# Patient Record
Sex: FEMALE | Race: WHITE | Employment: OTHER | URBAN - METROPOLITAN AREA
[De-identification: names, ages, dates, MRNs, and addresses within clinical notes are randomized per-mention and may not be internally consistent; named-entity substitution may affect disease eponyms.]

---

## 2017-08-08 PROBLEM — F41.9 ANXIETY: Status: ACTIVE | Noted: 2017-08-08

## 2017-08-08 PROBLEM — Z72.0 TOBACCO USE: Status: ACTIVE | Noted: 2017-08-08

## 2017-08-08 PROBLEM — F32.A DEPRESSION: Status: ACTIVE | Noted: 2017-08-08

## 2017-08-08 PROBLEM — E78.2 MIXED HYPERLIPIDEMIA: Status: ACTIVE | Noted: 2017-08-08

## 2017-10-16 PROBLEM — L50.8 URTICARIA, CHRONIC: Status: ACTIVE | Noted: 2017-10-16

## 2018-04-16 ENCOUNTER — HOSPITAL ENCOUNTER (OUTPATIENT)
Age: 58
Discharge: HOME | End: 2018-04-18
Payer: COMMERCIAL

## 2018-04-16 ENCOUNTER — OFFICE VISIT (OUTPATIENT)
Dept: FAMILY MEDICINE CLINIC | Age: 58
End: 2018-04-16
Payer: COMMERCIAL

## 2018-04-16 VITALS
WEIGHT: 150 LBS | TEMPERATURE: 98.4 F | SYSTOLIC BLOOD PRESSURE: 90 MMHG | OXYGEN SATURATION: 96 % | BODY MASS INDEX: 24.99 KG/M2 | HEART RATE: 87 BPM | HEIGHT: 65 IN | DIASTOLIC BLOOD PRESSURE: 68 MMHG

## 2018-04-16 DIAGNOSIS — Z23 NEED FOR 23-POLYVALENT PNEUMOCOCCAL POLYSACCHARIDE VACCINE: ICD-10-CM

## 2018-04-16 DIAGNOSIS — Z72.0 TOBACCO USE: ICD-10-CM

## 2018-04-16 DIAGNOSIS — E78.2 MIXED HYPERLIPIDEMIA: ICD-10-CM

## 2018-04-16 DIAGNOSIS — Z12.31 SCREENING MAMMOGRAM, ENCOUNTER FOR: ICD-10-CM

## 2018-04-16 DIAGNOSIS — F41.9 ANXIETY: ICD-10-CM

## 2018-04-16 DIAGNOSIS — F32.A DEPRESSION, UNSPECIFIED DEPRESSION TYPE: Primary | ICD-10-CM

## 2018-04-16 LAB
ALBUMIN SERPL-MCNC: 4.3 G/DL (ref 3.5–5.2)
ALP BLD-CCNC: 68 U/L (ref 35–104)
ALT SERPL-CCNC: 13 U/L (ref 0–32)
ANION GAP SERPL CALCULATED.3IONS-SCNC: 15 MMOL/L (ref 7–16)
AST SERPL-CCNC: 21 U/L (ref 0–31)
BILIRUB SERPL-MCNC: 0.4 MG/DL (ref 0–1.2)
BILIRUBIN DIRECT: <0.2 MG/DL (ref 0–0.3)
BILIRUBIN, INDIRECT: NORMAL MG/DL (ref 0–1)
BUN BLDV-MCNC: 8 MG/DL (ref 6–20)
CALCIUM SERPL-MCNC: 9.1 MG/DL (ref 8.6–10.2)
CHLORIDE BLD-SCNC: 105 MMOL/L (ref 98–107)
CHOLESTEROL, TOTAL: 166 MG/DL (ref 0–199)
CO2: 25 MMOL/L (ref 22–29)
CREAT SERPL-MCNC: 0.9 MG/DL (ref 0.5–1)
GFR AFRICAN AMERICAN: >60
GFR NON-AFRICAN AMERICAN: >60 ML/MIN/1.73
GLUCOSE BLD-MCNC: 82 MG/DL (ref 74–109)
HDLC SERPL-MCNC: 61 MG/DL
LDL CHOLESTEROL CALCULATED: 83 MG/DL (ref 0–99)
POTASSIUM SERPL-SCNC: 4.8 MMOL/L (ref 3.5–5)
SODIUM BLD-SCNC: 145 MMOL/L (ref 132–146)
TOTAL PROTEIN: 6.6 G/DL (ref 6.4–8.3)
TRIGL SERPL-MCNC: 110 MG/DL (ref 0–149)
VLDLC SERPL CALC-MCNC: 22 MG/DL

## 2018-04-16 PROCEDURE — 80076 HEPATIC FUNCTION PANEL: CPT

## 2018-04-16 PROCEDURE — 3017F COLORECTAL CA SCREEN DOC REV: CPT | Performed by: FAMILY MEDICINE

## 2018-04-16 PROCEDURE — 90732 PPSV23 VACC 2 YRS+ SUBQ/IM: CPT | Performed by: FAMILY MEDICINE

## 2018-04-16 PROCEDURE — 3014F SCREEN MAMMO DOC REV: CPT | Performed by: FAMILY MEDICINE

## 2018-04-16 PROCEDURE — 4004F PT TOBACCO SCREEN RCVD TLK: CPT | Performed by: FAMILY MEDICINE

## 2018-04-16 PROCEDURE — 80048 BASIC METABOLIC PNL TOTAL CA: CPT

## 2018-04-16 PROCEDURE — 80061 LIPID PANEL: CPT

## 2018-04-16 PROCEDURE — 90471 IMMUNIZATION ADMIN: CPT | Performed by: FAMILY MEDICINE

## 2018-04-16 PROCEDURE — G8427 DOCREV CUR MEDS BY ELIG CLIN: HCPCS | Performed by: FAMILY MEDICINE

## 2018-04-16 PROCEDURE — G8420 CALC BMI NORM PARAMETERS: HCPCS | Performed by: FAMILY MEDICINE

## 2018-04-16 PROCEDURE — 99213 OFFICE O/P EST LOW 20 MIN: CPT | Performed by: FAMILY MEDICINE

## 2018-04-16 PROCEDURE — 36415 COLL VENOUS BLD VENIPUNCTURE: CPT | Performed by: FAMILY MEDICINE

## 2018-04-16 RX ORDER — FLUOXETINE HYDROCHLORIDE 20 MG/1
60 CAPSULE ORAL DAILY
COMMUNITY
End: 2019-05-28

## 2018-04-16 RX ORDER — TRAZODONE HYDROCHLORIDE 50 MG/1
50 TABLET ORAL NIGHTLY
COMMUNITY
End: 2019-10-18 | Stop reason: SDUPTHER

## 2018-04-16 RX ORDER — FLUOXETINE HYDROCHLORIDE 20 MG/1
20 CAPSULE ORAL DAILY
COMMUNITY
End: 2018-04-16 | Stop reason: ALTCHOICE

## 2018-04-17 DIAGNOSIS — E78.2 MIXED HYPERLIPIDEMIA: ICD-10-CM

## 2018-04-17 RX ORDER — ATORVASTATIN CALCIUM 20 MG/1
TABLET, FILM COATED ORAL
Qty: 90 TABLET | Refills: 0 | Status: SHIPPED | OUTPATIENT
Start: 2018-04-17 | End: 2018-10-15 | Stop reason: SDUPTHER

## 2018-04-18 ENCOUNTER — TELEPHONE (OUTPATIENT)
Dept: FAMILY MEDICINE CLINIC | Age: 58
End: 2018-04-18

## 2018-05-09 ENCOUNTER — HOSPITAL ENCOUNTER (OUTPATIENT)
Dept: MAMMOGRAPHY | Age: 58
Discharge: HOME | End: 2018-05-11
Payer: COMMERCIAL

## 2018-05-09 DIAGNOSIS — Z12.31 SCREENING MAMMOGRAM, ENCOUNTER FOR: ICD-10-CM

## 2018-05-09 PROCEDURE — 77067 SCR MAMMO BI INCL CAD: CPT

## 2018-10-15 ENCOUNTER — HOSPITAL ENCOUNTER (OUTPATIENT)
Age: 58
Discharge: HOME | End: 2018-10-17
Payer: COMMERCIAL

## 2018-10-15 ENCOUNTER — OFFICE VISIT (OUTPATIENT)
Dept: FAMILY MEDICINE CLINIC | Age: 58
End: 2018-10-15
Payer: COMMERCIAL

## 2018-10-15 ENCOUNTER — TELEPHONE (OUTPATIENT)
Dept: FAMILY MEDICINE CLINIC | Age: 58
End: 2018-10-15

## 2018-10-15 VITALS
WEIGHT: 149 LBS | BODY MASS INDEX: 24.83 KG/M2 | OXYGEN SATURATION: 97 % | HEART RATE: 99 BPM | DIASTOLIC BLOOD PRESSURE: 62 MMHG | HEIGHT: 65 IN | TEMPERATURE: 98.7 F | SYSTOLIC BLOOD PRESSURE: 90 MMHG

## 2018-10-15 DIAGNOSIS — F41.9 ANXIETY: Primary | ICD-10-CM

## 2018-10-15 DIAGNOSIS — E78.2 MIXED HYPERLIPIDEMIA: ICD-10-CM

## 2018-10-15 DIAGNOSIS — Z72.0 TOBACCO USE: ICD-10-CM

## 2018-10-15 DIAGNOSIS — F32.9 REACTIVE DEPRESSION: ICD-10-CM

## 2018-10-15 DIAGNOSIS — Z13.31 POSITIVE DEPRESSION SCREENING: ICD-10-CM

## 2018-10-15 DIAGNOSIS — F32.A DEPRESSION, UNSPECIFIED DEPRESSION TYPE: ICD-10-CM

## 2018-10-15 DIAGNOSIS — F32.A DEPRESSION, UNSPECIFIED DEPRESSION TYPE: Primary | ICD-10-CM

## 2018-10-15 LAB
ALBUMIN SERPL-MCNC: 4.7 G/DL (ref 3.5–5.2)
ALP BLD-CCNC: 74 U/L (ref 35–104)
ALT SERPL-CCNC: 16 U/L (ref 0–32)
ANION GAP SERPL CALCULATED.3IONS-SCNC: 15 MMOL/L (ref 7–16)
AST SERPL-CCNC: 21 U/L (ref 0–31)
BILIRUB SERPL-MCNC: 0.4 MG/DL (ref 0–1.2)
BILIRUBIN DIRECT: <0.2 MG/DL (ref 0–0.3)
BILIRUBIN, INDIRECT: NORMAL MG/DL (ref 0–1)
BUN BLDV-MCNC: 11 MG/DL (ref 6–20)
CALCIUM SERPL-MCNC: 9.5 MG/DL (ref 8.6–10.2)
CHLORIDE BLD-SCNC: 103 MMOL/L (ref 98–107)
CHOLESTEROL, TOTAL: 177 MG/DL (ref 0–199)
CO2: 24 MMOL/L (ref 22–29)
CREAT SERPL-MCNC: 0.9 MG/DL (ref 0.5–1)
GFR AFRICAN AMERICAN: >60
GFR NON-AFRICAN AMERICAN: >60 ML/MIN/1.73
GLUCOSE BLD-MCNC: 85 MG/DL (ref 74–109)
HDLC SERPL-MCNC: 72 MG/DL
LDL CHOLESTEROL CALCULATED: 88 MG/DL (ref 0–99)
POTASSIUM SERPL-SCNC: 4.7 MMOL/L (ref 3.5–5)
SODIUM BLD-SCNC: 142 MMOL/L (ref 132–146)
TOTAL PROTEIN: 7.2 G/DL (ref 6.4–8.3)
TRIGL SERPL-MCNC: 85 MG/DL (ref 0–149)
VLDLC SERPL CALC-MCNC: 17 MG/DL

## 2018-10-15 PROCEDURE — 4004F PT TOBACCO SCREEN RCVD TLK: CPT | Performed by: FAMILY MEDICINE

## 2018-10-15 PROCEDURE — G0444 DEPRESSION SCREEN ANNUAL: HCPCS | Performed by: FAMILY MEDICINE

## 2018-10-15 PROCEDURE — 99213 OFFICE O/P EST LOW 20 MIN: CPT | Performed by: FAMILY MEDICINE

## 2018-10-15 PROCEDURE — G8427 DOCREV CUR MEDS BY ELIG CLIN: HCPCS | Performed by: FAMILY MEDICINE

## 2018-10-15 PROCEDURE — 80061 LIPID PANEL: CPT

## 2018-10-15 PROCEDURE — 36415 COLL VENOUS BLD VENIPUNCTURE: CPT | Performed by: FAMILY MEDICINE

## 2018-10-15 PROCEDURE — G8420 CALC BMI NORM PARAMETERS: HCPCS | Performed by: FAMILY MEDICINE

## 2018-10-15 PROCEDURE — 3017F COLORECTAL CA SCREEN DOC REV: CPT | Performed by: FAMILY MEDICINE

## 2018-10-15 PROCEDURE — 80076 HEPATIC FUNCTION PANEL: CPT

## 2018-10-15 PROCEDURE — 80048 BASIC METABOLIC PNL TOTAL CA: CPT

## 2018-10-15 PROCEDURE — G8484 FLU IMMUNIZE NO ADMIN: HCPCS | Performed by: FAMILY MEDICINE

## 2018-10-15 PROCEDURE — G8431 POS CLIN DEPRES SCRN F/U DOC: HCPCS | Performed by: FAMILY MEDICINE

## 2018-10-15 RX ORDER — ATORVASTATIN CALCIUM 20 MG/1
TABLET, FILM COATED ORAL
Qty: 90 TABLET | Refills: 0 | Status: SHIPPED | OUTPATIENT
Start: 2018-10-15 | End: 2019-01-13 | Stop reason: SDUPTHER

## 2018-10-15 ASSESSMENT — PATIENT HEALTH QUESTIONNAIRE - PHQ9
1. LITTLE INTEREST OR PLEASURE IN DOING THINGS: 3
8. MOVING OR SPEAKING SO SLOWLY THAT OTHER PEOPLE COULD HAVE NOTICED. OR THE OPPOSITE, BEING SO FIGETY OR RESTLESS THAT YOU HAVE BEEN MOVING AROUND A LOT MORE THAN USUAL: 1
2. FEELING DOWN, DEPRESSED OR HOPELESS: 3
10. IF YOU CHECKED OFF ANY PROBLEMS, HOW DIFFICULT HAVE THESE PROBLEMS MADE IT FOR YOU TO DO YOUR WORK, TAKE CARE OF THINGS AT HOME, OR GET ALONG WITH OTHER PEOPLE: 1
SUM OF ALL RESPONSES TO PHQ QUESTIONS 1-9: 21
6. FEELING BAD ABOUT YOURSELF - OR THAT YOU ARE A FAILURE OR HAVE LET YOURSELF OR YOUR FAMILY DOWN: 2
3. TROUBLE FALLING OR STAYING ASLEEP: 3
SUM OF ALL RESPONSES TO PHQ9 QUESTIONS 1 & 2: 6
7. TROUBLE CONCENTRATING ON THINGS, SUCH AS READING THE NEWSPAPER OR WATCHING TELEVISION: 2
9. THOUGHTS THAT YOU WOULD BE BETTER OFF DEAD, OR OF HURTING YOURSELF: 1
4. FEELING TIRED OR HAVING LITTLE ENERGY: 3
5. POOR APPETITE OR OVEREATING: 3
SUM OF ALL RESPONSES TO PHQ QUESTIONS 1-9: 21

## 2018-10-15 NOTE — PROGRESS NOTES
stools  Genito-Urinary ROS: no dysuria, trouble voiding, or hematuria      Physical Exam   BP 90/62 (Site: Right Upper Arm, Position: Sitting, Cuff Size: Medium Adult)   Pulse 99   Temp 98.7 °F (37.1 °C) (Oral)   Ht 5' 5\" (1.651 m)   Wt 149 lb (67.6 kg)   SpO2 97%   BMI 24.79 kg/m²    Physical Examination: General appearance - alert, well appearing, and in no distress  Mental status - normal mood, behavior and thought processes  Ears - bilateral TM's and external ear canals were clear with no injection of the TM's or the canals   Nose - normal and patent, no erythema, no nasal discharge   Mouth - mucous membranes moist, pharynx was not injected and was without lesions  Neck - supple, no palpable adenopathy  Chest - clear to auscultation, no wheezes, rales or rhonchi, symmetric air entry  Heart - normal rate and regular rhythm without ectopy     Margy was seen today for follow-up. Diagnoses and all orders for this visit:    Depression, unspecified depression type  She has an appointment with her counselor in one week    Mixed hyperlipidemia  -     atorvastatin (LIPITOR) 20 MG tablet; take 1 tablet by mouth once daily  -     Basic Metabolic Panel; Future  -     Hepatic Function Panel; Future  -     Lipid Panel; Future    Tobacco use  Encouraged her to quit tobacco use    Positive depression screening  -     Positive Screen for Clinical Depression with a Documented Follow-up Plan         She denies any suicidal ideation and states she would seek E.R care if she was having any suicidal thoughts . Ynes Garcia D.O. On the basis of positive PHQ-9 screening (PHQ-9 Total Score: 21), the following plan was implemented: patient has an appointment in one week.   Patient will follow-up in 1 week(s) with psychologist.

## 2018-10-15 NOTE — TELEPHONE ENCOUNTER
Pt called asking if there was any lab test that she could have done for a chemical imbalance since she has anxiety, depression and anxiety attacks?

## 2018-10-19 ENCOUNTER — NURSE ONLY (OUTPATIENT)
Dept: FAMILY MEDICINE CLINIC | Age: 58
End: 2018-10-19
Payer: COMMERCIAL

## 2018-10-19 ENCOUNTER — HOSPITAL ENCOUNTER (OUTPATIENT)
Age: 58
Discharge: HOME | End: 2018-10-21
Payer: COMMERCIAL

## 2018-10-19 DIAGNOSIS — F32.A DEPRESSION, UNSPECIFIED DEPRESSION TYPE: Primary | ICD-10-CM

## 2018-10-19 DIAGNOSIS — F32.A DEPRESSION, UNSPECIFIED DEPRESSION TYPE: ICD-10-CM

## 2018-10-19 LAB
FOLATE: 6.9 NG/ML (ref 4.8–24.2)
HCT VFR BLD CALC: 42.6 % (ref 34–48)
HEMOGLOBIN: 13.5 G/DL (ref 11.5–15.5)
MCH RBC QN AUTO: 30 PG (ref 26–35)
MCHC RBC AUTO-ENTMCNC: 31.7 % (ref 32–34.5)
MCV RBC AUTO: 94.7 FL (ref 80–99.9)
PDW BLD-RTO: 14.9 FL (ref 11.5–15)
PLATELET # BLD: 174 E9/L (ref 130–450)
PMV BLD AUTO: 9.8 FL (ref 7–12)
RBC # BLD: 4.5 E12/L (ref 3.5–5.5)
TSH SERPL DL<=0.05 MIU/L-ACNC: 3.02 UIU/ML (ref 0.27–4.2)
VITAMIN B-12: 557 PG/ML (ref 211–946)
VITAMIN D 25-HYDROXY: 32 NG/ML (ref 30–100)
WBC # BLD: 9.7 E9/L (ref 4.5–11.5)

## 2018-10-19 PROCEDURE — 85027 COMPLETE CBC AUTOMATED: CPT

## 2018-10-19 PROCEDURE — 84443 ASSAY THYROID STIM HORMONE: CPT

## 2018-10-19 PROCEDURE — 36415 COLL VENOUS BLD VENIPUNCTURE: CPT | Performed by: FAMILY MEDICINE

## 2018-10-19 PROCEDURE — 82306 VITAMIN D 25 HYDROXY: CPT

## 2018-10-19 PROCEDURE — 82607 VITAMIN B-12: CPT

## 2018-10-19 PROCEDURE — 82746 ASSAY OF FOLIC ACID SERUM: CPT

## 2019-01-13 DIAGNOSIS — E78.2 MIXED HYPERLIPIDEMIA: ICD-10-CM

## 2019-01-14 RX ORDER — ATORVASTATIN CALCIUM 20 MG/1
TABLET, FILM COATED ORAL
Qty: 90 TABLET | Refills: 0 | Status: SHIPPED | OUTPATIENT
Start: 2019-01-14 | End: 2019-04-15 | Stop reason: SDUPTHER

## 2019-04-15 DIAGNOSIS — E78.2 MIXED HYPERLIPIDEMIA: ICD-10-CM

## 2019-04-15 RX ORDER — ATORVASTATIN CALCIUM 20 MG/1
TABLET, FILM COATED ORAL
Qty: 90 TABLET | Refills: 0 | Status: SHIPPED | OUTPATIENT
Start: 2019-04-15 | End: 2019-07-17 | Stop reason: SDUPTHER

## 2019-05-16 ENCOUNTER — HOSPITAL ENCOUNTER (EMERGENCY)
Age: 59
Discharge: HOME | End: 2019-05-16
Attending: EMERGENCY MEDICINE
Payer: COMMERCIAL

## 2019-05-16 ENCOUNTER — APPOINTMENT (OUTPATIENT)
Dept: GENERAL RADIOLOGY | Age: 59
End: 2019-05-16
Payer: COMMERCIAL

## 2019-05-16 VITALS
HEART RATE: 98 BPM | HEIGHT: 66 IN | RESPIRATION RATE: 16 BRPM | OXYGEN SATURATION: 98 % | TEMPERATURE: 97.6 F | BODY MASS INDEX: 25.71 KG/M2 | SYSTOLIC BLOOD PRESSURE: 102 MMHG | DIASTOLIC BLOOD PRESSURE: 66 MMHG | WEIGHT: 160 LBS

## 2019-05-16 DIAGNOSIS — F41.0 PANIC ATTACK: ICD-10-CM

## 2019-05-16 DIAGNOSIS — R06.02 SHORTNESS OF BREATH: ICD-10-CM

## 2019-05-16 DIAGNOSIS — R07.89 ATYPICAL CHEST PAIN: Primary | ICD-10-CM

## 2019-05-16 LAB
ALBUMIN SERPL-MCNC: 4.8 G/DL (ref 3.5–5.2)
ALP BLD-CCNC: 91 U/L (ref 35–104)
ALT SERPL-CCNC: 28 U/L (ref 0–32)
ANION GAP SERPL CALCULATED.3IONS-SCNC: 20 MMOL/L (ref 7–16)
AST SERPL-CCNC: 22 U/L (ref 0–31)
BASOPHILS ABSOLUTE: 0.06 E9/L (ref 0–0.2)
BASOPHILS RELATIVE PERCENT: 0.7 % (ref 0–2)
BILIRUB SERPL-MCNC: 0.2 MG/DL (ref 0–1.2)
BUN BLDV-MCNC: 10 MG/DL (ref 6–20)
CALCIUM SERPL-MCNC: 9.9 MG/DL (ref 8.6–10.2)
CHLORIDE BLD-SCNC: 95 MMOL/L (ref 98–107)
CO2: 19 MMOL/L (ref 22–29)
CREAT SERPL-MCNC: 0.9 MG/DL (ref 0.5–1)
D DIMER: 242 NG/ML DDU
EKG ATRIAL RATE: 98 BPM
EKG P AXIS: 62 DEGREES
EKG P-R INTERVAL: 154 MS
EKG Q-T INTERVAL: 364 MS
EKG QRS DURATION: 76 MS
EKG QTC CALCULATION (BAZETT): 464 MS
EKG R AXIS: 67 DEGREES
EKG T AXIS: 49 DEGREES
EKG VENTRICULAR RATE: 98 BPM
EOSINOPHILS ABSOLUTE: 0.3 E9/L (ref 0.05–0.5)
EOSINOPHILS RELATIVE PERCENT: 3.5 % (ref 0–6)
GFR AFRICAN AMERICAN: >60
GFR NON-AFRICAN AMERICAN: >60 ML/MIN/1.73
GLUCOSE BLD-MCNC: 102 MG/DL (ref 74–99)
HCT VFR BLD CALC: 43 % (ref 34–48)
HEMOGLOBIN: 14.6 G/DL (ref 11.5–15.5)
IMMATURE GRANULOCYTES #: 0.01 E9/L
IMMATURE GRANULOCYTES %: 0.1 % (ref 0–5)
LACTIC ACID: 1.7 MMOL/L (ref 0.5–2.2)
LACTIC ACID: 5.7 MMOL/L (ref 0.5–2.2)
LIPASE: 49 U/L (ref 13–60)
LYMPHOCYTES ABSOLUTE: 2.63 E9/L (ref 1.5–4)
LYMPHOCYTES RELATIVE PERCENT: 30.8 % (ref 20–42)
MAGNESIUM: 2.1 MG/DL (ref 1.6–2.6)
MCH RBC QN AUTO: 30.4 PG (ref 26–35)
MCHC RBC AUTO-ENTMCNC: 34 % (ref 32–34.5)
MCV RBC AUTO: 89.4 FL (ref 80–99.9)
MONOCYTES ABSOLUTE: 0.75 E9/L (ref 0.1–0.95)
MONOCYTES RELATIVE PERCENT: 8.8 % (ref 2–12)
NEUTROPHILS ABSOLUTE: 4.79 E9/L (ref 1.8–7.3)
NEUTROPHILS RELATIVE PERCENT: 56.1 % (ref 43–80)
PDW BLD-RTO: 13.5 FL (ref 11.5–15)
PLATELET # BLD: 175 E9/L (ref 130–450)
PMV BLD AUTO: 9.7 FL (ref 7–12)
POTASSIUM SERPL-SCNC: 3.3 MMOL/L (ref 3.5–5)
PRO-BNP: 41 PG/ML (ref 0–125)
RBC # BLD: 4.81 E12/L (ref 3.5–5.5)
SODIUM BLD-SCNC: 134 MMOL/L (ref 132–146)
TOTAL PROTEIN: 7.3 G/DL (ref 6.4–8.3)
TROPONIN: <0.01 NG/ML (ref 0–0.03)
TROPONIN: <0.01 NG/ML (ref 0–0.03)
WBC # BLD: 8.5 E9/L (ref 4.5–11.5)

## 2019-05-16 PROCEDURE — 83735 ASSAY OF MAGNESIUM: CPT

## 2019-05-16 PROCEDURE — 96374 THER/PROPH/DIAG INJ IV PUSH: CPT

## 2019-05-16 PROCEDURE — 84484 ASSAY OF TROPONIN QUANT: CPT

## 2019-05-16 PROCEDURE — 94640 AIRWAY INHALATION TREATMENT: CPT

## 2019-05-16 PROCEDURE — 83605 ASSAY OF LACTIC ACID: CPT

## 2019-05-16 PROCEDURE — 85025 COMPLETE CBC W/AUTO DIFF WBC: CPT

## 2019-05-16 PROCEDURE — 6370000000 HC RX 637 (ALT 250 FOR IP)

## 2019-05-16 PROCEDURE — 80053 COMPREHEN METABOLIC PANEL: CPT

## 2019-05-16 PROCEDURE — 94664 DEMO&/EVAL PT USE INHALER: CPT

## 2019-05-16 PROCEDURE — 6360000002 HC RX W HCPCS: Performed by: EMERGENCY MEDICINE

## 2019-05-16 PROCEDURE — 96375 TX/PRO/DX INJ NEW DRUG ADDON: CPT

## 2019-05-16 PROCEDURE — 99285 EMERGENCY DEPT VISIT HI MDM: CPT

## 2019-05-16 PROCEDURE — 6370000000 HC RX 637 (ALT 250 FOR IP): Performed by: EMERGENCY MEDICINE

## 2019-05-16 PROCEDURE — 85378 FIBRIN DEGRADE SEMIQUANT: CPT

## 2019-05-16 PROCEDURE — 83880 ASSAY OF NATRIURETIC PEPTIDE: CPT

## 2019-05-16 PROCEDURE — 2580000003 HC RX 258: Performed by: EMERGENCY MEDICINE

## 2019-05-16 PROCEDURE — 83690 ASSAY OF LIPASE: CPT

## 2019-05-16 PROCEDURE — 93010 ELECTROCARDIOGRAM REPORT: CPT | Performed by: INTERNAL MEDICINE

## 2019-05-16 PROCEDURE — 93005 ELECTROCARDIOGRAM TRACING: CPT | Performed by: NURSE PRACTITIONER

## 2019-05-16 PROCEDURE — 93005 ELECTROCARDIOGRAM TRACING: CPT | Performed by: EMERGENCY MEDICINE

## 2019-05-16 PROCEDURE — 71045 X-RAY EXAM CHEST 1 VIEW: CPT

## 2019-05-16 RX ORDER — ONDANSETRON 4 MG/1
TABLET, ORALLY DISINTEGRATING ORAL
Status: COMPLETED
Start: 2019-05-16 | End: 2019-05-16

## 2019-05-16 RX ORDER — FENTANYL CITRATE 50 UG/ML
50 INJECTION, SOLUTION INTRAMUSCULAR; INTRAVENOUS ONCE
Status: COMPLETED | OUTPATIENT
Start: 2019-05-16 | End: 2019-05-16

## 2019-05-16 RX ORDER — ASPIRIN 325 MG
325 TABLET ORAL ONCE
Status: DISCONTINUED | OUTPATIENT
Start: 2019-05-16 | End: 2019-05-16 | Stop reason: HOSPADM

## 2019-05-16 RX ORDER — LORAZEPAM 2 MG/ML
1 INJECTION INTRAMUSCULAR ONCE
Status: COMPLETED | OUTPATIENT
Start: 2019-05-16 | End: 2019-05-16

## 2019-05-16 RX ORDER — IPRATROPIUM BROMIDE AND ALBUTEROL SULFATE 2.5; .5 MG/3ML; MG/3ML
1 SOLUTION RESPIRATORY (INHALATION)
Status: COMPLETED | OUTPATIENT
Start: 2019-05-16 | End: 2019-05-16

## 2019-05-16 RX ORDER — NITROGLYCERIN 0.4 MG/1
0.4 TABLET SUBLINGUAL EVERY 5 MIN PRN
Status: DISCONTINUED | OUTPATIENT
Start: 2019-05-16 | End: 2019-05-16 | Stop reason: HOSPADM

## 2019-05-16 RX ORDER — ONDANSETRON 4 MG/1
4 TABLET, ORALLY DISINTEGRATING ORAL ONCE
Status: COMPLETED | OUTPATIENT
Start: 2019-05-16 | End: 2019-05-16

## 2019-05-16 RX ORDER — 0.9 % SODIUM CHLORIDE 0.9 %
1000 INTRAVENOUS SOLUTION INTRAVENOUS ONCE
Status: DISCONTINUED | OUTPATIENT
Start: 2019-05-16 | End: 2019-05-16 | Stop reason: HOSPADM

## 2019-05-16 RX ADMIN — NITROGLYCERIN 0.4 MG: 0.4 TABLET, ORALLY DISINTEGRATING SUBLINGUAL at 11:55

## 2019-05-16 RX ADMIN — ONDANSETRON 4 MG: 4 TABLET, ORALLY DISINTEGRATING ORAL at 11:59

## 2019-05-16 RX ADMIN — IPRATROPIUM BROMIDE AND ALBUTEROL SULFATE 1 AMPULE: .5; 3 SOLUTION RESPIRATORY (INHALATION) at 14:51

## 2019-05-16 RX ADMIN — LORAZEPAM 1 MG: 2 INJECTION INTRAMUSCULAR; INTRAVENOUS at 13:15

## 2019-05-16 RX ADMIN — LORAZEPAM 1 MG: 2 INJECTION INTRAMUSCULAR; INTRAVENOUS at 11:55

## 2019-05-16 RX ADMIN — FENTANYL CITRATE 50 MCG: 50 INJECTION, SOLUTION INTRAMUSCULAR; INTRAVENOUS at 15:14

## 2019-05-16 RX ADMIN — IPRATROPIUM BROMIDE AND ALBUTEROL SULFATE 1 AMPULE: .5; 3 SOLUTION RESPIRATORY (INHALATION) at 14:52

## 2019-05-16 RX ADMIN — IPRATROPIUM BROMIDE AND ALBUTEROL SULFATE 1 AMPULE: .5; 3 SOLUTION RESPIRATORY (INHALATION) at 14:50

## 2019-05-16 ASSESSMENT — PAIN DESCRIPTION - PAIN TYPE: TYPE: ACUTE PAIN

## 2019-05-16 ASSESSMENT — PAIN SCALES - GENERAL
PAINLEVEL_OUTOF10: 7
PAINLEVEL_OUTOF10: 5
PAINLEVEL_OUTOF10: 7

## 2019-05-16 ASSESSMENT — PAIN DESCRIPTION - LOCATION
LOCATION: CHEST
LOCATION: CHEST

## 2019-05-16 ASSESSMENT — PAIN DESCRIPTION - ORIENTATION: ORIENTATION: MID

## 2019-05-16 NOTE — ED PROVIDER NOTES
Department of Emergency Medicine   ED  Provider Note  Admit Date/RoomTime: 2019 11:19 AM  ED Room: 15/15          History of Present Illness:  19, Time: 11:25 PM         Kirsten Pena is a 63 y.o. female presenting to the ED for chest pain, beginning 1 day ago. The complaint has been persistent, moderate in severity, and worsened by nothing. Patient reports that her symptoms started about a week ago with fatigue. Since then, she had been off balance and falling. Yesterday, she developed chest pain that she described as sternal and dull. Patient states that she has been short of breath this morning. She has history of anxiety. Pt is a smoker. Maternal history of MI. Pt denies GI symptoms, abdominal pain, urinary symptoms, headache, dizziness, changes in vision, cold symptoms, back pain, neck pain, extremity pain, weakness, numbness, tingling or any other symptoms at this time. Review of Systems:   Pertinent positives and negatives are stated within HPI, all other systems reviewed and are negative.      --------------------------------------------- PAST HISTORY ---------------------------------------------  Past Medical History:  has a past medical history of Anxiety and Depression. Past Surgical History:  has a past surgical history that includes  section; Carpal tunnel release (Bilateral); and cervical fusion. Social History:  reports that she has been smoking cigarettes. She has a 42.00 pack-year smoking history. She has never used smokeless tobacco. She reports that she does not drink alcohol or use drugs. Family History: family history includes Diabetes in her mother; Heart Disease in her mother. The patients home medications have been reviewed. Allergies: Prilosec [omeprazole];  Naproxen; Trintellix [vortioxetine]; and Zoloft [sertraline hcl]      ---------------------------------------------------PHYSICAL EXAM--------------------------------------    Constitutional/General: Alert and oriented x3, well appearing, non toxic in NAD  Head: Normocephalic and atraumatic  Eyes: PERRL, EOMI  Neck: Supple, full ROM   Pulmonary: Lungs clear to auscultation bilaterally,  Cardiovascular:  Regular rate. Regular rhythm. No murmurs, gallops, or rubs. 2+ distal pulses  Chest: no chest wall tenderness  Abdomen: Soft. Non tender. Non distended. +BS. No rebound, guarding, or rigidity. No pulsatile masses appreciated. Musculoskeletal: Moves all extremities x 4. Warm and well perfused, no clubbing, cyanosis, or edema. Capillary refill <3 seconds  Skin: warm and dry. No rashes. Neurologic: GCS 15, CN II-XII grossly intact, no focal deficits  Psych: Anxious Affect    -------------------------------------------------- RESULTS -------------------------------------------------  I have personally reviewed all laboratory and imaging results for this patient. Results are listed below.      LABS:  Results for orders placed or performed during the hospital encounter of 05/16/19   CBC Auto Differential   Result Value Ref Range    WBC 8.5 4.5 - 11.5 E9/L    RBC 4.81 3.50 - 5.50 E12/L    Hemoglobin 14.6 11.5 - 15.5 g/dL    Hematocrit 43.0 34.0 - 48.0 %    MCV 89.4 80.0 - 99.9 fL    MCH 30.4 26.0 - 35.0 pg    MCHC 34.0 32.0 - 34.5 %    RDW 13.5 11.5 - 15.0 fL    Platelets 162 379 - 081 E9/L    MPV 9.7 7.0 - 12.0 fL    Neutrophils % 56.1 43.0 - 80.0 %    Immature Granulocytes % 0.1 0.0 - 5.0 %    Lymphocytes % 30.8 20.0 - 42.0 %    Monocytes % 8.8 2.0 - 12.0 %    Eosinophils % 3.5 0.0 - 6.0 %    Basophils % 0.7 0.0 - 2.0 %    Neutrophils # 4.79 1.80 - 7.30 E9/L    Immature Granulocytes # 0.01 E9/L    Lymphocytes # 2.63 1.50 - 4.00 E9/L    Monocytes # 0.75 0.10 - 0.95 E9/L    Eosinophils # 0.30 0.05 - 0.50 E9/L    Basophils # 0.06 0.00 - 0.20 E9/L   Comprehensive Metabolic Panel   Result Value Ref Range    Sodium 134 132 - 146 mmol/L    Potassium 3.3 (L) 3.5 - 5.0 mmol/L    Chloride 95 (L) 98 - 107 mmol/L    CO2 19 (L) 22 - 29 mmol/L    Anion Gap 20 (H) 7 - 16 mmol/L    Glucose 102 (H) 74 - 99 mg/dL    BUN 10 6 - 20 mg/dL    CREATININE 0.9 0.5 - 1.0 mg/dL    GFR Non-African American >60 >=60 mL/min/1.73    GFR African American >60     Calcium 9.9 8.6 - 10.2 mg/dL    Total Protein 7.3 6.4 - 8.3 g/dL    Alb 4.8 3.5 - 5.2 g/dL    Total Bilirubin 0.2 0.0 - 1.2 mg/dL    Alkaline Phosphatase 91 35 - 104 U/L    ALT 28 0 - 32 U/L    AST 22 0 - 31 U/L   Troponin   Result Value Ref Range    Troponin <0.01 0.00 - 0.03 ng/mL   Brain Natriuretic Peptide   Result Value Ref Range    Pro-BNP 41 0 - 125 pg/mL   Magnesium   Result Value Ref Range    Magnesium 2.1 1.6 - 2.6 mg/dL   D-Dimer, Quantitative   Result Value Ref Range    D-Dimer, Quant 242 ng/mL DDU   Lipase   Result Value Ref Range    Lipase 49 13 - 60 U/L   Lactic Acid, Plasma   Result Value Ref Range    Lactic Acid 5.7 (HH) 0.5 - 2.2 mmol/L   Troponin   Result Value Ref Range    Troponin <0.01 0.00 - 0.03 ng/mL   Lactic acid, plasma   Result Value Ref Range    Lactic Acid 1.7 0.5 - 2.2 mmol/L   EKG 12 Lead   Result Value Ref Range    Ventricular Rate 98 BPM    Atrial Rate 98 BPM    P-R Interval 154 ms    QRS Duration 76 ms    Q-T Interval 364 ms    QTc Calculation (Bazett) 464 ms    P Axis 62 degrees    R Axis 67 degrees    T Axis 49 degrees       RADIOLOGY:  Interpreted by Radiologist.  XR CHEST PORTABLE   Final Result   1. Indeterminate medial right lung base airspace disease possibly   reflective of atelectasis/scarring, although an early developing   infiltrate/pneumonia might have the same appearance. 2. Vascular calcifications thoracic aorta. EKG:  This EKG is signed and interpreted by the EP. Time: 11:17  Rate: 98  Rhythm: NSR  Interpretation: ST elevation in aVL. No reciprocal changes.   Comparison: no previous EKG available      ------------------------- NURSING NOTES AND VITALS REVIEWED ---------------------------   The nursing notes within the ED encounter and vital signs as below have been reviewed by myself. BP 96/64   Pulse 98   Temp 97.6 °F (36.4 °C) (Oral)   Resp 16   Ht 5' 6\" (1.676 m)   Wt 160 lb (72.6 kg)   SpO2 98%   BMI 25.82 kg/m²   Oxygen Saturation Interpretation: Normal    The patients available past medical records and past encounters were reviewed. ------------------------------ ED COURSE/MEDICAL DECISION MAKING----------------------  Medications   aspirin tablet 325 mg (325 mg Oral Not Given 5/16/19 1155)   nitroGLYCERIN (NITROSTAT) SL tablet 0.4 mg (0.4 mg Sublingual Given 5/16/19 1155)   ondansetron (ZOFRAN-ODT) disintegrating tablet 4 mg (4 mg Oral Given 5/16/19 1159)   LORazepam (ATIVAN) injection 1 mg (1 mg Intravenous Given 5/16/19 1155)   LORazepam (ATIVAN) injection 1 mg (1 mg Intravenous Given 5/16/19 1315)   0.9 % sodium chloride bolus (1,000 mLs Intravenous New Bag 5/16/19 1317)   ipratropium-albuterol (DUONEB) nebulizer solution 1 ampule (1 ampule Inhalation Given 5/16/19 1452)   fentaNYL (SUBLIMAZE) injection 50 mcg (50 mcg Intravenous Given 5/16/19 1514)       Medical Decision Making:      Patient presents for chest pain and dyspnea. She is hyperventilating and extremely anxious. She is grabbing at her hands and yelling out. She felt much better after 2 mg of Ativan. Nitro did not do much for her pain. EKG with some mild elevation in aVL but it does not meet STEMI criteria. No prior to compare to. Initial troponin negative and d-dimer negative. Chest x-ray with no overt pneumonia or pleural effusion. On reevaluation, patient resting comfortably and only having pain with coughing. I gave her a small dose of fentanyl and some duo nebs which relieved her pain totally. Advise that she still does have moderate risk for coronary artery disease and she should be admitted for cardiology consult and ACS rule out.  Patient did not want to be admitted as she has

## 2019-05-17 ENCOUNTER — TELEPHONE (OUTPATIENT)
Dept: FAMILY MEDICINE CLINIC | Age: 59
End: 2019-05-17

## 2019-05-17 LAB
EKG ATRIAL RATE: 72 BPM
EKG P AXIS: 72 DEGREES
EKG P-R INTERVAL: 176 MS
EKG Q-T INTERVAL: 404 MS
EKG QRS DURATION: 76 MS
EKG QTC CALCULATION (BAZETT): 442 MS
EKG R AXIS: 72 DEGREES
EKG T AXIS: 67 DEGREES
EKG VENTRICULAR RATE: 72 BPM

## 2019-05-17 PROCEDURE — 93010 ELECTROCARDIOGRAM REPORT: CPT | Performed by: INTERNAL MEDICINE

## 2019-05-28 ENCOUNTER — OFFICE VISIT (OUTPATIENT)
Dept: FAMILY MEDICINE CLINIC | Age: 59
End: 2019-05-28
Payer: COMMERCIAL

## 2019-05-28 ENCOUNTER — HOSPITAL ENCOUNTER (OUTPATIENT)
Age: 59
Discharge: HOME | End: 2019-05-30
Payer: COMMERCIAL

## 2019-05-28 VITALS
BODY MASS INDEX: 26.03 KG/M2 | OXYGEN SATURATION: 98 % | WEIGHT: 162 LBS | HEART RATE: 74 BPM | HEIGHT: 66 IN | TEMPERATURE: 97.4 F | RESPIRATION RATE: 15 BRPM

## 2019-05-28 DIAGNOSIS — E78.2 MIXED HYPERLIPIDEMIA: ICD-10-CM

## 2019-05-28 DIAGNOSIS — F32.A DEPRESSION, UNSPECIFIED DEPRESSION TYPE: ICD-10-CM

## 2019-05-28 DIAGNOSIS — Z72.0 TOBACCO USE: ICD-10-CM

## 2019-05-28 DIAGNOSIS — E87.6 HYPOKALEMIA: ICD-10-CM

## 2019-05-28 DIAGNOSIS — F41.0 PANIC ATTACKS: ICD-10-CM

## 2019-05-28 DIAGNOSIS — F41.9 ANXIETY: Primary | ICD-10-CM

## 2019-05-28 PROCEDURE — 99214 OFFICE O/P EST MOD 30 MIN: CPT | Performed by: FAMILY MEDICINE

## 2019-05-28 RX ORDER — VILAZODONE HYDROCHLORIDE 40 MG/1
40 TABLET ORAL DAILY
COMMUNITY
End: 2019-07-17

## 2019-05-28 ASSESSMENT — ENCOUNTER SYMPTOMS
BLOOD IN STOOL: 0
EYE PAIN: 0
SINUS PAIN: 0
SHORTNESS OF BREATH: 0
EYE DISCHARGE: 0
VOMITING: 0
DIARRHEA: 0
CHEST TIGHTNESS: 0
BACK PAIN: 0
NAUSEA: 0
PHOTOPHOBIA: 0
COUGH: 0
EYE REDNESS: 0
TROUBLE SWALLOWING: 0
ALLERGIC/IMMUNOLOGIC NEGATIVE: 1
ABDOMINAL PAIN: 0
SORE THROAT: 0

## 2019-05-28 NOTE — PROGRESS NOTES
19  Minal Chavez : 1960 Sex: female  Age: 63 y.o. Chief Complaint   Patient presents with    New Patient       Establish. Seen [facility] ER 2 weeks ago for panic attack. Extensive testing ruled out MI. All blood worjk and tests from that ER visit were reviewed. She continues with panic attacks which have rendered her unable to work. Currently seeing psych at [facility] for ongoing treatment      Review of Systems   Constitutional: Negative for appetite change, fatigue and unexpected weight change. HENT: Negative for congestion, ear pain, hearing loss, sinus pain, sore throat and trouble swallowing. Eyes: Negative for photophobia, pain, discharge and redness. Respiratory: Negative for cough, chest tightness and shortness of breath. Cardiovascular: Negative for chest pain, palpitations and leg swelling. Gastrointestinal: Negative for abdominal pain, blood in stool, diarrhea, nausea and vomiting. Endocrine: Negative. Genitourinary: Negative for dysuria, flank pain, frequency and hematuria. Musculoskeletal: Negative for arthralgias, back pain, joint swelling and myalgias. Skin: Negative. Allergic/Immunologic: Negative. Neurological: Negative for dizziness, seizures, syncope, weakness, light-headedness, numbness and headaches. Hematological: Negative for adenopathy. Does not bruise/bleed easily. Psychiatric/Behavioral: Positive for agitation. The patient is nervous/anxious.           Current Outpatient Medications:     vilazodone HCl (VILAZODONE HCL) 40 MG TABS, Take 40 mg by mouth daily 0.5 po qd, Disp: , Rfl:     atorvastatin (LIPITOR) 20 MG tablet, take 1 tablet by mouth once daily, Disp: 90 tablet, Rfl: 0    traZODone (DESYREL) 50 MG tablet, Take 50 mg by mouth nightly, Disp: , Rfl:     ALPRAZolam (XANAX) 1 MG tablet, Take 1 mg by mouth 2 times daily, Disp: , Rfl:     brexpiprazole (REXULTI) 1 MG TABS tablet, Take 1 mg by mouth daily, Disp: , Rfl:   Allergies Allergen Reactions    Prilosec [Omeprazole] Hives    Naproxen Rash    Trintellix [Vortioxetine]     Zoloft [Sertraline Hcl] Rash       Past Medical History:   Diagnosis Date    Anxiety     Depression      Past Surgical History:   Procedure Laterality Date    CARPAL TUNNEL RELEASE Bilateral     CERVICAL FUSION       SECTION       Family History   Problem Relation Age of Onset    Heart Disease Mother     Diabetes Mother      Social History     Socioeconomic History    Marital status: Legally      Spouse name: Not on file    Number of children: Not on file    Years of education: Not on file    Highest education level: Not on file   Occupational History    Not on file   Social Needs    Financial resource strain: Not on file    Food insecurity:     Worry: Not on file     Inability: Not on file    Transportation needs:     Medical: Not on file     Non-medical: Not on file   Tobacco Use    Smoking status: Current Every Day Smoker     Packs/day: 1.00     Years: 42.00     Pack years: 42.00     Types: Cigarettes    Smokeless tobacco: Never Used   Substance and Sexual Activity    Alcohol use: No    Drug use: No    Sexual activity: Not on file   Lifestyle    Physical activity:     Days per week: Not on file     Minutes per session: Not on file    Stress: Not on file   Relationships    Social connections:     Talks on phone: Not on file     Gets together: Not on file     Attends Mormonism service: Not on file     Active member of club or organization: Not on file     Attends meetings of clubs or organizations: Not on file     Relationship status: Not on file    Intimate partner violence:     Fear of current or ex partner: Not on file     Emotionally abused: Not on file     Physically abused: Not on file     Forced sexual activity: Not on file   Other Topics Concern    Not on file   Social History Narrative    Not on file       Vitals:    19 1212   Pulse: 74   Resp: 15 Temp: 97.4 °F (36.3 °C)   TempSrc: Temporal   SpO2: 98%   Weight: 162 lb (73.5 kg)   Height: 5' 6\" (1.676 m)       Physical Exam   Constitutional: She is oriented to person, place, and time. She appears well-developed and well-nourished. HENT:   Head: Atraumatic. Right Ear: External ear normal.   Left Ear: External ear normal.   Nose: Nose normal.   Mouth/Throat: Oropharynx is clear and moist. No oropharyngeal exudate. Eyes: Pupils are equal, round, and reactive to light. Conjunctivae and EOM are normal.   Neck: Normal range of motion. Neck supple. No tracheal deviation present. No thyromegaly present. Cardiovascular: Normal rate, regular rhythm and intact distal pulses. Exam reveals no gallop and no friction rub. No murmur heard. Pulmonary/Chest: Effort normal and breath sounds normal. No respiratory distress. Abdominal: Soft. Bowel sounds are normal.   Musculoskeletal: Normal range of motion. She exhibits no edema, tenderness or deformity. Lymphadenopathy:     She has no cervical adenopathy. Neurological: She is alert and oriented to person, place, and time. She displays normal reflexes. No sensory deficit. She exhibits normal muscle tone. Coordination normal.   Skin: Skin is warm and dry. Capillary refill takes less than 2 seconds. No rash noted. Psychiatric: She has a normal mood and affect. Assessment and Plan:  Digna Willis was seen today for new patient. Diagnoses and all orders for this visit:    Anxiety    Mixed hyperlipidemia    Tobacco use    Depression, unspecified depression type    Panic attacks    Hypokalemia  -     Basic Metabolic Panel; Future  -     LACTIC ACID, PLASMA; Future        Return in about 1 month (around 6/25/2019).       Seen By:  Zion Be DO

## 2019-07-08 ENCOUNTER — TELEPHONE (OUTPATIENT)
Dept: FAMILY MEDICINE CLINIC | Age: 59
End: 2019-07-08

## 2019-07-17 ENCOUNTER — OFFICE VISIT (OUTPATIENT)
Dept: FAMILY MEDICINE CLINIC | Age: 59
End: 2019-07-17
Payer: MEDICAID

## 2019-07-17 VITALS
BODY MASS INDEX: 26.03 KG/M2 | RESPIRATION RATE: 15 BRPM | SYSTOLIC BLOOD PRESSURE: 98 MMHG | HEIGHT: 66 IN | OXYGEN SATURATION: 94 % | HEART RATE: 83 BPM | WEIGHT: 162 LBS | DIASTOLIC BLOOD PRESSURE: 66 MMHG | TEMPERATURE: 97.5 F

## 2019-07-17 DIAGNOSIS — F33.41 RECURRENT MAJOR DEPRESSIVE DISORDER, IN PARTIAL REMISSION (HCC): Primary | ICD-10-CM

## 2019-07-17 DIAGNOSIS — E78.2 MIXED HYPERLIPIDEMIA: ICD-10-CM

## 2019-07-17 PROCEDURE — 99213 OFFICE O/P EST LOW 20 MIN: CPT | Performed by: FAMILY MEDICINE

## 2019-07-17 RX ORDER — PAROXETINE HYDROCHLORIDE 40 MG/1
40 TABLET, FILM COATED ORAL EVERY MORNING
COMMUNITY
End: 2020-06-02

## 2019-07-17 RX ORDER — ATORVASTATIN CALCIUM 20 MG/1
TABLET, FILM COATED ORAL
Qty: 90 TABLET | Refills: 1 | Status: SHIPPED | OUTPATIENT
Start: 2019-07-17 | End: 2020-01-21 | Stop reason: SDUPTHER

## 2019-07-17 NOTE — PROGRESS NOTES
19  Hetal Mix : 1960 Sex: female  Age: 63 y.o. Chief Complaint   Patient presents with    Hyperlipidemia       Recheck, needs refills. Continues status quo wit hher depression, may need second opinion      Review of Systems   Constitutional: Negative for appetite change, fatigue and unexpected weight change. HENT: Negative for congestion, ear pain, hearing loss, sinus pain, sore throat and trouble swallowing. Eyes: Negative for photophobia, pain, discharge and redness. Respiratory: Negative for cough, chest tightness and shortness of breath. Cardiovascular: Negative for chest pain, palpitations and leg swelling. Gastrointestinal: Negative for abdominal pain, blood in stool, diarrhea, nausea and vomiting. Endocrine: Negative. Genitourinary: Negative for dysuria, flank pain, frequency and hematuria. Musculoskeletal: Negative for arthralgias, back pain, joint swelling and myalgias. Skin: Negative. Allergic/Immunologic: Negative. Neurological: Negative for dizziness, syncope, weakness, light-headedness, numbness and headaches. Hematological: Negative for adenopathy. Does not bruise/bleed easily. Psychiatric/Behavioral: Positive for decreased concentration and sleep disturbance. Negative for suicidal ideas. The patient is nervous/anxious.           Current Outpatient Medications:     PARoxetine (PAXIL) 40 MG tablet, Take 40 mg by mouth every morning, Disp: , Rfl:     atorvastatin (LIPITOR) 20 MG tablet, take 1 tablet by mouth once daily, Disp: 90 tablet, Rfl: 1    traZODone (DESYREL) 50 MG tablet, Take 50 mg by mouth nightly, Disp: , Rfl:     ALPRAZolam (XANAX) 1 MG tablet, Take 1 mg by mouth 2 times daily, Disp: , Rfl:     brexpiprazole (REXULTI) 1 MG TABS tablet, Take 1 mg by mouth daily, Disp: , Rfl:   Allergies   Allergen Reactions    Prilosec [Omeprazole] Hives    Naproxen Rash    Trintellix [Vortioxetine]     Zoloft [Sertraline Hcl] Rash       Past

## 2019-07-25 ASSESSMENT — ENCOUNTER SYMPTOMS
BACK PAIN: 0
COUGH: 0
SORE THROAT: 0
VOMITING: 0
TROUBLE SWALLOWING: 0
SINUS PAIN: 0
EYE DISCHARGE: 0
DIARRHEA: 0
NAUSEA: 0
CHEST TIGHTNESS: 0
ABDOMINAL PAIN: 0
EYE PAIN: 0
ALLERGIC/IMMUNOLOGIC NEGATIVE: 1
SHORTNESS OF BREATH: 0
EYE REDNESS: 0
PHOTOPHOBIA: 0
BLOOD IN STOOL: 0

## 2019-08-09 ENCOUNTER — ANESTHESIA EVENT (OUTPATIENT)
Dept: OPERATING ROOM | Age: 59
End: 2019-08-09
Payer: MEDICAID

## 2019-08-09 ENCOUNTER — HOSPITAL ENCOUNTER (OUTPATIENT)
Age: 59
Setting detail: OUTPATIENT SURGERY
Discharge: HOME | End: 2019-08-09
Attending: DENTIST | Admitting: DENTIST
Payer: MEDICAID

## 2019-08-09 ENCOUNTER — ANESTHESIA (OUTPATIENT)
Dept: OPERATING ROOM | Age: 59
End: 2019-08-09
Payer: MEDICAID

## 2019-08-09 VITALS
HEIGHT: 66 IN | RESPIRATION RATE: 20 BRPM | WEIGHT: 150 LBS | SYSTOLIC BLOOD PRESSURE: 108 MMHG | DIASTOLIC BLOOD PRESSURE: 60 MMHG | HEART RATE: 68 BPM | BODY MASS INDEX: 24.11 KG/M2 | OXYGEN SATURATION: 95 % | TEMPERATURE: 97.3 F

## 2019-08-09 VITALS
RESPIRATION RATE: 4 BRPM | SYSTOLIC BLOOD PRESSURE: 176 MMHG | DIASTOLIC BLOOD PRESSURE: 71 MMHG | OXYGEN SATURATION: 95 %

## 2019-08-09 DIAGNOSIS — K02.9 DENTAL CARIES: Primary | ICD-10-CM

## 2019-08-09 PROCEDURE — 2500000003 HC RX 250 WO HCPCS: Performed by: NURSE ANESTHETIST, CERTIFIED REGISTERED

## 2019-08-09 PROCEDURE — 3700000001 HC ADD 15 MINUTES (ANESTHESIA): Performed by: DENTIST

## 2019-08-09 PROCEDURE — 2500000003 HC RX 250 WO HCPCS: Performed by: DENTIST

## 2019-08-09 PROCEDURE — 3600000002 HC SURGERY LEVEL 2 BASE: Performed by: DENTIST

## 2019-08-09 PROCEDURE — 2580000003 HC RX 258: Performed by: NURSE ANESTHETIST, CERTIFIED REGISTERED

## 2019-08-09 PROCEDURE — 7100000010 HC PHASE II RECOVERY - FIRST 15 MIN: Performed by: DENTIST

## 2019-08-09 PROCEDURE — 7100000001 HC PACU RECOVERY - ADDTL 15 MIN: Performed by: DENTIST

## 2019-08-09 PROCEDURE — 3700000000 HC ANESTHESIA ATTENDED CARE: Performed by: DENTIST

## 2019-08-09 PROCEDURE — 6360000002 HC RX W HCPCS: Performed by: NURSE ANESTHETIST, CERTIFIED REGISTERED

## 2019-08-09 PROCEDURE — 3600000012 HC SURGERY LEVEL 2 ADDTL 15MIN: Performed by: DENTIST

## 2019-08-09 PROCEDURE — 7100000011 HC PHASE II RECOVERY - ADDTL 15 MIN: Performed by: DENTIST

## 2019-08-09 PROCEDURE — 2709999900 HC NON-CHARGEABLE SUPPLY: Performed by: DENTIST

## 2019-08-09 PROCEDURE — 7100000000 HC PACU RECOVERY - FIRST 15 MIN: Performed by: DENTIST

## 2019-08-09 PROCEDURE — 6360000002 HC RX W HCPCS: Performed by: ANESTHESIOLOGY

## 2019-08-09 RX ORDER — SUCCINYLCHOLINE/SOD CL,ISO/PF 200MG/10ML
SYRINGE (ML) INTRAVENOUS PRN
Status: DISCONTINUED | OUTPATIENT
Start: 2019-08-09 | End: 2019-08-09 | Stop reason: SDUPTHER

## 2019-08-09 RX ORDER — PROMETHAZINE HYDROCHLORIDE 25 MG/ML
6.25 INJECTION, SOLUTION INTRAMUSCULAR; INTRAVENOUS
Status: DISCONTINUED | OUTPATIENT
Start: 2019-08-09 | End: 2019-08-09 | Stop reason: HOSPADM

## 2019-08-09 RX ORDER — NEOSTIGMINE METHYLSULFATE 1 MG/ML
INJECTION, SOLUTION INTRAVENOUS PRN
Status: DISCONTINUED | OUTPATIENT
Start: 2019-08-09 | End: 2019-08-09 | Stop reason: SDUPTHER

## 2019-08-09 RX ORDER — SODIUM CHLORIDE 9 MG/ML
INJECTION, SOLUTION INTRAVENOUS CONTINUOUS PRN
Status: DISCONTINUED | OUTPATIENT
Start: 2019-08-09 | End: 2019-08-09 | Stop reason: SDUPTHER

## 2019-08-09 RX ORDER — HYDRALAZINE HYDROCHLORIDE 20 MG/ML
5 INJECTION INTRAMUSCULAR; INTRAVENOUS EVERY 10 MIN PRN
Status: DISCONTINUED | OUTPATIENT
Start: 2019-08-09 | End: 2019-08-09 | Stop reason: HOSPADM

## 2019-08-09 RX ORDER — PROPOFOL 10 MG/ML
INJECTION, EMULSION INTRAVENOUS PRN
Status: DISCONTINUED | OUTPATIENT
Start: 2019-08-09 | End: 2019-08-09 | Stop reason: SDUPTHER

## 2019-08-09 RX ORDER — LIDOCAINE HYDROCHLORIDE AND EPINEPHRINE BITARTRATE 20; .01 MG/ML; MG/ML
INJECTION, SOLUTION SUBCUTANEOUS PRN
Status: DISCONTINUED | OUTPATIENT
Start: 2019-08-09 | End: 2019-08-09 | Stop reason: ALTCHOICE

## 2019-08-09 RX ORDER — AMOXICILLIN 500 MG/1
500 CAPSULE ORAL 3 TIMES DAILY
Qty: 21 CAPSULE | Refills: 0 | Status: SHIPPED | OUTPATIENT
Start: 2019-08-09 | End: 2019-08-16

## 2019-08-09 RX ORDER — FENTANYL CITRATE 50 UG/ML
INJECTION, SOLUTION INTRAMUSCULAR; INTRAVENOUS PRN
Status: DISCONTINUED | OUTPATIENT
Start: 2019-08-09 | End: 2019-08-09 | Stop reason: SDUPTHER

## 2019-08-09 RX ORDER — DEXAMETHASONE SODIUM PHOSPHATE 4 MG/ML
INJECTION, SOLUTION INTRA-ARTICULAR; INTRALESIONAL; INTRAMUSCULAR; INTRAVENOUS; SOFT TISSUE PRN
Status: DISCONTINUED | OUTPATIENT
Start: 2019-08-09 | End: 2019-08-09 | Stop reason: SDUPTHER

## 2019-08-09 RX ORDER — MIDAZOLAM HYDROCHLORIDE 1 MG/ML
INJECTION INTRAMUSCULAR; INTRAVENOUS PRN
Status: DISCONTINUED | OUTPATIENT
Start: 2019-08-09 | End: 2019-08-09 | Stop reason: SDUPTHER

## 2019-08-09 RX ORDER — MEPERIDINE HYDROCHLORIDE 25 MG/ML
12.5 INJECTION INTRAMUSCULAR; INTRAVENOUS; SUBCUTANEOUS EVERY 5 MIN PRN
Status: DISCONTINUED | OUTPATIENT
Start: 2019-08-09 | End: 2019-08-09 | Stop reason: HOSPADM

## 2019-08-09 RX ORDER — GLYCOPYRROLATE 1 MG/5 ML
SYRINGE (ML) INTRAVENOUS PRN
Status: DISCONTINUED | OUTPATIENT
Start: 2019-08-09 | End: 2019-08-09 | Stop reason: SDUPTHER

## 2019-08-09 RX ORDER — LABETALOL HYDROCHLORIDE 5 MG/ML
5 INJECTION, SOLUTION INTRAVENOUS EVERY 10 MIN PRN
Status: DISCONTINUED | OUTPATIENT
Start: 2019-08-09 | End: 2019-08-09 | Stop reason: HOSPADM

## 2019-08-09 RX ORDER — LIDOCAINE HYDROCHLORIDE 20 MG/ML
INJECTION, SOLUTION EPIDURAL; INFILTRATION; INTRACAUDAL; PERINEURAL PRN
Status: DISCONTINUED | OUTPATIENT
Start: 2019-08-09 | End: 2019-08-09 | Stop reason: SDUPTHER

## 2019-08-09 RX ORDER — ROCURONIUM BROMIDE 10 MG/ML
INJECTION, SOLUTION INTRAVENOUS PRN
Status: DISCONTINUED | OUTPATIENT
Start: 2019-08-09 | End: 2019-08-09 | Stop reason: SDUPTHER

## 2019-08-09 RX ORDER — ONDANSETRON 2 MG/ML
INJECTION INTRAMUSCULAR; INTRAVENOUS PRN
Status: DISCONTINUED | OUTPATIENT
Start: 2019-08-09 | End: 2019-08-09 | Stop reason: SDUPTHER

## 2019-08-09 RX ORDER — HYDROCODONE BITARTRATE AND ACETAMINOPHEN 5; 325 MG/1; MG/1
1 TABLET ORAL EVERY 6 HOURS PRN
Qty: 12 TABLET | Refills: 0 | Status: SHIPPED | OUTPATIENT
Start: 2019-08-09 | End: 2019-08-12

## 2019-08-09 RX ADMIN — FENTANYL CITRATE 50 MCG: 50 INJECTION, SOLUTION INTRAMUSCULAR; INTRAVENOUS at 08:08

## 2019-08-09 RX ADMIN — HYDROMORPHONE HYDROCHLORIDE 0.5 MG: 1 INJECTION, SOLUTION INTRAMUSCULAR; INTRAVENOUS; SUBCUTANEOUS at 08:21

## 2019-08-09 RX ADMIN — PROPOFOL 150 MG: 10 INJECTION, EMULSION INTRAVENOUS at 07:29

## 2019-08-09 RX ADMIN — ONDANSETRON HYDROCHLORIDE 4 MG: 2 INJECTION, SOLUTION INTRAMUSCULAR; INTRAVENOUS at 07:29

## 2019-08-09 RX ADMIN — ROCURONIUM BROMIDE 20 MG: 10 SOLUTION INTRAVENOUS at 07:38

## 2019-08-09 RX ADMIN — Medication 0.4 MG: at 07:53

## 2019-08-09 RX ADMIN — MIDAZOLAM HYDROCHLORIDE 2 MG: 1 INJECTION, SOLUTION INTRAMUSCULAR; INTRAVENOUS at 07:19

## 2019-08-09 RX ADMIN — LIDOCAINE HYDROCHLORIDE 60 MG: 20 INJECTION, SOLUTION EPIDURAL; INFILTRATION; INTRACAUDAL; PERINEURAL at 07:29

## 2019-08-09 RX ADMIN — FENTANYL CITRATE 50 MCG: 50 INJECTION, SOLUTION INTRAMUSCULAR; INTRAVENOUS at 07:29

## 2019-08-09 RX ADMIN — Medication 3 MG: at 07:53

## 2019-08-09 RX ADMIN — SODIUM CHLORIDE: 9 INJECTION, SOLUTION INTRAVENOUS at 07:19

## 2019-08-09 RX ADMIN — Medication 100 MG: at 07:29

## 2019-08-09 RX ADMIN — DEXAMETHASONE SODIUM PHOSPHATE 10 MG: 4 INJECTION, SOLUTION INTRAMUSCULAR; INTRAVENOUS at 07:29

## 2019-08-09 ASSESSMENT — PAIN SCALES - GENERAL
PAINLEVEL_OUTOF10: 2
PAINLEVEL_OUTOF10: 4
PAINLEVEL_OUTOF10: 6
PAINLEVEL_OUTOF10: 7

## 2019-08-09 ASSESSMENT — PULMONARY FUNCTION TESTS
PIF_VALUE: 17
PIF_VALUE: 20
PIF_VALUE: 18
PIF_VALUE: 16
PIF_VALUE: 20
PIF_VALUE: 18
PIF_VALUE: 17
PIF_VALUE: 4
PIF_VALUE: 17
PIF_VALUE: 2
PIF_VALUE: 2
PIF_VALUE: 18
PIF_VALUE: 19
PIF_VALUE: 14
PIF_VALUE: 1
PIF_VALUE: 18
PIF_VALUE: 1
PIF_VALUE: 18
PIF_VALUE: 1
PIF_VALUE: 18
PIF_VALUE: 17
PIF_VALUE: 32
PIF_VALUE: 2
PIF_VALUE: 17
PIF_VALUE: 4
PIF_VALUE: 18
PIF_VALUE: 17
PIF_VALUE: 2
PIF_VALUE: 1
PIF_VALUE: 18
PIF_VALUE: 34
PIF_VALUE: 21
PIF_VALUE: 21
PIF_VALUE: 0
PIF_VALUE: 20
PIF_VALUE: 1
PIF_VALUE: 17
PIF_VALUE: 4
PIF_VALUE: 1
PIF_VALUE: 17
PIF_VALUE: 40
PIF_VALUE: 17
PIF_VALUE: 33
PIF_VALUE: 1

## 2019-08-09 ASSESSMENT — PAIN DESCRIPTION - PAIN TYPE
TYPE: SURGICAL PAIN

## 2019-08-09 ASSESSMENT — PAIN - FUNCTIONAL ASSESSMENT: PAIN_FUNCTIONAL_ASSESSMENT: 0-10

## 2019-08-09 ASSESSMENT — PAIN DESCRIPTION - DESCRIPTORS
DESCRIPTORS: ACHING;THROBBING

## 2019-08-09 ASSESSMENT — PAIN DESCRIPTION - LOCATION
LOCATION: MOUTH

## 2019-08-09 ASSESSMENT — PAIN DESCRIPTION - FREQUENCY: FREQUENCY: CONTINUOUS

## 2019-08-09 ASSESSMENT — PAIN DESCRIPTION - PROGRESSION: CLINICAL_PROGRESSION: GRADUALLY IMPROVING

## 2019-08-09 NOTE — PROGRESS NOTES
IV removed, catheter intact, discharge instructions given to patient and sister, denies any questions

## 2019-08-09 NOTE — ANESTHESIA PRE PROCEDURE
Department of Anesthesiology  Preprocedure Note       Name:  Altaf Lerma   Age:  63 y.o.  :  1960                                          MRN:  [id number]         Date:  2019      Surgeon: Darin Ramirez):  Carey Joyce DDS    Procedure: DENTAL EXAM XRAYS PROPHY FILLINGS EXTRACTIONS (N/A )    Medications prior to admission:   Prior to Admission medications    Medication Sig Start Date End Date Taking? Authorizing Provider   PARoxetine (PAXIL) 40 MG tablet Take 40 mg by mouth every morning Instructed to take morning of surgery with a sip of water   Yes Historical Provider, MD   atorvastatin (LIPITOR) 20 MG tablet take 1 tablet by mouth once daily 19  Yes [place] FAN Art DO   traZODone (DESYREL) 50 MG tablet Take 50 mg by mouth nightly   Yes Historical Provider, MD   ALPRAZolam (XANAX) 1 MG tablet Take 1 mg by mouth 2 times daily. Instructed to take morning of surgery with a sip of water   Yes Historical Provider, MD   brexpiprazole (REXULTI) 1 MG TABS tablet Take 1 mg by mouth daily Instructed to take morning of surgery with a sip of water   Yes Historical Provider, MD       Current medications:    No current facility-administered medications for this encounter. Allergies:     Allergies   Allergen Reactions    Prilosec [Omeprazole] Hives    Naproxen Rash    Trintellix [Vortioxetine]     Zoloft [Sertraline Hcl] Rash       Problem List:    Patient Active Problem List   Diagnosis Code    Depression F32.9    Anxiety F41.9    Mixed hyperlipidemia E78.2    Tobacco use Z72.0    Urticaria, chronic L50.8       Past Medical History:        Diagnosis Date    Anxiety     Dental caries     Depression     Hyperlipidemia        Past Surgical History:        Procedure Laterality Date    CARPAL TUNNEL RELEASE Bilateral     CERVICAL FUSION       SECTION      x 3       Social History:    Social History     Tobacco Use    Smoking status: Current Every Day Smoker     Packs/day:

## 2019-10-18 ENCOUNTER — HOSPITAL ENCOUNTER (OUTPATIENT)
Age: 59
Discharge: HOME | End: 2019-10-20
Payer: MEDICAID

## 2019-10-18 ENCOUNTER — OFFICE VISIT (OUTPATIENT)
Dept: FAMILY MEDICINE CLINIC | Age: 59
End: 2019-10-18
Payer: MEDICAID

## 2019-10-18 VITALS
OXYGEN SATURATION: 96 % | HEIGHT: 66 IN | WEIGHT: 156 LBS | DIASTOLIC BLOOD PRESSURE: 78 MMHG | BODY MASS INDEX: 25.07 KG/M2 | SYSTOLIC BLOOD PRESSURE: 112 MMHG | HEART RATE: 77 BPM | TEMPERATURE: 97 F

## 2019-10-18 DIAGNOSIS — F33.41 RECURRENT MAJOR DEPRESSIVE DISORDER, IN PARTIAL REMISSION (HCC): Primary | ICD-10-CM

## 2019-10-18 DIAGNOSIS — Z12.11 SCREENING FOR MALIGNANT NEOPLASM OF COLON: ICD-10-CM

## 2019-10-18 DIAGNOSIS — E78.2 MIXED HYPERLIPIDEMIA: ICD-10-CM

## 2019-10-18 PROBLEM — E78.5 HYPERLIPIDEMIA: Status: ACTIVE | Noted: 2017-08-08

## 2019-10-18 LAB
ALBUMIN SERPL-MCNC: 4.6 G/DL (ref 3.5–5.2)
ALP BLD-CCNC: 116 U/L (ref 35–104)
ALT SERPL-CCNC: 17 U/L (ref 0–32)
ANION GAP SERPL CALCULATED.3IONS-SCNC: 13 MMOL/L (ref 7–16)
AST SERPL-CCNC: 17 U/L (ref 0–31)
BACTERIA: ABNORMAL /HPF
BASOPHILS ABSOLUTE: 0.05 E9/L (ref 0–0.2)
BASOPHILS RELATIVE PERCENT: 1 % (ref 0–2)
BILIRUB SERPL-MCNC: 0.2 MG/DL (ref 0–1.2)
BILIRUBIN URINE: NEGATIVE
BLOOD, URINE: ABNORMAL
BUN BLDV-MCNC: 9 MG/DL (ref 6–20)
CALCIUM SERPL-MCNC: 9.4 MG/DL (ref 8.6–10.2)
CHLORIDE BLD-SCNC: 103 MMOL/L (ref 98–107)
CHOLESTEROL, TOTAL: 168 MG/DL (ref 0–199)
CLARITY: CLEAR
CO2: 26 MMOL/L (ref 22–29)
COLOR: YELLOW
CREAT SERPL-MCNC: 0.9 MG/DL (ref 0.5–1)
EOSINOPHILS ABSOLUTE: 0.29 E9/L (ref 0.05–0.5)
EOSINOPHILS RELATIVE PERCENT: 5.5 % (ref 0–6)
GFR AFRICAN AMERICAN: >60
GFR NON-AFRICAN AMERICAN: >60 ML/MIN/1.73
GLUCOSE FASTING: 90 MG/DL (ref 74–99)
GLUCOSE URINE: NEGATIVE MG/DL
HCT VFR BLD CALC: 45.5 % (ref 34–48)
HDLC SERPL-MCNC: 58 MG/DL
HEMOGLOBIN: 14.2 G/DL (ref 11.5–15.5)
IMMATURE GRANULOCYTES #: 0.01 E9/L
IMMATURE GRANULOCYTES %: 0.2 % (ref 0–5)
KETONES, URINE: NEGATIVE MG/DL
LDL CHOLESTEROL CALCULATED: 75 MG/DL (ref 0–99)
LEUKOCYTE ESTERASE, URINE: ABNORMAL
LYMPHOCYTES ABSOLUTE: 1.82 E9/L (ref 1.5–4)
LYMPHOCYTES RELATIVE PERCENT: 34.7 % (ref 20–42)
MCH RBC QN AUTO: 29.5 PG (ref 26–35)
MCHC RBC AUTO-ENTMCNC: 31.2 % (ref 32–34.5)
MCV RBC AUTO: 94.6 FL (ref 80–99.9)
MONOCYTES ABSOLUTE: 0.41 E9/L (ref 0.1–0.95)
MONOCYTES RELATIVE PERCENT: 7.8 % (ref 2–12)
NEUTROPHILS ABSOLUTE: 2.66 E9/L (ref 1.8–7.3)
NEUTROPHILS RELATIVE PERCENT: 50.8 % (ref 43–80)
NITRITE, URINE: NEGATIVE
PDW BLD-RTO: 14.1 FL (ref 11.5–15)
PH UA: 7.5 (ref 5–9)
PLATELET # BLD: 213 E9/L (ref 130–450)
PMV BLD AUTO: 10.1 FL (ref 7–12)
POTASSIUM SERPL-SCNC: 4.4 MMOL/L (ref 3.5–5)
PROTEIN UA: NEGATIVE MG/DL
RBC # BLD: 4.81 E12/L (ref 3.5–5.5)
RBC UA: ABNORMAL /HPF (ref 0–2)
SODIUM BLD-SCNC: 142 MMOL/L (ref 132–146)
SPECIFIC GRAVITY UA: 1.01 (ref 1–1.03)
TOTAL PROTEIN: 7.1 G/DL (ref 6.4–8.3)
TRIGL SERPL-MCNC: 173 MG/DL (ref 0–149)
TSH SERPL DL<=0.05 MIU/L-ACNC: 4.81 UIU/ML (ref 0.27–4.2)
UROBILINOGEN, URINE: 0.2 E.U./DL
VLDLC SERPL CALC-MCNC: 35 MG/DL
WBC # BLD: 5.2 E9/L (ref 4.5–11.5)
WBC UA: ABNORMAL /HPF (ref 0–5)

## 2019-10-18 PROCEDURE — 84443 ASSAY THYROID STIM HORMONE: CPT

## 2019-10-18 PROCEDURE — G8419 CALC BMI OUT NRM PARAM NOF/U: HCPCS | Performed by: FAMILY MEDICINE

## 2019-10-18 PROCEDURE — 99214 OFFICE O/P EST MOD 30 MIN: CPT | Performed by: FAMILY MEDICINE

## 2019-10-18 PROCEDURE — G8484 FLU IMMUNIZE NO ADMIN: HCPCS | Performed by: FAMILY MEDICINE

## 2019-10-18 PROCEDURE — 85025 COMPLETE CBC W/AUTO DIFF WBC: CPT

## 2019-10-18 PROCEDURE — 80053 COMPREHEN METABOLIC PANEL: CPT

## 2019-10-18 PROCEDURE — G8427 DOCREV CUR MEDS BY ELIG CLIN: HCPCS | Performed by: FAMILY MEDICINE

## 2019-10-18 PROCEDURE — 80061 LIPID PANEL: CPT

## 2019-10-18 PROCEDURE — 4004F PT TOBACCO SCREEN RCVD TLK: CPT | Performed by: FAMILY MEDICINE

## 2019-10-18 PROCEDURE — 3017F COLORECTAL CA SCREEN DOC REV: CPT | Performed by: FAMILY MEDICINE

## 2019-10-18 PROCEDURE — 81001 URINALYSIS AUTO W/SCOPE: CPT

## 2019-10-18 PROCEDURE — 36415 COLL VENOUS BLD VENIPUNCTURE: CPT

## 2019-10-18 RX ORDER — TRAZODONE HYDROCHLORIDE 50 MG/1
50 TABLET ORAL NIGHTLY
Qty: 30 TABLET | Refills: 2 | Status: SHIPPED
Start: 2019-10-18 | End: 2020-06-02

## 2019-10-18 ASSESSMENT — ENCOUNTER SYMPTOMS
EYE PAIN: 0
EYE REDNESS: 0
CHEST TIGHTNESS: 0
VOMITING: 0
SHORTNESS OF BREATH: 0
SINUS PAIN: 0
SORE THROAT: 0
BLOOD IN STOOL: 0
BACK PAIN: 0
EYE DISCHARGE: 0
DIARRHEA: 0
TROUBLE SWALLOWING: 0
NAUSEA: 0
PHOTOPHOBIA: 0
COUGH: 0
ABDOMINAL PAIN: 0
ALLERGIC/IMMUNOLOGIC NEGATIVE: 1

## 2019-10-21 DIAGNOSIS — Z12.11 SCREENING FOR MALIGNANT NEOPLASM OF COLON: ICD-10-CM

## 2019-10-21 LAB
CONTROL: NORMAL
HEMOCCULT STL QL: NORMAL

## 2019-10-21 PROCEDURE — 82274 ASSAY TEST FOR BLOOD FECAL: CPT | Performed by: FAMILY MEDICINE

## 2019-11-06 ENCOUNTER — HOSPITAL ENCOUNTER (EMERGENCY)
Age: 59
Discharge: OTHER INSTITUTION | End: 2019-11-07
Attending: EMERGENCY MEDICINE
Payer: MEDICAID

## 2019-11-06 DIAGNOSIS — F32.89 OTHER DEPRESSION: ICD-10-CM

## 2019-11-06 DIAGNOSIS — R45.851 SUICIDAL IDEATION: ICD-10-CM

## 2019-11-06 DIAGNOSIS — F41.1 ANXIETY STATE: Primary | ICD-10-CM

## 2019-11-06 LAB
ALBUMIN SERPL-MCNC: 4.9 G/DL (ref 3.5–5.2)
ALP BLD-CCNC: 106 U/L (ref 35–104)
ALT SERPL-CCNC: 21 U/L (ref 0–32)
AMPHETAMINE SCREEN, URINE: NOT DETECTED
ANION GAP SERPL CALCULATED.3IONS-SCNC: 17 MMOL/L (ref 7–16)
AST SERPL-CCNC: 19 U/L (ref 0–31)
BACTERIA: NORMAL /HPF
BARBITURATE SCREEN URINE: NOT DETECTED
BASOPHILS ABSOLUTE: 0.05 E9/L (ref 0–0.2)
BASOPHILS RELATIVE PERCENT: 0.8 % (ref 0–2)
BENZODIAZEPINE SCREEN, URINE: POSITIVE
BILIRUB SERPL-MCNC: <0.2 MG/DL (ref 0–1.2)
BILIRUBIN URINE: NEGATIVE
BLOOD, URINE: ABNORMAL
BUN BLDV-MCNC: 10 MG/DL (ref 6–20)
CALCIUM SERPL-MCNC: 10.1 MG/DL (ref 8.6–10.2)
CANNABINOID SCREEN URINE: NOT DETECTED
CHLORIDE BLD-SCNC: 96 MMOL/L (ref 98–107)
CLARITY: CLEAR
CO2: 25 MMOL/L (ref 22–29)
COCAINE METABOLITE SCREEN URINE: NOT DETECTED
COLOR: YELLOW
CREAT SERPL-MCNC: 0.9 MG/DL (ref 0.5–1)
EKG ATRIAL RATE: 102 BPM
EKG P AXIS: 78 DEGREES
EKG P-R INTERVAL: 150 MS
EKG Q-T INTERVAL: 360 MS
EKG QRS DURATION: 74 MS
EKG QTC CALCULATION (BAZETT): 469 MS
EKG R AXIS: 81 DEGREES
EKG T AXIS: 64 DEGREES
EKG VENTRICULAR RATE: 102 BPM
EOSINOPHILS ABSOLUTE: 0.24 E9/L (ref 0.05–0.5)
EOSINOPHILS RELATIVE PERCENT: 3.7 % (ref 0–6)
EPITHELIAL CELLS, UA: NORMAL /HPF
FENTANYL SCREEN, URINE: NOT DETECTED
GFR AFRICAN AMERICAN: >60
GFR NON-AFRICAN AMERICAN: >60 ML/MIN/1.73
GLUCOSE BLD-MCNC: 102 MG/DL (ref 74–99)
GLUCOSE URINE: NEGATIVE MG/DL
HCT VFR BLD CALC: 42.7 % (ref 34–48)
HEMOGLOBIN: 14 G/DL (ref 11.5–15.5)
IMMATURE GRANULOCYTES #: 0.01 E9/L
IMMATURE GRANULOCYTES %: 0.2 % (ref 0–5)
KETONES, URINE: NEGATIVE MG/DL
LEUKOCYTE ESTERASE, URINE: ABNORMAL
LYMPHOCYTES ABSOLUTE: 2.68 E9/L (ref 1.5–4)
LYMPHOCYTES RELATIVE PERCENT: 41.2 % (ref 20–42)
Lab: ABNORMAL
MCH RBC QN AUTO: 30.3 PG (ref 26–35)
MCHC RBC AUTO-ENTMCNC: 32.8 % (ref 32–34.5)
MCV RBC AUTO: 92.4 FL (ref 80–99.9)
METHADONE SCREEN, URINE: NOT DETECTED
MONOCYTES ABSOLUTE: 0.42 E9/L (ref 0.1–0.95)
MONOCYTES RELATIVE PERCENT: 6.5 % (ref 2–12)
NEUTROPHILS ABSOLUTE: 3.1 E9/L (ref 1.8–7.3)
NEUTROPHILS RELATIVE PERCENT: 47.6 % (ref 43–80)
NITRITE, URINE: NEGATIVE
OPIATE SCREEN URINE: NOT DETECTED
OXYCODONE URINE: NOT DETECTED
PDW BLD-RTO: 14.4 FL (ref 11.5–15)
PH UA: 8 (ref 5–9)
PHENCYCLIDINE SCREEN URINE: NOT DETECTED
PLATELET # BLD: 197 E9/L (ref 130–450)
PMV BLD AUTO: 9.8 FL (ref 7–12)
POTASSIUM SERPL-SCNC: 3.9 MMOL/L (ref 3.5–5)
PROTEIN UA: NEGATIVE MG/DL
RBC # BLD: 4.62 E12/L (ref 3.5–5.5)
RBC UA: NORMAL /HPF (ref 0–2)
SODIUM BLD-SCNC: 138 MMOL/L (ref 132–146)
SPECIFIC GRAVITY UA: 1.01 (ref 1–1.03)
TOTAL PROTEIN: 7.3 G/DL (ref 6.4–8.3)
TROPONIN: <0.01 NG/ML (ref 0–0.03)
UROBILINOGEN, URINE: 0.2 E.U./DL
WBC # BLD: 6.5 E9/L (ref 4.5–11.5)
WBC UA: NORMAL /HPF (ref 0–5)

## 2019-11-06 PROCEDURE — 80053 COMPREHEN METABOLIC PANEL: CPT

## 2019-11-06 PROCEDURE — 93010 ELECTROCARDIOGRAM REPORT: CPT | Performed by: INTERNAL MEDICINE

## 2019-11-06 PROCEDURE — 96374 THER/PROPH/DIAG INJ IV PUSH: CPT

## 2019-11-06 PROCEDURE — 93005 ELECTROCARDIOGRAM TRACING: CPT | Performed by: EMERGENCY MEDICINE

## 2019-11-06 PROCEDURE — 84484 ASSAY OF TROPONIN QUANT: CPT

## 2019-11-06 PROCEDURE — 80307 DRUG TEST PRSMV CHEM ANLYZR: CPT

## 2019-11-06 PROCEDURE — 99285 EMERGENCY DEPT VISIT HI MDM: CPT

## 2019-11-06 PROCEDURE — 81001 URINALYSIS AUTO W/SCOPE: CPT

## 2019-11-06 PROCEDURE — G0480 DRUG TEST DEF 1-7 CLASSES: HCPCS

## 2019-11-06 PROCEDURE — 85025 COMPLETE CBC W/AUTO DIFF WBC: CPT

## 2019-11-06 PROCEDURE — 6360000002 HC RX W HCPCS: Performed by: EMERGENCY MEDICINE

## 2019-11-06 RX ORDER — LORAZEPAM 2 MG/ML
1 INJECTION INTRAMUSCULAR ONCE
Status: COMPLETED | OUTPATIENT
Start: 2019-11-06 | End: 2019-11-06

## 2019-11-06 RX ADMIN — LORAZEPAM 1 MG: 2 INJECTION, SOLUTION INTRAMUSCULAR; INTRAVENOUS at 20:57

## 2019-11-06 ASSESSMENT — PAIN SCALES - GENERAL: PAINLEVEL_OUTOF10: 7

## 2019-11-07 VITALS
RESPIRATION RATE: 16 BRPM | TEMPERATURE: 98.6 F | HEIGHT: 66 IN | SYSTOLIC BLOOD PRESSURE: 113 MMHG | BODY MASS INDEX: 24.91 KG/M2 | WEIGHT: 155 LBS | HEART RATE: 72 BPM | DIASTOLIC BLOOD PRESSURE: 61 MMHG | OXYGEN SATURATION: 96 %

## 2019-11-07 LAB
ACETAMINOPHEN LEVEL: <5 MCG/ML (ref 10–30)
ETHANOL: <10 MG/DL (ref 0–0.08)
SALICYLATE, SERUM: <0.3 MG/DL (ref 0–30)
TRICYCLIC ANTIDEPRESSANTS SCREEN SERUM: NEGATIVE NG/ML

## 2020-01-21 ENCOUNTER — HOSPITAL ENCOUNTER (OUTPATIENT)
Age: 60
Discharge: HOME | End: 2020-01-23
Payer: MEDICAID

## 2020-01-21 LAB
CHOLESTEROL, TOTAL: 197 MG/DL (ref 0–199)
HDLC SERPL-MCNC: 72 MG/DL
LDL CHOLESTEROL CALCULATED: 105 MG/DL (ref 0–99)
T4 FREE: 0.93 NG/DL (ref 0.93–1.7)
TRIGL SERPL-MCNC: 99 MG/DL (ref 0–149)
TSH SERPL DL<=0.05 MIU/L-ACNC: 5.14 UIU/ML (ref 0.27–4.2)
VLDLC SERPL CALC-MCNC: 20 MG/DL

## 2020-01-21 PROCEDURE — 80061 LIPID PANEL: CPT

## 2020-01-21 PROCEDURE — 36415 COLL VENOUS BLD VENIPUNCTURE: CPT

## 2020-01-21 PROCEDURE — 84443 ASSAY THYROID STIM HORMONE: CPT

## 2020-01-21 PROCEDURE — 84439 ASSAY OF FREE THYROXINE: CPT

## 2020-01-22 ENCOUNTER — TELEPHONE (OUTPATIENT)
Dept: FAMILY MEDICINE CLINIC | Age: 60
End: 2020-01-22

## 2020-01-22 RX ORDER — LEVOTHYROXINE SODIUM 0.05 MG/1
50 TABLET ORAL DAILY
Qty: 30 TABLET | Refills: 5 | Status: SHIPPED
Start: 2020-01-22 | End: 2020-02-21 | Stop reason: SINTOL

## 2020-02-21 ENCOUNTER — OFFICE VISIT (OUTPATIENT)
Dept: FAMILY MEDICINE CLINIC | Age: 60
End: 2020-02-21
Payer: MEDICAID

## 2020-02-21 ENCOUNTER — TELEPHONE (OUTPATIENT)
Dept: FAMILY MEDICINE CLINIC | Age: 60
End: 2020-02-21

## 2020-02-21 VITALS
SYSTOLIC BLOOD PRESSURE: 120 MMHG | DIASTOLIC BLOOD PRESSURE: 68 MMHG | BODY MASS INDEX: 27.97 KG/M2 | TEMPERATURE: 97.5 F | OXYGEN SATURATION: 99 % | HEART RATE: 83 BPM | HEIGHT: 66 IN | WEIGHT: 174 LBS

## 2020-02-21 PROCEDURE — 3017F COLORECTAL CA SCREEN DOC REV: CPT | Performed by: FAMILY MEDICINE

## 2020-02-21 PROCEDURE — 99213 OFFICE O/P EST LOW 20 MIN: CPT | Performed by: FAMILY MEDICINE

## 2020-02-21 PROCEDURE — G8419 CALC BMI OUT NRM PARAM NOF/U: HCPCS | Performed by: FAMILY MEDICINE

## 2020-02-21 PROCEDURE — G8484 FLU IMMUNIZE NO ADMIN: HCPCS | Performed by: FAMILY MEDICINE

## 2020-02-21 PROCEDURE — 4004F PT TOBACCO SCREEN RCVD TLK: CPT | Performed by: FAMILY MEDICINE

## 2020-02-21 PROCEDURE — G8427 DOCREV CUR MEDS BY ELIG CLIN: HCPCS | Performed by: FAMILY MEDICINE

## 2020-02-21 ASSESSMENT — ENCOUNTER SYMPTOMS
SHORTNESS OF BREATH: 0
TROUBLE SWALLOWING: 0
SORE THROAT: 0
EYE DISCHARGE: 0
EYE REDNESS: 0
ABDOMINAL PAIN: 0
BLOOD IN STOOL: 0
ALLERGIC/IMMUNOLOGIC NEGATIVE: 1
PHOTOPHOBIA: 0
EYE PAIN: 0
VOMITING: 0
NAUSEA: 0
BACK PAIN: 0
DIARRHEA: 0
COUGH: 0
CHEST TIGHTNESS: 0
SINUS PAIN: 0

## 2020-02-21 NOTE — TELEPHONE ENCOUNTER
Patient calling in. She was diagnosed with Hypothyroidism and has been on Levothyroxine for a month now. Patient states she has gained 7 pounds since then. She isn't sure if this normal side effect.    Please advise, thank you

## 2020-02-21 NOTE — PROGRESS NOTES
Worry: Not on file     Inability: Not on file    Transportation needs:     Medical: Not on file     Non-medical: Not on file   Tobacco Use    Smoking status: Current Every Day Smoker     Packs/day: 0.50     Years: 42.00     Pack years: 21.00     Types: Cigarettes    Smokeless tobacco: Never Used   Substance and Sexual Activity    Alcohol use: No    Drug use: No    Sexual activity: Not on file   Lifestyle    Physical activity:     Days per week: Not on file     Minutes per session: Not on file    Stress: Not on file   Relationships    Social connections:     Talks on phone: Not on file     Gets together: Not on file     Attends Mosque service: Not on file     Active member of club or organization: Not on file     Attends meetings of clubs or organizations: Not on file     Relationship status: Not on file    Intimate partner violence:     Fear of current or ex partner: Not on file     Emotionally abused: Not on file     Physically abused: Not on file     Forced sexual activity: Not on file   Other Topics Concern    Not on file   Social History Narrative    Not on file       Vitals:    02/21/20 0936   BP: 120/68   Pulse: 83   Temp: 97.5 °F (36.4 °C)   TempSrc: Temporal   SpO2: 99%   Weight: 174 lb (78.9 kg)   Height: 5' 6\" (1.676 m)       Physical Exam    Assessment and Plan:  There are no diagnoses linked to this encounter. No follow-ups on file.       Seen By:  Kush Conley DO

## 2020-03-09 ENCOUNTER — TELEPHONE (OUTPATIENT)
Dept: ADMINISTRATIVE | Age: 60
End: 2020-03-09

## 2020-03-18 ENCOUNTER — OFFICE VISIT (OUTPATIENT)
Dept: FAMILY MEDICINE CLINIC | Age: 60
End: 2020-03-18
Payer: MEDICAID

## 2020-03-18 VITALS
SYSTOLIC BLOOD PRESSURE: 128 MMHG | BODY MASS INDEX: 29.1 KG/M2 | DIASTOLIC BLOOD PRESSURE: 76 MMHG | WEIGHT: 181.06 LBS | TEMPERATURE: 97.6 F | HEIGHT: 66 IN | HEART RATE: 88 BPM | OXYGEN SATURATION: 96 %

## 2020-03-18 PROCEDURE — G8427 DOCREV CUR MEDS BY ELIG CLIN: HCPCS | Performed by: FAMILY MEDICINE

## 2020-03-18 PROCEDURE — 4004F PT TOBACCO SCREEN RCVD TLK: CPT | Performed by: FAMILY MEDICINE

## 2020-03-18 PROCEDURE — 3017F COLORECTAL CA SCREEN DOC REV: CPT | Performed by: FAMILY MEDICINE

## 2020-03-18 PROCEDURE — 99213 OFFICE O/P EST LOW 20 MIN: CPT | Performed by: FAMILY MEDICINE

## 2020-03-18 PROCEDURE — G8484 FLU IMMUNIZE NO ADMIN: HCPCS | Performed by: FAMILY MEDICINE

## 2020-03-18 PROCEDURE — G8419 CALC BMI OUT NRM PARAM NOF/U: HCPCS | Performed by: FAMILY MEDICINE

## 2020-03-18 ASSESSMENT — ENCOUNTER SYMPTOMS
EYE DISCHARGE: 0
BACK PAIN: 0
CHEST TIGHTNESS: 0
EYE REDNESS: 0
BLOOD IN STOOL: 0
PHOTOPHOBIA: 0
SINUS PAIN: 0
NAUSEA: 0
ALLERGIC/IMMUNOLOGIC NEGATIVE: 1
TROUBLE SWALLOWING: 0
SORE THROAT: 0
ABDOMINAL PAIN: 0
EYE PAIN: 0
VOMITING: 0
SHORTNESS OF BREATH: 0
DIARRHEA: 0
COUGH: 0

## 2020-03-18 NOTE — PROGRESS NOTES
3/18/20  Zainab Ramsay : 1960 Sex: female  Age: 63 y.o. Chief Complaint   Patient presents with    Thyroid Problem     pt said that she was given meds for underactive thyroid, she was told if things don't get better to come back in she now put on a total of 20 pounds and is now 30 pounds over weight. Continued weight gain      Review of Systems   Constitutional: Negative for appetite change, fatigue and unexpected weight change. HENT: Negative for congestion, ear pain, hearing loss, sinus pain, sore throat and trouble swallowing. Eyes: Negative for photophobia, pain, discharge and redness. Respiratory: Negative for cough, chest tightness and shortness of breath. Cardiovascular: Negative for chest pain, palpitations and leg swelling. Gastrointestinal: Negative for abdominal pain, blood in stool, diarrhea, nausea and vomiting. Endocrine: Negative. Genitourinary: Negative for dysuria, flank pain, frequency and hematuria. Musculoskeletal: Negative for arthralgias, back pain, joint swelling and myalgias. Skin: Negative. Allergic/Immunologic: Negative. Neurological: Negative for dizziness, seizures, syncope, weakness, light-headedness, numbness and headaches. Hematological: Negative for adenopathy. Does not bruise/bleed easily. Psychiatric/Behavioral: Negative.           Current Outpatient Medications:     naltrexone-buPROPion (CONTRAVE) 8-90 MG per extended release tablet, 1 tablet daily for 1 week 1 tablet twice daily for 1 week 2 tablets am, 1 tablet pm for one week 2 tablets twice daily, Disp: 70 tablet, Rfl: 0    hydrOXYzine (ATARAX) 50 MG tablet, 50 mg 1 po bid prn anxiety, Disp: , Rfl:     atorvastatin (LIPITOR) 20 MG tablet, take 1 tablet by mouth once daily, Disp: 90 tablet, Rfl: 1    traZODone (DESYREL) 50 MG tablet, Take 1 tablet by mouth nightly, Disp: 30 tablet, Rfl: 2    PARoxetine (PAXIL) 40 MG tablet, Take 40 mg by mouth every morning Instructed to take morning of surgery with a sip of water, Disp: , Rfl:     brexpiprazole (REXULTI) 1 MG TABS tablet, Take 1 mg by mouth daily Instructed to take morning of surgery with a sip of water, Disp: , Rfl:   Allergies   Allergen Reactions    Lansoprazole     Prilosec [Omeprazole] Hives    Naproxen Rash    Trintellix [Vortioxetine]     Zoloft [Sertraline Hcl] Rash       Past Medical History:   Diagnosis Date    Anxiety     Dental caries     Depression     Hyperlipidemia      Past Surgical History:   Procedure Laterality Date    CARPAL TUNNEL RELEASE Bilateral     CERVICAL FUSION       SECTION      x 3    DENTAL SURGERY N/A 2019    DENTAL EXAM FULL MOUTH EXTRACTIONS ALEOPOLASTY performed by Jimena French DDS at [facility] OR     Family History   Problem Relation Age of Onset    Heart Disease Mother     Diabetes Mother      Social History     Socioeconomic History    Marital status:      Spouse name: Not on file    Number of children: Not on file    Years of education: Not on file    Highest education level: Not on file   Occupational History    Not on file   Social Needs    Financial resource strain: Not on file    Food insecurity     Worry: Not on file     Inability: Not on file    Transportation needs     Medical: Not on file     Non-medical: Not on file   Tobacco Use    Smoking status: Current Every Day Smoker     Packs/day: 0.50     Years: 42.00     Pack years: 21.00     Types: Cigarettes    Smokeless tobacco: Never Used   Substance and Sexual Activity    Alcohol use: No    Drug use: No    Sexual activity: Not on file   Lifestyle    Physical activity     Days per week: Not on file     Minutes per session: Not on file    Stress: Not on file   Relationships    Social connections     Talks on phone: Not on file     Gets together: Not on file     Attends Confucianism service: Not on file     Active member of club or organization: Not on file     Attends meetings of

## 2020-03-26 ENCOUNTER — TELEPHONE (OUTPATIENT)
Dept: FAMILY MEDICINE CLINIC | Age: 60
End: 2020-03-26

## 2020-03-26 NOTE — TELEPHONE ENCOUNTER
Pt called to get update on a prior auth for a new thyroid medication she was supposed to start taking. Pt states she was told she would know something a week ago. Please advise.

## 2020-04-14 RX ORDER — ATORVASTATIN CALCIUM 20 MG/1
TABLET, FILM COATED ORAL
Qty: 90 TABLET | Refills: 0 | Status: SHIPPED
Start: 2020-04-14 | End: 2020-06-02 | Stop reason: SDUPTHER

## 2020-04-20 ENCOUNTER — TELEPHONE (OUTPATIENT)
Dept: PRIMARY CARE CLINIC | Age: 60
End: 2020-04-20

## 2020-05-11 RX ORDER — HYDROXYZINE 50 MG/1
50 TABLET, FILM COATED ORAL SEE ADMIN INSTRUCTIONS
Qty: 60 TABLET | Refills: 2 | Status: SHIPPED
Start: 2020-05-11 | End: 2022-04-27

## 2020-05-11 NOTE — TELEPHONE ENCOUNTER
Last Appointment:  3/18/2020  No future appointments.      James Duvall asking for refill on hydroxyzine bid daily med pended

## 2020-06-02 ENCOUNTER — HOSPITAL ENCOUNTER (OUTPATIENT)
Age: 60
Discharge: HOME | End: 2020-06-04
Payer: MEDICAID

## 2020-06-02 ENCOUNTER — OFFICE VISIT (OUTPATIENT)
Dept: FAMILY MEDICINE CLINIC | Age: 60
End: 2020-06-02
Payer: MEDICAID

## 2020-06-02 VITALS
HEART RATE: 92 BPM | RESPIRATION RATE: 15 BRPM | WEIGHT: 181 LBS | SYSTOLIC BLOOD PRESSURE: 100 MMHG | OXYGEN SATURATION: 96 % | TEMPERATURE: 97.8 F | HEIGHT: 66 IN | DIASTOLIC BLOOD PRESSURE: 68 MMHG | BODY MASS INDEX: 29.09 KG/M2

## 2020-06-02 LAB
ALBUMIN SERPL-MCNC: 4.6 G/DL (ref 3.5–5.2)
ALP BLD-CCNC: 90 U/L (ref 35–104)
ALT SERPL-CCNC: 15 U/L (ref 0–32)
AMPHETAMINE SCREEN, URINE: NOT DETECTED
ANION GAP SERPL CALCULATED.3IONS-SCNC: 17 MMOL/L (ref 7–16)
AST SERPL-CCNC: 17 U/L (ref 0–31)
BACTERIA: ABNORMAL /HPF
BARBITURATE SCREEN URINE: NOT DETECTED
BASOPHILS ABSOLUTE: 0.05 E9/L (ref 0–0.2)
BASOPHILS RELATIVE PERCENT: 0.9 % (ref 0–2)
BENZODIAZEPINE SCREEN, URINE: POSITIVE
BILIRUB SERPL-MCNC: <0.2 MG/DL (ref 0–1.2)
BILIRUBIN URINE: NEGATIVE
BLOOD, URINE: ABNORMAL
BUN BLDV-MCNC: 10 MG/DL (ref 6–20)
CALCIUM SERPL-MCNC: 9.8 MG/DL (ref 8.6–10.2)
CANNABINOID SCREEN URINE: NOT DETECTED
CHLORIDE BLD-SCNC: 100 MMOL/L (ref 98–107)
CHOLESTEROL, TOTAL: 194 MG/DL (ref 0–199)
CLARITY: CLEAR
CO2: 22 MMOL/L (ref 22–29)
COCAINE METABOLITE SCREEN URINE: NOT DETECTED
COLOR: YELLOW
CREAT SERPL-MCNC: 0.9 MG/DL (ref 0.5–1)
EOSINOPHILS ABSOLUTE: 0.38 E9/L (ref 0.05–0.5)
EOSINOPHILS RELATIVE PERCENT: 6.6 % (ref 0–6)
EPITHELIAL CELLS, UA: ABNORMAL /HPF
FENTANYL SCREEN, URINE: NOT DETECTED
GFR AFRICAN AMERICAN: >60
GFR NON-AFRICAN AMERICAN: >60 ML/MIN/1.73
GLUCOSE FASTING: 97 MG/DL (ref 74–99)
GLUCOSE URINE: NEGATIVE MG/DL
HCT VFR BLD CALC: 44 % (ref 34–48)
HDLC SERPL-MCNC: 59 MG/DL
HEMOGLOBIN: 14.4 G/DL (ref 11.5–15.5)
IMMATURE GRANULOCYTES #: 0.01 E9/L
IMMATURE GRANULOCYTES %: 0.2 % (ref 0–5)
KETONES, URINE: NEGATIVE MG/DL
LDL CHOLESTEROL CALCULATED: 114 MG/DL (ref 0–99)
LEUKOCYTE ESTERASE, URINE: ABNORMAL
LYMPHOCYTES ABSOLUTE: 1.49 E9/L (ref 1.5–4)
LYMPHOCYTES RELATIVE PERCENT: 26 % (ref 20–42)
Lab: ABNORMAL
MCH RBC QN AUTO: 29.2 PG (ref 26–35)
MCHC RBC AUTO-ENTMCNC: 32.7 % (ref 32–34.5)
MCV RBC AUTO: 89.2 FL (ref 80–99.9)
METHADONE SCREEN, URINE: NOT DETECTED
MONOCYTES ABSOLUTE: 0.51 E9/L (ref 0.1–0.95)
MONOCYTES RELATIVE PERCENT: 8.9 % (ref 2–12)
NEUTROPHILS ABSOLUTE: 3.3 E9/L (ref 1.8–7.3)
NEUTROPHILS RELATIVE PERCENT: 57.4 % (ref 43–80)
NITRITE, URINE: NEGATIVE
OPIATE SCREEN URINE: NOT DETECTED
OXYCODONE URINE: NOT DETECTED
PDW BLD-RTO: 14.1 FL (ref 11.5–15)
PH UA: 6.5 (ref 5–9)
PHENCYCLIDINE SCREEN URINE: NOT DETECTED
PLATELET # BLD: 172 E9/L (ref 130–450)
PMV BLD AUTO: 9.7 FL (ref 7–12)
POTASSIUM SERPL-SCNC: 4.7 MMOL/L (ref 3.5–5)
PROTEIN UA: NEGATIVE MG/DL
RBC # BLD: 4.93 E12/L (ref 3.5–5.5)
RBC UA: ABNORMAL /HPF (ref 0–2)
SODIUM BLD-SCNC: 139 MMOL/L (ref 132–146)
SPECIFIC GRAVITY UA: 1.01 (ref 1–1.03)
TOTAL PROTEIN: 7 G/DL (ref 6.4–8.3)
TRIGL SERPL-MCNC: 106 MG/DL (ref 0–149)
TSH SERPL DL<=0.05 MIU/L-ACNC: 3.19 UIU/ML (ref 0.27–4.2)
UROBILINOGEN, URINE: 0.2 E.U./DL
VLDLC SERPL CALC-MCNC: 21 MG/DL
WBC # BLD: 5.7 E9/L (ref 4.5–11.5)
WBC UA: ABNORMAL /HPF (ref 0–5)

## 2020-06-02 PROCEDURE — 84443 ASSAY THYROID STIM HORMONE: CPT

## 2020-06-02 PROCEDURE — 80061 LIPID PANEL: CPT

## 2020-06-02 PROCEDURE — 81001 URINALYSIS AUTO W/SCOPE: CPT

## 2020-06-02 PROCEDURE — 3017F COLORECTAL CA SCREEN DOC REV: CPT | Performed by: FAMILY MEDICINE

## 2020-06-02 PROCEDURE — 36415 COLL VENOUS BLD VENIPUNCTURE: CPT

## 2020-06-02 PROCEDURE — 85025 COMPLETE CBC W/AUTO DIFF WBC: CPT

## 2020-06-02 PROCEDURE — G8427 DOCREV CUR MEDS BY ELIG CLIN: HCPCS | Performed by: FAMILY MEDICINE

## 2020-06-02 PROCEDURE — 99214 OFFICE O/P EST MOD 30 MIN: CPT | Performed by: FAMILY MEDICINE

## 2020-06-02 PROCEDURE — G8419 CALC BMI OUT NRM PARAM NOF/U: HCPCS | Performed by: FAMILY MEDICINE

## 2020-06-02 PROCEDURE — 4004F PT TOBACCO SCREEN RCVD TLK: CPT | Performed by: FAMILY MEDICINE

## 2020-06-02 PROCEDURE — 80307 DRUG TEST PRSMV CHEM ANLYZR: CPT

## 2020-06-02 PROCEDURE — 80053 COMPREHEN METABOLIC PANEL: CPT

## 2020-06-02 RX ORDER — PHENOL 1.4 %
10 AEROSOL, SPRAY (ML) MUCOUS MEMBRANE NIGHTLY PRN
COMMUNITY

## 2020-06-02 RX ORDER — ATORVASTATIN CALCIUM 20 MG/1
TABLET, FILM COATED ORAL
Qty: 90 TABLET | Refills: 1 | Status: SHIPPED
Start: 2020-06-02 | End: 2020-11-30 | Stop reason: SDUPTHER

## 2020-06-02 ASSESSMENT — ENCOUNTER SYMPTOMS
BLOOD IN STOOL: 0
EYE DISCHARGE: 0
BACK PAIN: 0
EYE PAIN: 0
CHEST TIGHTNESS: 0
VOMITING: 0
DIARRHEA: 0
COUGH: 0
EYE REDNESS: 0
ABDOMINAL PAIN: 0
SORE THROAT: 0
PHOTOPHOBIA: 0
SINUS PAIN: 0
SHORTNESS OF BREATH: 0
NAUSEA: 0
TROUBLE SWALLOWING: 0
ALLERGIC/IMMUNOLOGIC NEGATIVE: 1

## 2020-06-02 NOTE — PROGRESS NOTES
20  Larry Ann : 1960 Sex: female  Age: 62 y.o. Chief Complaint   Patient presents with    Thyroid Problem     weight gain        Recheck, refills, for FBW today. She has not gained any weight since last visit, contrave not approved      Review of Systems   Constitutional: Negative for appetite change, fatigue and unexpected weight change. HENT: Negative for congestion, ear pain, hearing loss, sinus pain, sore throat and trouble swallowing. Eyes: Negative for photophobia, pain, discharge and redness. Respiratory: Negative for cough, chest tightness and shortness of breath. Cardiovascular: Negative for chest pain, palpitations and leg swelling. Gastrointestinal: Negative for abdominal pain, blood in stool, diarrhea, nausea and vomiting. Endocrine: Negative. Genitourinary: Negative for dysuria, flank pain, frequency and hematuria. Musculoskeletal: Negative for arthralgias, back pain, joint swelling and myalgias. Skin: Negative. Allergic/Immunologic: Negative. Neurological: Negative for dizziness, seizures, syncope, weakness, light-headedness, numbness and headaches. Hematological: Negative for adenopathy. Does not bruise/bleed easily. Psychiatric/Behavioral: Negative.           Current Outpatient Medications:     Melatonin 10 MG TABS, Take 10 mg by mouth nightly as needed 2 po @@ hs, Disp: , Rfl:     atorvastatin (LIPITOR) 20 MG tablet, take 1 tablet by mouth once daily, Disp: 90 tablet, Rfl: 1    hydrOXYzine (ATARAX) 50 MG tablet, Take 1 tablet by mouth See Admin Instructions 1 po bid prn anxiety, Disp: 60 tablet, Rfl: 2  Allergies   Allergen Reactions    Lansoprazole     Prilosec [Omeprazole] Hives    Naproxen Rash    Trintellix [Vortioxetine]     Zoloft [Sertraline Hcl] Rash       Past Medical History:   Diagnosis Date    Anxiety     Dental caries     Depression     Hyperlipidemia      Past Surgical History:   Procedure Laterality Date    CARPAL TUNNEL RELEASE Bilateral     CERVICAL FUSION       SECTION      x 3    DENTAL SURGERY N/A 2019    DENTAL EXAM FULL MOUTH EXTRACTIONS ALEOPOLASTY performed by Lori Jones DDS at [facility] OR     Family History   Problem Relation Age of Onset    Heart Disease Mother     Diabetes Mother      Social History     Socioeconomic History    Marital status:      Spouse name: Not on file    Number of children: Not on file    Years of education: Not on file    Highest education level: Not on file   Occupational History    Not on file   Social Needs    Financial resource strain: Not on file    Food insecurity     Worry: Not on file     Inability: Not on file    Transportation needs     Medical: Not on file     Non-medical: Not on file   Tobacco Use    Smoking status: Current Every Day Smoker     Packs/day: 0.50     Years: 42.00     Pack years: 21.00     Types: Cigarettes    Smokeless tobacco: Never Used   Substance and Sexual Activity    Alcohol use: No    Drug use: No    Sexual activity: Not on file   Lifestyle    Physical activity     Days per week: Not on file     Minutes per session: Not on file    Stress: Not on file   Relationships    Social connections     Talks on phone: Not on file     Gets together: Not on file     Attends Anabaptist service: Not on file     Active member of club or organization: Not on file     Attends meetings of clubs or organizations: Not on file     Relationship status: Not on file    Intimate partner violence     Fear of current or ex partner: Not on file     Emotionally abused: Not on file     Physically abused: Not on file     Forced sexual activity: Not on file   Other Topics Concern    Not on file   Social History Narrative    Not on file       Vitals:    20 0758   BP: 100/68   Pulse: 92   Resp: 15   Temp: 97.8 °F (36.6 °C)   TempSrc: Temporal   SpO2: 96%   Weight: 181 lb (82.1 kg)   Height: 5' 6\" (1.676 m)       Physical Exam  Vitals signs and nursing note reviewed. Constitutional:       Appearance: She is well-developed. HENT:      Head: Atraumatic. Right Ear: External ear normal.      Left Ear: External ear normal.      Nose: Nose normal.      Mouth/Throat:      Pharynx: No oropharyngeal exudate. Eyes:      Conjunctiva/sclera: Conjunctivae normal.      Pupils: Pupils are equal, round, and reactive to light. Neck:      Musculoskeletal: Normal range of motion and neck supple. Thyroid: No thyromegaly. Trachea: No tracheal deviation. Cardiovascular:      Rate and Rhythm: Normal rate and regular rhythm. Heart sounds: No murmur. No friction rub. No gallop. Pulmonary:      Effort: Pulmonary effort is normal. No respiratory distress. Breath sounds: Normal breath sounds. Abdominal:      General: Bowel sounds are normal.      Palpations: Abdomen is soft. Musculoskeletal: Normal range of motion. General: No tenderness or deformity. Lymphadenopathy:      Cervical: No cervical adenopathy. Skin:     General: Skin is warm and dry. Capillary Refill: Capillary refill takes less than 2 seconds. Findings: No rash. Neurological:      Mental Status: She is alert and oriented to person, place, and time. Sensory: No sensory deficit. Motor: No abnormal muscle tone. Coordination: Coordination normal.      Deep Tendon Reflexes: Reflexes normal.         Assessment and Plan:  Levi Guerrero was seen today for thyroid problem. Diagnoses and all orders for this visit:    Anxiety    Mixed hyperlipidemia  -     atorvastatin (LIPITOR) 20 MG tablet; take 1 tablet by mouth once daily    Recurrent major depressive disorder, in partial remission (HCC)    Weight gain  -     CBC Auto Differential; Future  -     Comprehensive Metabolic Panel, Fasting; Future  -     Lipid Panel; Future  -     TSH without Reflex; Future  -     Urinalysis;  Future  -     URINE DRUG SCREEN; Future        Return in about 3 months

## 2020-11-30 RX ORDER — ATORVASTATIN CALCIUM 20 MG/1
TABLET, FILM COATED ORAL
Qty: 90 TABLET | Refills: 1 | Status: SHIPPED
Start: 2020-11-30 | End: 2021-04-28 | Stop reason: SDUPTHER

## 2021-04-12 ENCOUNTER — OFFICE VISIT (OUTPATIENT)
Dept: FAMILY MEDICINE CLINIC | Age: 61
End: 2021-04-12
Payer: MEDICAID

## 2021-04-12 VITALS
TEMPERATURE: 96.4 F | BODY MASS INDEX: 27.8 KG/M2 | SYSTOLIC BLOOD PRESSURE: 100 MMHG | DIASTOLIC BLOOD PRESSURE: 72 MMHG | HEART RATE: 87 BPM | HEIGHT: 66 IN | RESPIRATION RATE: 18 BRPM | WEIGHT: 173 LBS | OXYGEN SATURATION: 98 %

## 2021-04-12 DIAGNOSIS — J01.40 ACUTE NON-RECURRENT PANSINUSITIS: Primary | ICD-10-CM

## 2021-04-12 PROCEDURE — 3017F COLORECTAL CA SCREEN DOC REV: CPT | Performed by: FAMILY MEDICINE

## 2021-04-12 PROCEDURE — 99213 OFFICE O/P EST LOW 20 MIN: CPT | Performed by: FAMILY MEDICINE

## 2021-04-12 PROCEDURE — G8427 DOCREV CUR MEDS BY ELIG CLIN: HCPCS | Performed by: FAMILY MEDICINE

## 2021-04-12 PROCEDURE — G8419 CALC BMI OUT NRM PARAM NOF/U: HCPCS | Performed by: FAMILY MEDICINE

## 2021-04-12 PROCEDURE — 4004F PT TOBACCO SCREEN RCVD TLK: CPT | Performed by: FAMILY MEDICINE

## 2021-04-12 RX ORDER — FLUOXETINE HYDROCHLORIDE 20 MG/1
60 CAPSULE ORAL
COMMUNITY
Start: 2021-03-15 | End: 2021-05-03

## 2021-04-12 RX ORDER — AZITHROMYCIN 250 MG/1
250 TABLET, FILM COATED ORAL SEE ADMIN INSTRUCTIONS
Qty: 6 TABLET | Refills: 0 | Status: SHIPPED | OUTPATIENT
Start: 2021-04-12 | End: 2021-04-17

## 2021-04-12 ASSESSMENT — ENCOUNTER SYMPTOMS
COUGH: 1
TROUBLE SWALLOWING: 0
NAUSEA: 0
EYE DISCHARGE: 0
EYE REDNESS: 0
CHEST TIGHTNESS: 0
SHORTNESS OF BREATH: 0
VOMITING: 0
SORE THROAT: 1
SINUS PRESSURE: 1
PHOTOPHOBIA: 0
SINUS PAIN: 0
BLOOD IN STOOL: 0
ALLERGIC/IMMUNOLOGIC NEGATIVE: 1
EYE PAIN: 0
BACK PAIN: 0
DIARRHEA: 0
ABDOMINAL PAIN: 0

## 2021-04-12 NOTE — PROGRESS NOTES
Current Outpatient Medications:     azithromycin (ZITHROMAX) 250 MG tablet, Take 1 tablet by mouth See Admin Instructions for 5 days 500mg on day 1 followed by 250mg on days 2 - 5, Disp: 6 tablet, Rfl: 0    atorvastatin (LIPITOR) 20 MG tablet, take 1 tablet by mouth once daily, Disp: 90 tablet, Rfl: 1    Melatonin 10 MG TABS, Take 10 mg by mouth nightly as needed 2 po @@ hs, Disp: , Rfl:     hydrOXYzine (ATARAX) 50 MG tablet, Take 1 tablet by mouth See Admin Instructions 1 po bid prn anxiety, Disp: 60 tablet, Rfl: 2    FLUoxetine (PROZAC) 20 MG capsule, , Disp: , Rfl:   Allergies   Allergen Reactions    Lansoprazole     Prilosec [Omeprazole] Hives    Naproxen Rash    Trintellix [Vortioxetine]     Zoloft [Sertraline Hcl] Rash       Past Medical History:   Diagnosis Date    Anxiety     Dental caries     Depression     Hyperlipidemia      Past Surgical History:   Procedure Laterality Date    CARPAL TUNNEL RELEASE Bilateral     CERVICAL FUSION       SECTION      x 3    DENTAL SURGERY N/A 2019    DENTAL EXAM FULL MOUTH EXTRACTIONS ALEOPOLASTY performed by Kevin Malcolm DDS at [facility] OR     Family History   Problem Relation Age of Onset    Heart Disease Mother     Diabetes Mother      Social History     Socioeconomic History    Marital status:      Spouse name: Not on file    Number of children: Not on file    Years of education: Not on file    Highest education level: Not on file   Occupational History    Not on file   Social Needs    Financial resource strain: Not hard at all   [employer] insecurity     Worry: Never true     Inability: Never true   [employer] needs     Medical: No     Non-medical: No   Tobacco Use    Smoking status: Current Every Day Smoker     Packs/day: 0.50     Years: 42.00     Pack years: 21.00     Types: Cigarettes    Smokeless tobacco: Never Used   Substance and Sexual Activity    Alcohol use: No    Drug use: No    Sexual activity: Not on file   Lifestyle    Physical activity     Days per week: Not on file     Minutes per session: Not on file    Stress: Not on file   Relationships    Social connections     Talks on phone: Not on file     Gets together: Not on file     Attends Pentecostalism service: Not on file     Active member of club or organization: Not on file     Attends meetings of clubs or organizations: Not on file     Relationship status: Not on file    Intimate partner violence     Fear of current or ex partner: Not on file     Emotionally abused: Not on file     Physically abused: Not on file     Forced sexual activity: Not on file   Other Topics Concern    Not on file   Social History Narrative    Not on file       Vitals:    04/12/21 1320   BP: 100/72   Pulse: 87   Resp: 18   Temp: 96.4 °F (35.8 °C)   TempSrc: Temporal   SpO2: 98%   Weight: 173 lb (78.5 kg)   Height: 5' 6\" (1.676 m)       Physical Exam  Vitals signs and nursing note reviewed. Constitutional:       Appearance: She is well-developed. HENT:      Head: Atraumatic. Right Ear: External ear normal.      Left Ear: External ear normal.      Nose: Congestion present. Mouth/Throat:      Pharynx: Posterior oropharyngeal erythema present. No oropharyngeal exudate. Eyes:      Conjunctiva/sclera: Conjunctivae normal.      Pupils: Pupils are equal, round, and reactive to light. Neck:      Musculoskeletal: Normal range of motion and neck supple. Thyroid: No thyromegaly. Trachea: No tracheal deviation. Cardiovascular:      Rate and Rhythm: Normal rate and regular rhythm. Heart sounds: No murmur. No friction rub. No gallop. Pulmonary:      Effort: Pulmonary effort is normal. No respiratory distress. Breath sounds: Normal breath sounds. Abdominal:      General: Bowel sounds are normal.      Palpations: Abdomen is soft. Musculoskeletal: Normal range of motion. General: No tenderness or deformity.    Lymphadenopathy:      Cervical: No cervical adenopathy. Skin:     General: Skin is warm and dry. Capillary Refill: Capillary refill takes less than 2 seconds. Findings: No rash. Neurological:      Mental Status: She is alert and oriented to person, place, and time. Sensory: No sensory deficit. Motor: No abnormal muscle tone.       Coordination: Coordination normal.      Deep Tendon Reflexes: Reflexes normal.             Seen By:  Riki Mcginnis,

## 2021-04-20 ENCOUNTER — OFFICE VISIT (OUTPATIENT)
Dept: FAMILY MEDICINE CLINIC | Age: 61
End: 2021-04-20
Payer: MEDICAID

## 2021-04-20 VITALS
TEMPERATURE: 98.1 F | BODY MASS INDEX: 27.16 KG/M2 | DIASTOLIC BLOOD PRESSURE: 68 MMHG | WEIGHT: 169 LBS | HEIGHT: 66 IN | RESPIRATION RATE: 18 BRPM | SYSTOLIC BLOOD PRESSURE: 110 MMHG | OXYGEN SATURATION: 98 % | HEART RATE: 79 BPM

## 2021-04-20 DIAGNOSIS — K21.9 GASTROESOPHAGEAL REFLUX DISEASE, UNSPECIFIED WHETHER ESOPHAGITIS PRESENT: ICD-10-CM

## 2021-04-20 DIAGNOSIS — J02.9 ACUTE PHARYNGITIS, UNSPECIFIED ETIOLOGY: Primary | ICD-10-CM

## 2021-04-20 LAB — S PYO AG THROAT QL: NORMAL

## 2021-04-20 PROCEDURE — 87880 STREP A ASSAY W/OPTIC: CPT | Performed by: FAMILY MEDICINE

## 2021-04-20 PROCEDURE — G8419 CALC BMI OUT NRM PARAM NOF/U: HCPCS | Performed by: FAMILY MEDICINE

## 2021-04-20 PROCEDURE — G8427 DOCREV CUR MEDS BY ELIG CLIN: HCPCS | Performed by: FAMILY MEDICINE

## 2021-04-20 PROCEDURE — 3017F COLORECTAL CA SCREEN DOC REV: CPT | Performed by: FAMILY MEDICINE

## 2021-04-20 PROCEDURE — 99213 OFFICE O/P EST LOW 20 MIN: CPT | Performed by: FAMILY MEDICINE

## 2021-04-20 PROCEDURE — 4004F PT TOBACCO SCREEN RCVD TLK: CPT | Performed by: FAMILY MEDICINE

## 2021-04-20 RX ORDER — AZITHROMYCIN 250 MG/1
250 TABLET, FILM COATED ORAL SEE ADMIN INSTRUCTIONS
Qty: 6 TABLET | Refills: 0 | Status: SHIPPED | OUTPATIENT
Start: 2021-04-20 | End: 2021-04-25

## 2021-04-20 ASSESSMENT — ENCOUNTER SYMPTOMS
ABDOMINAL PAIN: 0
PHOTOPHOBIA: 0
EYE REDNESS: 0
NAUSEA: 0
EYE DISCHARGE: 0
ALLERGIC/IMMUNOLOGIC NEGATIVE: 1
EYE PAIN: 0
CHEST TIGHTNESS: 0
TROUBLE SWALLOWING: 0
BLOOD IN STOOL: 0
DIARRHEA: 0
SINUS PAIN: 0
BACK PAIN: 0
SORE THROAT: 1
COUGH: 0
SHORTNESS OF BREATH: 0
VOMITING: 0

## 2021-04-20 NOTE — PROGRESS NOTES
hematuria. Musculoskeletal: Negative for arthralgias, back pain, joint swelling and myalgias. Skin: Negative. Allergic/Immunologic: Negative. Neurological: Negative for dizziness, seizures, syncope, weakness, light-headedness, numbness and headaches. Hematological: Negative for adenopathy. Does not bruise/bleed easily. Psychiatric/Behavioral: Negative.           Current Outpatient Medications:     azithromycin (ZITHROMAX) 250 MG tablet, Take 1 tablet by mouth See Admin Instructions for 5 days 500mg on day 1 followed by 250mg on days 2 - 5, Disp: 6 tablet, Rfl: 0    FLUoxetine (PROZAC) 20 MG capsule, , Disp: , Rfl:     atorvastatin (LIPITOR) 20 MG tablet, take 1 tablet by mouth once daily, Disp: 90 tablet, Rfl: 1    Melatonin 10 MG TABS, Take 10 mg by mouth nightly as needed 2 po @@ hs, Disp: , Rfl:     hydrOXYzine (ATARAX) 50 MG tablet, Take 1 tablet by mouth See Admin Instructions 1 po bid prn anxiety, Disp: 60 tablet, Rfl: 2  Allergies   Allergen Reactions    Lansoprazole     Prilosec [Omeprazole] Hives    Naproxen Rash    Trintellix [Vortioxetine]     Zoloft [Sertraline Hcl] Rash       Past Medical History:   Diagnosis Date    Anxiety     Dental caries     Depression     Hyperlipidemia      Past Surgical History:   Procedure Laterality Date    CARPAL TUNNEL RELEASE Bilateral     CERVICAL FUSION       SECTION      x 3    DENTAL SURGERY N/A 2019    DENTAL EXAM FULL MOUTH EXTRACTIONS ALEOPOLASTY performed by Aleyda Gomez DDS at [facility] OR     Family History   Problem Relation Age of Onset    Heart Disease Mother     Diabetes Mother      Social History     Socioeconomic History    Marital status:      Spouse name: Not on file    Number of children: Not on file    Years of education: Not on file    Highest education level: Not on file   Occupational History    Not on file   Social Needs    Financial resource strain: Not hard at all   [employer] insecurity Worry: Never true     Inability: Never true    Transportation needs     Medical: No     Non-medical: No   Tobacco Use    Smoking status: Current Every Day Smoker     Packs/day: 0.50     Years: 42.00     Pack years: 21.00     Types: Cigarettes    Smokeless tobacco: Never Used   Substance and Sexual Activity    Alcohol use: No    Drug use: No    Sexual activity: Not on file   Lifestyle    Physical activity     Days per week: Not on file     Minutes per session: Not on file    Stress: Not on file   Relationships    Social connections     Talks on phone: Not on file     Gets together: Not on file     Attends Spiritism service: Not on file     Active member of club or organization: Not on file     Attends meetings of clubs or organizations: Not on file     Relationship status: Not on file    Intimate partner violence     Fear of current or ex partner: Not on file     Emotionally abused: Not on file     Physically abused: Not on file     Forced sexual activity: Not on file   Other Topics Concern    Not on file   Social History Narrative    Not on file       Vitals:    04/20/21 1327   BP: 110/68   Pulse: 79   Resp: 18   Temp: 98.1 °F (36.7 °C)   TempSrc: Temporal   SpO2: 98%   Weight: 169 lb (76.7 kg)   Height: 5' 6\" (1.676 m)       Physical Exam  Vitals signs and nursing note reviewed. Constitutional:       Appearance: She is well-developed. HENT:      Head: Atraumatic. Right Ear: External ear normal.      Left Ear: External ear normal.      Nose: Congestion present. Mouth/Throat:      Pharynx: Posterior oropharyngeal erythema present. No oropharyngeal exudate. Eyes:      Conjunctiva/sclera: Conjunctivae normal.      Pupils: Pupils are equal, round, and reactive to light. Neck:      Musculoskeletal: Normal range of motion and neck supple. Thyroid: No thyromegaly. Trachea: No tracheal deviation. Cardiovascular:      Rate and Rhythm: Normal rate and regular rhythm.       Heart

## 2021-04-21 ENCOUNTER — TELEPHONE (OUTPATIENT)
Dept: FAMILY MEDICINE CLINIC | Age: 61
End: 2021-04-21

## 2021-04-21 NOTE — TELEPHONE ENCOUNTER
Michelle Joyce said you was sending a script for a liquid antacid to [facility], but they have nothing yet.

## 2021-04-22 ENCOUNTER — TELEPHONE (OUTPATIENT)
Dept: FAMILY MEDICINE CLINIC | Age: 61
End: 2021-04-22

## 2021-04-22 NOTE — TELEPHONE ENCOUNTER
Patient picked up z-pack from pharmacy. She wanted to make sure that it was right. She thought you wanted her to take it for a whole week this time since she was already on the 5 day pack before and it didn't seem to work. She just wanted to make sure she would have enough medication. 5 or 7 days for this Please advise.

## 2021-04-26 ENCOUNTER — TELEPHONE (OUTPATIENT)
Dept: FAMILY MEDICINE CLINIC | Age: 61
End: 2021-04-26

## 2021-04-26 NOTE — TELEPHONE ENCOUNTER
Nain Levy is doing no better from zpack. She is worse. She has throat pain, back pain , shoulder pain and chest pain.  Please advise

## 2021-04-27 ENCOUNTER — OFFICE VISIT (OUTPATIENT)
Dept: FAMILY MEDICINE CLINIC | Age: 61
End: 2021-04-27
Payer: MEDICAID

## 2021-04-27 VITALS
OXYGEN SATURATION: 96 % | HEIGHT: 66 IN | TEMPERATURE: 97.5 F | BODY MASS INDEX: 27 KG/M2 | SYSTOLIC BLOOD PRESSURE: 120 MMHG | WEIGHT: 168 LBS | HEART RATE: 80 BPM | DIASTOLIC BLOOD PRESSURE: 76 MMHG

## 2021-04-27 DIAGNOSIS — M25.50 ARTHRALGIA, UNSPECIFIED JOINT: ICD-10-CM

## 2021-04-27 DIAGNOSIS — Z72.0 TOBACCO USE: ICD-10-CM

## 2021-04-27 DIAGNOSIS — J02.9 ACUTE PHARYNGITIS, UNSPECIFIED ETIOLOGY: Primary | ICD-10-CM

## 2021-04-27 DIAGNOSIS — Z12.31 ENCOUNTER FOR SCREENING MAMMOGRAM FOR MALIGNANT NEOPLASM OF BREAST: ICD-10-CM

## 2021-04-27 LAB
C-REACTIVE PROTEIN, HIGH SENSITIVITY: <0.3 MG/L (ref 0–3)
RHEUMATOID FACTOR: <10 IU/ML (ref 0–13)
SEDIMENTATION RATE, ERYTHROCYTE: 3 MM/HR (ref 0–20)

## 2021-04-27 PROCEDURE — 3017F COLORECTAL CA SCREEN DOC REV: CPT | Performed by: FAMILY MEDICINE

## 2021-04-27 PROCEDURE — 4004F PT TOBACCO SCREEN RCVD TLK: CPT | Performed by: FAMILY MEDICINE

## 2021-04-27 PROCEDURE — G8419 CALC BMI OUT NRM PARAM NOF/U: HCPCS | Performed by: FAMILY MEDICINE

## 2021-04-27 PROCEDURE — G8427 DOCREV CUR MEDS BY ELIG CLIN: HCPCS | Performed by: FAMILY MEDICINE

## 2021-04-27 PROCEDURE — 99213 OFFICE O/P EST LOW 20 MIN: CPT | Performed by: FAMILY MEDICINE

## 2021-04-27 ASSESSMENT — LIFESTYLE VARIABLES
HOW OFTEN DURING THE LAST YEAR HAVE YOU BEEN UNABLE TO REMEMBER WHAT HAPPENED THE NIGHT BEFORE BECAUSE YOU HAD BEEN DRINKING: NEVER
HOW OFTEN DURING THE LAST YEAR HAVE YOU FAILED TO DO WHAT WAS NORMALLY EXPECTED FROM YOU BECAUSE OF DRINKING: 0
HAVE YOU OR SOMEONE ELSE BEEN INJURED AS A RESULT OF YOUR DRINKING: NO
AUDIT TOTAL SCORE: 1
HOW OFTEN DO YOU HAVE SIX OR MORE DRINKS ON ONE OCCASION: 0
HAS A RELATIVE, FRIEND, DOCTOR, OR ANOTHER HEALTH PROFESSIONAL EXPRESSED CONCERN ABOUT YOUR DRINKING OR SUGGESTED YOU CUT DOWN: NO
HOW OFTEN DURING THE LAST YEAR HAVE YOU BEEN UNABLE TO REMEMBER WHAT HAPPENED THE NIGHT BEFORE BECAUSE YOU HAD BEEN DRINKING: 0
HOW OFTEN DURING THE LAST YEAR HAVE YOU NEEDED AN ALCOHOLIC DRINK FIRST THING IN THE MORNING TO GET YOURSELF GOING AFTER A NIGHT OF HEAVY DRINKING: NEVER
HOW OFTEN DURING THE LAST YEAR HAVE YOU FOUND THAT YOU WERE NOT ABLE TO STOP DRINKING ONCE YOU HAD STARTED: 0
AUDIT TOTAL SCORE: 0
HOW OFTEN DO YOU HAVE SIX OR MORE DRINKS ON ONE OCCASION: NEVER
HAVE YOU OR SOMEONE ELSE BEEN INJURED AS A RESULT OF YOUR DRINKING: 0
HOW OFTEN DURING THE LAST YEAR HAVE YOU FOUND THAT YOU WERE NOT ABLE TO STOP DRINKING ONCE YOU HAD STARTED: NEVER
HOW OFTEN DO YOU HAVE A DRINK CONTAINING ALCOHOL: MONTHLY OR LESS
HOW OFTEN DURING THE LAST YEAR HAVE YOU HAD A FEELING OF GUILT OR REMORSE AFTER DRINKING: NEVER
AUDIT-C TOTAL SCORE: 0
HOW MANY STANDARD DRINKS CONTAINING ALCOHOL DO YOU HAVE ON A TYPICAL DAY: ONE OR TWO
HOW OFTEN DURING THE LAST YEAR HAVE YOU FAILED TO DO WHAT WAS NORMALLY EXPECTED FROM YOU BECAUSE OF DRINKING: NEVER

## 2021-04-27 ASSESSMENT — ENCOUNTER SYMPTOMS
BACK PAIN: 1
VOMITING: 0
ABDOMINAL PAIN: 0
CHEST TIGHTNESS: 0
EYE PAIN: 0
COUGH: 0
TROUBLE SWALLOWING: 0
EYE REDNESS: 0
EYE DISCHARGE: 0
PHOTOPHOBIA: 0
DIARRHEA: 0
BLOOD IN STOOL: 0
SHORTNESS OF BREATH: 0
SORE THROAT: 0
SINUS PAIN: 0
ALLERGIC/IMMUNOLOGIC NEGATIVE: 1
NAUSEA: 0

## 2021-04-27 ASSESSMENT — PATIENT HEALTH QUESTIONNAIRE - PHQ9
SUM OF ALL RESPONSES TO PHQ QUESTIONS 1-9: 16
SUM OF ALL RESPONSES TO PHQ QUESTIONS 1-9: 16
7. TROUBLE CONCENTRATING ON THINGS, SUCH AS READING THE NEWSPAPER OR WATCHING TELEVISION: 2
8. MOVING OR SPEAKING SO SLOWLY THAT OTHER PEOPLE COULD HAVE NOTICED. OR THE OPPOSITE, BEING SO FIGETY OR RESTLESS THAT YOU HAVE BEEN MOVING AROUND A LOT MORE THAN USUAL: 2
9. THOUGHTS THAT YOU WOULD BE BETTER OFF DEAD, OR OF HURTING YOURSELF: 2
5. POOR APPETITE OR OVEREATING: 0
1. LITTLE INTEREST OR PLEASURE IN DOING THINGS: 3

## 2021-04-27 NOTE — PROGRESS NOTES
21  Oma Kilpatrick : 1960 Sex: female  Age: 62 y.o. Assessment and Plan:  Nain Levy was seen today for pharyngitis. Diagnoses and all orders for this visit:    Nain Levy was seen today for pharyngitis. Diagnoses and all orders for this visit:    Acute pharyngitis, unspecified etiology    Arthralgia, unspecified joint  -     High sensitivity CRP; Future  -     Rheumatoid Factor; Future  -     Sedimentation Rate; Future  -     SRUTHI; Future    Tobacco use  -     XR CHEST (2 VW); Future    Encounter for screening mammogram for malignant neoplasm of breast  -     KACIE DIGITAL SCREEN W OR WO CAD BILATERAL; Future            Return in about 2 weeks (around 2021), or For Pap smear, fit test, mammogram..    Chief Complaint   Patient presents with    Pharyngitis       Presents for recheck pharyngitis. This is actually resolving, Z-Nicolas x2 improve the situation. Now is complaining of generalized arthralgias aching and stiffness in joints. Denies swelling or redness, skin rash, eye or vision problems, occultly swallowing. Will check inflammatory markers today, make certain is not a infection inflammatory process. Also concerned over cancer, she is due for several screening tests. Will check a chest x-ray today as she is a smoker. Schedule her for Pap and mammogram which can be done in our office. He also need a low-dose CT of the chest any on x-ray results. Review of Systems   Constitutional: Negative for appetite change, fatigue and unexpected weight change. HENT: Negative for congestion, ear pain, hearing loss, sinus pain, sore throat and trouble swallowing. Eyes: Negative for photophobia, pain, discharge and redness. Respiratory: Negative for cough, chest tightness and shortness of breath. Cardiovascular: Negative for chest pain, palpitations and leg swelling. Gastrointestinal: Negative for abdominal pain, blood in stool, diarrhea, nausea and vomiting.    Endocrine: Non-medical: No   Tobacco Use    Smoking status: Current Every Day Smoker     Packs/day: 0.50     Years: 42.00     Pack years: 21.00     Types: Cigarettes    Smokeless tobacco: Never Used   Substance and Sexual Activity    Alcohol use: No    Drug use: No    Sexual activity: Not on file   Lifestyle    Physical activity     Days per week: Not on file     Minutes per session: Not on file    Stress: Not on file   Relationships    Social connections     Talks on phone: Not on file     Gets together: Not on file     Attends [employer] service: Not on file     Active member of club or organization: Not on file     Attends meetings of clubs or organizations: Not on file     Relationship status: Not on file    Intimate partner violence     Fear of current or ex partner: Not on file     Emotionally abused: Not on file     Physically abused: Not on file     Forced sexual activity: Not on file   Other Topics Concern    Not on file   Social History Narrative    Not on file       Vitals:    04/27/21 0814   BP: 120/76   Pulse: 80   Temp: 97.5 °F (36.4 °C)   TempSrc: Temporal   SpO2: 96%   Weight: 168 lb (76.2 kg)   Height: 5' 6\" (1.676 m)       Physical Exam  Vitals signs and nursing note reviewed. Constitutional:       Appearance: She is well-developed. HENT:      Head: Atraumatic. Right Ear: External ear normal.      Left Ear: External ear normal.      Nose: Nose normal.      Mouth/Throat:      Pharynx: No oropharyngeal exudate. Eyes:      Conjunctiva/sclera: Conjunctivae normal.      Pupils: Pupils are equal, round, and reactive to light. Neck:      Musculoskeletal: Normal range of motion and neck supple. Thyroid: No thyromegaly. Trachea: No tracheal deviation. Cardiovascular:      Rate and Rhythm: Normal rate and regular rhythm. Heart sounds: No murmur. No friction rub. No gallop. Pulmonary:      Effort: Pulmonary effort is normal. No respiratory distress.       Breath sounds: Normal breath sounds. Abdominal:      General: Bowel sounds are normal.      Palpations: Abdomen is soft. Musculoskeletal: Normal range of motion. General: No tenderness or deformity. Lymphadenopathy:      Cervical: No cervical adenopathy. Skin:     General: Skin is warm and dry. Capillary Refill: Capillary refill takes less than 2 seconds. Findings: No rash. Neurological:      Mental Status: She is alert and oriented to person, place, and time. Sensory: No sensory deficit. Motor: No abnormal muscle tone.       Coordination: Coordination normal.      Deep Tendon Reflexes: Reflexes normal.             Seen By:  Munir Chun DO

## 2021-04-28 ENCOUNTER — OFFICE VISIT (OUTPATIENT)
Dept: FAMILY MEDICINE CLINIC | Age: 61
End: 2021-04-28
Payer: MEDICAID

## 2021-04-28 VITALS
BODY MASS INDEX: 27.16 KG/M2 | OXYGEN SATURATION: 98 % | SYSTOLIC BLOOD PRESSURE: 100 MMHG | HEIGHT: 66 IN | WEIGHT: 169 LBS | TEMPERATURE: 98.7 F | HEART RATE: 89 BPM | RESPIRATION RATE: 18 BRPM | DIASTOLIC BLOOD PRESSURE: 58 MMHG

## 2021-04-28 VITALS
OXYGEN SATURATION: 98 % | HEIGHT: 66 IN | SYSTOLIC BLOOD PRESSURE: 100 MMHG | WEIGHT: 169 LBS | DIASTOLIC BLOOD PRESSURE: 58 MMHG | BODY MASS INDEX: 27.16 KG/M2 | HEART RATE: 89 BPM | RESPIRATION RATE: 18 BRPM | TEMPERATURE: 98.7 F

## 2021-04-28 DIAGNOSIS — Z12.31 ENCOUNTER FOR SCREENING MAMMOGRAM FOR MALIGNANT NEOPLASM OF BREAST: ICD-10-CM

## 2021-04-28 DIAGNOSIS — Z12.11 SCREENING FOR MALIGNANT NEOPLASM OF COLON: Primary | ICD-10-CM

## 2021-04-28 DIAGNOSIS — E78.2 MIXED HYPERLIPIDEMIA: ICD-10-CM

## 2021-04-28 DIAGNOSIS — Z01.419 ENCOUNTER FOR GYNECOLOGICAL EXAMINATION WITHOUT ABNORMAL FINDING: ICD-10-CM

## 2021-04-28 DIAGNOSIS — Z00.00 ROUTINE GENERAL MEDICAL EXAMINATION AT A HEALTH CARE FACILITY: Primary | ICD-10-CM

## 2021-04-28 LAB
ANTI-NUCLEAR ANTIBODY (ANA): NEGATIVE
CONTROL: NORMAL
HEMOCCULT STL QL: NORMAL

## 2021-04-28 PROCEDURE — 3017F COLORECTAL CA SCREEN DOC REV: CPT | Performed by: FAMILY MEDICINE

## 2021-04-28 PROCEDURE — 82274 ASSAY TEST FOR BLOOD FECAL: CPT | Performed by: FAMILY MEDICINE

## 2021-04-28 PROCEDURE — G0438 PPPS, INITIAL VISIT: HCPCS | Performed by: FAMILY MEDICINE

## 2021-04-28 PROCEDURE — 99396 PREV VISIT EST AGE 40-64: CPT | Performed by: FAMILY MEDICINE

## 2021-04-28 RX ORDER — ATORVASTATIN CALCIUM 20 MG/1
TABLET, FILM COATED ORAL
Qty: 90 TABLET | Refills: 1 | Status: SHIPPED
Start: 2021-04-28 | End: 2021-11-11

## 2021-04-28 ASSESSMENT — ENCOUNTER SYMPTOMS
CHEST TIGHTNESS: 0
BLOOD IN STOOL: 0
PHOTOPHOBIA: 0
VOMITING: 0
ABDOMINAL PAIN: 0
SHORTNESS OF BREATH: 0
DIARRHEA: 0
SINUS PAIN: 0
TROUBLE SWALLOWING: 0
EYE DISCHARGE: 0
NAUSEA: 0
SORE THROAT: 0
COUGH: 0
BACK PAIN: 0
ALLERGIC/IMMUNOLOGIC NEGATIVE: 1
EYE REDNESS: 0
EYE PAIN: 0

## 2021-04-28 NOTE — PROGRESS NOTES
21  Oma Kilpatrick : 1960 Sex: female  Age: 62 y.o. Assessment and Plan:  Diagnoses and all orders for this visit:    Screening for malignant neoplasm of colon  -     POCT Fit Test; Future  -     POCT Fit Test  Test was negative, good for 1 year. Encounter for gynecological examination without abnormal finding  -     PAP SMEAR; Future  3 to 5 years for recheck Pap if everything returns within normal limits. Encounter for screening mammogram for malignant neoplasm of breast  Screening mammogram today. Return in about 4 weeks (around 2021). No chief complaint on file. Here today for Pap smear. Is been about 4 years since her last Pap which was within normal limits. Is also due for mammogram as well. She is here test will also be performed. Chest x-ray from yesterday was read as negative, with her history of smoking 1 to 1-1/2 packs/day she may benefit from low-dose chest CT for screening. Review of Systems   Constitutional: Negative for appetite change, fatigue and unexpected weight change. HENT: Negative for congestion, ear pain, hearing loss, sinus pain, sore throat and trouble swallowing. Eyes: Negative for photophobia, pain, discharge and redness. Respiratory: Negative for cough, chest tightness and shortness of breath. Cardiovascular: Negative for chest pain, palpitations and leg swelling. Gastrointestinal: Negative for abdominal pain, blood in stool, diarrhea, nausea and vomiting. Endocrine: Negative. Genitourinary: Negative for dysuria, flank pain, frequency, hematuria, vaginal bleeding, vaginal discharge and vaginal pain. Musculoskeletal: Positive for arthralgias. Negative for back pain, joint swelling and myalgias. Skin: Negative. Allergic/Immunologic: Negative. Neurological: Negative for dizziness, seizures, syncope, weakness, light-headedness, numbness and headaches. Hematological: Negative for adenopathy.  Does not bruise/bleed easily. Psychiatric/Behavioral: Negative.           Current Outpatient Medications:     FLUoxetine (PROZAC) 20 MG capsule, , Disp: , Rfl:     atorvastatin (LIPITOR) 20 MG tablet, take 1 tablet by mouth once daily, Disp: 90 tablet, Rfl: 1    Melatonin 10 MG TABS, Take 10 mg by mouth nightly as needed 2 po @@ hs, Disp: , Rfl:     hydrOXYzine (ATARAX) 50 MG tablet, Take 1 tablet by mouth See Admin Instructions 1 po bid prn anxiety, Disp: 60 tablet, Rfl: 2  Allergies   Allergen Reactions    Lansoprazole     Prilosec [Omeprazole] Hives    Naproxen Rash    Trintellix [Vortioxetine]     Zoloft [Sertraline Hcl] Rash       Past Medical History:   Diagnosis Date    Anxiety     Dental caries     Depression     Hyperlipidemia      Past Surgical History:   Procedure Laterality Date    CARPAL TUNNEL RELEASE Bilateral     CERVICAL FUSION       SECTION      x 3    DENTAL SURGERY N/A 2019    DENTAL EXAM FULL MOUTH EXTRACTIONS ALEOPOLASTY performed by Morgan Schroeder DDS at [facility] OR     Family History   Problem Relation Age of Onset    Heart Disease Mother     Diabetes Mother      Social History     Socioeconomic History    Marital status:      Spouse name: Not on file    Number of children: Not on file    Years of education: Not on file    Highest education level: Not on file   Occupational History    Not on file   Social Needs    Financial resource strain: Not hard at all   Tawny-Thea insecurity     Worry: Never true     Inability: Never true   Fluk[employer] needs     Medical: No     Non-medical: No   Tobacco Use    Smoking status: Current Every Day Smoker     Packs/day: 0.50     Years: 42.00     Pack years: 21.00     Types: Cigarettes    Smokeless tobacco: Never Used   Substance and Sexual Activity    Alcohol use: No    Drug use: No    Sexual activity: Not on file   Lifestyle    Physical activity     Days per week: Not on file     Minutes per session: Not on file   

## 2021-04-28 NOTE — PROGRESS NOTES
Medicare Annual Wellness Visit  Name: Rubén Barillas Date: 2021   MRN: [id number] Sex: Female   Age: 62 y.o. Ethnicity: Non-/Non    : 1960 Race: David Wilcox is here for Medicare AWV    Screenings for behavioral, psychosocial and functional/safety risks, and cognitive dysfunction are all negative except as indicated below. These results, as well as other patient data from the [address] form, are documented in Flowsheets linked to this Encounter. Allergies   Allergen Reactions    Lansoprazole     Prilosec [Omeprazole] Hives    Naproxen Rash    Trintellix [Vortioxetine]     Zoloft [Sertraline Hcl] Rash         Prior to Visit Medications    Medication Sig Taking?  Authorizing Provider   atorvastatin (LIPITOR) 20 MG tablet take 1 tablet by mouth once daily Yes [place] FAN Art DO   FLUoxetine (PROZAC) 20 MG capsule  Yes Historical Provider, MD   Melatonin 10 MG TABS Take 10 mg by mouth nightly as needed 2 po @@ hs Yes Historical Provider, MD   hydrOXYzine (ATARAX) 50 MG tablet Take 1 tablet by mouth See Admin Instructions 1 po bid prn anxiety Yes Clemente Walsh DO         Past Medical History:   Diagnosis Date    Anxiety     Dental caries     Depression     Hyperlipidemia        Past Surgical History:   Procedure Laterality Date    CARPAL TUNNEL RELEASE Bilateral     CERVICAL FUSION       SECTION      x 3    DENTAL SURGERY N/A 2019    DENTAL EXAM FULL MOUTH EXTRACTIONS ALEOPOLASTY performed by Armand Dang DDS at [facility] OR         Family History   Problem Relation Age of Onset    Heart Disease Mother     Diabetes Mother        CareTeam (Including outside providers/suppliers regularly involved in providing care):   Patient Care Team:  Clemente Walsh DO as PCP - General (Family Medicine)  Clemente Walsh DO as PCP - Isidoro Ponce Provider    Wt Readings from Last 3 Encounters:   21 169 lb (76.7 kg) 04/27/21 168 lb (76.2 kg)   04/20/21 169 lb (76.7 kg)     Vitals:    04/28/21 0822   BP: (!) 100/58   Pulse: 89   Resp: 18   Temp: 98.7 °F (37.1 °C)   TempSrc: Temporal   SpO2: 98%   Weight: 169 lb (76.7 kg)   Height: 5' 6\" (1.676 m)     Body mass index is 27.28 kg/m². Based upon direct observation of the patient, evaluation of cognition reveals recent and remote memory intact. Patient's complete Health Risk Assessment and screening values have been reviewed and are found in Flowsheets. The following problems were reviewed today and where indicated follow up appointments were made and/or referrals ordered. Positive Risk Factor Screenings with Interventions:       Depression:  PHQ-2 Score: 5  PHQ-9 Total Score: 16    Severity:1-4 = minimal depression, 5-9 = mild depression, 10-14 = moderate depression, 15-19 = moderately severe depression, 20-27 = severe depression  Depression Interventions:  · Patient advised to follow-up in this office for further evaluation and treatment within 1 week     Substance History:  Social History     Tobacco History     Smoking Status  Current Every Day Smoker Smoking Frequency  0.5 packs/day for 42 years (21 pk yrs) Smoking Tobacco Type  Cigarettes    Smokeless Tobacco Use  Never Used          Alcohol History     Alcohol Use Status  No          Drug Use     Drug Use Status  No          Sexual Activity     Sexually Active  Not Asked               Alcohol Screening: Audit-C Score: 1  Total Score: 1    A score of 8 or more is associated with harmful or hazardous drinking. A score of 13 or more in women, and 15 or more in men, is likely to indicate alcohol dependence. Substance Abuse Interventions:  · Tobacco abuse:  patient is not ready to work toward tobacco cessation at this time    [address] and ACP:  General  In general, how would you say your health is?: Very Good  In the past 7 days, have you experienced any of the following?  New or Increased Pain, New or Increased Fatigue, Loneliness, Social Isolation, Stress or Anger?: (!) New or Increased Pain, New or Increased Fatigue  Do you get the social and emotional support that you need?: Yes  Do you have a Living Will?: Yes  Advance Directives     Power of  Living Will ACP-Advance Directive ACP-Power of     Not on File Not on File Not on File Not on File      General Health Risk Interventions:  · No Living Will: Patient declines ACP discussion/assistance    Health Habits/Nutrition:  Health Habits/Nutrition  Do you exercise for at least 20 minutes 2-3 times per week?: (!) No  Have you lost any weight without trying in the past 3 months?: No  Do you eat only one meal per day?: No  Have you seen the dentist within the past year?: Yes  Body mass index: (!) 27.27  Health Habits/Nutrition Interventions:  · Dental exam overdue:  patient encouraged to make appointment with his/her dentist       Personalized Preventive Plan   Current Health Maintenance Status  Immunization History   Administered Date(s) Administered    Pneumococcal Polysaccharide (Dottttsub77) 04/16/2018    Zoster Live (Zostavax) 06/15/2016        Health Maintenance   Topic Date Due    HIV screen  Never done    COVID-19 Vaccine (1) Never done    DTaP/Tdap/Td vaccine (1 - Tdap) Never done    Shingles Vaccine (2 of 3) 08/10/2016    Breast cancer screen  05/09/2020    Cervical cancer screen  10/13/2020    Lipid screen  06/02/2021    Flu vaccine (Season Ended) 09/01/2021    Colon Cancer Screen FIT/FOBT  04/28/2022    Diabetes screen  06/02/2023    Pneumococcal 0-64 years Vaccine  Completed    Hepatitis C screen  Completed    Hepatitis A vaccine  Aged Out    Hepatitis B vaccine  Aged Out    Hib vaccine  Aged Out    Meningococcal (ACWY) vaccine  Aged Out     Recommendations for bewarket Due: see orders and patient instructions/AVS.  .   Recommended screening schedule for the next 5-10 years is provided to the patient in written

## 2021-04-28 NOTE — PATIENT INSTRUCTIONS
Personalized Preventive Plan for Cara Porter - 4/28/2021  Medicare offers a range of preventive health benefits. Some of the tests and screenings are paid in full while other may be subject to a deductible, co-insurance, and/or copay. Some of these benefits include a comprehensive review of your medical history including lifestyle, illnesses that may run in your family, and various assessments and screenings as appropriate. After reviewing your medical record and screening and assessments performed today your provider may have ordered immunizations, labs, imaging, and/or referrals for you. A list of these orders (if applicable) as well as your Preventive Care list are included within your After Visit Summary for your review. Other Preventive Recommendations:    · A preventive eye exam performed by an eye specialist is recommended every 1-2 years to screen for glaucoma; cataracts, macular degeneration, and other eye disorders. · A preventive dental visit is recommended every 6 months. · Try to get at least 150 minutes of exercise per week or 10,000 steps per day on a pedometer . · Order or download the FREE \"Exercise & Physical Activity: Your Everyday Guide\" from [employer]. Call 7-260.856.4997 or search [employer] online. · You need 2656-3118 mg of calcium and 5988-4006 IU of vitamin D per day. It is possible to meet your calcium requirement with diet alone, but a vitamin D supplement is usually necessary to meet this goal.  · When exposed to the sun, use a sunscreen that protects against both UVA and UVB radiation with an SPF of 30 or greater. Reapply every 2 to 3 hours or after sweating, drying off with a towel, or swimming. · Always wear a seat belt when traveling in a car. Always wear a helmet when riding a bicycle or motorcycle.

## 2021-04-29 DIAGNOSIS — Z01.419 ENCOUNTER FOR GYNECOLOGICAL EXAMINATION WITHOUT ABNORMAL FINDING: ICD-10-CM

## 2021-04-30 ENCOUNTER — TELEPHONE (OUTPATIENT)
Dept: FAMILY MEDICINE CLINIC | Age: 61
End: 2021-04-30

## 2021-04-30 NOTE — TELEPHONE ENCOUNTER
Left a detailed message to call for an appointment on Monday.     I will call her again on Monday morning if she has scheduled that follow up

## 2021-05-03 ENCOUNTER — OFFICE VISIT (OUTPATIENT)
Dept: CARDIOLOGY CLINIC | Age: 61
End: 2021-05-03
Payer: MEDICAID

## 2021-05-03 ENCOUNTER — OFFICE VISIT (OUTPATIENT)
Dept: FAMILY MEDICINE CLINIC | Age: 61
End: 2021-05-03
Payer: MEDICAID

## 2021-05-03 VITALS
TEMPERATURE: 98.1 F | HEIGHT: 66 IN | HEART RATE: 77 BPM | WEIGHT: 169 LBS | DIASTOLIC BLOOD PRESSURE: 68 MMHG | SYSTOLIC BLOOD PRESSURE: 100 MMHG | OXYGEN SATURATION: 98 % | RESPIRATION RATE: 18 BRPM | BODY MASS INDEX: 27.16 KG/M2

## 2021-05-03 VITALS
BODY MASS INDEX: 27.24 KG/M2 | RESPIRATION RATE: 16 BRPM | HEIGHT: 66 IN | DIASTOLIC BLOOD PRESSURE: 68 MMHG | HEART RATE: 93 BPM | OXYGEN SATURATION: 97 % | WEIGHT: 169.5 LBS | SYSTOLIC BLOOD PRESSURE: 121 MMHG

## 2021-05-03 DIAGNOSIS — L50.8 URTICARIA, CHRONIC: ICD-10-CM

## 2021-05-03 DIAGNOSIS — E78.2 MIXED HYPERLIPIDEMIA: Primary | ICD-10-CM

## 2021-05-03 DIAGNOSIS — F33.41 RECURRENT MAJOR DEPRESSIVE DISORDER, IN PARTIAL REMISSION (HCC): ICD-10-CM

## 2021-05-03 DIAGNOSIS — R10.13 EPIGASTRIC PAIN: ICD-10-CM

## 2021-05-03 DIAGNOSIS — E78.2 MIXED HYPERLIPIDEMIA: ICD-10-CM

## 2021-05-03 DIAGNOSIS — Z72.0 TOBACCO USE: ICD-10-CM

## 2021-05-03 DIAGNOSIS — M25.50 ARTHRALGIA, UNSPECIFIED JOINT: ICD-10-CM

## 2021-05-03 DIAGNOSIS — R07.89 OTHER CHEST PAIN: ICD-10-CM

## 2021-05-03 LAB
ALBUMIN SERPL-MCNC: 4.5 G/DL (ref 3.5–5.2)
ALP BLD-CCNC: 105 U/L (ref 35–104)
ALT SERPL-CCNC: 20 U/L (ref 0–32)
ANION GAP SERPL CALCULATED.3IONS-SCNC: 13 MMOL/L (ref 7–16)
AST SERPL-CCNC: 20 U/L (ref 0–31)
BACTERIA: ABNORMAL /HPF
BASOPHILS ABSOLUTE: 0.04 E9/L (ref 0–0.2)
BASOPHILS RELATIVE PERCENT: 0.7 % (ref 0–2)
BILIRUB SERPL-MCNC: 0.3 MG/DL (ref 0–1.2)
BILIRUBIN URINE: NEGATIVE
BLOOD, URINE: ABNORMAL
BUN BLDV-MCNC: 10 MG/DL (ref 6–23)
CALCIUM SERPL-MCNC: 8.9 MG/DL (ref 8.6–10.2)
CHLORIDE BLD-SCNC: 102 MMOL/L (ref 98–107)
CHOLESTEROL, TOTAL: 141 MG/DL (ref 0–199)
CLARITY: CLEAR
CO2: 25 MMOL/L (ref 22–29)
COLOR: YELLOW
CREAT SERPL-MCNC: 0.9 MG/DL (ref 0.5–1)
EOSINOPHILS ABSOLUTE: 0.19 E9/L (ref 0.05–0.5)
EOSINOPHILS RELATIVE PERCENT: 3.3 % (ref 0–6)
GFR AFRICAN AMERICAN: >60
GFR NON-AFRICAN AMERICAN: >60 ML/MIN/1.73
GLUCOSE FASTING: 94 MG/DL (ref 74–99)
GLUCOSE URINE: NEGATIVE MG/DL
HCT VFR BLD CALC: 41.8 % (ref 34–48)
HDLC SERPL-MCNC: 67 MG/DL
HEMOGLOBIN: 13.3 G/DL (ref 11.5–15.5)
HPV SAMPLE: ABNORMAL
HPV TYPE 16: NOT DETECTED
HPV TYPE 18: NOT DETECTED
HPV, HIGH RISK OTHER: DETECTED
IMMATURE GRANULOCYTES #: 0.02 E9/L
IMMATURE GRANULOCYTES %: 0.3 % (ref 0–5)
INTERPRETATION: ABNORMAL
KETONES, URINE: NEGATIVE MG/DL
LDL CHOLESTEROL CALCULATED: 60 MG/DL (ref 0–99)
LEUKOCYTE ESTERASE, URINE: ABNORMAL
LYMPHOCYTES ABSOLUTE: 1.41 E9/L (ref 1.5–4)
LYMPHOCYTES RELATIVE PERCENT: 24.1 % (ref 20–42)
MCH RBC QN AUTO: 29.1 PG (ref 26–35)
MCHC RBC AUTO-ENTMCNC: 31.8 % (ref 32–34.5)
MCV RBC AUTO: 91.5 FL (ref 80–99.9)
MONOCYTES ABSOLUTE: 0.45 E9/L (ref 0.1–0.95)
MONOCYTES RELATIVE PERCENT: 7.7 % (ref 2–12)
NEUTROPHILS ABSOLUTE: 3.73 E9/L (ref 1.8–7.3)
NEUTROPHILS RELATIVE PERCENT: 63.9 % (ref 43–80)
NITRITE, URINE: NEGATIVE
PDW BLD-RTO: 14 FL (ref 11.5–15)
PH UA: 8 (ref 5–9)
PLATELET # BLD: 176 E9/L (ref 130–450)
PMV BLD AUTO: 10.4 FL (ref 7–12)
POTASSIUM SERPL-SCNC: 4 MMOL/L (ref 3.5–5)
PROTEIN UA: NEGATIVE MG/DL
RBC # BLD: 4.57 E12/L (ref 3.5–5.5)
RBC UA: ABNORMAL /HPF (ref 0–2)
SODIUM BLD-SCNC: 140 MMOL/L (ref 132–146)
SOURCE: ABNORMAL
SPECIFIC GRAVITY UA: 1.01 (ref 1–1.03)
TOTAL PROTEIN: 6.7 G/DL (ref 6.4–8.3)
TRIGL SERPL-MCNC: 69 MG/DL (ref 0–149)
TSH SERPL DL<=0.05 MIU/L-ACNC: 2.74 UIU/ML (ref 0.27–4.2)
UROBILINOGEN, URINE: 0.2 E.U./DL
VLDLC SERPL CALC-MCNC: 14 MG/DL
WBC # BLD: 5.8 E9/L (ref 4.5–11.5)
WBC UA: ABNORMAL /HPF (ref 0–5)

## 2021-05-03 PROCEDURE — 3017F COLORECTAL CA SCREEN DOC REV: CPT | Performed by: FAMILY MEDICINE

## 2021-05-03 PROCEDURE — 93000 ELECTROCARDIOGRAM COMPLETE: CPT | Performed by: FAMILY MEDICINE

## 2021-05-03 PROCEDURE — 4004F PT TOBACCO SCREEN RCVD TLK: CPT | Performed by: INTERNAL MEDICINE

## 2021-05-03 PROCEDURE — G8427 DOCREV CUR MEDS BY ELIG CLIN: HCPCS | Performed by: FAMILY MEDICINE

## 2021-05-03 PROCEDURE — G8427 DOCREV CUR MEDS BY ELIG CLIN: HCPCS | Performed by: INTERNAL MEDICINE

## 2021-05-03 PROCEDURE — 99214 OFFICE O/P EST MOD 30 MIN: CPT | Performed by: FAMILY MEDICINE

## 2021-05-03 PROCEDURE — 4004F PT TOBACCO SCREEN RCVD TLK: CPT | Performed by: FAMILY MEDICINE

## 2021-05-03 PROCEDURE — 99204 OFFICE O/P NEW MOD 45 MIN: CPT | Performed by: INTERNAL MEDICINE

## 2021-05-03 PROCEDURE — 93000 ELECTROCARDIOGRAM COMPLETE: CPT | Performed by: INTERNAL MEDICINE

## 2021-05-03 PROCEDURE — 3017F COLORECTAL CA SCREEN DOC REV: CPT | Performed by: INTERNAL MEDICINE

## 2021-05-03 PROCEDURE — G8419 CALC BMI OUT NRM PARAM NOF/U: HCPCS | Performed by: FAMILY MEDICINE

## 2021-05-03 PROCEDURE — G8419 CALC BMI OUT NRM PARAM NOF/U: HCPCS | Performed by: INTERNAL MEDICINE

## 2021-05-03 RX ORDER — DIVALPROEX SODIUM 500 MG/1
500 TABLET, DELAYED RELEASE ORAL 2 TIMES DAILY
COMMUNITY
End: 2022-04-27

## 2021-05-03 RX ORDER — ARIPIPRAZOLE 15 MG/1
15 TABLET ORAL DAILY
COMMUNITY
End: 2022-04-27

## 2021-05-03 RX ORDER — PROPRANOLOL HYDROCHLORIDE 10 MG/1
10 TABLET ORAL 2 TIMES DAILY
COMMUNITY
End: 2022-04-27

## 2021-05-03 RX ORDER — AMOXICILLIN 500 MG
CAPSULE ORAL
COMMUNITY

## 2021-05-03 RX ORDER — FAMOTIDINE 20 MG/1
20 TABLET, FILM COATED ORAL 2 TIMES DAILY
Qty: 60 TABLET | Refills: 3 | Status: SHIPPED
Start: 2021-05-03 | End: 2021-08-03

## 2021-05-03 ASSESSMENT — ENCOUNTER SYMPTOMS
COUGH: 0
ALLERGIC/IMMUNOLOGIC NEGATIVE: 1
SORE THROAT: 0
BACK PAIN: 0
DIARRHEA: 0
BLOOD IN STOOL: 0
SHORTNESS OF BREATH: 0
NAUSEA: 0
SINUS PAIN: 0
EYE DISCHARGE: 0
ABDOMINAL PAIN: 1
PHOTOPHOBIA: 0
EYE PAIN: 0
VOMITING: 0
CHEST TIGHTNESS: 0
TROUBLE SWALLOWING: 0
EYE REDNESS: 0

## 2021-05-03 NOTE — PROGRESS NOTES
resource strain: Not hard at all   Tawny-Thea insecurity     Worry: Never true     Inability: Never true    Transportation needs     Medical: No     Non-medical: No   Tobacco Use    Smoking status: Current Every Day Smoker     Packs/day: 0.50     Years: 42.00     Pack years: 21.00     Types: Cigarettes    Smokeless tobacco: Never Used   Substance and Sexual Activity    Alcohol use: No    Drug use: No    Sexual activity: Not on file   Lifestyle    Physical activity     Days per week: Not on file     Minutes per session: Not on file    Stress: Not on file   Relationships    Social connections     Talks on phone: Not on file     Gets together: Not on file     Attends Sabianism service: Not on file     Active member of club or organization: Not on file     Attends meetings of clubs or organizations: Not on file     Relationship status: Not on file    Intimate partner violence     Fear of current or ex partner: Not on file     Emotionally abused: Not on file     Physically abused: Not on file     Forced sexual activity: Not on file   Other Topics Concern    Not on file   Social History Narrative    Not on file       Family History   Problem Relation Age of Onset    Heart Disease Mother     Diabetes Mother          SUBJECTIVE: Kusum Jefferson presents to the office today for consult -dr Mela Robb - for cardiac -evaluation . She complains of a month of CONTINUAL \"heartburn\" with radiation to right shoulder, that is unassociated with exertion. worse at night, partially responsive to Gaviscon. no trial of PPI . and denies   dyspnea, exertional chest pressure/discomfort, fatigue, irregular heart beat, lower extremity edema, near-syncope, orthopnea, palpitations, paroxysmal nocturnal dyspnea, syncope and tachypnea  Is a smoker, and has family hx of CAD. Does all her chores with no effect on symptoms.  .        Review of Systems:   Heart: as above   Lungs: as above   Eyes: denies changes in vision or discharge. Ears: denies changes in hearing or pain. Nose: denies epistaxis or masses   Throat: denies sore throat or trouble swallowing. Neuro: denies numbness, tingling, tremors. Skin: denies rashes or itching. : denies hematuria, dysuria   GI: denies vomiting, diarrhea   Psych: denies mood changed, anxiety, depression. all others negative. Physical Exam   /68 (Site: Right Upper Arm, Position: Sitting, Cuff Size: Medium Adult)   Pulse 93   Resp 16   Ht 5' 6\" (1.676 m)   Wt 169 lb 8 oz (76.9 kg)   SpO2 97%   BMI 27.36 kg/m²   Constitutional: Oriented to person, place, and time. Well-developed and well-nourished. No distress. Head: Normocephalic and atraumatic. Eyes: EOM are normal. Pupils are equal, round, and reactive to light. Neck: Normal range of motion. Neck supple. No hepatojugular reflux and no JVD present. Carotid bruit is not present. No tracheal deviation present. No thyromegaly present. Cardiovascular: Normal rate, regular rhythm, normal heart sounds and intact distal pulses. Exam reveals no gallop and no friction rub. No murmur heard. Pulmonary/Chest: Effort normal and breath sounds normal. No respiratory distress. No wheezes. No rales. No tenderness. Abdominal: Soft. Bowel sounds are normal. No distension and no mass. No tenderness. No rebound and no guarding. Musculoskeletal: Normal range of motion. No edema and no tenderness. Neurological: Alert and oriented to person, place, and time. Skin: Skin is warm and dry. No rash noted. Not diaphoretic. No erythema. Psychiatric: Normal mood and affect. Behavior is normal.     EKG:  normal EKG, normal sinus rhythm, normal sinus rhythm, rate 72, axis +68.     ASSESSMENT AND PLAN:  Patient Active Problem List   Diagnosis    Depression    Anxiety    Hyperlipidemia    Tobacco use    Urticaria, chronic    Acute pharyngitis    Arthralgia    Other chest pain     Multiple risk factors for CAD, but hx c/w GI etiology  Normal CV exam and ekg  Encouraged her to be compliant with PCP initiation of H2 blocker, may need PPI and/or EGD  No cardiac testing  AHA 10 yr CV risk 7.7% - intermediate risk   Needs cigarette cessation      Dre Mae M.D  [facility]

## 2021-05-04 ENCOUNTER — TELEPHONE (OUTPATIENT)
Dept: FAMILY MEDICINE CLINIC | Age: 61
End: 2021-05-04

## 2021-05-04 DIAGNOSIS — R87.612 LGSIL ON PAP SMEAR OF CERVIX: ICD-10-CM

## 2021-05-04 DIAGNOSIS — B97.7 HPV (HUMAN PAPILLOMA VIRUS) INFECTION: Primary | ICD-10-CM

## 2021-05-04 NOTE — TELEPHONE ENCOUNTER
Ree Be calling about the HPV diagnosis. She has had time to think and would like a referral to a Gyn for this. She does not have one. She is also asking is her partner of 20 years needs to do something or have some testing done as well?

## 2021-05-06 ENCOUNTER — TELEPHONE (OUTPATIENT)
Dept: FAMILY MEDICINE CLINIC | Age: 61
End: 2021-05-06

## 2021-05-06 DIAGNOSIS — R10.13 EPIGASTRIC PAIN: Primary | ICD-10-CM

## 2021-05-11 ENCOUNTER — OFFICE VISIT (OUTPATIENT)
Dept: FAMILY MEDICINE CLINIC | Age: 61
End: 2021-05-11
Payer: MEDICAID

## 2021-05-11 VITALS
RESPIRATION RATE: 18 BRPM | OXYGEN SATURATION: 98 % | WEIGHT: 167 LBS | SYSTOLIC BLOOD PRESSURE: 120 MMHG | DIASTOLIC BLOOD PRESSURE: 76 MMHG | HEIGHT: 66 IN | TEMPERATURE: 98.4 F | HEART RATE: 79 BPM | BODY MASS INDEX: 26.84 KG/M2

## 2021-05-11 DIAGNOSIS — B37.0 THRUSH, ORAL: Primary | ICD-10-CM

## 2021-05-11 PROCEDURE — G8419 CALC BMI OUT NRM PARAM NOF/U: HCPCS | Performed by: FAMILY MEDICINE

## 2021-05-11 PROCEDURE — 99213 OFFICE O/P EST LOW 20 MIN: CPT | Performed by: FAMILY MEDICINE

## 2021-05-11 PROCEDURE — G8427 DOCREV CUR MEDS BY ELIG CLIN: HCPCS | Performed by: FAMILY MEDICINE

## 2021-05-11 PROCEDURE — 4004F PT TOBACCO SCREEN RCVD TLK: CPT | Performed by: FAMILY MEDICINE

## 2021-05-11 PROCEDURE — 3017F COLORECTAL CA SCREEN DOC REV: CPT | Performed by: FAMILY MEDICINE

## 2021-05-11 ASSESSMENT — ENCOUNTER SYMPTOMS
COUGH: 0
PHOTOPHOBIA: 0
EYE DISCHARGE: 0
CHEST TIGHTNESS: 0
BACK PAIN: 0
VOMITING: 0
EYE PAIN: 0
EYE REDNESS: 0
SINUS PAIN: 0
SORE THROAT: 1
NAUSEA: 0
ABDOMINAL PAIN: 0
BLOOD IN STOOL: 0
ALLERGIC/IMMUNOLOGIC NEGATIVE: 1
SHORTNESS OF BREATH: 0
DIARRHEA: 0
TROUBLE SWALLOWING: 0

## 2021-05-11 NOTE — PROGRESS NOTES
21  Kimmy Yanes : 1960 Sex: female  Age: 62 y.o. Assessment and Plan:  Jay Monsivais was seen today for other. Diagnoses and all orders for this visit:    Thrush, oral  -     nystatin (MYCOSTATIN) 987809 UNIT/ML suspension; Take 5 mLs by mouth 4 times daily for 5 days Retain in mouth as long as possible        Return 3 to 5 days if not improved. .    Chief Complaint   Patient presents with    Other     tongue feels burning and tingling       Tongue is coated. Set 2 days ago. Painful some tingling. No bleeding or drainage noted. Small also mild irritation in the posterior pharynx, tonsillar pillars. Denies fever, chills, gesturing, lymph adenopathy. Review of Systems   Constitutional: Negative for appetite change, fatigue and unexpected weight change. HENT: Positive for sore throat. Negative for congestion, ear pain, hearing loss, sinus pain and trouble swallowing. Eyes: Negative for photophobia, pain, discharge and redness. Respiratory: Negative for cough, chest tightness and shortness of breath. Cardiovascular: Negative for chest pain, palpitations and leg swelling. Gastrointestinal: Negative for abdominal pain, blood in stool, diarrhea, nausea and vomiting. Endocrine: Negative. Genitourinary: Negative for dysuria, flank pain, frequency and hematuria. Musculoskeletal: Negative for arthralgias, back pain, joint swelling and myalgias. Skin: Negative. Allergic/Immunologic: Negative. Neurological: Negative for dizziness, seizures, syncope, weakness, light-headedness, numbness and headaches. Hematological: Negative for adenopathy. Does not bruise/bleed easily. Psychiatric/Behavioral: Negative.           Current Outpatient Medications:     nystatin (MYCOSTATIN) [id number] UNIT/ML suspension, Take 5 mLs by mouth 4 times daily for 5 days Retain in mouth as long as possible, Disp: 100 mL, Rfl: 0    Omega-3 Fatty Acids (FISH OIL) 1200 MG CAPS, Take by mouth, Disp: , Rfl:     divalproex (DEPAKOTE) 500 MG DR tablet, Take 500 mg by mouth 2 times daily, Disp: , Rfl:     ARIPiprazole (ABILIFY) 15 MG tablet, Take 15 mg by mouth daily, Disp: , Rfl:     propranolol (INDERAL) 10 MG tablet, Take 10 mg by mouth 2 times daily, Disp: , Rfl:     famotidine (PEPCID) 20 MG tablet, Take 1 tablet by mouth 2 times daily, Disp: 60 tablet, Rfl: 3    FLUoxetine HCl (PROZAC PO), Take 60 mg by mouth daily, Disp: , Rfl:     atorvastatin (LIPITOR) 20 MG tablet, take 1 tablet by mouth once daily, Disp: 90 tablet, Rfl: 1    Melatonin 10 MG TABS, Take 10 mg by mouth nightly as needed 2 po @@ hs, Disp: , Rfl:     hydrOXYzine (ATARAX) 50 MG tablet, Take 1 tablet by mouth See Admin Instructions 1 po bid prn anxiety (Patient taking differently: Take 50 mg by mouth See Admin Instructions 1 po tid prn anxiety), Disp: 60 tablet, Rfl: 2  Allergies   Allergen Reactions    Lansoprazole     Prilosec [Omeprazole] Hives    Naproxen Rash    Trintellix [Vortioxetine]     Zoloft [Sertraline Hcl] Rash       Past Medical History:   Diagnosis Date    Anxiety     Dental caries     Depression     Hyperlipidemia      Past Surgical History:   Procedure Laterality Date    CARPAL TUNNEL RELEASE Bilateral     CERVICAL FUSION       SECTION      x 3    DENTAL SURGERY N/A 2019    DENTAL EXAM FULL MOUTH EXTRACTIONS ALEOPOLASTY performed by Skylar Jose DDS at [facility] OR     Family History   Problem Relation Age of Onset    Heart Disease Mother     Diabetes Mother      Social History     Socioeconomic History    Marital status:      Spouse name: Not on file    Number of children: Not on file    Years of education: Not on file    Highest education level: Not on file   Occupational History    Not on file   Social Needs    Financial resource strain: Not hard at all    Food insecurity     Worry: Never true     Inability: Never true   [employer] needs     Medical: No distress. Breath sounds: Normal breath sounds. Abdominal:      General: Bowel sounds are normal.      Palpations: Abdomen is soft. Musculoskeletal: Normal range of motion. General: No tenderness or deformity. Lymphadenopathy:      Cervical: No cervical adenopathy. Skin:     General: Skin is warm and dry. Capillary Refill: Capillary refill takes less than 2 seconds. Findings: No rash. Neurological:      Mental Status: She is alert and oriented to person, place, and time. Sensory: No sensory deficit. Motor: No abnormal muscle tone.       Coordination: Coordination normal.      Deep Tendon Reflexes: Reflexes normal.             Seen By:  Buster Cortes DO

## 2021-05-18 ENCOUNTER — OFFICE VISIT (OUTPATIENT)
Dept: FAMILY MEDICINE CLINIC | Age: 61
End: 2021-05-18
Payer: MEDICAID

## 2021-05-18 VITALS
WEIGHT: 167 LBS | BODY MASS INDEX: 26.84 KG/M2 | OXYGEN SATURATION: 98 % | DIASTOLIC BLOOD PRESSURE: 72 MMHG | SYSTOLIC BLOOD PRESSURE: 118 MMHG | RESPIRATION RATE: 18 BRPM | HEART RATE: 78 BPM | HEIGHT: 66 IN | TEMPERATURE: 97.4 F

## 2021-05-18 DIAGNOSIS — J00 NASOPHARYNGITIS: Primary | ICD-10-CM

## 2021-05-18 PROCEDURE — G8419 CALC BMI OUT NRM PARAM NOF/U: HCPCS | Performed by: FAMILY MEDICINE

## 2021-05-18 PROCEDURE — 3017F COLORECTAL CA SCREEN DOC REV: CPT | Performed by: FAMILY MEDICINE

## 2021-05-18 PROCEDURE — 4004F PT TOBACCO SCREEN RCVD TLK: CPT | Performed by: FAMILY MEDICINE

## 2021-05-18 PROCEDURE — 99213 OFFICE O/P EST LOW 20 MIN: CPT | Performed by: FAMILY MEDICINE

## 2021-05-18 PROCEDURE — G8427 DOCREV CUR MEDS BY ELIG CLIN: HCPCS | Performed by: FAMILY MEDICINE

## 2021-05-18 SDOH — ECONOMIC STABILITY: FOOD INSECURITY: WITHIN THE PAST 12 MONTHS, YOU WORRIED THAT YOUR FOOD WOULD RUN OUT BEFORE YOU GOT MONEY TO BUY MORE.: PATIENT DECLINED

## 2021-05-18 ASSESSMENT — ENCOUNTER SYMPTOMS
BACK PAIN: 0
CHEST TIGHTNESS: 0
DIARRHEA: 0
EYE DISCHARGE: 0
SORE THROAT: 1
PHOTOPHOBIA: 0
NAUSEA: 0
EYE REDNESS: 0
VOMITING: 0
COUGH: 0
EYE PAIN: 0
ABDOMINAL PAIN: 0
ALLERGIC/IMMUNOLOGIC NEGATIVE: 1
BLOOD IN STOOL: 0
TROUBLE SWALLOWING: 0
SHORTNESS OF BREATH: 0
SINUS PAIN: 0

## 2021-05-18 ASSESSMENT — SOCIAL DETERMINANTS OF HEALTH (SDOH): HOW HARD IS IT FOR YOU TO PAY FOR THE VERY BASICS LIKE FOOD, HOUSING, MEDICAL CARE, AND HEATING?: NOT HARD AT ALL

## 2021-05-18 NOTE — PROGRESS NOTES
21  Marty Wu : 1960 Sex: female  Age: 62 y.o. Assessment and Plan:  Pedro Holland was seen today for thrush. Diagnoses and all orders for this visit:    Nasopharyngitis  -     External Referral To ENT  She has continue with these chronic use for the last several weeks. She would benefit from an ENT referral, and his expertise in treatment. No follow-ups on file. Chief Complaint   Patient presents with   Julián Hinkle       Presents for recheck today. She changes with pain in the tongue area and the posterior pharynx has had 1 week of nystatin with minimal effect. Denies fever, chills, nausea, vomiting      Review of Systems   Constitutional: Negative for appetite change, fatigue and unexpected weight change. HENT: Positive for sore throat. Negative for congestion, ear pain, hearing loss, sinus pain and trouble swallowing. Eyes: Negative for photophobia, pain, discharge and redness. Respiratory: Negative for cough, chest tightness and shortness of breath. Cardiovascular: Negative for chest pain, palpitations and leg swelling. Gastrointestinal: Negative for abdominal pain, blood in stool, diarrhea, nausea and vomiting. Endocrine: Negative. Genitourinary: Negative for dysuria, flank pain, frequency and hematuria. Musculoskeletal: Negative for arthralgias, back pain, joint swelling and myalgias. Skin: Negative. Allergic/Immunologic: Negative. Neurological: Negative for dizziness, seizures, syncope, weakness, light-headedness, numbness and headaches. Hematological: Negative for adenopathy. Does not bruise/bleed easily. Psychiatric/Behavioral: Negative.           Current Outpatient Medications:     Omega-3 Fatty Acids (FISH OIL) 1200 MG CAPS, Take by mouth, Disp: , Rfl:     divalproex (DEPAKOTE) 500 MG DR tablet, Take 500 mg by mouth 2 times daily, Disp: , Rfl:     ARIPiprazole (ABILIFY) 15 MG tablet, Take 15 mg by mouth daily, Disp: , Rfl:    Narrative    Not on file     Social Determinants of Health     Financial Resource Strain: Low Risk     Difficulty of Paying Living Expenses: Not hard at all   Food Insecurity: Unknown    Worried About [address] in the Last Year: Patient refused    [address] in the Last Year: Patient refused   Transportation Needs: No Transportation Needs    Lack of Transportation (Medical): No    Lack of Transportation (Non-Medical): No   Physical Activity:     Days of Exercise per Week:     Minutes of Exercise per Session:    Stress:     Feeling of Stress :    Social Connections:     Frequency of Communication with Friends and Family:     Frequency of Social Gatherings with Friends and Family:     Attends [employer] Services:     Active Member of Clubs or Organizations:     Attends Club or Organization Meetings:     Marital Status:    Intimate Partner Violence:     Fear of Current or Ex-Partner:     Emotionally Abused:     Physically Abused:     Sexually Abused:        Vitals:    05/18/21 0953   BP: 118/72   Pulse: 78   Resp: 18   Temp: 97.4 °F (36.3 °C)   TempSrc: Temporal   SpO2: 98%   Weight: 167 lb (75.8 kg)   Height: 5' 6\" (1.676 m)       Physical Exam  Vitals and nursing note reviewed. Constitutional:       Appearance: She is well-developed. HENT:      Head: Atraumatic. Right Ear: External ear normal.      Left Ear: External ear normal.      Nose: Nose normal.      Mouth/Throat:      Pharynx: Posterior oropharyngeal erythema present. No oropharyngeal exudate. Comments: She appears to have several small shallow ulcers posterior pharynx and tonsillar pillars. These areas have surrounding erythema. Eyes:      Conjunctiva/sclera: Conjunctivae normal.      Pupils: Pupils are equal, round, and reactive to light. Neck:      Thyroid: No thyromegaly. Trachea: No tracheal deviation. Cardiovascular:      Rate and Rhythm: Normal rate and regular rhythm. Heart sounds:  No

## 2021-08-03 DIAGNOSIS — R10.13 EPIGASTRIC PAIN: ICD-10-CM

## 2021-08-03 RX ORDER — FAMOTIDINE 20 MG/1
TABLET, FILM COATED ORAL
Qty: 60 TABLET | Refills: 3 | Status: SHIPPED
Start: 2021-08-03 | End: 2021-11-12 | Stop reason: SDUPTHER

## 2021-11-11 DIAGNOSIS — E78.2 MIXED HYPERLIPIDEMIA: ICD-10-CM

## 2021-11-11 RX ORDER — ATORVASTATIN CALCIUM 20 MG/1
TABLET, FILM COATED ORAL
Qty: 30 TABLET | Refills: 5 | Status: SHIPPED
Start: 2021-11-11 | End: 2022-04-27

## 2021-11-12 ENCOUNTER — OFFICE VISIT (OUTPATIENT)
Dept: FAMILY MEDICINE CLINIC | Age: 61
End: 2021-11-12
Payer: MEDICARE

## 2021-11-12 VITALS
WEIGHT: 171 LBS | OXYGEN SATURATION: 96 % | HEART RATE: 81 BPM | DIASTOLIC BLOOD PRESSURE: 82 MMHG | SYSTOLIC BLOOD PRESSURE: 114 MMHG | RESPIRATION RATE: 16 BRPM | TEMPERATURE: 97.6 F | BODY MASS INDEX: 27.48 KG/M2 | HEIGHT: 66 IN

## 2021-11-12 DIAGNOSIS — R10.13 EPIGASTRIC PAIN: ICD-10-CM

## 2021-11-12 DIAGNOSIS — E78.2 MIXED HYPERLIPIDEMIA: Primary | ICD-10-CM

## 2021-11-12 DIAGNOSIS — F33.41 RECURRENT MAJOR DEPRESSIVE DISORDER, IN PARTIAL REMISSION (HCC): ICD-10-CM

## 2021-11-12 DIAGNOSIS — K21.9 GASTROESOPHAGEAL REFLUX DISEASE WITHOUT ESOPHAGITIS: ICD-10-CM

## 2021-11-12 PROCEDURE — G8484 FLU IMMUNIZE NO ADMIN: HCPCS | Performed by: FAMILY MEDICINE

## 2021-11-12 PROCEDURE — 4004F PT TOBACCO SCREEN RCVD TLK: CPT | Performed by: FAMILY MEDICINE

## 2021-11-12 PROCEDURE — 3017F COLORECTAL CA SCREEN DOC REV: CPT | Performed by: FAMILY MEDICINE

## 2021-11-12 PROCEDURE — 99213 OFFICE O/P EST LOW 20 MIN: CPT | Performed by: FAMILY MEDICINE

## 2021-11-12 PROCEDURE — G8419 CALC BMI OUT NRM PARAM NOF/U: HCPCS | Performed by: FAMILY MEDICINE

## 2021-11-12 PROCEDURE — G8427 DOCREV CUR MEDS BY ELIG CLIN: HCPCS | Performed by: FAMILY MEDICINE

## 2021-11-12 RX ORDER — TRAZODONE HYDROCHLORIDE 50 MG/1
TABLET ORAL
COMMUNITY
Start: 2021-10-27 | End: 2022-04-27

## 2021-11-12 RX ORDER — FAMOTIDINE 20 MG/1
TABLET, FILM COATED ORAL
Qty: 180 TABLET | Refills: 1 | Status: SHIPPED
Start: 2021-11-12 | End: 2022-04-27 | Stop reason: SDUPTHER

## 2021-11-12 ASSESSMENT — ENCOUNTER SYMPTOMS
DIARRHEA: 0
VOMITING: 0
EYE REDNESS: 0
BLOOD IN STOOL: 0
CHEST TIGHTNESS: 0
SHORTNESS OF BREATH: 0
SORE THROAT: 0
ABDOMINAL PAIN: 0
EYE DISCHARGE: 0
EYE PAIN: 0
NAUSEA: 0
ALLERGIC/IMMUNOLOGIC NEGATIVE: 1
BACK PAIN: 0
PHOTOPHOBIA: 0
COUGH: 0
TROUBLE SWALLOWING: 0
SINUS PAIN: 0

## 2021-11-12 NOTE — PROGRESS NOTES
21  Niurka Muro : 1960 Sex: female  Age: 62 y.o. Assessment and Plan:  Marian Oakley was seen today for hyperlipidemia. Diagnoses and all orders for this visit:    Mixed hyperlipidemia  Statin effective and well-tolerated. Epigastric pain  -     famotidine (PEPCID) 20 MG tablet; TAKE 1 TABLET BY MOUTH TWICE A DAY  Is well controlled on intermittent acid use. Recurrent major depressive disorder, in partial remission ([address]  Stable at this time. Gastroesophageal reflux disease without esophagitis  -     famotidine (PEPCID) 20 MG tablet; TAKE 1 TABLET BY MOUTH TWICE A DAY  Remittent Pepcid use controlling her discomfort. No follow-ups on file. Chief Complaint   Patient presents with    Hyperlipidemia       Here for recheck cholesterol. Her lipid profile in May was normal, he is tolerating the Lipitor well with any side effects, it is very effective. She is otherwise doing well, denies any new complaints. Review of Systems   Constitutional: Negative for appetite change, fatigue and unexpected weight change. HENT: Negative for congestion, ear pain, hearing loss, sinus pain, sore throat and trouble swallowing. Eyes: Negative for photophobia, pain, discharge and redness. Respiratory: Negative for cough, chest tightness and shortness of breath. Cardiovascular: Negative for chest pain, palpitations and leg swelling. Gastrointestinal: Negative for abdominal pain, blood in stool, diarrhea, nausea and vomiting. Endocrine: Negative. Genitourinary: Negative for dysuria, flank pain, frequency and hematuria. Musculoskeletal: Negative for arthralgias, back pain, joint swelling and myalgias. Skin: Negative. Allergic/Immunologic: Negative. Neurological: Negative for dizziness, seizures, syncope, weakness, light-headedness, numbness and headaches. Hematological: Negative for adenopathy. Does not bruise/bleed easily. Psychiatric/Behavioral: Negative. Current Outpatient Medications:     traZODone (DESYREL) 50 MG tablet, , Disp: , Rfl:     famotidine (PEPCID) 20 MG tablet, TAKE 1 TABLET BY MOUTH TWICE A DAY, Disp: 180 tablet, Rfl: 1    atorvastatin (LIPITOR) 20 MG tablet, TAKE 1 TABLET BY MOUTH DAILY, Disp: 30 tablet, Rfl: 5    Omega-3 Fatty Acids (FISH OIL) 1200 MG CAPS, Take by mouth, Disp: , Rfl:     divalproex (DEPAKOTE) 500 MG DR tablet, Take 500 mg by mouth 2 times daily, Disp: , Rfl:     ARIPiprazole (ABILIFY) 15 MG tablet, Take 15 mg by mouth daily, Disp: , Rfl:     propranolol (INDERAL) 10 MG tablet, Take 10 mg by mouth 2 times daily, Disp: , Rfl:     FLUoxetine HCl (PROZAC PO), Take 60 mg by mouth daily, Disp: , Rfl:     Melatonin 10 MG TABS, Take 10 mg by mouth nightly as needed 2 po @@ hs, Disp: , Rfl:     hydrOXYzine (ATARAX) 50 MG tablet, Take 1 tablet by mouth See Admin Instructions 1 po bid prn anxiety (Patient taking differently: Take 50 mg by mouth See Admin Instructions 1 po tid prn anxiety), Disp: 60 tablet, Rfl: 2  Allergies   Allergen Reactions    Lansoprazole     Prilosec [Omeprazole] Hives    Naproxen Rash    Trintellix [Vortioxetine]     Zoloft [Sertraline Hcl] Rash       Past Medical History:   Diagnosis Date    Anxiety     Dental caries     Depression     Hyperlipidemia      Past Surgical History:   Procedure Laterality Date    CARPAL TUNNEL RELEASE Bilateral     CERVICAL FUSION       SECTION      x 3    DENTAL SURGERY N/A 2019    DENTAL EXAM FULL MOUTH EXTRACTIONS ALEOPOLASTY performed by Kacie Hylton DDS at [facility] OR     Family History   Problem Relation Age of Onset    Heart Disease Mother     Diabetes Mother      Social History     Socioeconomic History    Marital status:      Spouse name: Not on file    Number of children: Not on file    Years of education: Not on file    Highest education level: Not on file   Occupational History    Not on file   Tobacco Use    Smoking status: Current Every Day Smoker     Packs/day: 0.50     Years: 42.00     Pack years: 21.00     Types: Cigarettes    Smokeless tobacco: Never Used   Vaping Use    Vaping Use: Every day   Substance and Sexual Activity    Alcohol use: No    Drug use: No    Sexual activity: Not on file   Other Topics Concern    Not on file   Social History Narrative    Not on file     Social Determinants of Health     Financial Resource Strain: Low Risk     Difficulty of Paying Living Expenses: Not hard at all   Food Insecurity: Unknown    Worried About [address] [facility] in the Last Year: Patient refused    Ran Out of Food in the Last Year: Patient refused   Transportation Needs: No Transportation Needs    Lack of Transportation (Medical): No    Lack of Transportation (Non-Medical): No   Physical Activity:     Days of Exercise per Week: Not on file    Minutes of Exercise per Session: Not on file   Stress:     Feeling of Stress : Not on file   Social Connections:     Frequency of Communication with Friends and Family: Not on file    Frequency of Social Gatherings with Friends and Family: Not on file    Attends [employer] Services: Not on file    Active Member of [address] [place] [address] or Organizations: Not on file    Attends Club or Organization Meetings: Not on file    Marital Status: Not on file   Intimate Partner Violence:     Fear of Current or Ex-Partner: Not on file    Emotionally Abused: Not on file    Physically Abused: Not on file    Sexually Abused: Not on file   Housing Stability:     Unable to Pay for Housing in the Last Year: Not on file    Number of [place] in the Last Year: Not on file    Unstable Housing in the Last Year: Not on file       Vitals:    11/12/21 0755   BP: 114/82   Pulse: 81   Resp: 16   Temp: 97.6 °F (36.4 °C)   TempSrc: Temporal   SpO2: 96%   Weight: 171 lb (77.6 kg)   Height: 5' 6\" (1.676 m)       Physical Exam  Vitals and nursing note reviewed.    Constitutional:       Appearance: She is well-developed. HENT:      Head: Atraumatic. Right Ear: External ear normal.      Left Ear: External ear normal.      Nose: Nose normal.      Mouth/Throat:      Pharynx: No oropharyngeal exudate. Eyes:      Conjunctiva/sclera: Conjunctivae normal.      Pupils: Pupils are equal, round, and reactive to light. Neck:      Thyroid: No thyromegaly. Trachea: No tracheal deviation. Cardiovascular:      Rate and Rhythm: Normal rate and regular rhythm. Heart sounds: No murmur heard. No friction rub. No gallop. Pulmonary:      Effort: Pulmonary effort is normal. No respiratory distress. Breath sounds: Normal breath sounds. Abdominal:      General: Bowel sounds are normal.      Palpations: Abdomen is soft. Musculoskeletal:         General: No tenderness or deformity. Normal range of motion. Cervical back: Normal range of motion and neck supple. Lymphadenopathy:      Cervical: No cervical adenopathy. Skin:     General: Skin is warm and dry. Capillary Refill: Capillary refill takes less than 2 seconds. Findings: No rash. Neurological:      Mental Status: She is alert and oriented to person, place, and time. Sensory: No sensory deficit. Motor: No abnormal muscle tone.       Coordination: Coordination normal.      Deep Tendon Reflexes: Reflexes normal.             Seen By:  Jamila Gramajo DO

## 2022-04-14 DIAGNOSIS — R10.13 EPIGASTRIC PAIN: ICD-10-CM

## 2022-04-14 DIAGNOSIS — K21.9 GASTROESOPHAGEAL REFLUX DISEASE WITHOUT ESOPHAGITIS: ICD-10-CM

## 2022-04-14 DIAGNOSIS — E78.2 MIXED HYPERLIPIDEMIA: ICD-10-CM

## 2022-04-14 RX ORDER — ATORVASTATIN CALCIUM 20 MG/1
TABLET, FILM COATED ORAL
Qty: 30 TABLET | Refills: 5 | OUTPATIENT
Start: 2022-04-14

## 2022-04-14 RX ORDER — FAMOTIDINE 20 MG/1
TABLET, FILM COATED ORAL
Qty: 60 TABLET | OUTPATIENT
Start: 2022-04-14

## 2022-04-27 ENCOUNTER — OFFICE VISIT (OUTPATIENT)
Dept: FAMILY MEDICINE CLINIC | Age: 62
End: 2022-04-27
Payer: MEDICARE

## 2022-04-27 VITALS
BODY MASS INDEX: 26.36 KG/M2 | RESPIRATION RATE: 16 BRPM | HEART RATE: 69 BPM | WEIGHT: 164 LBS | SYSTOLIC BLOOD PRESSURE: 120 MMHG | TEMPERATURE: 97.2 F | HEIGHT: 66 IN | DIASTOLIC BLOOD PRESSURE: 82 MMHG | OXYGEN SATURATION: 98 %

## 2022-04-27 DIAGNOSIS — K21.9 GASTROESOPHAGEAL REFLUX DISEASE WITHOUT ESOPHAGITIS: ICD-10-CM

## 2022-04-27 DIAGNOSIS — E78.2 MIXED HYPERLIPIDEMIA: ICD-10-CM

## 2022-04-27 DIAGNOSIS — R10.13 EPIGASTRIC PAIN: ICD-10-CM

## 2022-04-27 DIAGNOSIS — F33.41 RECURRENT MAJOR DEPRESSIVE DISORDER, IN PARTIAL REMISSION (HCC): Primary | ICD-10-CM

## 2022-04-27 LAB
ALBUMIN SERPL-MCNC: 4.2 G/DL (ref 3.5–5.2)
ALP BLD-CCNC: 93 U/L (ref 35–104)
ALT SERPL-CCNC: 8 U/L (ref 0–32)
ANION GAP SERPL CALCULATED.3IONS-SCNC: 7 MMOL/L (ref 7–16)
AST SERPL-CCNC: 12 U/L (ref 0–31)
BASOPHILS ABSOLUTE: 0.04 E9/L (ref 0–0.2)
BASOPHILS RELATIVE PERCENT: 0.9 % (ref 0–2)
BILIRUB SERPL-MCNC: 0.2 MG/DL (ref 0–1.2)
BUN BLDV-MCNC: 8 MG/DL (ref 6–23)
CALCIUM SERPL-MCNC: 9 MG/DL (ref 8.6–10.2)
CHLORIDE BLD-SCNC: 101 MMOL/L (ref 98–107)
CHOLESTEROL, TOTAL: 158 MG/DL (ref 0–199)
CO2: 28 MMOL/L (ref 22–29)
CREAT SERPL-MCNC: 0.9 MG/DL (ref 0.5–1)
EOSINOPHILS ABSOLUTE: 0.34 E9/L (ref 0.05–0.5)
EOSINOPHILS RELATIVE PERCENT: 7.3 % (ref 0–6)
GFR AFRICAN AMERICAN: >60
GFR NON-AFRICAN AMERICAN: >60 ML/MIN/1.73
GLUCOSE FASTING: 95 MG/DL (ref 74–99)
HCT VFR BLD CALC: 43.4 % (ref 34–48)
HDLC SERPL-MCNC: 51 MG/DL
HEMOGLOBIN: 14.1 G/DL (ref 11.5–15.5)
IMMATURE GRANULOCYTES #: 0.01 E9/L
IMMATURE GRANULOCYTES %: 0.2 % (ref 0–5)
LDL CHOLESTEROL CALCULATED: 88 MG/DL (ref 0–99)
LYMPHOCYTES ABSOLUTE: 1.38 E9/L (ref 1.5–4)
LYMPHOCYTES RELATIVE PERCENT: 29.6 % (ref 20–42)
MCH RBC QN AUTO: 29.8 PG (ref 26–35)
MCHC RBC AUTO-ENTMCNC: 32.5 % (ref 32–34.5)
MCV RBC AUTO: 91.8 FL (ref 80–99.9)
MONOCYTES ABSOLUTE: 0.31 E9/L (ref 0.1–0.95)
MONOCYTES RELATIVE PERCENT: 6.7 % (ref 2–12)
NEUTROPHILS ABSOLUTE: 2.58 E9/L (ref 1.8–7.3)
NEUTROPHILS RELATIVE PERCENT: 55.3 % (ref 43–80)
PDW BLD-RTO: 14.3 FL (ref 11.5–15)
PLATELET # BLD: 155 E9/L (ref 130–450)
PMV BLD AUTO: 10.6 FL (ref 7–12)
POTASSIUM SERPL-SCNC: 4.2 MMOL/L (ref 3.5–5)
RBC # BLD: 4.73 E12/L (ref 3.5–5.5)
SODIUM BLD-SCNC: 136 MMOL/L (ref 132–146)
TOTAL PROTEIN: 6.4 G/DL (ref 6.4–8.3)
TRIGL SERPL-MCNC: 93 MG/DL (ref 0–149)
TSH SERPL DL<=0.05 MIU/L-ACNC: 2.25 UIU/ML (ref 0.27–4.2)
VLDLC SERPL CALC-MCNC: 19 MG/DL
WBC # BLD: 4.7 E9/L (ref 4.5–11.5)

## 2022-04-27 PROCEDURE — 99214 OFFICE O/P EST MOD 30 MIN: CPT | Performed by: FAMILY MEDICINE

## 2022-04-27 PROCEDURE — 4004F PT TOBACCO SCREEN RCVD TLK: CPT | Performed by: FAMILY MEDICINE

## 2022-04-27 PROCEDURE — 3017F COLORECTAL CA SCREEN DOC REV: CPT | Performed by: FAMILY MEDICINE

## 2022-04-27 PROCEDURE — G8419 CALC BMI OUT NRM PARAM NOF/U: HCPCS | Performed by: FAMILY MEDICINE

## 2022-04-27 PROCEDURE — G8427 DOCREV CUR MEDS BY ELIG CLIN: HCPCS | Performed by: FAMILY MEDICINE

## 2022-04-27 RX ORDER — FAMOTIDINE 20 MG/1
TABLET, FILM COATED ORAL
Qty: 180 TABLET | Refills: 1 | Status: SHIPPED
Start: 2022-04-27 | End: 2022-09-20

## 2022-04-27 RX ORDER — ATORVASTATIN CALCIUM 20 MG/1
TABLET, FILM COATED ORAL
Qty: 90 TABLET | Refills: 1 | Status: SHIPPED
Start: 2022-04-27 | End: 2022-09-20

## 2022-04-27 ASSESSMENT — ENCOUNTER SYMPTOMS
VOMITING: 0
ABDOMINAL PAIN: 0
BLOOD IN STOOL: 0
COUGH: 0
BACK PAIN: 0
TROUBLE SWALLOWING: 0
DIARRHEA: 0
ALLERGIC/IMMUNOLOGIC NEGATIVE: 1
NAUSEA: 0
EYE DISCHARGE: 0
SINUS PAIN: 0
EYE REDNESS: 0
SORE THROAT: 0
SHORTNESS OF BREATH: 0
EYE PAIN: 0
CHEST TIGHTNESS: 0
PHOTOPHOBIA: 0

## 2022-04-27 NOTE — PROGRESS NOTES
Genitourinary: Negative for dysuria, flank pain, frequency and hematuria. Musculoskeletal: Negative for arthralgias, back pain, joint swelling and myalgias. Skin: Negative. Allergic/Immunologic: Negative. Neurological: Negative for dizziness, seizures, syncope, weakness, light-headedness, numbness and headaches. Hematological: Negative for adenopathy. Does not bruise/bleed easily. Psychiatric/Behavioral: Negative.           Current Outpatient Medications:     atorvastatin (LIPITOR) 20 MG tablet, TAKE 1 TABLET BY MOUTH DAILY, Disp: 90 tablet, Rfl: 1    famotidine (PEPCID) 20 MG tablet, TAKE 1 TABLET BY MOUTH TWICE A DAY, Disp: 180 tablet, Rfl: 1    Omega-3 Fatty Acids (FISH OIL) 1200 MG CAPS, Take by mouth, Disp: , Rfl:     Melatonin 10 MG TABS, Take 10 mg by mouth nightly as needed 2 po @@ hs, Disp: , Rfl:   Allergies   Allergen Reactions    Lansoprazole     Prilosec [Omeprazole] Hives    Naproxen Rash    Trintellix [Vortioxetine]     Zoloft [Sertraline Hcl] Rash       Past Medical History:   Diagnosis Date    Anxiety     Dental caries     Depression     Hyperlipidemia      Past Surgical History:   Procedure Laterality Date    CARPAL TUNNEL RELEASE Bilateral     CERVICAL FUSION       SECTION      x 3    DENTAL SURGERY N/A 2019    DENTAL EXAM FULL MOUTH EXTRACTIONS ALEOPOLASTY performed by Cameron Kennedy DDS at [facility] OR     Family History   Problem Relation Age of Onset    Heart Disease Mother     Diabetes Mother      Social History     Socioeconomic History    Marital status:      Spouse name: Not on file    Number of children: Not on file    Years of education: Not on file    Highest education level: Not on file   Occupational History    Not on file   Tobacco Use    Smoking status: Current Every Day Smoker     Packs/day: 0.50     Years: 42.00     Pack years: 21.00     Types: Cigarettes    Smokeless tobacco: Never Used   Vaping Use    Vaping Use: Every day   Substance and Sexual Activity    Alcohol use: No    Drug use: No    Sexual activity: Not on file   Other Topics Concern    Not on file   Social History Narrative    Not on file     Social Determinants of Health     Financial Resource Strain: Low Risk     Difficulty of Paying Living Expenses: Not hard at all   Food Insecurity: Unknown    Worried About [address] in the Last Year: Patient refused   [address] in the Last Year: Patient refused   Transportation Needs:     Lack of Transportation (Medical): Not on file    Lack of Transportation (Non-Medical): Not on file   Physical Activity:     Days of Exercise per Week: Not on file    Minutes of Exercise per Session: Not on file   Stress:     Feeling of Stress : Not on file   Social Connections:     Frequency of Communication with Friends and Family: Not on file    Frequency of Social Gatherings with Friends and Family: Not on file    Attends [employer] Services: Not on file    Active Member of [address] [place] [address] or Organizations: Not on file    Attends Club or Organization Meetings: Not on file    Marital Status: Not on file   Intimate Partner Violence:     Fear of Current or Ex-Partner: Not on file    Emotionally Abused: Not on file    Physically Abused: Not on file    Sexually Abused: Not on file   Housing Stability:     Unable to Pay for Housing in the Last Year: Not on file    Number of [place] in the Last Year: Not on file    Unstable Housing in the Last Year: Not on file       Vitals:    04/27/22 0855   BP: 120/82   Pulse: 69   Resp: 16   Temp: 97.2 °F (36.2 °C)   TempSrc: Tympanic   SpO2: 98%   Weight: 164 lb (74.4 kg)   Height: 5' 6\" (1.676 m)       Physical Exam  Vitals and nursing note reviewed. Constitutional:       Appearance: She is well-developed. HENT:      Head: Atraumatic.       Right Ear: External ear normal.      Left Ear: External ear normal.      Nose: Nose normal.      Mouth/Throat:      Pharynx: No oropharyngeal exudate. Eyes:      Conjunctiva/sclera: Conjunctivae normal.      Pupils: Pupils are equal, round, and reactive to light. Neck:      Thyroid: No thyromegaly. Trachea: No tracheal deviation. Cardiovascular:      Rate and Rhythm: Normal rate and regular rhythm. Heart sounds: No murmur heard. No friction rub. No gallop. Pulmonary:      Effort: Pulmonary effort is normal. No respiratory distress. Breath sounds: Normal breath sounds. Abdominal:      General: Bowel sounds are normal.      Palpations: Abdomen is soft. Musculoskeletal:         General: No tenderness or deformity. Normal range of motion. Cervical back: Normal range of motion and neck supple. Lymphadenopathy:      Cervical: No cervical adenopathy. Skin:     General: Skin is warm and dry. Capillary Refill: Capillary refill takes less than 2 seconds. Findings: No rash. Neurological:      Mental Status: She is alert and oriented to person, place, and time. Sensory: No sensory deficit. Motor: No abnormal muscle tone.       Coordination: Coordination normal.      Deep Tendon Reflexes: Reflexes normal.             Seen By:  Kaiden Menon DO

## 2022-07-25 ENCOUNTER — APPOINTMENT (OUTPATIENT)
Dept: CT IMAGING | Age: 62
End: 2022-07-25
Payer: MEDICARE

## 2022-07-25 ENCOUNTER — HOSPITAL ENCOUNTER (EMERGENCY)
Age: 62
Discharge: HOME | End: 2022-07-25
Payer: MEDICARE

## 2022-07-25 ENCOUNTER — OFFICE VISIT (OUTPATIENT)
Dept: FAMILY MEDICINE CLINIC | Age: 62
End: 2022-07-25
Payer: MEDICARE

## 2022-07-25 ENCOUNTER — APPOINTMENT (OUTPATIENT)
Dept: GENERAL RADIOLOGY | Age: 62
End: 2022-07-25
Payer: MEDICARE

## 2022-07-25 VITALS
SYSTOLIC BLOOD PRESSURE: 138 MMHG | HEART RATE: 83 BPM | WEIGHT: 148 LBS | TEMPERATURE: 97.5 F | OXYGEN SATURATION: 97 % | BODY MASS INDEX: 23.78 KG/M2 | RESPIRATION RATE: 16 BRPM | HEIGHT: 66 IN | DIASTOLIC BLOOD PRESSURE: 76 MMHG

## 2022-07-25 VITALS
SYSTOLIC BLOOD PRESSURE: 156 MMHG | RESPIRATION RATE: 14 BRPM | TEMPERATURE: 98 F | DIASTOLIC BLOOD PRESSURE: 86 MMHG | HEIGHT: 66 IN | HEART RATE: 76 BPM | WEIGHT: 148 LBS | OXYGEN SATURATION: 99 % | BODY MASS INDEX: 23.78 KG/M2

## 2022-07-25 VITALS
TEMPERATURE: 98 F | OXYGEN SATURATION: 99 % | DIASTOLIC BLOOD PRESSURE: 86 MMHG | BODY MASS INDEX: 23.78 KG/M2 | RESPIRATION RATE: 16 BRPM | HEART RATE: 76 BPM | WEIGHT: 148 LBS | HEIGHT: 66 IN | SYSTOLIC BLOOD PRESSURE: 156 MMHG

## 2022-07-25 DIAGNOSIS — S10.93XA CONTUSION OF NECK, INITIAL ENCOUNTER: ICD-10-CM

## 2022-07-25 DIAGNOSIS — S30.0XXA CONTUSION OF LOWER BACK, INITIAL ENCOUNTER: ICD-10-CM

## 2022-07-25 DIAGNOSIS — Z00.00 MEDICARE ANNUAL WELLNESS VISIT, SUBSEQUENT: Primary | ICD-10-CM

## 2022-07-25 DIAGNOSIS — S33.5XXA LUMBAR SPRAIN, INITIAL ENCOUNTER: ICD-10-CM

## 2022-07-25 DIAGNOSIS — S06.0X0A CONCUSSION WITHOUT LOSS OF CONSCIOUSNESS, INITIAL ENCOUNTER: Primary | ICD-10-CM

## 2022-07-25 DIAGNOSIS — S13.9XXA CERVICAL SPRAIN, INITIAL ENCOUNTER: ICD-10-CM

## 2022-07-25 DIAGNOSIS — S09.90XA INJURY OF HEAD, INITIAL ENCOUNTER: ICD-10-CM

## 2022-07-25 DIAGNOSIS — S06.0X0A CONCUSSION WITHOUT LOSS OF CONSCIOUSNESS, INITIAL ENCOUNTER: ICD-10-CM
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
## 2022-07-25 PROCEDURE — 99284 EMERGENCY DEPT VISIT MOD MDM: CPT

## 2022-07-25 PROCEDURE — 4004F PT TOBACCO SCREEN RCVD TLK: CPT | Performed by: FAMILY MEDICINE

## 2022-07-25 PROCEDURE — 99214 OFFICE O/P EST MOD 30 MIN: CPT | Performed by: FAMILY MEDICINE

## 2022-07-25 PROCEDURE — 3017F COLORECTAL CA SCREEN DOC REV: CPT | Performed by: FAMILY MEDICINE

## 2022-07-25 PROCEDURE — 72100 X-RAY EXAM L-S SPINE 2/3 VWS: CPT

## 2022-07-25 PROCEDURE — G8420 CALC BMI NORM PARAMETERS: HCPCS | Performed by: FAMILY MEDICINE

## 2022-07-25 PROCEDURE — G8427 DOCREV CUR MEDS BY ELIG CLIN: HCPCS | Performed by: FAMILY MEDICINE

## 2022-07-25 PROCEDURE — 70450 CT HEAD/BRAIN W/O DYE: CPT

## 2022-07-25 PROCEDURE — 72125 CT NECK SPINE W/O DYE: CPT

## 2022-07-25 PROCEDURE — G0439 PPPS, SUBSEQ VISIT: HCPCS | Performed by: FAMILY MEDICINE

## 2022-07-25 RX ORDER — CYCLOBENZAPRINE HCL 10 MG
10 TABLET ORAL 3 TIMES DAILY PRN
Qty: 21 TABLET | Refills: 0 | Status: SHIPPED | OUTPATIENT
Start: 2022-07-25 | End: 2022-08-04

## 2022-07-25 RX ORDER — IBUPROFEN 600 MG/1
600 TABLET ORAL 3 TIMES DAILY PRN
Qty: 30 TABLET | Refills: 0 | Status: SHIPPED | OUTPATIENT
Start: 2022-07-25

## 2022-07-25 SDOH — ECONOMIC STABILITY: FOOD INSECURITY: WITHIN THE PAST 12 MONTHS, THE FOOD YOU BOUGHT JUST DIDN'T LAST AND YOU DIDN'T HAVE MONEY TO GET MORE.: PATIENT DECLINED

## 2022-07-25 SDOH — ECONOMIC STABILITY: FOOD INSECURITY: WITHIN THE PAST 12 MONTHS, YOU WORRIED THAT YOUR FOOD WOULD RUN OUT BEFORE YOU GOT MONEY TO BUY MORE.: PATIENT DECLINED

## 2022-07-25 ASSESSMENT — ENCOUNTER SYMPTOMS
SORE THROAT: 0
EYE REDNESS: 0
PHOTOPHOBIA: 0
NAUSEA: 0
TROUBLE SWALLOWING: 0
EYE DISCHARGE: 0
COUGH: 0
EYE PAIN: 0
BLOOD IN STOOL: 0
ALLERGIC/IMMUNOLOGIC NEGATIVE: 1
DIARRHEA: 0
VOMITING: 0
BACK PAIN: 1
SHORTNESS OF BREATH: 0
SINUS PAIN: 0
ABDOMINAL PAIN: 0
CHEST TIGHTNESS: 0

## 2022-07-25 ASSESSMENT — PATIENT HEALTH QUESTIONNAIRE - PHQ9
8. MOVING OR SPEAKING SO SLOWLY THAT OTHER PEOPLE COULD HAVE NOTICED. OR THE OPPOSITE, BEING SO FIGETY OR RESTLESS THAT YOU HAVE BEEN MOVING AROUND A LOT MORE THAN USUAL: 0
SUM OF ALL RESPONSES TO PHQ9 QUESTIONS 1 & 2: 1
2. FEELING DOWN, DEPRESSED OR HOPELESS: 0
3. TROUBLE FALLING OR STAYING ASLEEP: 1
SUM OF ALL RESPONSES TO PHQ QUESTIONS 1-9: 3
6. FEELING BAD ABOUT YOURSELF - OR THAT YOU ARE A FAILURE OR HAVE LET YOURSELF OR YOUR FAMILY DOWN: 0
SUM OF ALL RESPONSES TO PHQ QUESTIONS 1-9: 3
7. TROUBLE CONCENTRATING ON THINGS, SUCH AS READING THE NEWSPAPER OR WATCHING TELEVISION: 0
5. POOR APPETITE OR OVEREATING: 0
9. THOUGHTS THAT YOU WOULD BE BETTER OFF DEAD, OR OF HURTING YOURSELF: 0
1. LITTLE INTEREST OR PLEASURE IN DOING THINGS: 1
10. IF YOU CHECKED OFF ANY PROBLEMS, HOW DIFFICULT HAVE THESE PROBLEMS MADE IT FOR YOU TO DO YOUR WORK, TAKE CARE OF THINGS AT HOME, OR GET ALONG WITH OTHER PEOPLE: 1
4. FEELING TIRED OR HAVING LITTLE ENERGY: 1
SUM OF ALL RESPONSES TO PHQ QUESTIONS 1-9: 3
SUM OF ALL RESPONSES TO PHQ QUESTIONS 1-9: 3

## 2022-07-25 ASSESSMENT — LIFESTYLE VARIABLES: HOW OFTEN DO YOU HAVE A DRINK CONTAINING ALCOHOL: NEVER

## 2022-07-25 ASSESSMENT — SOCIAL DETERMINANTS OF HEALTH (SDOH): HOW HARD IS IT FOR YOU TO PAY FOR THE VERY BASICS LIKE FOOD, HOUSING, MEDICAL CARE, AND HEATING?: PATIENT DECLINED

## 2022-07-25 NOTE — PROGRESS NOTES
Medicare Annual Wellness Visit    Jose Miguel Gonzalez is here for Medicare AWV    Assessment & Plan   Medicare annual wellness visit, subsequent    Recommendations for Preventive Services Due: see orders and patient instructions/AVS.  Recommended screening schedule for the next 5-10 years is provided to the patient in written form: see Patient Instructions/AVS.     Return for Medicare Annual Wellness Visit in 1 year. Subjective       Patient's complete Health Risk Assessment and screening values have been reviewed and are found in Flowsheets. The following problems were reviewed today and where indicated follow up appointments were made and/or referrals ordered.     Positive Risk Factor Screenings with Interventions:    Fall Risk:  Do you feel unsteady or are you worried about falling? : no  2 or more falls in past year?: (!) yes  Fall with injury in past year?: no   Fall Risk Interventions:    Home safety tips provided      Tobacco Use:  Tobacco Use: High Risk    Smoking Tobacco Use: Every Day    Smokeless Tobacco Use: Never     E-cigarette/Vaping       Questions Responses    E-cigarette/Vaping Use Current Every Day User    Start Date     Passive Exposure     Quit Date     Counseling Given     Comments           Substance Use - Tobacco Interventions:  patient is not ready to work toward tobacco cessation at this time         General Health and ACP:  General  In general, how would you say your health is?: Good  In the past 7 days, have you experienced any of the following: New or Increased Pain, New or Increased Fatigue, Loneliness, Social Isolation, Stress or Anger?: No  Do you get the social and emotional support that you need?: Yes  Do you have a Living Will?: Yes    Advance Directives       Power of  Living Will ACP-Advance Directive ACP-Power of     Not on File Not on File Not on File Not on File        General Health Risk Interventions:  No Living Will: Patient declines ACP discussion/assistance    Health Habits/Nutrition:  Physical Activity: Inactive    Days of Exercise per Week: 0 days    Minutes of Exercise per Session: 0 min     Have you lost any weight without trying in the past 3 months?: No  Body mass index: 23.89  Have you seen the dentist within the past year?: Yes  Health Habits/Nutrition Interventions:  Dental exam overdue:  patient encouraged to make appointment with his/her dentist    Hearing/Vision:  Do you or your family notice any trouble with your hearing that hasn't been managed with hearing aids?: No  Do you have difficulty driving, watching TV, or doing any of your daily activities because of your eyesight?: No  Have you had an eye exam within the past year?: (!) No  No results found. Hearing/Vision Interventions:  Hearing concerns:  patient declines any further evaluation/treatment for hearing issues            Objective   Vitals:    07/25/22 1401   BP: (!) 156/86   Pulse: 76   Resp: 16   Temp: 98 °F (36.7 °C)   TempSrc: Temporal   SpO2: 99%   Weight: 148 lb (67.1 kg)   Height: 5' 6\" (1.676 m)      Body mass index is 23.89 kg/m². Allergies   Allergen Reactions    Lansoprazole     Prilosec [Omeprazole] Hives    Naproxen Rash    Trintellix [Vortioxetine]     Zoloft [Sertraline Hcl] Rash     Prior to Visit Medications    Medication Sig Taking?  Authorizing Provider   atorvastatin (LIPITOR) 20 MG tablet TAKE 1 TABLET BY MOUTH DAILY Yes Hanna FAN Art DO   famotidine (PEPCID) 20 MG tablet TAKE 1 TABLET BY MOUTH TWICE A DAY Yes Hanna FAN Art DO   Omega-3 Fatty Acids (FISH OIL) 1200 MG CAPS Take by mouth Yes Historical Provider, MD   Melatonin 10 MG TABS Take 10 mg by mouth nightly as needed 2 po @@ hs Yes Historical Provider, MD Krishnan (Including outside providers/suppliers regularly involved in providing care):   Patient Care Team:  Jessie Tabor DO as PCP - General (Family Medicine)  Jessie Tabor DO as PCP - [facility] Empaneled Provider Reviewed and updated this visit:  Tobacco  Allergies  Meds  Problems  Med Hx  Surg Hx  Soc Hx  Fam Hx               Cardiovascular Disease Risk Counseling: Assessed the patient's risk to develop cardiovascular disease and reviewed main risk factors. Reviewed steps to reduce disease risk including:   Quitting tobacco use, reducing amount smoked, or not starting the habit  Making healthy food choices  Being physically active and gradualy increasing activity levels   Reduce weight and determine a healthy BMI goal  Monitor blood pressure and treat if higher than 140/90 mmHg  Maintain blood total cholesterol levels under 5 mmol/l or 190 mg/dl  Maintain LDL cholesterol levels under 3.0 mmol/l or 115 mg/dl   Control blood glucose levels  Consider taking aspirin (75 mg daily), once blood pressure is controlled   Provided a follow up plan.   Time spent (minutes): 5 min

## 2022-07-25 NOTE — ED PROVIDER NOTES
[facility]                                                                                                                                        Department of Emergency Medicine   ED  Provider Note  Admit Date/RoomTime: 2022 12:27 PM  ED Room:         HPI:  22,   Time: 1:23 PM EDT         Rufino Bacon is a 65 y.o. female presenting to the ED for fall down flight of steps, beginning 2 days ago. The complaint has been persistent, moderate in severity, and worsened by nothing. Pt states she did hit her head. Denies LOC. Complaining of nausea, HA, neck pain and visual changes. States she is mostly concerned about blurred and double vision. Pt is not on blood thinners. Hx cervical fusion. Reports pain in head is bitemporal and occipital region. Pt reports low back pain as well. ROS:     Constitutional: Negative for fever and chills  HENT: Negative for ear pain, sore throat and sinus pressure  Eyes: Negative for pain, discharge and redness  Respiratory:  Negative for shortness of breath, cough and wheezing  Cardiovascular: Negative for CP, edema or palpitations  Gastrointestinal: Negative for nausea, vomiting, diarrhea and abdominal distention  Genitourinary: Negative for dysuria and frequency  Musculoskeletal:  See HPI  Skin: Negative for rash and wound  Neurological:  See HPI  Hematological: Negative for adenopathy    All other systems reviewed and are negative      -------------------------------- PAST HISTORY ----------------------------------  Past Medical History:  has a past medical history of Anxiety, Dental caries, Depression, and Hyperlipidemia. Past Surgical History:  has a past surgical history that includes  section; Carpal tunnel release (Bilateral); cervical fusion; and Dental surgery (N/A, 2019). Social History:  reports that she has been smoking cigarettes. She has a 21.00 pack-year smoking history.  She has never used smokeless tobacco. She reports that she does not drink alcohol and does not use drugs. Family History: family history includes Diabetes in her mother; Heart Disease in her mother. The patients home medications have been reviewed. Allergies: Lansoprazole, Prilosec [omeprazole], Naproxen, Trintellix [vortioxetine], and Zoloft [sertraline hcl]    --------------------------------- RESULTS ------------------------------------------  All laboratory and radiology results have been personally reviewed by myself   LABS:  No results found for this visit on 07/25/22. RADIOLOGY:  Interpreted by Radiologist.  XR LUMBAR SPINE (2-3 VIEWS)   Final Result   No fracture. Mild retrolisthesis at L3 over L4. Mild lumbar scoliosis, with   convexity to the left. Osteo-degenerative changes and discogenic disc   disease, as described above. CT HEAD WO CONTRAST   Final Result   No acute intracranial abnormality. CT CERVICAL SPINE WO CONTRAST   Final Result   No acute abnormality of the cervical spine. Post fusion of C5-C6. Bilateral thyroid nodule measuring up to 1.5 cm on the right. Follow-up with   non emergent thyroid sonogram recommended. ----------------- NURSING NOTES AND VITALS REVIEWED ---------------   The nursing notes within the ED encounter and vital signs as below have been reviewed. BP (!) 156/86   Pulse 76   Temp 98 °F (36.7 °C) (Temporal)   Resp 14   Ht 5' 6\" (1.676 m)   Wt 148 lb (67.1 kg)   SpO2 99%   BMI 23.89 kg/m²   Oxygen Saturation Interpretation: Normal      --------------------------------PHYSICAL EXAM------------------------------------      Constitutional/General: Alert and oriented x3, well appearing, non toxic in NAD  Head: NC/AT. No visible trauma, swelling or wounds. Eyes: PERRL, EOMI  Mouth: Oropharynx clear, handling secretions, no trismus  Neck: Supple, full ROM, no meningeal signs. No deformity. Mild diffuse midline tenderness.   Pulmonary: Lungs clear to auscultation bilaterally, no wheezes, rales, or rhonchi. Not in respiratory distress  Cardiovascular:  Regular rate and rhythm, no murmurs, gallops, or rubs. 2+ distal pulses  Abdomen: Soft, + BS. No distension. Nontender. No palpable rigidity, rebound or guarding  Extremities: Moves all extremities x 4. Warm and well perfused  Back:   No visible trauma. Diffuse lumbar tenderness. ROM grossly intact. SLR negative. Skin: warm and dry without rash  Neurologic: GCS 15,  Intact. No focal deficits  Psych: Normal Affect      ------------------------ ED COURSE/MEDICAL DECISION MAKING----------------------  Medications - No data to display      Medical Decision Making:    CT scan of the head and cervical spine were unremarkable. No acute bleed or mass noted. No acute cervical fracture. x-ray of the lumbar spine shows no acute fracture or bony changes as well. Patient reassured. Certainly if she has ongoing visual changes she is advised to call the eye doctor. Suspect mild concussion. Discussed treatment plan. The patient is aware and agreeable to this       Counseling: The emergency provider has spoken with the patient and discussed todays results, in addition to providing specific details for the plan of care and counseling regarding the diagnosis and prognosis. Questions are answered at this time and they are agreeable with the plan.      ------------------------ IMPRESSION AND DISPOSITION -------------------------------    IMPRESSION  1. Fall (on) (from) other stairs and steps, initial encounter    2. Injury of head, initial encounter    3. Concussion without loss of consciousness, initial encounter    4. Cervical sprain, initial encounter    5.  Lumbar sprain, initial encounter        DISPOSITION  Disposition: Discharge to home  Patient condition is stable                   Diego Estrada PA-C  07/25/22 [id number]

## 2022-07-25 NOTE — PROGRESS NOTES
22  Darryn Dee : 1960 Sex: female  Age: 65 y.o. Assessment and Plan:  Surendra Stauffer was seen today for fall, back pain and neck pain. Diagnoses and all orders for this visit:    Concussion without loss of consciousness, initial encounter    Contusion of neck, initial encounter    Contusion of lower back, initial encounter  This patient had a closed head injury with out loss of consciousness. She needs to have headaches and visual changes. Needs further evaluation including CT of the head to rule out any intracranial process. The neck and lower back and also be obtained. He sent to [facility] emergency room for further evaluation and treatment. Patient notified and in route. Emergency room was notified. Return in about 2 weeks (around 2022). Chief Complaint   Patient presents with    Fall     Headache , blurred vision , nausea    Back Pain    Neck Pain       Patient presents for a fall with closed head injury. She was sitting her daughter's house and slipped down a total of 6-8 steps. Denied loss of consciousness. She has pain in her head, neck, lower back. She also admits to's visual changes as well as the headaches. Denies weakness or paresthesias. She has tenderness in her neck with mild decreased range of motion as well as a lower lumbar as well. He denies any GI or genitourinary complaints. Review of Systems   Constitutional:  Negative for appetite change, fatigue and unexpected weight change. HENT:  Negative for congestion, ear pain, hearing loss, sinus pain, sore throat and trouble swallowing. Eyes:  Positive for visual disturbance. Negative for photophobia, pain, discharge and redness. Respiratory:  Negative for cough, chest tightness and shortness of breath. Cardiovascular:  Negative for chest pain, palpitations and leg swelling. Gastrointestinal:  Negative for abdominal pain, blood in stool, diarrhea, nausea and vomiting. Endocrine: Negative. Genitourinary:  Negative for dysuria, flank pain, frequency and hematuria. Musculoskeletal:  Positive for back pain and myalgias. Negative for arthralgias and joint swelling. Neck, lumbar   Skin: Negative. Allergic/Immunologic: Negative. Neurological:  Positive for headaches. Negative for dizziness, seizures, syncope, weakness, light-headedness and numbness. Hematological:  Negative for adenopathy. Does not bruise/bleed easily. Psychiatric/Behavioral: Negative.          Current Outpatient Medications:     atorvastatin (LIPITOR) 20 MG tablet, TAKE 1 TABLET BY MOUTH DAILY, Disp: 90 tablet, Rfl: 1    famotidine (PEPCID) 20 MG tablet, TAKE 1 TABLET BY MOUTH TWICE A DAY, Disp: 180 tablet, Rfl: 1    Omega-3 Fatty Acids (FISH OIL) 1200 MG CAPS, Take by mouth, Disp: , Rfl:     Melatonin 10 MG TABS, Take 10 mg by mouth nightly as needed 2 po @@ hs, Disp: , Rfl:   Allergies   Allergen Reactions    Lansoprazole     Prilosec [Omeprazole] Hives    Naproxen Rash    Trintellix [Vortioxetine]     Zoloft [Sertraline Hcl] Rash       Past Medical History:   Diagnosis Date    Anxiety     Dental caries     Depression     Hyperlipidemia      Past Surgical History:   Procedure Laterality Date    CARPAL TUNNEL RELEASE Bilateral     CERVICAL FUSION       SECTION      x 3    DENTAL SURGERY N/A 2019    DENTAL EXAM FULL MOUTH EXTRACTIONS ALEOPOLASTY performed by Jessica Rogers DDS at [facility] OR     Family History   Problem Relation Age of Onset    Heart Disease Mother     Diabetes Mother      Social History     Socioeconomic History    Marital status:      Spouse name: Not on file    Number of children: Not on file    Years of education: Not on file    Highest education level: Not on file   Occupational History    Not on file   Tobacco Use    Smoking status: Every Day     Packs/day: 0.50     Years: 42.00     Pack years: 21.00     Types: Cigarettes    Smokeless tobacco: Never warm and dry. Capillary Refill: Capillary refill takes less than 2 seconds. Findings: No rash. Neurological:      Mental Status: She is alert and oriented to person, place, and time. Sensory: No sensory deficit. Motor: No weakness or abnormal muscle tone.       Coordination: Coordination normal.      Gait: Gait normal.      Deep Tendon Reflexes: Reflexes normal.           Seen By:  Darlene Cullen DO

## 2022-09-16 DIAGNOSIS — E78.2 MIXED HYPERLIPIDEMIA: ICD-10-CM

## 2022-09-16 DIAGNOSIS — K21.9 GASTROESOPHAGEAL REFLUX DISEASE WITHOUT ESOPHAGITIS: ICD-10-CM

## 2022-09-16 DIAGNOSIS — R10.13 EPIGASTRIC PAIN: ICD-10-CM

## 2022-09-20 RX ORDER — FAMOTIDINE 20 MG/1
TABLET, FILM COATED ORAL
Qty: 180 TABLET | Refills: 1 | Status: SHIPPED | OUTPATIENT
Start: 2022-09-20

## 2022-09-20 RX ORDER — ATORVASTATIN CALCIUM 20 MG/1
TABLET, FILM COATED ORAL
Qty: 90 TABLET | Refills: 1 | Status: SHIPPED | OUTPATIENT
Start: 2022-09-20

## 2022-09-22 ENCOUNTER — OFFICE VISIT (OUTPATIENT)
Dept: FAMILY MEDICINE CLINIC | Age: 62
End: 2022-09-22
Payer: MEDICARE

## 2022-09-22 VITALS
BODY MASS INDEX: 23.3 KG/M2 | DIASTOLIC BLOOD PRESSURE: 72 MMHG | OXYGEN SATURATION: 97 % | RESPIRATION RATE: 16 BRPM | HEIGHT: 66 IN | HEART RATE: 68 BPM | TEMPERATURE: 97.8 F | SYSTOLIC BLOOD PRESSURE: 112 MMHG | WEIGHT: 145 LBS

## 2022-09-22 DIAGNOSIS — G25.81 RLS (RESTLESS LEGS SYNDROME): Primary | ICD-10-CM

## 2022-09-22 DIAGNOSIS — R21 RASH AND NONSPECIFIC SKIN ERUPTION: ICD-10-CM

## 2022-09-22 PROCEDURE — 96372 THER/PROPH/DIAG INJ SC/IM: CPT | Performed by: FAMILY MEDICINE

## 2022-09-22 PROCEDURE — G8420 CALC BMI NORM PARAMETERS: HCPCS | Performed by: FAMILY MEDICINE

## 2022-09-22 PROCEDURE — 99213 OFFICE O/P EST LOW 20 MIN: CPT | Performed by: FAMILY MEDICINE

## 2022-09-22 PROCEDURE — G8427 DOCREV CUR MEDS BY ELIG CLIN: HCPCS | Performed by: FAMILY MEDICINE

## 2022-09-22 PROCEDURE — 3017F COLORECTAL CA SCREEN DOC REV: CPT | Performed by: FAMILY MEDICINE

## 2022-09-22 PROCEDURE — 4004F PT TOBACCO SCREEN RCVD TLK: CPT | Performed by: FAMILY MEDICINE

## 2022-09-22 RX ORDER — PREDNISONE 10 MG/1
TABLET ORAL
Qty: 18 TABLET | Refills: 0 | Status: SHIPPED | OUTPATIENT
Start: 2022-09-22 | End: 2022-10-01

## 2022-09-22 RX ORDER — TRIAMCINOLONE ACETONIDE 40 MG/ML
40 INJECTION, SUSPENSION INTRA-ARTICULAR; INTRAMUSCULAR ONCE
Status: COMPLETED | OUTPATIENT
Start: 2022-09-22 | End: 2022-09-22

## 2022-09-22 RX ORDER — ROPINIROLE 0.25 MG/1
0.25 TABLET, FILM COATED ORAL NIGHTLY
Qty: 30 TABLET | Refills: 2 | Status: SHIPPED
Start: 2022-09-22 | End: 2022-10-03

## 2022-09-22 RX ADMIN — TRIAMCINOLONE ACETONIDE 40 MG: 40 INJECTION, SUSPENSION INTRA-ARTICULAR; INTRAMUSCULAR at 10:29

## 2022-09-22 ASSESSMENT — ENCOUNTER SYMPTOMS
EYE DISCHARGE: 0
COUGH: 0
EYE REDNESS: 0
BACK PAIN: 0
ABDOMINAL PAIN: 0
DIARRHEA: 0
CHEST TIGHTNESS: 0
ALLERGIC/IMMUNOLOGIC NEGATIVE: 1
SINUS PAIN: 0
EYE PAIN: 0
TROUBLE SWALLOWING: 0
NAUSEA: 0
SORE THROAT: 0
PHOTOPHOBIA: 0
SHORTNESS OF BREATH: 0
VOMITING: 0
BLOOD IN STOOL: 0
COLOR CHANGE: 1

## 2022-09-22 NOTE — PROGRESS NOTES
22  Duane Miller : 1960 Sex: female  Age: 65 y.o. Assessment and Plan:  Tyler Councilman was seen today for rash. Diagnoses and all orders for this visit:    RLS (restless legs syndrome)  -     rOPINIRole (REQUIP) 0.25 MG tablet; Take 1 tablet by mouth nightly    Rash and nonspecific skin eruption  -     triamcinolone acetonide (KENALOG-40) injection 40 mg  -     predniSONE (DELTASONE) 10 MG tablet; Take 3 tablets by mouth daily for 3 days, THEN 2 tablets daily for 3 days, THEN 1 tablet daily for 3 days. Return 7 to 10-day recheck if not improving. .    Chief Complaint   Patient presents with    Rash     Arms, chest, legs       Patient presents with red, pruritic, irritated, and a papular, rash of the upper extremities and the wall. Onset week previously, all over-the-counter's have been unsuccessful. Denies any new contacts yard work, foods, exposure. He is also complaining of restless legs, cramping, pain, discomfort, awakens her at night. Review of Systems   Constitutional:  Negative for appetite change, fatigue and unexpected weight change. HENT:  Negative for congestion, ear pain, hearing loss, sinus pain, sore throat and trouble swallowing. Eyes:  Negative for photophobia, pain, discharge and redness. Respiratory:  Negative for cough, chest tightness and shortness of breath. Cardiovascular:  Negative for chest pain, palpitations and leg swelling. Gastrointestinal:  Negative for abdominal pain, blood in stool, diarrhea, nausea and vomiting. Endocrine: Negative. Genitourinary:  Negative for dysuria, flank pain, frequency and hematuria. Musculoskeletal:  Positive for myalgias. Negative for arthralgias, back pain and joint swelling. Legs bilateral   Skin:  Positive for color change and rash. Upper extremities, chest   Allergic/Immunologic: Negative.     Neurological:  Negative for dizziness, seizures, syncope, weakness, light-headedness, numbness and headaches. Hematological:  Negative for adenopathy. Does not bruise/bleed easily. Psychiatric/Behavioral: Negative. Current Outpatient Medications:     rOPINIRole (REQUIP) 0.25 MG tablet, Take 1 tablet by mouth nightly, Disp: 30 tablet, Rfl: 2    predniSONE (DELTASONE) 10 MG tablet, Take 3 tablets by mouth daily for 3 days, THEN 2 tablets daily for 3 days, THEN 1 tablet daily for 3 days. , Disp: 18 tablet, Rfl: 0    atorvastatin (LIPITOR) 20 MG tablet, TAKE 1 TABLET BY MOUTH DAILY, Disp: 90 tablet, Rfl: 1    famotidine (FAMOTIDINE MAXIMUM STRENGTH) 20 MG tablet, TAKE 1 TABLET BY MOUTH TWICE A DAY, Disp: 180 tablet, Rfl: 1    ibuprofen (ADVIL;MOTRIN) 600 MG tablet, Take 1 tablet by mouth 3 times daily as needed for Pain, Disp: 30 tablet, Rfl: 0    Omega-3 Fatty Acids (FISH OIL) 1200 MG CAPS, Take by mouth, Disp: , Rfl:     Melatonin 10 MG TABS, Take 10 mg by mouth nightly as needed 2 po @@ hs, Disp: , Rfl:   Allergies   Allergen Reactions    Lansoprazole     Prilosec [Omeprazole] Hives    Naproxen Rash    Trintellix [Vortioxetine]     Zoloft [Sertraline Hcl] Rash       Past Medical History:   Diagnosis Date    Anxiety     Dental caries     Depression     Hyperlipidemia      Past Surgical History:   Procedure Laterality Date    CARPAL TUNNEL RELEASE Bilateral     CERVICAL FUSION       SECTION      x 3    DENTAL SURGERY N/A 2019    DENTAL EXAM FULL MOUTH EXTRACTIONS ALEOPOLASTY performed by Lady Bird DDS at [facility] OR     Family History   Problem Relation Age of Onset    Heart Disease Mother     Diabetes Mother      Social History     Socioeconomic History    Marital status:      Spouse name: Not on file    Number of children: Not on file    Years of education: Not on file    Highest education level: Not on file   Occupational History    Not on file   Tobacco Use    Smoking status: Every Day     Packs/day: 0.50     Years: 42.00     Pack years: 21.00     Types: Cigarettes Smokeless tobacco: Never   Vaping Use    Vaping Use: Every day   Substance and Sexual Activity    Alcohol use: No    Drug use: No    Sexual activity: Not on file   Other Topics Concern    Not on file   Social History Narrative    Not on file     Social Determinants of Health     Financial Resource Strain: Unknown    Difficulty of Paying Living Expenses: Patient refused   Food Insecurity: Unknown    Worried About Running Out of Food in the Last Year: Patient refused    Ran Out of Food in the Last Year: Patient refused   Transportation Needs: Not on file   Physical Activity: Inactive    Days of Exercise per Week: 0 days    Minutes of Exercise per Session: 0 min   Stress: Not on file   Social Connections: Not on file   Intimate Partner Violence: Not on file   Housing Stability: Not on file       Vitals:    09/22/22 1005   BP: 112/72   Pulse: 68   Resp: 16   Temp: 97.8 °F (36.6 °C)   TempSrc: Temporal   SpO2: 97%   Weight: 145 lb (65.8 kg)   Height: 5' 6\" (1.676 m)       Physical Exam  Vitals and nursing note reviewed. Constitutional:       Appearance: She is well-developed. HENT:      Head: Atraumatic. Right Ear: External ear normal.      Left Ear: External ear normal.      Nose: Nose normal.      Mouth/Throat:      Pharynx: No oropharyngeal exudate. Eyes:      Conjunctiva/sclera: Conjunctivae normal.      Pupils: Pupils are equal, round, and reactive to light. Neck:      Thyroid: No thyromegaly. Trachea: No tracheal deviation. Cardiovascular:      Rate and Rhythm: Normal rate and regular rhythm. Heart sounds: No murmur heard. No friction rub. No gallop. Pulmonary:      Effort: Pulmonary effort is normal. No respiratory distress. Breath sounds: Normal breath sounds. Abdominal:      General: Bowel sounds are normal.      Palpations: Abdomen is soft. Musculoskeletal:         General: No tenderness or deformity. Normal range of motion.       Cervical back: Normal range of motion and neck supple. Lymphadenopathy:      Cervical: No cervical adenopathy. Skin:     General: Skin is warm and dry. Capillary Refill: Capillary refill takes less than 2 seconds. Findings: Erythema and rash present. Comments: Red, maculopapular, pruritic, irritated rash upper extremities and chest   Neurological:      Mental Status: She is alert and oriented to person, place, and time. Sensory: No sensory deficit. Motor: No abnormal muscle tone.       Coordination: Coordination normal.      Deep Tendon Reflexes: Reflexes normal.           Seen By:  Severo Wheatley,

## 2022-10-03 RX ORDER — GABAPENTIN 100 MG/1
100 CAPSULE ORAL NIGHTLY
Qty: 30 CAPSULE | Refills: 0 | Status: SHIPPED
Start: 2022-10-03 | End: 2022-10-21 | Stop reason: SDUPTHER

## 2022-10-23 RX ORDER — GABAPENTIN 100 MG/1
100 CAPSULE ORAL 2 TIMES DAILY
Qty: 60 CAPSULE | Refills: 2 | Status: SHIPPED | OUTPATIENT
Start: 2022-10-23 | End: 2023-01-21

## 2022-11-17 ENCOUNTER — OFFICE VISIT (OUTPATIENT)
Dept: FAMILY MEDICINE CLINIC | Age: 62
End: 2022-11-17
Payer: MEDICARE

## 2022-11-17 VITALS
HEIGHT: 65 IN | OXYGEN SATURATION: 99 % | TEMPERATURE: 97.9 F | WEIGHT: 145 LBS | HEART RATE: 87 BPM | BODY MASS INDEX: 24.16 KG/M2 | RESPIRATION RATE: 16 BRPM

## 2022-11-17 DIAGNOSIS — L50.9 URTICARIA: Primary | ICD-10-CM

## 2022-11-17 PROCEDURE — 96372 THER/PROPH/DIAG INJ SC/IM: CPT | Performed by: FAMILY MEDICINE

## 2022-11-17 PROCEDURE — 99213 OFFICE O/P EST LOW 20 MIN: CPT | Performed by: FAMILY MEDICINE

## 2022-11-17 PROCEDURE — G8484 FLU IMMUNIZE NO ADMIN: HCPCS | Performed by: FAMILY MEDICINE

## 2022-11-17 PROCEDURE — G8420 CALC BMI NORM PARAMETERS: HCPCS | Performed by: FAMILY MEDICINE

## 2022-11-17 PROCEDURE — 3017F COLORECTAL CA SCREEN DOC REV: CPT | Performed by: FAMILY MEDICINE

## 2022-11-17 PROCEDURE — 4004F PT TOBACCO SCREEN RCVD TLK: CPT | Performed by: FAMILY MEDICINE

## 2022-11-17 PROCEDURE — G8427 DOCREV CUR MEDS BY ELIG CLIN: HCPCS | Performed by: FAMILY MEDICINE

## 2022-11-17 RX ORDER — TRIAMCINOLONE ACETONIDE 40 MG/ML
40 INJECTION, SUSPENSION INTRA-ARTICULAR; INTRAMUSCULAR ONCE
Status: COMPLETED | OUTPATIENT
Start: 2022-11-17 | End: 2022-11-17

## 2022-11-17 RX ORDER — PREDNISONE 10 MG/1
TABLET ORAL
Qty: 18 TABLET | Refills: 0 | Status: SHIPPED | OUTPATIENT
Start: 2022-11-17 | End: 2022-11-26

## 2022-11-17 RX ADMIN — TRIAMCINOLONE ACETONIDE 40 MG: 40 INJECTION, SUSPENSION INTRA-ARTICULAR; INTRAMUSCULAR at 10:15

## 2022-11-17 ASSESSMENT — ENCOUNTER SYMPTOMS
PHOTOPHOBIA: 0
TROUBLE SWALLOWING: 0
BACK PAIN: 0
DIARRHEA: 0
EYE DISCHARGE: 0
COLOR CHANGE: 1
VOMITING: 0
SORE THROAT: 0
EYE PAIN: 0
ALLERGIC/IMMUNOLOGIC NEGATIVE: 1
SINUS PAIN: 0
BLOOD IN STOOL: 0
NAUSEA: 0
CHEST TIGHTNESS: 0
EYE REDNESS: 0
COUGH: 0
ABDOMINAL PAIN: 0
SHORTNESS OF BREATH: 0

## 2022-11-17 NOTE — PROGRESS NOTES
22  Devin Huerta : 1960 Sex: female  Age: 65 y.o. Assessment and Plan:  Susanna Marion was seen today for urticaria. Diagnoses and all orders for this visit:    Urticaria  -     triamcinolone acetonide (KENALOG-40) injection 40 mg  -     predniSONE (DELTASONE) 10 MG tablet; Take 3 tablets by mouth daily for 3 days, THEN 2 tablets daily for 3 days, THEN 1 tablet daily for 3 days. Kenalog IM with a prednisone taper to clear rash. Second showed in the last several months. We will consider dermatology referral if recurs. Return 3 to 5-day recheck if not improved. .    Chief Complaint   Patient presents with    Urticaria     Hands/arm bilateral. Face       Patient presents with recurrent urticaria. Episode of urticaria September which was treated with IM steroid shot and a prednisone taper with complete resolution. Been well over the last couple months until recurrence in the last 2 days. She admits to red, raised, irritated, intensely pruritic lesions over neck upper extremities and torso. Review of Systems   Constitutional:  Negative for appetite change, fatigue and unexpected weight change. HENT:  Negative for congestion, ear pain, hearing loss, sinus pain, sore throat and trouble swallowing. Eyes:  Negative for photophobia, pain, discharge and redness. Respiratory:  Negative for cough, chest tightness and shortness of breath. Cardiovascular:  Negative for chest pain, palpitations and leg swelling. Gastrointestinal:  Negative for abdominal pain, blood in stool, diarrhea, nausea and vomiting. Endocrine: Negative. Genitourinary:  Negative for dysuria, flank pain, frequency and hematuria. Musculoskeletal:  Negative for arthralgias, back pain, joint swelling and myalgias. Skin:  Positive for color change and rash. Allergic/Immunologic: Negative. Neurological:  Negative for dizziness, seizures, syncope, weakness, light-headedness, numbness and headaches. Hematological:  Negative for adenopathy. Does not bruise/bleed easily. Psychiatric/Behavioral: Negative. Current Outpatient Medications:     predniSONE (DELTASONE) 10 MG tablet, Take 3 tablets by mouth daily for 3 days, THEN 2 tablets daily for 3 days, THEN 1 tablet daily for 3 days. , Disp: 18 tablet, Rfl: 0    gabapentin (NEURONTIN) 100 MG capsule, Take 1 capsule by mouth in the morning and at bedtime for 90 days.  Intended supply: 30 days, Disp: 60 capsule, Rfl: 2    atorvastatin (LIPITOR) 20 MG tablet, TAKE 1 TABLET BY MOUTH DAILY, Disp: 90 tablet, Rfl: 1    famotidine (FAMOTIDINE MAXIMUM STRENGTH) 20 MG tablet, TAKE 1 TABLET BY MOUTH TWICE A DAY, Disp: 180 tablet, Rfl: 1    ibuprofen (ADVIL;MOTRIN) 600 MG tablet, Take 1 tablet by mouth 3 times daily as needed for Pain, Disp: 30 tablet, Rfl: 0    Omega-3 Fatty Acids (FISH OIL) 1200 MG CAPS, Take by mouth, Disp: , Rfl:     Melatonin 10 MG TABS, Take 10 mg by mouth nightly as needed 2 po @@ hs, Disp: , Rfl:   Allergies   Allergen Reactions    Lansoprazole     Prilosec [Omeprazole] Hives    Naproxen Rash    Trintellix [Vortioxetine]     Zoloft [Sertraline Hcl] Rash       Past Medical History:   Diagnosis Date    Anxiety     Dental caries     Depression     Hyperlipidemia      Past Surgical History:   Procedure Laterality Date    CARPAL TUNNEL RELEASE Bilateral     CERVICAL FUSION       SECTION      x 3    DENTAL SURGERY N/A 2019    DENTAL EXAM FULL MOUTH EXTRACTIONS ALEOPOLASTY performed by [facility], [facility] at [facility] OR     Family History   Problem Relation Age of Onset    Heart Disease Mother     Diabetes Mother      Social History     Socioeconomic History    Marital status:      Spouse name: Not on file    Number of children: Not on file    Years of education: Not on file    Highest education level: Not on file   Occupational History    Not on file   Tobacco Use    Smoking status: Every Day     Packs/day: 0.50     Years: 42.00 Pack years: 21.00     Types: Cigarettes    Smokeless tobacco: Never   Vaping Use    Vaping Use: Every day   Substance and Sexual Activity    Alcohol use: No    Drug use: No    Sexual activity: Not on file   Other Topics Concern    Not on file   Social History Narrative    Not on file     Social Determinants of Health     Financial Resource Strain: Unknown    Difficulty of Paying Living Expenses: Patient refused   Food Insecurity: Unknown    Worried About Running Out of Food in the Last Year: Patient refused    Ran Out of Food in the Last Year: Patient refused   Transportation Needs: Not on file   Physical Activity: Inactive    Days of Exercise per Week: 0 days    Minutes of Exercise per Session: 0 min   Stress: Not on file   Social Connections: Not on file   Intimate Partner Violence: Not on file   Housing Stability: Not on file       Vitals:    11/17/22 0955   Pulse: 87   Resp: 16   Temp: 97.9 °F (36.6 °C)   TempSrc: Temporal   SpO2: 99%   Weight: 145 lb (65.8 kg)   Height: 5' 5\" (1.651 m)       Physical Exam  Vitals and nursing note reviewed. Constitutional:       Appearance: She is well-developed. HENT:      Head: Atraumatic. Right Ear: External ear normal.      Left Ear: External ear normal.      Nose: Nose normal.      Mouth/Throat:      Pharynx: No oropharyngeal exudate. Eyes:      Conjunctiva/sclera: Conjunctivae normal.      Pupils: Pupils are equal, round, and reactive to light. Neck:      Thyroid: No thyromegaly. Trachea: No tracheal deviation. Cardiovascular:      Rate and Rhythm: Normal rate and regular rhythm. Heart sounds: No murmur heard. No friction rub. No gallop. Pulmonary:      Effort: Pulmonary effort is normal. No respiratory distress. Breath sounds: Normal breath sounds. Abdominal:      General: Bowel sounds are normal.      Palpations: Abdomen is soft. Musculoskeletal:         General: No tenderness or deformity. Normal range of motion.       Cervical back: Normal range of motion and neck supple. Lymphadenopathy:      Cervical: No cervical adenopathy. Skin:     General: Skin is warm and dry. Capillary Refill: Capillary refill takes less than 2 seconds. Findings: No rash. Comments: Urticarial rash of face neck upper extremities and trunk. Rash is intensely pruritic. Neurological:      Mental Status: She is alert and oriented to person, place, and time. Sensory: No sensory deficit. Motor: No abnormal muscle tone.       Coordination: Coordination normal.      Deep Tendon Reflexes: Reflexes normal.           Seen By:  Jayme Mckeon DO

## 2022-12-05 DIAGNOSIS — G25.81 RLS (RESTLESS LEGS SYNDROME): ICD-10-CM

## 2022-12-06 RX ORDER — ROPINIROLE 0.25 MG/1
0.25 TABLET, FILM COATED ORAL NIGHTLY
Qty: 30 TABLET | Refills: 2 | OUTPATIENT
Start: 2022-12-06

## 2023-01-03 RX ORDER — GABAPENTIN 100 MG/1
CAPSULE ORAL
Qty: 60 CAPSULE | Refills: 2 | Status: SHIPPED | OUTPATIENT
Start: 2023-01-03 | End: 2023-04-02

## 2023-01-27 ENCOUNTER — APPOINTMENT (OUTPATIENT)
Dept: GENERAL RADIOLOGY | Age: 63
End: 2023-01-27
Payer: MEDICARE

## 2023-01-27 ENCOUNTER — APPOINTMENT (OUTPATIENT)
Dept: CT IMAGING | Age: 63
End: 2023-01-27
Payer: MEDICARE

## 2023-01-27 ENCOUNTER — OFFICE VISIT (OUTPATIENT)
Dept: FAMILY MEDICINE CLINIC | Age: 63
End: 2023-01-27
Payer: MEDICARE

## 2023-01-27 ENCOUNTER — HOSPITAL ENCOUNTER (EMERGENCY)
Age: 63
Discharge: HOME | End: 2023-01-27
Attending: EMERGENCY MEDICINE
Payer: MEDICARE

## 2023-01-27 VITALS
BODY MASS INDEX: 22.66 KG/M2 | SYSTOLIC BLOOD PRESSURE: 107 MMHG | OXYGEN SATURATION: 96 % | TEMPERATURE: 97.2 F | DIASTOLIC BLOOD PRESSURE: 63 MMHG | WEIGHT: 136 LBS | RESPIRATION RATE: 16 BRPM | HEIGHT: 65 IN | HEART RATE: 79 BPM

## 2023-01-27 VITALS
BODY MASS INDEX: 22.66 KG/M2 | HEART RATE: 60 BPM | SYSTOLIC BLOOD PRESSURE: 98 MMHG | WEIGHT: 136 LBS | HEIGHT: 65 IN | RESPIRATION RATE: 16 BRPM | OXYGEN SATURATION: 98 % | DIASTOLIC BLOOD PRESSURE: 52 MMHG | TEMPERATURE: 96.3 F

## 2023-01-27 DIAGNOSIS — R31.9 HEMATURIA, UNSPECIFIED TYPE: ICD-10-CM

## 2023-01-27 DIAGNOSIS — U07.1 COVID-19: Primary | ICD-10-CM

## 2023-01-27 DIAGNOSIS — R11.2 NAUSEA AND VOMITING, UNSPECIFIED VOMITING TYPE: Primary | ICD-10-CM

## 2023-01-27 DIAGNOSIS — F33.41 RECURRENT MAJOR DEPRESSIVE DISORDER, IN PARTIAL REMISSION (HCC): ICD-10-CM

## 2023-01-27 DIAGNOSIS — K21.9 GASTROESOPHAGEAL REFLUX DISEASE WITHOUT ESOPHAGITIS: ICD-10-CM

## 2023-01-27 DIAGNOSIS — E86.0 DEHYDRATION: ICD-10-CM

## 2023-01-27 DIAGNOSIS — E78.2 MIXED HYPERLIPIDEMIA: ICD-10-CM

## 2023-01-27 DIAGNOSIS — J06.9 VIRAL URI: ICD-10-CM

## 2023-01-27 DIAGNOSIS — R10.13 EPIGASTRIC PAIN: ICD-10-CM

## 2023-01-27 DIAGNOSIS — R55 VASOVAGAL SYNCOPE: ICD-10-CM

## 2023-01-27 DIAGNOSIS — R11.2 NAUSEA AND VOMITING, UNSPECIFIED VOMITING TYPE: ICD-10-CM

## 2023-01-27 LAB
ALBUMIN SERPL-MCNC: 4 G/DL (ref 3.5–5.2)
ALBUMIN SERPL-MCNC: 4.2 G/DL (ref 3.5–5.2)
ALP BLD-CCNC: 102 U/L (ref 35–104)
ALP BLD-CCNC: 109 U/L (ref 35–104)
ALT SERPL-CCNC: 61 U/L (ref 0–32)
ALT SERPL-CCNC: 64 U/L (ref 0–32)
ANION GAP SERPL CALCULATED.3IONS-SCNC: 13 MMOL/L (ref 7–16)
ANION GAP SERPL CALCULATED.3IONS-SCNC: 15 MMOL/L (ref 7–16)
ANION GAP SERPL CALCULATED.3IONS-SCNC: 9 MMOL/L (ref 7–16)
AST SERPL-CCNC: 53 U/L (ref 0–31)
AST SERPL-CCNC: 56 U/L (ref 0–31)
BACTERIA: ABNORMAL /HPF
BASOPHILS ABSOLUTE: 0.01 E9/L (ref 0–0.2)
BASOPHILS ABSOLUTE: 0.01 E9/L (ref 0–0.2)
BASOPHILS RELATIVE PERCENT: 0.4 % (ref 0–2)
BASOPHILS RELATIVE PERCENT: 0.4 % (ref 0–2)
BILIRUB SERPL-MCNC: 0.3 MG/DL (ref 0–1.2)
BILIRUB SERPL-MCNC: 0.3 MG/DL (ref 0–1.2)
BILIRUBIN URINE: ABNORMAL
BILIRUBIN, POC: NORMAL
BLOOD URINE, POC: NORMAL
BLOOD, URINE: ABNORMAL
BUN BLDV-MCNC: 11 MG/DL (ref 6–23)
BUN BLDV-MCNC: 12 MG/DL (ref 6–23)
BUN BLDV-MCNC: 12 MG/DL (ref 6–23)
CALCIUM SERPL-MCNC: 7.8 MG/DL (ref 8.6–10.2)
CALCIUM SERPL-MCNC: 8.9 MG/DL (ref 8.6–10.2)
CALCIUM SERPL-MCNC: 9.1 MG/DL (ref 8.6–10.2)
CHLORIDE BLD-SCNC: 88 MMOL/L (ref 98–107)
CHLORIDE BLD-SCNC: 90 MMOL/L (ref 98–107)
CHLORIDE BLD-SCNC: 93 MMOL/L (ref 98–107)
CHOLESTEROL, TOTAL: 166 MG/DL (ref 0–199)
CHP ED QC CHECK: NORMAL
CLARITY, POC: CLEAR
CLARITY: CLEAR
CO2: 21 MMOL/L (ref 22–29)
CO2: 23 MMOL/L (ref 22–29)
CO2: 24 MMOL/L (ref 22–29)
COARSE CASTS, UA: ABNORMAL /LPF (ref 0–2)
COLOR, POC: YELLOW
COLOR: YELLOW
CREAT SERPL-MCNC: 0.7 MG/DL (ref 0.5–1)
CREAT SERPL-MCNC: 0.8 MG/DL (ref 0.5–1)
CREAT SERPL-MCNC: 0.8 MG/DL (ref 0.5–1)
EOSINOPHILS ABSOLUTE: 0 E9/L (ref 0.05–0.5)
EOSINOPHILS ABSOLUTE: 0.01 E9/L (ref 0.05–0.5)
EOSINOPHILS RELATIVE PERCENT: 0 % (ref 0–6)
EOSINOPHILS RELATIVE PERCENT: 0.4 % (ref 0–6)
GFR SERPL CREATININE-BSD FRML MDRD: >60 ML/MIN/1.73
GLUCOSE BLD-MCNC: 110 MG/DL
GLUCOSE BLD-MCNC: 83 MG/DL (ref 74–99)
GLUCOSE BLD-MCNC: 99 MG/DL (ref 74–99)
GLUCOSE FASTING: 91 MG/DL (ref 74–99)
GLUCOSE URINE, POC: NORMAL
GLUCOSE URINE: NEGATIVE MG/DL
HCT VFR BLD CALC: 40.3 % (ref 34–48)
HCT VFR BLD CALC: 40.4 % (ref 34–48)
HDLC SERPL-MCNC: 63 MG/DL
HEMOGLOBIN: 14.1 G/DL (ref 11.5–15.5)
HEMOGLOBIN: 14.2 G/DL (ref 11.5–15.5)
IMMATURE GRANULOCYTES #: 0.01 E9/L
IMMATURE GRANULOCYTES #: 0.01 E9/L
IMMATURE GRANULOCYTES %: 0.4 % (ref 0–5)
IMMATURE GRANULOCYTES %: 0.4 % (ref 0–5)
INFLUENZA A ANTIGEN, POC: NORMAL
INFLUENZA A BY PCR: NOT DETECTED
INFLUENZA B ANTIGEN, POC: NORMAL
INFLUENZA B BY PCR: NOT DETECTED
KETONES, POC: 40
KETONES, URINE: 15 MG/DL
LDL CHOLESTEROL CALCULATED: 79 MG/DL (ref 0–99)
LEUKOCYTE EST, POC: NORMAL
LEUKOCYTE ESTERASE, URINE: NEGATIVE
LYMPHOCYTES ABSOLUTE: 0.59 E9/L (ref 1.5–4)
LYMPHOCYTES ABSOLUTE: 0.71 E9/L (ref 1.5–4)
LYMPHOCYTES RELATIVE PERCENT: 23.3 % (ref 20–42)
LYMPHOCYTES RELATIVE PERCENT: 26.3 % (ref 20–42)
MCH RBC QN AUTO: 30.6 PG (ref 26–35)
MCH RBC QN AUTO: 31.3 PG (ref 26–35)
MCHC RBC AUTO-ENTMCNC: 35 % (ref 32–34.5)
MCHC RBC AUTO-ENTMCNC: 35.1 % (ref 32–34.5)
MCV RBC AUTO: 87.1 FL (ref 80–99.9)
MCV RBC AUTO: 89.6 FL (ref 80–99.9)
MONOCYTES ABSOLUTE: 0.36 E9/L (ref 0.1–0.95)
MONOCYTES ABSOLUTE: 0.55 E9/L (ref 0.1–0.95)
MONOCYTES RELATIVE PERCENT: 14.2 % (ref 2–12)
MONOCYTES RELATIVE PERCENT: 20.4 % (ref 2–12)
NEUTROPHILS ABSOLUTE: 1.41 E9/L (ref 1.8–7.3)
NEUTROPHILS ABSOLUTE: 1.56 E9/L (ref 1.8–7.3)
NEUTROPHILS RELATIVE PERCENT: 52.1 % (ref 43–80)
NEUTROPHILS RELATIVE PERCENT: 61.7 % (ref 43–80)
NITRITE, POC: NORMAL
NITRITE, URINE: NEGATIVE
PDW BLD-RTO: 13 FL (ref 11.5–15)
PDW BLD-RTO: 13.2 FL (ref 11.5–15)
PH UA: 6 (ref 5–9)
PH, POC: 6
PLATELET # BLD: 72 E9/L (ref 130–450)
PLATELET # BLD: 77 E9/L (ref 130–450)
PLATELET CONFIRMATION: NORMAL
PLATELET CONFIRMATION: NORMAL
PMV BLD AUTO: 10.5 FL (ref 7–12)
PMV BLD AUTO: 9.6 FL (ref 7–12)
POTASSIUM REFLEX MAGNESIUM: 4.2 MMOL/L (ref 3.5–5)
POTASSIUM SERPL-SCNC: 4.2 MMOL/L (ref 3.5–5)
POTASSIUM SERPL-SCNC: 4.3 MMOL/L (ref 3.5–5)
PROTEIN UA: NEGATIVE MG/DL
PROTEIN, POC: NORMAL
RBC # BLD: 4.5 E12/L (ref 3.5–5.5)
RBC # BLD: 4.64 E12/L (ref 3.5–5.5)
RBC # BLD: NORMAL 10*6/UL
RBC UA: ABNORMAL /HPF (ref 0–2)
SARS-COV-2, NAAT: DETECTED
SODIUM BLD-SCNC: 124 MMOL/L (ref 132–146)
SODIUM BLD-SCNC: 126 MMOL/L (ref 132–146)
SODIUM BLD-SCNC: 126 MMOL/L (ref 132–146)
SPECIFIC GRAVITY UA: >=1.03 (ref 1–1.03)
SPECIFIC GRAVITY, POC: >=1.03
TOTAL PROTEIN: 6.4 G/DL (ref 6.4–8.3)
TOTAL PROTEIN: 6.6 G/DL (ref 6.4–8.3)
TRIGL SERPL-MCNC: 120 MG/DL (ref 0–149)
TSH SERPL DL<=0.05 MIU/L-ACNC: 1.08 UIU/ML (ref 0.27–4.2)
UROBILINOGEN, POC: 0.2
UROBILINOGEN, URINE: 0.2 E.U./DL
VLDLC SERPL CALC-MCNC: 24 MG/DL
WBC # BLD: 2.5 E9/L (ref 4.5–11.5)
WBC # BLD: 2.7 E9/L (ref 4.5–11.5)
WBC UA: ABNORMAL /HPF (ref 0–5)

## 2023-01-27 PROCEDURE — 99285 EMERGENCY DEPT VISIT HI MDM: CPT

## 2023-01-27 PROCEDURE — 6360000002 HC RX W HCPCS

## 2023-01-27 PROCEDURE — 87635 SARS-COV-2 COVID-19 AMP PRB: CPT

## 2023-01-27 PROCEDURE — G8484 FLU IMMUNIZE NO ADMIN: HCPCS | Performed by: FAMILY MEDICINE

## 2023-01-27 PROCEDURE — 2580000003 HC RX 258

## 2023-01-27 PROCEDURE — G8420 CALC BMI NORM PARAMETERS: HCPCS | Performed by: FAMILY MEDICINE

## 2023-01-27 PROCEDURE — 6370000000 HC RX 637 (ALT 250 FOR IP)

## 2023-01-27 PROCEDURE — 93005 ELECTROCARDIOGRAM TRACING: CPT

## 2023-01-27 PROCEDURE — 99215 OFFICE O/P EST HI 40 MIN: CPT | Performed by: FAMILY MEDICINE

## 2023-01-27 PROCEDURE — G8427 DOCREV CUR MEDS BY ELIG CLIN: HCPCS | Performed by: FAMILY MEDICINE

## 2023-01-27 PROCEDURE — 3017F COLORECTAL CA SCREEN DOC REV: CPT | Performed by: FAMILY MEDICINE

## 2023-01-27 PROCEDURE — 80053 COMPREHEN METABOLIC PANEL: CPT

## 2023-01-27 PROCEDURE — 81001 URINALYSIS AUTO W/SCOPE: CPT

## 2023-01-27 PROCEDURE — 71045 X-RAY EXAM CHEST 1 VIEW: CPT

## 2023-01-27 PROCEDURE — 87502 INFLUENZA DNA AMP PROBE: CPT

## 2023-01-27 PROCEDURE — 81002 URINALYSIS NONAUTO W/O SCOPE: CPT | Performed by: FAMILY MEDICINE

## 2023-01-27 PROCEDURE — 87804 INFLUENZA ASSAY W/OPTIC: CPT | Performed by: FAMILY MEDICINE

## 2023-01-27 PROCEDURE — 80048 BASIC METABOLIC PNL TOTAL CA: CPT

## 2023-01-27 PROCEDURE — 96361 HYDRATE IV INFUSION ADD-ON: CPT

## 2023-01-27 PROCEDURE — 82962 GLUCOSE BLOOD TEST: CPT | Performed by: FAMILY MEDICINE

## 2023-01-27 PROCEDURE — 4004F PT TOBACCO SCREEN RCVD TLK: CPT | Performed by: FAMILY MEDICINE

## 2023-01-27 PROCEDURE — 96374 THER/PROPH/DIAG INJ IV PUSH: CPT

## 2023-01-27 PROCEDURE — 85025 COMPLETE CBC W/AUTO DIFF WBC: CPT

## 2023-01-27 PROCEDURE — 74176 CT ABD & PELVIS W/O CONTRAST: CPT

## 2023-01-27 PROCEDURE — 96376 TX/PRO/DX INJ SAME DRUG ADON: CPT

## 2023-01-27 PROCEDURE — 96375 TX/PRO/DX INJ NEW DRUG ADDON: CPT

## 2023-01-27 RX ORDER — FENTANYL CITRATE 50 UG/ML
25 INJECTION, SOLUTION INTRAMUSCULAR; INTRAVENOUS ONCE
Status: COMPLETED | OUTPATIENT
Start: 2023-01-27 | End: 2023-01-27

## 2023-01-27 RX ORDER — FAMOTIDINE 20 MG/1
20 TABLET, FILM COATED ORAL 2 TIMES DAILY
COMMUNITY

## 2023-01-27 RX ORDER — ONDANSETRON 2 MG/ML
4 INJECTION INTRAMUSCULAR; INTRAVENOUS ONCE
Status: COMPLETED | OUTPATIENT
Start: 2023-01-27 | End: 2023-01-27

## 2023-01-27 RX ORDER — ATORVASTATIN CALCIUM 20 MG/1
TABLET, FILM COATED ORAL
Qty: 90 TABLET | Refills: 1 | Status: SHIPPED
Start: 2023-01-27 | End: 2023-01-27 | Stop reason: ALTCHOICE

## 2023-01-27 RX ORDER — ONDANSETRON 4 MG/1
4 TABLET, ORALLY DISINTEGRATING ORAL 3 TIMES DAILY PRN
Qty: 15 TABLET | Refills: 0 | Status: SHIPPED | OUTPATIENT
Start: 2023-01-27 | End: 2023-02-01

## 2023-01-27 RX ORDER — ONDANSETRON 4 MG/1
4 TABLET, ORALLY DISINTEGRATING ORAL 3 TIMES DAILY PRN
Qty: 15 TABLET | Refills: 0 | Status: SHIPPED | OUTPATIENT
Start: 2023-01-27 | End: 2023-01-27 | Stop reason: SDUPTHER

## 2023-01-27 RX ORDER — 0.9 % SODIUM CHLORIDE 0.9 %
1000 INTRAVENOUS SOLUTION INTRAVENOUS ONCE
Status: COMPLETED | OUTPATIENT
Start: 2023-01-27 | End: 2023-01-27

## 2023-01-27 RX ORDER — ONDANSETRON 4 MG/1
4 TABLET, ORALLY DISINTEGRATING ORAL 3 TIMES DAILY PRN
Qty: 21 TABLET | Refills: 0 | Status: SHIPPED
Start: 2023-01-27 | End: 2023-01-27

## 2023-01-27 RX ORDER — ATORVASTATIN CALCIUM 20 MG/1
20 TABLET, FILM COATED ORAL EVERY MORNING
COMMUNITY

## 2023-01-27 RX ORDER — GABAPENTIN 100 MG/1
100 CAPSULE ORAL 2 TIMES DAILY
COMMUNITY

## 2023-01-27 RX ORDER — ACETAMINOPHEN 325 MG/1
650 TABLET ORAL ONCE
Status: COMPLETED | OUTPATIENT
Start: 2023-01-27 | End: 2023-01-27

## 2023-01-27 RX ORDER — FAMOTIDINE 20 MG/1
TABLET, FILM COATED ORAL
Qty: 180 TABLET | Refills: 1 | Status: SHIPPED
Start: 2023-01-27 | End: 2023-01-27 | Stop reason: ALTCHOICE

## 2023-01-27 RX ADMIN — ACETAMINOPHEN 650 MG: 325 TABLET ORAL at 11:21

## 2023-01-27 RX ADMIN — SODIUM CHLORIDE 1000 ML: 9 INJECTION, SOLUTION INTRAVENOUS at 13:30

## 2023-01-27 RX ADMIN — FENTANYL CITRATE 25 MCG: 50 INJECTION, SOLUTION INTRAMUSCULAR; INTRAVENOUS at 13:32

## 2023-01-27 RX ADMIN — ONDANSETRON 4 MG: 2 INJECTION INTRAMUSCULAR; INTRAVENOUS at 11:20

## 2023-01-27 RX ADMIN — FENTANYL CITRATE 25 MCG: 50 INJECTION, SOLUTION INTRAMUSCULAR; INTRAVENOUS at 12:20

## 2023-01-27 RX ADMIN — SODIUM CHLORIDE 1000 ML: 9 INJECTION, SOLUTION INTRAVENOUS at 12:03

## 2023-01-27 RX ADMIN — ONDANSETRON 4 MG: 2 INJECTION INTRAMUSCULAR; INTRAVENOUS at 13:31

## 2023-01-27 ASSESSMENT — PAIN SCALES - GENERAL
PAINLEVEL_OUTOF10: 6
PAINLEVEL_OUTOF10: 8
PAINLEVEL_OUTOF10: 9

## 2023-01-27 ASSESSMENT — PATIENT HEALTH QUESTIONNAIRE - PHQ9
3. TROUBLE FALLING OR STAYING ASLEEP: 0
7. TROUBLE CONCENTRATING ON THINGS, SUCH AS READING THE NEWSPAPER OR WATCHING TELEVISION: 0
9. THOUGHTS THAT YOU WOULD BE BETTER OFF DEAD, OR OF HURTING YOURSELF: 0
SUM OF ALL RESPONSES TO PHQ QUESTIONS 1-9: 5
SUM OF ALL RESPONSES TO PHQ9 QUESTIONS 1 & 2: 3
6. FEELING BAD ABOUT YOURSELF - OR THAT YOU ARE A FAILURE OR HAVE LET YOURSELF OR YOUR FAMILY DOWN: 0
10. IF YOU CHECKED OFF ANY PROBLEMS, HOW DIFFICULT HAVE THESE PROBLEMS MADE IT FOR YOU TO DO YOUR WORK, TAKE CARE OF THINGS AT HOME, OR GET ALONG WITH OTHER PEOPLE: 0
5. POOR APPETITE OR OVEREATING: 0
1. LITTLE INTEREST OR PLEASURE IN DOING THINGS: 1
2. FEELING DOWN, DEPRESSED OR HOPELESS: 2
SUM OF ALL RESPONSES TO PHQ QUESTIONS 1-9: 5
SUM OF ALL RESPONSES TO PHQ QUESTIONS 1-9: 5
4. FEELING TIRED OR HAVING LITTLE ENERGY: 2
8. MOVING OR SPEAKING SO SLOWLY THAT OTHER PEOPLE COULD HAVE NOTICED. OR THE OPPOSITE, BEING SO FIGETY OR RESTLESS THAT YOU HAVE BEEN MOVING AROUND A LOT MORE THAN USUAL: 0
SUM OF ALL RESPONSES TO PHQ QUESTIONS 1-9: 5

## 2023-01-27 ASSESSMENT — ENCOUNTER SYMPTOMS
VOMITING: 1
NAUSEA: 1
ABDOMINAL PAIN: 1

## 2023-01-27 ASSESSMENT — LIFESTYLE VARIABLES
HOW MANY STANDARD DRINKS CONTAINING ALCOHOL DO YOU HAVE ON A TYPICAL DAY: PATIENT DOES NOT DRINK
HOW OFTEN DO YOU HAVE A DRINK CONTAINING ALCOHOL: NEVER

## 2023-01-27 ASSESSMENT — PAIN DESCRIPTION - ORIENTATION
ORIENTATION: LEFT
ORIENTATION: LEFT

## 2023-01-27 ASSESSMENT — PAIN DESCRIPTION - DESCRIPTORS
DESCRIPTORS: SHARP
DESCRIPTORS: SHARP

## 2023-01-27 ASSESSMENT — PAIN DESCRIPTION - LOCATION
LOCATION: BACK

## 2023-01-27 ASSESSMENT — PAIN - FUNCTIONAL ASSESSMENT: PAIN_FUNCTIONAL_ASSESSMENT: 0-10

## 2023-01-27 NOTE — PROGRESS NOTES
23  Darryn Dee : 1960 Sex: female  Age: 65 y.o. Assessment and Plan:  Surendra Stauffer was seen today for hyperlipidemia, chills, headache and nausea & vomiting. Diagnoses and all orders for this visit:    Mixed hyperlipidemia    Epigastric pain    Gastroesophageal reflux disease without esophagitis        No follow-ups on file.     Chief Complaint   Patient presents with    Hyperlipidemia    Chills    Headache    Nausea & Vomiting     Last 2-3 days        HPI    Review of Systems      Current Outpatient Medications:     Probiotic Product (PROBIOTIC ADVANCED PO), Take by mouth Bio complete 3   taking 4po qd, Disp: , Rfl:     gabapentin (NEURONTIN) 100 MG capsule, TAKE 1 CAPSULE BY MOUTH EVERY MORNING AND 1 EVERY NIGHT AT BEDTIME, Disp: 60 capsule, Rfl: 2    atorvastatin (LIPITOR) 20 MG tablet, TAKE 1 TABLET BY MOUTH DAILY, Disp: 90 tablet, Rfl: 1    famotidine (FAMOTIDINE MAXIMUM STRENGTH) 20 MG tablet, TAKE 1 TABLET BY MOUTH TWICE A DAY, Disp: 180 tablet, Rfl: 1  Allergies   Allergen Reactions    Lansoprazole     Prilosec [Omeprazole] Hives    Naproxen Rash    Trintellix [Vortioxetine]     Zoloft [Sertraline Hcl] Rash       Past Medical History:   Diagnosis Date    Anxiety     Dental caries     Depression     Hyperlipidemia      Past Surgical History:   Procedure Laterality Date    CARPAL TUNNEL RELEASE Bilateral     CERVICAL FUSION       SECTION      x 3    DENTAL SURGERY N/A 2019    DENTAL EXAM FULL MOUTH EXTRACTIONS ALEOPOLASTY performed by Tom Hdz DDS at [facility] OR     Family History   Problem Relation Age of Onset    Heart Disease Mother     Diabetes Mother      Social History     Socioeconomic History    Marital status:      Spouse name: Not on file    Number of children: Not on file    Years of education: Not on file    Highest education level: Not on file   Occupational History    Not on file   Tobacco Use    Smoking status: Every Day     Packs/day: 0.50 Years: 42.00     Pack years: 21.00     Types: Cigarettes    Smokeless tobacco: Never   Vaping Use    Vaping Use: Every day   Substance and Sexual Activity    Alcohol use: No    Drug use: No    Sexual activity: Not on file   Other Topics Concern    Not on file   Social History Narrative    Not on file     Social Determinants of Health     Financial Resource Strain: Unknown    Difficulty of Paying Living Expenses: Patient refused   Food Insecurity: Unknown    Worried About Running Out of Food in the Last Year: Patient refused    Ran Out of Food in the Last Year: Patient refused   Transportation Needs: Not on file   Physical Activity: Inactive    Days of Exercise per Week: 0 days    Minutes of Exercise per Session: 0 min   Stress: Not on file   Social Connections: Not on file   Intimate Partner Violence: Not on file   Housing Stability: Not on file       Vitals:    01/27/23 0848   BP: 100/60   Pulse: 94   Resp: 16   Temp: 97.6 °F (36.4 °C)   TempSrc: Temporal   SpO2: 98%   Weight: 136 lb (61.7 kg)   Height: 5' 5\" (1.651 m)       Physical Exam        Seen By:  Elida Hill DO

## 2023-01-27 NOTE — ED PROVIDER NOTES
[address]        Pt Name: Lupe Long  MRN: [id number]  [place] 1960  Date of evaluation: 2023  Provider: Divya Guadarrama DO  PCP: Richard Severino DO  Note Started: 10:52 AM EST 23    CHIEF COMPLAINT       Chief Complaint   Patient presents with    Loss of Consciousness     Pt went to [facility] for flu like symptoms since Wednesday nausea and vomiting while there she had a syncopal episode. Was caught before she hit the ground. HISTORY OF PRESENT ILLNESS: 1 or more Elements   History From: patient     Limitations to history : None    Lupe Long is a 65 y.o. female with PMH of GERD, depression who presents with a complaint of flank pain and flulike symptoms ongoing for several days and an episode of syncope while at her primary care physician's office this morning. PCP called in the report stating that the patient stood up from the exam table and lost consciousness. I spoke with the patient who states that there was prodromal symptoms including feeling cold and clammy and nauseous and according to the patient she was unconscious for upwards of 5 minutes. She states that she has not eaten or drink anything for the past 2 days. She has belching in the room. She states that she is also experiencing constipation. Chronic Conditions: GERD    Nursing Notes were all reviewed and agreed with or any disagreements were addressed in the HPI.     REVIEW OF SYSTEMS :      Review of Systems    POSITIVE (+): N/V, constipation, headache, flank pain  NEGATIVE (-): fevers, chills, chest pain, SOB, black or bloody stool, abd pain    SURGICAL HISTORY     Past Surgical History:   Procedure Laterality Date    CARPAL TUNNEL RELEASE Bilateral     CERVICAL FUSION       SECTION      x 3    DENTAL SURGERY N/A 2019    DENTAL EXAM FULL MOUTH EXTRACTIONS ALEOPOLASTY performed by Salome Cooper Mati, DDS at [facility] OR       CURRENTMEDICATIONS       Discharge Medication List as of 1/27/2023  2:55 PM        CONTINUE these medications which have NOT CHANGED    Details   Probiotic Product (PROBIOTIC ADVANCED PO) Take by mouth Bio complete 3   taking 4po qdHistorical Med      atorvastatin (LIPITOR) 20 MG tablet 1 po  qd, Disp-90 tablet, R-1Maximum Refills ReachedNormal      famotidine (FAMOTIDINE MAXIMUM STRENGTH) 20 MG tablet TAKE 1 TABLET BY MOUTH TWICE A DAY, Disp-180 tablet, R-1Maximum Refills ReachedNormal      gabapentin (NEURONTIN) 100 MG capsule TAKE 1 CAPSULE BY MOUTH EVERY MORNING AND 1 EVERY NIGHT AT BEDTIME, Disp-60 capsule, R-2Normal             ALLERGIES     Lansoprazole, Naproxen, Prilosec [omeprazole], Trintellix [vortioxetine], and Zoloft [sertraline hcl]    FAMILYHISTORY       Family History   Problem Relation Age of Onset    Heart Disease Mother     Diabetes Mother         SOCIAL HISTORY       Social History     Tobacco Use    Smoking status: Every Day     Packs/day: 0.50     Years: 42.00     Pack years: 21.00     Types: Cigarettes    Smokeless tobacco: Never   Vaping Use    Vaping Use: Every day   Substance Use Topics    Alcohol use: No    Drug use: No       SCREENINGS        Lewes Coma Scale  Eye Opening: Spontaneous  Best Verbal Response: Oriented  Best Motor Response: Obeys commands  Lewes Coma Scale Score: 15                CIWA Assessment  BP: 107/63  Heart Rate: 79           PHYSICAL EXAM  1 or more Elements     ED Triage Vitals [01/27/23 1038]   BP Temp Temp Source Heart Rate Resp SpO2 Height Weight   110/78 97.2 °F (36.2 °C) Oral 76 16 100 % 5' 5\" (1.651 m) 136 lb (61.7 kg)       Physical Exam  Constitutional:       General: She is not in acute distress.     Appearance: She is not ill-appearing.   HENT:      Head: Normocephalic.      Nose: No congestion.   Eyes:      Extraocular Movements: Extraocular movements intact.      Pupils: Pupils are equal, round, and reactive to light.  Cardiovascular:      Rate and Rhythm: Normal rate. Pulmonary:      Effort: No respiratory distress. Breath sounds: No wheezing, rhonchi or rales. Abdominal:      General: There is no distension. Palpations: There is no mass. Tenderness: There is no abdominal tenderness. There is no right CVA tenderness or left CVA tenderness. Musculoskeletal:      Cervical back: No tenderness. Neurological:      Mental Status: She is alert. DIAGNOSTIC RESULTS   LABS:    Labs Reviewed   COVID-19, RAPID - Abnormal; Notable for the following components:       Result Value    SARS-CoV-2, NAAT DETECTED (*)     All other components within normal limits   CBC WITH AUTO DIFFERENTIAL - Abnormal; Notable for the following components:    WBC 2.5 (*)     MCHC 35.0 (*)     Platelets 72 (*)     Monocytes % 14.2 (*)     Neutrophils Absolute 1.56 (*)     Lymphocytes Absolute 0.59 (*)     Eosinophils Absolute 0.00 (*)     All other components within normal limits   COMPREHENSIVE METABOLIC PANEL - Abnormal; Notable for the following components:    Sodium 124 (*)     Chloride 88 (*)     ALT 61 (*)     AST 53 (*)     All other components within normal limits   URINALYSIS WITH MICROSCOPIC - Abnormal; Notable for the following components:    Bilirubin Urine SMALL (*)     Ketones, Urine 15 (*)     Blood, Urine LARGE (*)     Bacteria, UA FEW (*)     All other components within normal limits   BASIC METABOLIC PANEL W/ REFLEX TO MG FOR LOW K - Abnormal; Notable for the following components:    Sodium 126 (*)     Chloride 93 (*)     Calcium 7.8 (*)     All other components within normal limits   RAPID INFLUENZA A/B ANTIGENS   CULTURE, URINE   PLATELET CONFIRMATION       When ordered only abnormal lab results are displayed. All other labs were within normal range or not returned as of this dictation. EKG:  EKG: This EKG is signed by emergency department physician.     Rate: 80  Rhythm: Sinus  Interpretation: no acute changes  Comparison: stable as compared to patient's most recent EKG       RADIOLOGY:   Non-plain film images such as CT, Ultrasound and MRI are read by the radiologist. Plain radiographic images are visualized and preliminarily interpreted by the ED Provider with the below findings:    CXR: no acute findings  CT abd/pelvis: moderate fecal retention    Interpretation per the Radiologist below, if available at the time of this note:      CT ABDOMEN PELVIS WO CONTRAST Additional Contrast? None   Final Result   1. Moderate fecal retention in the colon which may signify constipation. No   bowel wall thickening or obstruction. 2. Trace free fluid in the lower right pelvis of unclear etiology. 3. Small hiatal hernia. XR CHEST PORTABLE   Final Result   No acute process. No results found. No results found. PROCEDURES   Unless otherwise noted below, none     Procedures      PAST MEDICAL HISTORY      has a past medical history of Anxiety, Dental caries, Depression, and Hyperlipidemia.      EMERGENCY DEPARTMENT COURSE and DIFFERENTIAL DIAGNOSIS/MDM:   Vitals:    Vitals:    01/27/23 1038 01/27/23 1221 01/27/23 1332   BP: 110/78 107/77 107/63   Pulse: 76 80 79   Resp: 16 16 16   Temp: 97.2 °F (36.2 °C)     TempSrc: Oral     SpO2: 100% 98% 96%   Weight: 136 lb (61.7 kg)     Height: 5' 5\" (1.651 m)         Patient was given the following medications:  Medications   acetaminophen (TYLENOL) tablet 650 mg (650 mg Oral Given 1/27/23 1121)   ondansetron (ZOFRAN) injection 4 mg (4 mg IntraVENous Given 1/27/23 1120)   0.9 % sodium chloride bolus (0 mLs IntraVENous Stopped 1/27/23 1329)   fentaNYL (SUBLIMAZE) injection 25 mcg (25 mcg IntraVENous Given 1/27/23 1220)   fentaNYL (SUBLIMAZE) injection 25 mcg (25 mcg IntraVENous Given 1/27/23 1332)   ondansetron (ZOFRAN) injection 4 mg (4 mg IntraVENous Given 1/27/23 1331)   0.9 % sodium chloride bolus (0 mLs IntraVENous Stopped 1/27/23 1447)       ED Course as of 01/27/23 1917 Fri Jan 27, 2023   1113 EKG: This EKG is signed and interpreted by the EP. Time: 11:08  Rate: 80  Rhythm: Sinus  Interpretation: non-specific EKG  Comparison: stable as compared to patient's most recent EKG   [CF]   1142 Patient seen and examined. Patient appears dehydrated. She reportedly was at primary care office today for evaluation for this she had nausea and vomiting and constipation. During course of her evaluation at the office she had a syncopal episode. Concerns include dehydration. We will check influenza and COVID swabs for possible viral illness causing symptoms. IV fluids were initiated IV Zofran and p.o. Tylenol were provided. [CF]   1143 WBC(!): 2.5  Leukopenia [CF]   1143 CT abdomen pelvis noted with constipation [CF]   1145 Sodium(!): 124  Sodium noted to be low. [CF]   0065 Reevaluated and pt continues to feel nauseas and in pain, will give more antiemetic and analgesic [CB]   1335 SARS-CoV-2, NAAT(!): DETECTED [CF]      ED Course User Index  [CB] Jadiel Ryder, DO  [CF] Yakelin Malagon, DO          CC/HPI Summary, DDx, ED Course, and Reassessment:  Joseph Marroquin is a 65 y.o. female with PMH of GERD, depression who presents with a complaint of flank pain and flulike symptoms ongoing for several days and an episode of syncope while at her primary care physician's office this morning. On arrival patient has stable vitals with no acute distress. Exam revealed no abdominal pain, negative CVA tenderness. Patient points to her left lower flank stating there is moderate pain in the area although it is not reproducible. DDx includes viral syndrome, UTI, vasovagal, arrhythmia, dehydration. Labs indicated a sodium of 124 she was subsequently given multiple liters of normal saline with a repeat sodium of 126 urinalysis was negative for acute infection, she had a positive COVID-19 screening negative flu and a white count of 2.5.  She was given 2 rounds of IV zofran for nausea, IV fentanyl for lower back pain that presented as likely body aches. She also received Tylenol initially which did not relieve her pain. Pt was ambulated and remained above 90% on RA. Pt was found stable for discharge home with close follow up and was sent home with scripts for inhaler and antitussives. CONSULTS: (Who and What was discussed)  None      Social Determinants : None      Records Reviewed (source and summary): none    Disposition Considerations (include 1 Tests not done, Admit vs D/C, Shared Decision Making, Pt Expectation of Test or Tx.): CTA pulmonary not ordered, pt had very few risk factors for PE. I am the Primary Clinician of Record. FINAL IMPRESSION      1. COVID-19    2.  Dehydration          DISPOSITION/PLAN     DISPOSITION Decision To Discharge 01/27/2023 02:51:25 PM      PATIENT REFERRED TO:  Mariajose Sue DO  [address] [id number] [phone]    Call in 1 day  As needed    DISCHARGE MEDICATIONS:  Discharge Medication List as of 1/27/2023  2:55 PM          DISCONTINUED MEDICATIONS:  Discharge Medication List as of 1/27/2023  2:55 PM                 (Please note that portions of this note were completed with a voice recognition program.  Efforts were made to edit the dictations but occasionally words are mis-transcribed.)    Kathie Villagran DO (electronically signed)           Kathie Villagran DO  Resident  01/27/23 6954

## 2023-01-27 NOTE — ED NOTES
Ambulated pt to bathroom SPO2 97% on RA pt had no complaint of SOB but did complain of dizziness. Pt returned to bed.       Matt Pena RN  01/27/23 0858

## 2023-01-27 NOTE — DISCHARGE INSTRUCTIONS
Please drink plenty of fluids throughout the next few days. Please follow-up with your primary doctor. Please return to the emergency room if you have difficulty breathing, develop leg pain and swelling or chest pain.

## 2023-01-27 NOTE — PROGRESS NOTES
23  Merrill Richards : 1960 Sex: female  Age: 65 y.o. Assessment and Plan:  Sofya Mao was seen today for hyperlipidemia, chills, headache and nausea & vomiting. Diagnoses and all orders for this visit:    Nausea and vomiting, unspecified vomiting type  -     XR ABDOMEN (KUB) (SINGLE AP VIEW); Future  -     CBC with Auto Differential; Future  -     Comprehensive Metabolic Panel, Fasting; Future  -     ondansetron (ZOFRAN-ODT) 4 MG disintegrating tablet; Take 1 tablet by mouth 3 times daily as needed for Nausea or Vomiting    Mixed hyperlipidemia  -     atorvastatin (LIPITOR) 20 MG tablet; 1 po  qd  -     CBC with Auto Differential; Future  -     Comprehensive Metabolic Panel, Fasting; Future  -     Lipid Panel; Future  -     TSH; Future    Epigastric pain  -     famotidine (FAMOTIDINE MAXIMUM STRENGTH) 20 MG tablet; TAKE 1 TABLET BY MOUTH TWICE A DAY  -     XR ABDOMEN (KUB) (SINGLE AP VIEW); Future  -     CBC with Auto Differential; Future  -     Comprehensive Metabolic Panel, Fasting; Future    Gastroesophageal reflux disease without esophagitis  -     famotidine (FAMOTIDINE MAXIMUM STRENGTH) 20 MG tablet; TAKE 1 TABLET BY MOUTH TWICE A DAY  -     CBC with Auto Differential; Future  -     Comprehensive Metabolic Panel, Fasting; Future    Recurrent major depressive disorder, in partial remission (HCC)    Hematuria, unspecified type  -     POCT Urinalysis no Micro  -     Culture, Urine; Future  -     XR ABDOMEN (KUB) (SINGLE AP VIEW); Future  -     CBC with Auto Differential; Future  -     Comprehensive Metabolic Panel, Fasting; Future    Dehydration  -     POCT Glucose    Vasovagal syncope  -     POCT Glucose    She presented with 2-day history of nausea, vomiting, headaches, loss, inability to keep any fluids down. In the course of her work-up she became unsteady on her feet and talking to me I called her and let let her down gently for syncopal episode. Is out approximately 5 seconds. Clammy and mildly hypotensive after. Rexie Leys saturation remained satisfactory. Sugar was 110. The ambulance was called and she was stabilized and transported to [facility] emergency room. Emergency room was notified by me. Return in about 2 weeks (around 2/10/2023). Chief Complaint   Patient presents with    Hyperlipidemia    Chills    Headache    Nausea & Vomiting     Last 2-3 days        The patient states that they have had abdominal pain for the last 2 days. The pain is described as cramping and is located in the epigastrium. The patient admits  nausea and vomiting. Bowel movements have been normal color and consistency. No diarrhea. No black or bloody stools. The patient denies dysuria, urinary frequency, urgency and or gross hematuria. No flank pain. No fevers or chills. No chest pain or dyspnea. Review of Systems   Constitutional:  Positive for chills. Gastrointestinal:  Positive for abdominal pain, nausea and vomiting. Neurological:  Positive for headaches.        Current Outpatient Medications:     Probiotic Product (PROBIOTIC ADVANCED PO), Take by mouth Bio complete 3   taking 4po qd, Disp: , Rfl:     atorvastatin (LIPITOR) 20 MG tablet, 1 po  qd, Disp: 90 tablet, Rfl: 1    famotidine (FAMOTIDINE MAXIMUM STRENGTH) 20 MG tablet, TAKE 1 TABLET BY MOUTH TWICE A DAY, Disp: 180 tablet, Rfl: 1    ondansetron (ZOFRAN-ODT) 4 MG disintegrating tablet, Take 1 tablet by mouth 3 times daily as needed for Nausea or Vomiting, Disp: 21 tablet, Rfl: 0    gabapentin (NEURONTIN) 100 MG capsule, TAKE 1 CAPSULE BY MOUTH EVERY MORNING AND 1 EVERY NIGHT AT BEDTIME, Disp: 60 capsule, Rfl: 2  Allergies   Allergen Reactions    Lansoprazole     Prilosec [Omeprazole] Hives    Naproxen Rash    Trintellix [Vortioxetine]     Zoloft [Sertraline Hcl] Rash       Past Medical History:   Diagnosis Date    Anxiety     Dental caries     Depression     Hyperlipidemia      Past Surgical History:   Procedure Laterality Date    CARPAL TUNNEL RELEASE Bilateral     CERVICAL FUSION       SECTION      x 3    DENTAL SURGERY N/A 2019    DENTAL EXAM FULL MOUTH EXTRACTIONS ALEOPOLASTY performed by Kalie Corcoran DDS at [facility] OR     Family History   Problem Relation Age of Onset    Heart Disease Mother     Diabetes Mother      Social History     Socioeconomic History    Marital status:      Spouse name: Not on file    Number of children: Not on file    Years of education: Not on file    Highest education level: Not on file   Occupational History    Not on file   Tobacco Use    Smoking status: Every Day     Packs/day: 0.50     Years: 42.00     Pack years: 21.00     Types: Cigarettes    Smokeless tobacco: Never   Vaping Use    Vaping Use: Every day   Substance and Sexual Activity    Alcohol use: No    Drug use: No    Sexual activity: Not on file   Other Topics Concern    Not on file   Social History Narrative    Not on file     Social Determinants of Health     Financial Resource Strain: Unknown    Difficulty of Paying Living Expenses: Patient refused   Food Insecurity: Unknown    Worried About Running Out of Food in the Last Year: Patient refused    Ran Out of Food in the Last Year: Patient refused   Transportation Needs: Not on file   Physical Activity: Inactive    Days of Exercise per Week: 0 days    Minutes of Exercise per Session: 0 min   Stress: Not on file   Social Connections: Not on file   Intimate Partner Violence: Not on file   Housing Stability: Not on file       Vitals:    23 0848   BP: 100/60   Pulse: 94   Resp: 16   Temp: 97.6 °F (36.4 °C)   TempSrc: Temporal   SpO2: 98%   Weight: 136 lb (61.7 kg)   Height: 5' 5\" (1.651 m)       Physical Exam  Vitals and nursing note reviewed. Constitutional:       Appearance: She is well-developed. HENT:      Head: Atraumatic.       Right Ear: External ear normal.      Left Ear: External ear normal.      Nose: Nose normal.      Mouth/Throat: Pharynx: No oropharyngeal exudate. Eyes:      Conjunctiva/sclera: Conjunctivae normal.      Pupils: Pupils are equal, round, and reactive to light. Neck:      Thyroid: No thyromegaly. Trachea: No tracheal deviation. Cardiovascular:      Rate and Rhythm: Normal rate and regular rhythm. Heart sounds: No murmur heard. No friction rub. No gallop. Pulmonary:      Effort: Pulmonary effort is normal. No respiratory distress. Breath sounds: Normal breath sounds. Abdominal:      General: Bowel sounds are normal.      Palpations: Abdomen is soft. Musculoskeletal:         General: No tenderness or deformity. Normal range of motion. Cervical back: Normal range of motion and neck supple. Lymphadenopathy:      Cervical: No cervical adenopathy. Skin:     General: Skin is warm and dry. Capillary Refill: Capillary refill takes less than 2 seconds. Findings: No rash. Neurological:      Mental Status: She is alert and oriented to person, place, and time. Sensory: No sensory deficit. Motor: No abnormal muscle tone.       Coordination: Coordination normal.      Deep Tendon Reflexes: Reflexes normal.           Seen By:  Sahra Keenan DO

## 2023-01-28 LAB
EKG ATRIAL RATE: 80 BPM
EKG P AXIS: 86 DEGREES
EKG P-R INTERVAL: 172 MS
EKG Q-T INTERVAL: 376 MS
EKG QRS DURATION: 68 MS
EKG QTC CALCULATION (BAZETT): 433 MS
EKG R AXIS: 50 DEGREES
EKG T AXIS: 22 DEGREES
EKG VENTRICULAR RATE: 80 BPM

## 2023-01-28 PROCEDURE — 93010 ELECTROCARDIOGRAM REPORT: CPT | Performed by: INTERNAL MEDICINE

## 2023-01-30 LAB — URINE CULTURE, ROUTINE: NORMAL

## 2023-02-03 ENCOUNTER — HOSPITAL ENCOUNTER (EMERGENCY)
Age: 63
Discharge: HOME | End: 2023-02-03
Attending: EMERGENCY MEDICINE
Payer: MEDICARE

## 2023-02-03 ENCOUNTER — APPOINTMENT (OUTPATIENT)
Dept: GENERAL RADIOLOGY | Age: 63
End: 2023-02-03
Payer: MEDICARE

## 2023-02-03 VITALS
SYSTOLIC BLOOD PRESSURE: 138 MMHG | HEIGHT: 66 IN | WEIGHT: 130 LBS | DIASTOLIC BLOOD PRESSURE: 80 MMHG | OXYGEN SATURATION: 95 % | HEART RATE: 74 BPM | TEMPERATURE: 98.6 F | RESPIRATION RATE: 14 BRPM | BODY MASS INDEX: 20.89 KG/M2

## 2023-02-03 DIAGNOSIS — R53.1 GENERALIZED WEAKNESS: Primary | ICD-10-CM

## 2023-02-03 DIAGNOSIS — U07.1 COVID-19: ICD-10-CM

## 2023-02-03 LAB
ALBUMIN SERPL-MCNC: 4.3 G/DL (ref 3.5–5.2)
ALP BLD-CCNC: 83 U/L (ref 35–104)
ALT SERPL-CCNC: 20 U/L (ref 0–32)
ANION GAP SERPL CALCULATED.3IONS-SCNC: 11 MMOL/L (ref 7–16)
AST SERPL-CCNC: 17 U/L (ref 0–31)
BACTERIA: ABNORMAL /HPF
BASOPHILS ABSOLUTE: 0.02 E9/L (ref 0–0.2)
BASOPHILS RELATIVE PERCENT: 0.5 % (ref 0–2)
BILIRUB SERPL-MCNC: 0.5 MG/DL (ref 0–1.2)
BILIRUBIN URINE: NEGATIVE
BLOOD, URINE: NORMAL
BUN BLDV-MCNC: 5 MG/DL (ref 6–23)
CALCIUM SERPL-MCNC: 9.3 MG/DL (ref 8.6–10.2)
CHLORIDE BLD-SCNC: 98 MMOL/L (ref 98–107)
CLARITY: CLEAR
CO2: 28 MMOL/L (ref 22–29)
COLOR: YELLOW
CREAT SERPL-MCNC: 0.8 MG/DL (ref 0.5–1)
EKG ATRIAL RATE: 79 BPM
EKG P AXIS: -24 DEGREES
EKG P-R INTERVAL: 142 MS
EKG Q-T INTERVAL: 364 MS
EKG QRS DURATION: 70 MS
EKG QTC CALCULATION (BAZETT): 417 MS
EKG R AXIS: 40 DEGREES
EKG T AXIS: 32 DEGREES
EKG VENTRICULAR RATE: 79 BPM
EOSINOPHILS ABSOLUTE: 0.09 E9/L (ref 0.05–0.5)
EOSINOPHILS RELATIVE PERCENT: 2.5 % (ref 0–6)
EPITHELIAL CELLS, UA: ABNORMAL /HPF
GFR SERPL CREATININE-BSD FRML MDRD: >60 ML/MIN/1.73
GLUCOSE BLD-MCNC: 94 MG/DL (ref 74–99)
GLUCOSE URINE: NEGATIVE MG/DL
HCT VFR BLD CALC: 41.2 % (ref 34–48)
HEMOGLOBIN: 14 G/DL (ref 11.5–15.5)
IMMATURE GRANULOCYTES #: 0 E9/L
IMMATURE GRANULOCYTES %: 0 % (ref 0–5)
KETONES, URINE: NEGATIVE MG/DL
LEUKOCYTE ESTERASE, URINE: NEGATIVE
LIPASE: 51 U/L (ref 13–60)
LYMPHOCYTES ABSOLUTE: 1.89 E9/L (ref 1.5–4)
LYMPHOCYTES RELATIVE PERCENT: 51.8 % (ref 20–42)
MCH RBC QN AUTO: 31 PG (ref 26–35)
MCHC RBC AUTO-ENTMCNC: 34 % (ref 32–34.5)
MCV RBC AUTO: 91.4 FL (ref 80–99.9)
MONOCYTES ABSOLUTE: 0.41 E9/L (ref 0.1–0.95)
MONOCYTES RELATIVE PERCENT: 11.2 % (ref 2–12)
NEUTROPHILS ABSOLUTE: 1.24 E9/L (ref 1.8–7.3)
NEUTROPHILS RELATIVE PERCENT: 34 % (ref 43–80)
NITRITE, URINE: NEGATIVE
PDW BLD-RTO: 13.1 FL (ref 11.5–15)
PH UA: 7.5 (ref 5–9)
PLATELET # BLD: 195 E9/L (ref 130–450)
PMV BLD AUTO: 8.9 FL (ref 7–12)
POTASSIUM SERPL-SCNC: 4.2 MMOL/L (ref 3.5–5)
PROTEIN UA: NEGATIVE MG/DL
RBC # BLD: 4.51 E12/L (ref 3.5–5.5)
RBC UA: ABNORMAL /HPF (ref 0–2)
SODIUM BLD-SCNC: 137 MMOL/L (ref 132–146)
SPECIFIC GRAVITY UA: 1.01 (ref 1–1.03)
TOTAL PROTEIN: 6.6 G/DL (ref 6.4–8.3)
TROPONIN, HIGH SENSITIVITY: <6 NG/L (ref 0–9)
UROBILINOGEN, URINE: 0.2 E.U./DL
WBC # BLD: 3.7 E9/L (ref 4.5–11.5)
WBC UA: ABNORMAL /HPF (ref 0–5)

## 2023-02-03 PROCEDURE — 99285 EMERGENCY DEPT VISIT HI MDM: CPT

## 2023-02-03 PROCEDURE — 93005 ELECTROCARDIOGRAM TRACING: CPT | Performed by: EMERGENCY MEDICINE

## 2023-02-03 PROCEDURE — 84484 ASSAY OF TROPONIN QUANT: CPT

## 2023-02-03 PROCEDURE — 71045 X-RAY EXAM CHEST 1 VIEW: CPT

## 2023-02-03 PROCEDURE — 2580000003 HC RX 258: Performed by: EMERGENCY MEDICINE

## 2023-02-03 PROCEDURE — 6370000000 HC RX 637 (ALT 250 FOR IP): Performed by: EMERGENCY MEDICINE

## 2023-02-03 PROCEDURE — 80053 COMPREHEN METABOLIC PANEL: CPT

## 2023-02-03 PROCEDURE — 83690 ASSAY OF LIPASE: CPT

## 2023-02-03 PROCEDURE — 81001 URINALYSIS AUTO W/SCOPE: CPT

## 2023-02-03 PROCEDURE — 85025 COMPLETE CBC W/AUTO DIFF WBC: CPT

## 2023-02-03 PROCEDURE — 93010 ELECTROCARDIOGRAM REPORT: CPT | Performed by: INTERNAL MEDICINE

## 2023-02-03 RX ORDER — MECLIZINE HCL 12.5 MG/1
25 TABLET ORAL ONCE
Status: COMPLETED | OUTPATIENT
Start: 2023-02-03 | End: 2023-02-03

## 2023-02-03 RX ORDER — 0.9 % SODIUM CHLORIDE 0.9 %
1000 INTRAVENOUS SOLUTION INTRAVENOUS ONCE
Status: DISCONTINUED | OUTPATIENT
Start: 2023-02-03 | End: 2023-02-03

## 2023-02-03 RX ORDER — 0.9 % SODIUM CHLORIDE 0.9 %
1000 INTRAVENOUS SOLUTION INTRAVENOUS ONCE
Status: COMPLETED | OUTPATIENT
Start: 2023-02-03 | End: 2023-02-03

## 2023-02-03 RX ADMIN — SODIUM CHLORIDE 1000 ML: 9 INJECTION, SOLUTION INTRAVENOUS at 03:50

## 2023-02-03 RX ADMIN — SODIUM CHLORIDE 1000 ML: 9 INJECTION, SOLUTION INTRAVENOUS at 03:17

## 2023-02-03 RX ADMIN — MECLIZINE 25 MG: 12.5 TABLET ORAL at 03:47

## 2023-02-03 ASSESSMENT — PAIN - FUNCTIONAL ASSESSMENT
PAIN_FUNCTIONAL_ASSESSMENT: NONE - DENIES PAIN
PAIN_FUNCTIONAL_ASSESSMENT: 0-10

## 2023-02-03 ASSESSMENT — LIFESTYLE VARIABLES
HOW OFTEN DO YOU HAVE A DRINK CONTAINING ALCOHOL: NEVER
HOW MANY STANDARD DRINKS CONTAINING ALCOHOL DO YOU HAVE ON A TYPICAL DAY: PATIENT DOES NOT DRINK

## 2023-02-03 ASSESSMENT — PAIN SCALES - GENERAL: PAINLEVEL_OUTOF10: 7

## 2023-02-03 ASSESSMENT — PAIN DESCRIPTION - LOCATION: LOCATION: HEAD

## 2023-02-03 NOTE — ED PROVIDER NOTES
[facility] ENCOUNTER        Pt Name: Bethel Mejia  MRN: [id number]  [place] 1960  Date of evaluation: 2/3/2023  Provider: Adonis Platt DO  PCP: Rock Shi DO  Note Started: 1:53 AM EST 2/3/23    CHIEF COMPLAINT       Chief Complaint   Patient presents with    Emesis     N/V, lightheaded and generalized weakness. Pt is currently Dx with COVID19       HISTORY OF PRESENT ILLNESS: 1 or more Elements   History From: Patient    Limitations to history : None    Bethel Mejia is a 65 y.o. female who presents to the emergency department for dizziness and lightheadedness. Patient states that on Friday she had a syncopal episode. She states that she was seen in the emergency department and diagnosed with COVID-19 and dehydration. She states she received fluids and was discharged home. She states that since then she has been drinking plenty of fluids as well as Pedialyte at home. She states she is able to keep it down. Despite this she continues to feel lightheaded and feels as if she is going to pass out before she came back to the ED to be evaluated. No chest pain or shortness of breath. Does complain of a cough. No fevers or chills. Denies any abdominal pain. No urinary symptoms. Does complain of generalized fatigue and weakness. Nursing Notes were all reviewed and agreed with or any disagreements were addressed in the HPI. REVIEW OF EXTERNAL NOTE :           REVIEW OF SYSTEMS :           Positives and Pertinent negatives as per HPI.      SURGICAL HISTORY     Past Surgical History:   Procedure Laterality Date    CARPAL TUNNEL RELEASE Bilateral     CERVICAL FUSION       SECTION      x 3    DENTAL SURGERY N/A 2019    DENTAL EXAM FULL MOUTH EXTRACTIONS ALEOPOLASTY performed by Ciara Westfall DDS at [facility]       Previous Medications    ATORVASTATIN (LIPITOR) 20 MG TABLET    Take 20 mg by mouth every morning    FAMOTIDINE (PEPCID) 20 MG TABLET    Take 20 mg by mouth 2 times daily    GABAPENTIN (NEURONTIN) 100 MG CAPSULE    Take 100 mg by mouth in the morning and 100 mg in the evening. PROBIOTIC PRODUCT (PROBIOTIC ADVANCED PO)    Take 4 capsules by mouth every morning PRODUCT: BIO COMPLETE 3       ALLERGIES     Lansoprazole, Naproxen, Prilosec [omeprazole], Trintellix [vortioxetine], and Zoloft [sertraline hcl]    FAMILYHISTORY       Family History   Problem Relation Age of Onset    Heart Disease Mother     Diabetes Mother         SOCIAL HISTORY       Social History     Tobacco Use    Smoking status: Every Day     Packs/day: 0.50     Years: 42.00     Pack years: 21.00     Types: Cigarettes    Smokeless tobacco: Never   Vaping Use    Vaping Use: Every day   Substance Use Topics    Alcohol use: No    Drug use: No       SCREENINGS   NIH Stroke Scale  Interval: Baseline  Level of Consciousness (1a): Alert  LOC Questions (1b): Answers both correctly  LOC Commands (1c): Performs both tasks correctly  Best Gaze (2): Normal  Visual (3): No visual loss  Facial Palsy (4): Normal symmetrical movement  Motor Arm, Left (5a): No drift  Motor Arm, Right (5b): No drift  Motor Leg, Left (6a): No drift  Motor Leg, Right (6b):  No drift  Limb Ataxia (7): Absent  Sensory (8): Normal  Best Language (9): No aphasia  Dysarthria (10): Normal  Extinction and Inattention (11): No abnormality  Total: 0    [place] Coma Scale  Eye Opening: Spontaneous  Best Verbal Response: Oriented  Best Motor Response: Obeys commands  [place] Coma Scale Score: 15                CIWA Assessment  BP: 138/80  Heart Rate: 74           PHYSICAL EXAM  1 or more Elements     ED Triage Vitals [02/03/23 0135]   BP Temp Temp src Heart Rate Resp SpO2 Height Weight   136/73 98.6 °F (37 °C) -- 88 18 99 % 5' 6\" (1.676 m) 130 lb (59 kg)              Constitutional/General: Alert and oriented x3  Head: Normocephalic and atraumatic  Eyes: PERRL, EOMI, conjunctiva normal, sclera non icteric  ENT:  Oropharynx clear, handling secretions, no trismus, no asymmetry of the posterior oropharynx or uvular edema, dry mucous membranes  Neck: Supple, full ROM, no stridor, no meningeal signs  Respiratory: Lungs clear to auscultation bilaterally, no wheezes, rales, or rhonchi. Not in respiratory distress  Cardiovascular:  Regular rate. Regular rhythm. No murmurs, no gallops, no rubs. 2+ distal pulses. Equal extremity pulses. Chest: No chest wall tenderness  GI:  Abdomen Soft, Non tender, Non distended. No rebound, guarding, or rigidity. No pulsatile masses. Musculoskeletal: Moves all extremities x 4. Warm and well perfused, no clubbing, no cyanosis, no edema. Capillary refill <3 seconds  Integument: skin warm and dry. No rashes. Neurologic: GCS 15, no focal deficits, symmetric strength 5/5 in the upper and lower extremities bilaterally  Psychiatric: Normal Affect            DIAGNOSTIC RESULTS   LABS:    Labs Reviewed   CBC WITH AUTO DIFFERENTIAL - Abnormal; Notable for the following components:       Result Value    WBC 3.7 (*)     Neutrophils % 34.0 (*)     Lymphocytes % 51.8 (*)     Neutrophils Absolute 1.24 (*)     All other components within normal limits   COMPREHENSIVE METABOLIC PANEL - Abnormal; Notable for the following components:    BUN 5 (*)     All other components within normal limits   MICROSCOPIC URINALYSIS - Abnormal; Notable for the following components:    Bacteria, UA FEW (*)     All other components within normal limits   LIPASE   URINALYSIS   TROPONIN       As interpreted by me, the above displayed labs are abnormal. All other labs obtained during this visit were within normal range or not returned as of this dictation. EKG Interpretation  Interpreted by emergency department physician, Marilynn Gonsalez DO      EKG shows normal sinus rhythm at 79 bpm.  Normal axis. Normal QRS.   Nonspecific ST and T wave changes. No STEMI. RADIOLOGY:   Non-plain film images such as CT, Ultrasound and MRI are read by the radiologist. Plain radiographic images are visualized and preliminarily interpreted by the ED Provider with the below findings:    X-ray shows no acute cardiopulmonary disease    Interpretation per the Radiologist below, if available at the time of this note:    XR CHEST PORTABLE   Final Result   No acute disease. RECOMMENDATION:   Careful clinical correlation and follow up recommended. CT ABDOMEN PELVIS WO CONTRAST Additional Contrast? None    Result Date: 1/27/2023  EXAMINATION: CT OF THE ABDOMEN AND PELVIS WITHOUT CONTRAST 1/27/2023 11:23 am TECHNIQUE: CT of the abdomen and pelvis was performed without the administration of intravenous contrast. Multiplanar reformatted images are provided for review. Automated exposure control, iterative reconstruction, and/or weight based adjustment of the mA/kV was utilized to reduce the radiation dose to as low as reasonably achievable. COMPARISON: None. HISTORY: ORDERING SYSTEM PROVIDED HISTORY: rule out SBO TECHNOLOGIST PROVIDED HISTORY: Reason for exam:->rule out SBO Additional Contrast?->None Decision Support Exception - unselect if not a suspected or confirmed emergency medical condition->Emergency Medical Condition (MA) FINDINGS: Lower Chest: The lung bases are clear. The heart is normal in size. No pleural or pericardial effusion. Small hiatal hernia. Organs: Liver: Unremarkable. Gallbladder: Unremarkable. Pancreas: Unremarkable. Spleen:  Unremarkable. Adrenals: Unremarkable. Kidneys: Unremarkable. GI/Bowel: Moderate fecal retention in the colon. No bowel wall thickening or obstruction. The appendix is unremarkable. Pelvis: The urinary bladder is unremarkable. Within limits of the CT technique, the uterus and the adnexa are grossly unremarkable. Peritoneum/Retroperitoneum: Moderate calcified atherosclerosis is seen in the aorta. No aneurysm. No lymphadenopathy. No free air trace fluid in the lower right pelvis Bones/Soft Tissues: The visualized bones are intact without fracture or focal lesion. Prominent loss of disc heights at L2-3 and L3-4.     1.  Moderate fecal retention in the colon which may signify constipation. No bowel wall thickening or obstruction. 2. Trace free fluid in the lower right pelvis of unclear etiology. 3. Small hiatal hernia. XR ABDOMEN (KUB) (SINGLE AP VIEW)    Result Date: 1/27/2023  EXAMINATION: ONE SUPINE XRAY VIEW(S) OF THE ABDOMEN 1/27/2023 8:23 am COMPARISON: None. HISTORY: ORDERING SYSTEM PROVIDED HISTORY: Epigastric pain FINDINGS: Multiple likely pelvic phleboliths. No free air, bowel wall pneumatosis or dilated bowel. No abnormal accumulation of fecal material within the lower GI tract. Pelvic phleboliths. Nothing active otherwise. XR CHEST PORTABLE    Result Date: 1/27/2023  EXAMINATION: ONE XRAY VIEW OF THE CHEST 1/27/2023 11:23 am COMPARISON: 04/27/2021 HISTORY: ORDERING SYSTEM PROVIDED HISTORY: syncope TECHNOLOGIST PROVIDED HISTORY: Reason for exam:->syncope FINDINGS: The lungs are without acute focal process. There is no effusion or pneumothorax. The cardiomediastinal silhouette is without acute process. The osseous structures are without acute process. No acute process. No results found. PROCEDURES   Unless otherwise noted below, none          CRITICAL CARE TIME (.cct)       PAST MEDICAL HISTORY/Chronic Conditions Affecting Care      has a past medical history of Anxiety, Dental caries, Depression, and Hyperlipidemia.      EMERGENCY DEPARTMENT COURSE    Vitals:    Vitals:    02/03/23 0135 02/03/23 0201 02/03/23 0318   BP: 136/73 (!) 140/88 138/80   Pulse: 88 79 74   Resp: 18 21 14   Temp: 98.6 °F (37 °C)     SpO2: 99% 98% 95%   Weight: 130 lb (59 kg)     Height: 5' 6\" (1.676 m)         Patient was given the following medications:  Medications   0.9 % sodium chloride bolus (0 mLs IntraVENous Stopped 2/3/23 4904)   0.9 % sodium chloride bolus (0 mLs IntraVENous Stopped 2/3/23 5949)   meclizine (ANTIVERT) tablet 25 mg (25 mg Oral Given 2/3/23 1357)           Is this patient to be included in the SEP-1 Core Measure due to severe sepsis or septic shock? No Exclusion criteria - the patient is NOT to be included for SEP-1 Core Measure due to: Viral etiology found or highly suspected (including COVID-19) without concomitant bacterial infection        Medical Decision Making/Differential Diagnosis:    CC/HPI Summary, Social Determinants of health, Records Reviewed, DDx, testing done/not done, ED Course, Reassessment, disposition considerations/shared decision making with patient, consults, disposition:            Medical Decision Making  Amount and/or Complexity of Data Reviewed  Labs: ordered. Radiology: ordered. ECG/medicine tests: ordered. Risk  Prescription drug management. This is a 58-year-old female presents to the ED for dizziness lightheadedness and generalized weakness. Upon arrival to the ED patient's vital signs are within normal limits. On examination patient had fillers ill with dry mucous membranes. Neurologically intact. Patient is differential diagnosis includes but is not limited to ACS, electrolyte abnormalities, COVID-19, dehydration. Patient undergo CBC CMP lipase urinalysis troponin EKG chest x-ray. Patient's laboratory work-up showed a normal CBC except for white count of 3.7. Chemistry unremarkable except for BUN of 5. Urinalysis shows no signs of infection. Lipase normal troponin negative. EKG showed nonspecific findings. Chest x-ray showed no acute cardiopulmonary disease. Patient received 2 L of IV fluids and meclizine p.o. On reevaluation vital signs remained stable. Patient remains neurologically intact. Patient feeling better. Ambulating without difficulty.   Patient stable for discharge home with symptomatic and supportive treatment for COVID-19. Patient has follow-up with her PCP. Return precautions given. Patient agrees to plan    CONSULTS: (Who and What was discussed)  None        I am the Primary Clinician of Record. FINAL IMPRESSION      1. Generalized weakness    2.  COVID-19          DISPOSITION/PLAN     DISPOSITION Decision To Discharge 02/03/2023 04:26:36 AM      PATIENT REFERRED TO:  Dagoberto Black DO  [address] [id number] [phone]    Schedule an appointment as soon as possible for a visit in 2 days      DISCHARGE MEDICATIONS:  New Prescriptions    No medications on file       DISCONTINUED MEDICATIONS:  Discontinued Medications    No medications on file              (Please note that portions of this note were completed with a voice recognition program.  Efforts were made to edit the dictations but occasionally words are mis-transcribed.)    Stephanie Ghosh DO (electronically signed)           Stephanie Ghosh DO  02/03/23 9727

## 2023-02-03 NOTE — ED NOTES
Pt states she is \"afraid to have a bowel movement because she thinks she is going to pass out\". Pt educated on importance of staying in bed and using bedpan if needed.       [id number] Erik Meeks RN  02/03/23 1287

## 2023-02-06 ENCOUNTER — OFFICE VISIT (OUTPATIENT)
Dept: FAMILY MEDICINE CLINIC | Age: 63
End: 2023-02-06
Payer: MEDICARE

## 2023-02-06 VITALS
WEIGHT: 133 LBS | RESPIRATION RATE: 16 BRPM | HEIGHT: 66 IN | SYSTOLIC BLOOD PRESSURE: 124 MMHG | OXYGEN SATURATION: 97 % | BODY MASS INDEX: 21.38 KG/M2 | TEMPERATURE: 97.5 F | HEART RATE: 100 BPM | DIASTOLIC BLOOD PRESSURE: 80 MMHG

## 2023-02-06 DIAGNOSIS — E87.1 HYPONATREMIA: ICD-10-CM

## 2023-02-06 DIAGNOSIS — U07.1 COVID-19: Primary | ICD-10-CM

## 2023-02-06 DIAGNOSIS — R55 VASOVAGAL SYNCOPE: ICD-10-CM

## 2023-02-06 DIAGNOSIS — E86.0 DEHYDRATION: ICD-10-CM

## 2023-02-06 PROCEDURE — G8484 FLU IMMUNIZE NO ADMIN: HCPCS | Performed by: FAMILY MEDICINE

## 2023-02-06 PROCEDURE — 4004F PT TOBACCO SCREEN RCVD TLK: CPT | Performed by: FAMILY MEDICINE

## 2023-02-06 PROCEDURE — 99213 OFFICE O/P EST LOW 20 MIN: CPT | Performed by: FAMILY MEDICINE

## 2023-02-06 PROCEDURE — 3017F COLORECTAL CA SCREEN DOC REV: CPT | Performed by: FAMILY MEDICINE

## 2023-02-06 PROCEDURE — G8420 CALC BMI NORM PARAMETERS: HCPCS | Performed by: FAMILY MEDICINE

## 2023-02-06 PROCEDURE — G8427 DOCREV CUR MEDS BY ELIG CLIN: HCPCS | Performed by: FAMILY MEDICINE

## 2023-02-06 RX ORDER — POLYETHYLENE GLYCOL 3350 17 G/17G
17 POWDER, FOR SOLUTION ORAL DAILY
COMMUNITY

## 2023-02-06 SDOH — ECONOMIC STABILITY: FOOD INSECURITY: WITHIN THE PAST 12 MONTHS, THE FOOD YOU BOUGHT JUST DIDN'T LAST AND YOU DIDN'T HAVE MONEY TO GET MORE.: NEVER TRUE

## 2023-02-06 SDOH — ECONOMIC STABILITY: HOUSING INSECURITY
IN THE LAST 12 MONTHS, WAS THERE A TIME WHEN YOU DID NOT HAVE A STEADY PLACE TO SLEEP OR SLEPT IN A SHELTER (INCLUDING NOW)?: NO

## 2023-02-06 SDOH — ECONOMIC STABILITY: FOOD INSECURITY: WITHIN THE PAST 12 MONTHS, YOU WORRIED THAT YOUR FOOD WOULD RUN OUT BEFORE YOU GOT MONEY TO BUY MORE.: NEVER TRUE

## 2023-02-06 SDOH — ECONOMIC STABILITY: INCOME INSECURITY: HOW HARD IS IT FOR YOU TO PAY FOR THE VERY BASICS LIKE FOOD, HOUSING, MEDICAL CARE, AND HEATING?: NOT HARD AT ALL

## 2023-02-06 ASSESSMENT — ENCOUNTER SYMPTOMS
CHEST TIGHTNESS: 0
SORE THROAT: 0
EYE PAIN: 0
BLOOD IN STOOL: 0
DIARRHEA: 0
EYE DISCHARGE: 0
BACK PAIN: 0
EYE REDNESS: 0
SINUS PAIN: 0
ABDOMINAL PAIN: 0
VOMITING: 0
NAUSEA: 0
PHOTOPHOBIA: 0
SHORTNESS OF BREATH: 0
TROUBLE SWALLOWING: 0
COUGH: 0
ALLERGIC/IMMUNOLOGIC NEGATIVE: 1

## 2023-02-06 NOTE — PROGRESS NOTES
23  Carmela Gift : 1960 Sex: female  Age: 65 y.o. Assessment and Plan:  Jose Hopkins was seen today for post-covid symptoms, urinary frequency, headache and excessive sweating. Diagnoses and all orders for this visit:    COVID-19  She is  5 days self-isolation period, can resume a    Hyponatremia  This was resolved with intravenous fluids and a electrolyte supplement    Dehydration  Solved with intravenous fluids    Vasovagal syncope  No further episodes since hydration status reestablished. Return in about 2 weeks (around 2023). Chief Complaint   Patient presents with    Post-COVID Symptoms    Urinary Frequency    Headache    Excessive Sweating       Returns for recheck COVID. She was seen in this office 2 weeks ago and had a syncopal episode that resulted her being sent to the emergency room. There she was found to have COVID, hyponatremia dehydration. She was treated with intravenous fluids improvement. She returned to the emergency room last Friday for similar complaints. Lab at that time was within normal limits. She returns today for follow-up feeling better but having headaches, diaphoresis, weakness. Activity. Electrolyte supplementation when she got home. Lecture light electrolyte supplement      Review of Systems   Constitutional:  Negative for appetite change, fatigue and unexpected weight change. HENT:  Negative for congestion, ear pain, hearing loss, sinus pain, sore throat and trouble swallowing. Eyes:  Negative for photophobia, pain, discharge and redness. Respiratory:  Negative for cough, chest tightness and shortness of breath. Cardiovascular:  Negative for chest pain, palpitations and leg swelling. Gastrointestinal:  Negative for abdominal pain, blood in stool, diarrhea, nausea and vomiting. Endocrine: Negative. Genitourinary:  Negative for dysuria, flank pain, frequency and hematuria.    Musculoskeletal:  Negative for arthralgias, back pain, joint swelling and myalgias. Skin: Negative. Allergic/Immunologic: Negative. Neurological:  Negative for dizziness, seizures, syncope, weakness, light-headedness, numbness and headaches. Hematological:  Negative for adenopathy. Does not bruise/bleed easily. Psychiatric/Behavioral: Negative.     , Diaphoresis, weakness.     Current Outpatient Medications:     polyethylene glycol (GLYCOLAX) 17 GM/SCOOP powder, Take 17 g by mouth daily, Disp: , Rfl:     Probiotic Product (PROBIOTIC ADVANCED PO), Take 4 capsules by mouth every morning PRODUCT: BIO COMPLETE 3, Disp: , Rfl:     atorvastatin (LIPITOR) 20 MG tablet, Take 20 mg by mouth every morning, Disp: , Rfl:     famotidine (PEPCID) 20 MG tablet, Take 20 mg by mouth 2 times daily, Disp: , Rfl:     gabapentin (NEURONTIN) 100 MG capsule, Take 100 mg by mouth in the morning and 100 mg in the evening., Disp: , Rfl:   Allergies   Allergen Reactions    Lansoprazole Hives    Naproxen Rash    Prilosec [Omeprazole] Hives    Trintellix [Vortioxetine] Rash    Zoloft [Sertraline Hcl] Rash       Past Medical History:   Diagnosis Date    Anxiety     Dental caries     Depression     Hyperlipidemia      Past Surgical History:   Procedure Laterality Date    CARPAL TUNNEL RELEASE Bilateral     CERVICAL FUSION       SECTION      x 3    DENTAL SURGERY N/A 2019    DENTAL EXAM FULL MOUTH EXTRACTIONS ALEOPOLASTY performed by Julia Rebollar DDS at [facility] OR     Family History   Problem Relation Age of Onset    Heart Disease Mother     Diabetes Mother      Social History     Socioeconomic History    Marital status:      Spouse name: Not on file    Number of children: Not on file    Years of education: Not on file    Highest education level: Not on file   Occupational History    Not on file   Tobacco Use    Smoking status: Every Day     Packs/day: 0.50     Years: 42.00     Pack years: 21.00     Types: Cigarettes    Smokeless tobacco: Never Vaping Use    Vaping Use: Every day   Substance and Sexual Activity    Alcohol use: No    Drug use: No    Sexual activity: Not on file   Other Topics Concern    Not on file   Social History Narrative    Not on file     Social Determinants of Health     Financial Resource Strain: Low Risk     Difficulty of Paying Living Expenses: Not hard at all   Food Insecurity: No Food Insecurity    Worried About [address] in the Last Year: Never true    [address] in the Last Year: Never true   Transportation Needs: Unknown    Lack of Transportation (Medical): Not on file    Lack of Transportation (Non-Medical): No   Physical Activity: Inactive    Days of Exercise per Week: 0 days    Minutes of Exercise per Session: 0 min   Stress: Not on file   Social Connections: Not on file   Intimate Partner Violence: Not on file   Housing Stability: Unknown    Unable to Pay for Housing in the Last Year: Not on file    Number of Places Lived in the Last Year: Not on file    Unstable Housing in the Last Year: No       Vitals:    02/06/23 1342   BP: 124/80   Pulse: 100   Resp: 16   Temp: 97.5 °F (36.4 °C)   TempSrc: Temporal   SpO2: 97%   Weight: 133 lb (60.3 kg)   Height: 5' 6\" (1.676 m)       Physical Exam  Vitals and nursing note reviewed. Constitutional:       Appearance: She is well-developed. HENT:      Head: Atraumatic. Right Ear: External ear normal.      Left Ear: External ear normal.      Nose: Nose normal.      Mouth/Throat:      Pharynx: No oropharyngeal exudate. Eyes:      Conjunctiva/sclera: Conjunctivae normal.      Pupils: Pupils are equal, round, and reactive to light. Neck:      Thyroid: No thyromegaly. Trachea: No tracheal deviation. Cardiovascular:      Rate and Rhythm: Normal rate and regular rhythm. Heart sounds: No murmur heard. No friction rub. No gallop. Pulmonary:      Effort: Pulmonary effort is normal. No respiratory distress. Breath sounds: Normal breath sounds. Abdominal:      General: Bowel sounds are normal.      Palpations: Abdomen is soft. Musculoskeletal:         General: No tenderness or deformity. Normal range of motion. Cervical back: Normal range of motion and neck supple. Lymphadenopathy:      Cervical: No cervical adenopathy. Skin:     General: Skin is warm and dry. Capillary Refill: Capillary refill takes less than 2 seconds. Findings: No rash. Neurological:      Mental Status: She is alert and oriented to person, place, and time. Sensory: No sensory deficit. Motor: No abnormal muscle tone.       Coordination: Coordination normal.      Deep Tendon Reflexes: Reflexes normal.           Seen By:  Kerwin Rios DO

## 2023-02-15 ENCOUNTER — HOSPITAL ENCOUNTER (EMERGENCY)
Age: 63
Discharge: HOME | End: 2023-02-16
Payer: MEDICARE

## 2023-02-15 ENCOUNTER — APPOINTMENT (OUTPATIENT)
Dept: CT IMAGING | Age: 63
End: 2023-02-15
Payer: MEDICARE

## 2023-02-15 VITALS
WEIGHT: 133 LBS | DIASTOLIC BLOOD PRESSURE: 80 MMHG | BODY MASS INDEX: 21.38 KG/M2 | OXYGEN SATURATION: 100 % | RESPIRATION RATE: 16 BRPM | HEIGHT: 66 IN | SYSTOLIC BLOOD PRESSURE: 150 MMHG | TEMPERATURE: 97.2 F | HEART RATE: 100 BPM

## 2023-02-15 DIAGNOSIS — M54.2 CHRONIC NECK PAIN: Primary | ICD-10-CM

## 2023-02-15 DIAGNOSIS — M54.50 CHRONIC LOW BACK PAIN WITHOUT SCIATICA, UNSPECIFIED BACK PAIN LATERALITY: ICD-10-CM

## 2023-02-15 DIAGNOSIS — G89.29 CHRONIC NECK PAIN: Primary | ICD-10-CM

## 2023-02-15 DIAGNOSIS — R20.2 TINGLING IN EXTREMITIES: ICD-10-CM

## 2023-02-15 DIAGNOSIS — G89.29 CHRONIC LOW BACK PAIN WITHOUT SCIATICA, UNSPECIFIED BACK PAIN LATERALITY: ICD-10-CM

## 2023-02-15 LAB
ALBUMIN SERPL-MCNC: 4.3 G/DL (ref 3.5–5.2)
ALP BLD-CCNC: 70 U/L (ref 35–104)
ALT SERPL-CCNC: 16 U/L (ref 0–32)
ANION GAP SERPL CALCULATED.3IONS-SCNC: 10 MMOL/L (ref 7–16)
AST SERPL-CCNC: 13 U/L (ref 0–31)
BACTERIA: NORMAL /HPF
BASOPHILS ABSOLUTE: 0.03 E9/L (ref 0–0.2)
BASOPHILS RELATIVE PERCENT: 0.5 % (ref 0–2)
BILIRUB SERPL-MCNC: 0.4 MG/DL (ref 0–1.2)
BILIRUBIN URINE: NEGATIVE
BLOOD, URINE: ABNORMAL
BUN BLDV-MCNC: 5 MG/DL (ref 6–23)
CALCIUM SERPL-MCNC: 8.8 MG/DL (ref 8.6–10.2)
CHLORIDE BLD-SCNC: 98 MMOL/L (ref 98–107)
CLARITY: CLEAR
CO2: 24 MMOL/L (ref 22–29)
COLOR: YELLOW
CREAT SERPL-MCNC: 0.6 MG/DL (ref 0.5–1)
EOSINOPHILS ABSOLUTE: 0.07 E9/L (ref 0.05–0.5)
EOSINOPHILS RELATIVE PERCENT: 1.3 % (ref 0–6)
EPITHELIAL CELLS, UA: NORMAL /HPF
GFR SERPL CREATININE-BSD FRML MDRD: >60 ML/MIN/1.73
GLUCOSE BLD-MCNC: 95 MG/DL (ref 74–99)
GLUCOSE URINE: NEGATIVE MG/DL
HCT VFR BLD CALC: 37.6 % (ref 34–48)
HEMOGLOBIN: 12.9 G/DL (ref 11.5–15.5)
IMMATURE GRANULOCYTES #: 0.02 E9/L
IMMATURE GRANULOCYTES %: 0.4 % (ref 0–5)
KETONES, URINE: NEGATIVE MG/DL
LEUKOCYTE ESTERASE, URINE: ABNORMAL
LYMPHOCYTES ABSOLUTE: 1.89 E9/L (ref 1.5–4)
LYMPHOCYTES RELATIVE PERCENT: 34.4 % (ref 20–42)
MAGNESIUM: 2.1 MG/DL (ref 1.6–2.6)
MCH RBC QN AUTO: 31.7 PG (ref 26–35)
MCHC RBC AUTO-ENTMCNC: 34.3 % (ref 32–34.5)
MCV RBC AUTO: 92.4 FL (ref 80–99.9)
MONOCYTES ABSOLUTE: 0.48 E9/L (ref 0.1–0.95)
MONOCYTES RELATIVE PERCENT: 8.7 % (ref 2–12)
NEUTROPHILS ABSOLUTE: 3 E9/L (ref 1.8–7.3)
NEUTROPHILS RELATIVE PERCENT: 54.7 % (ref 43–80)
NITRITE, URINE: NEGATIVE
PDW BLD-RTO: 13.7 FL (ref 11.5–15)
PH UA: 5.5 (ref 5–9)
PLATELET # BLD: 163 E9/L (ref 130–450)
PMV BLD AUTO: 9.2 FL (ref 7–12)
POTASSIUM REFLEX MAGNESIUM: 3.4 MMOL/L (ref 3.5–5)
PROTEIN UA: NEGATIVE MG/DL
RBC # BLD: 4.07 E12/L (ref 3.5–5.5)
RBC UA: NORMAL /HPF (ref 0–2)
SEDIMENTATION RATE, ERYTHROCYTE: 0 MM/HR (ref 0–20)
SODIUM BLD-SCNC: 132 MMOL/L (ref 132–146)
SPECIFIC GRAVITY UA: <=1.005 (ref 1–1.03)
TOTAL PROTEIN: 6.5 G/DL (ref 6.4–8.3)
UROBILINOGEN, URINE: 0.2 E.U./DL
WBC # BLD: 5.5 E9/L (ref 4.5–11.5)
WBC UA: NORMAL /HPF (ref 0–5)

## 2023-02-15 PROCEDURE — 83735 ASSAY OF MAGNESIUM: CPT

## 2023-02-15 PROCEDURE — 81001 URINALYSIS AUTO W/SCOPE: CPT

## 2023-02-15 PROCEDURE — 72131 CT LUMBAR SPINE W/O DYE: CPT

## 2023-02-15 PROCEDURE — 72125 CT NECK SPINE W/O DYE: CPT

## 2023-02-15 PROCEDURE — 72128 CT CHEST SPINE W/O DYE: CPT

## 2023-02-15 PROCEDURE — 87088 URINE BACTERIA CULTURE: CPT

## 2023-02-15 PROCEDURE — 99284 EMERGENCY DEPT VISIT MOD MDM: CPT

## 2023-02-15 PROCEDURE — 85651 RBC SED RATE NONAUTOMATED: CPT

## 2023-02-15 PROCEDURE — 80053 COMPREHEN METABOLIC PANEL: CPT

## 2023-02-15 PROCEDURE — 85025 COMPLETE CBC W/AUTO DIFF WBC: CPT

## 2023-02-15 PROCEDURE — 86140 C-REACTIVE PROTEIN: CPT

## 2023-02-15 PROCEDURE — 70450 CT HEAD/BRAIN W/O DYE: CPT

## 2023-02-16 LAB — C-REACTIVE PROTEIN: <0.3 MG/DL (ref 0–0.4)

## 2023-02-16 PROCEDURE — 6370000000 HC RX 637 (ALT 250 FOR IP)

## 2023-02-16 RX ORDER — OXYCODONE HYDROCHLORIDE AND ACETAMINOPHEN 5; 325 MG/1; MG/1
1 TABLET ORAL ONCE
Status: COMPLETED | OUTPATIENT
Start: 2023-02-16 | End: 2023-02-16

## 2023-02-16 RX ORDER — METHOCARBAMOL 500 MG/1
500 TABLET, FILM COATED ORAL 4 TIMES DAILY
Qty: 40 TABLET | Refills: 0 | Status: SHIPPED | OUTPATIENT
Start: 2023-02-16 | End: 2023-02-24 | Stop reason: SDUPTHER

## 2023-02-16 RX ORDER — METHYLPREDNISOLONE 4 MG/1
TABLET ORAL
Qty: 1 KIT | Refills: 0 | Status: SHIPPED | OUTPATIENT
Start: 2023-02-16 | End: 2023-02-21

## 2023-02-16 RX ORDER — POTASSIUM CHLORIDE 20 MEQ/1
20 TABLET, EXTENDED RELEASE ORAL ONCE
Status: COMPLETED | OUTPATIENT
Start: 2023-02-16 | End: 2023-02-16

## 2023-02-16 RX ADMIN — OXYCODONE AND ACETAMINOPHEN 1 TABLET: 5; 325 TABLET ORAL at 01:43

## 2023-02-16 RX ADMIN — POTASSIUM CHLORIDE 20 MEQ: 1500 TABLET, EXTENDED RELEASE ORAL at 01:43

## 2023-02-16 ASSESSMENT — PAIN SCALES - GENERAL: PAINLEVEL_OUTOF10: 9

## 2023-02-16 NOTE — ED PROVIDER NOTES
Independent [facility] Visit. [facility]  ED  Encounter Note  Admit Date/RoomTime: 2/15/2023  7:15 PM  ED Room: 33  NAME: Army Luque  : 1960  MRN: [id number]  PCP: Maisha Guadarrama DO    CHIEF COMPLAINT     Tingling (burning and pins and needles bilateral feet, sx onset Thursday ) and Back Pain (Neck pain, \"brain fog\" )    HISTORY OF PRESENT ILLNESS        Army Luque is a 65 y.o. female who has an ongoing history of chronic neck pain who had a cervical fusion in the past who presents to the ED by private vehicle for nontraumatic headache neck shoulder blade and lower back pains over the past 7 to 10 days. Patient was seen by her primary care doctor today for the same complaint and was advised to come to the emergency room for evaluation, blood work and imaging. She also complains of tingling sensation to the tops of both of her feet. This is something new for her. She states she has a longstanding history of constipation requiring the use of stool softening agents noted to move her bowels which she does about every 3 days. She also complains that she has been urinating a little more frequently although denying any burning with urination or any foul smell. She is denies any fever, chills, saddle paresthesias. She did have a CT of her abdomen pelvis in January of this year which I did review. Bone window magnification demonstrates significant arthritic changes at L3-L4 with disc space narrowing. No other obvious issues were identified on her CT of the abdomen pelvis. REVIEW OF SYSTEMS     Pertinent positives and negatives are stated within HPI, all other systems reviewed and are negative. Past Medical History:  has a past medical history of Anxiety, Dental caries, Depression, and Hyperlipidemia.     Surgical History:  has a past surgical history that includes  section; Carpal tunnel release (Bilateral); cervical fusion; and Dental surgery (N/A, 8/9/2019). Social History:  reports that she has been smoking cigarettes. She has a 21.00 pack-year smoking history. She has never used smokeless tobacco. She reports that she does not drink alcohol and does not use drugs. Family History: family history includes Diabetes in her mother; Heart Disease in her mother. Allergies: Lansoprazole, Naproxen, Prilosec [omeprazole], Trintellix [vortioxetine], and Zoloft [sertraline hcl]  CURRENT MEDICATIONS       Discharge Medication List as of 2/16/2023  1:45 AM        CONTINUE these medications which have NOT CHANGED    Details   polyethylene glycol (GLYCOLAX) 17 GM/SCOOP powder Take 17 g by mouth dailyHistorical Med      Probiotic Product (PROBIOTIC ADVANCED PO) Take 4 capsules by mouth every morning PRODUCT: BIO COMPLETE 3Historical Med      atorvastatin (LIPITOR) 20 MG tablet Take 20 mg by mouth every morningHistorical Med      famotidine (PEPCID) 20 MG tablet Take 20 mg by mouth 2 times dailyHistorical Med      gabapentin (NEURONTIN) 100 MG capsule Take 100 mg by mouth in the morning and 100 mg in the evening. Historical Med             SCREENINGS     [place] Coma Scale  Eye Opening: Spontaneous  Best Verbal Response: Oriented  Best Motor Response: Obeys commands  [place] Coma Scale Score: 15         CIWA Assessment  BP: (!) 150/80  Heart Rate: 100       PHYSICAL EXAM   Oxygen Saturation Interpretation: Normal on room air analysis. ED Triage Vitals   BP Temp Temp src Heart Rate Resp SpO2 Height Weight   02/15/23 1828 02/15/23 1828 -- 02/15/23 1828 02/15/23 1828 02/15/23 1828 02/15/23 1811 02/15/23 1811   (!) 150/80 97.2 °F (36.2 °C)  100 16 100 % 5' 6\" (1.676 m) 133 lb (60.3 kg)         Physical Exam  Constitutional/General: Alert and oriented x3, well appearing, non toxic  HEENT:  NC/NT. PERRLA,  Airway patent. Neck: Supple, limited range of motion likely due from prior fusion. No meningeal signs.   No crepitus palpated. No midline tenderness. There is mild bilateral trapezius tenderness and paraspinal tenderness. Respiratory: Lungs clear to auscultation bilaterally, no wheezes, rales, or rhonchi. Not in respiratory distress  CV:  Regular rate. Regular rhythm. No murmurs, gallops, or rubs. 2+ distal pulses  Chest: No chest wall tenderness  GI:  Abdomen Soft, Non tender, Non distended. +BS. No rebound, guarding, or rigidity. No pulsatile masses. Back: Mild to moderate lower lumbar paravertebral tenderness to palpation. No crepitus or rash noted. There is no significant thoracic spine tenderness. Range of motion of thoracolumbar spine is full. Gait is normal.  Musculoskeletal: Moves all extremities x 4. Warm and well perfused, no clubbing, cyanosis, or edema. Capillary refill <3 seconds  Integument: skin warm and dry. No rashes. Lymphatic: no lymphadenopathy noted  Neurologic: GCS 15, no focal deficits, symmetric strength 5/5 in the upper and lower extremities bilaterally. Cranial nerves II through XII grossly intact. Muscle strength equal bilateral lower extremities from hips to ankles. Reflexes normal bilaterally lower extremities. Normal gait. Sensation intact. No neurovascular deficit. Distal pulses are intact.       DIAGNOSTIC RESULTS   (All laboratory and radiology results have been personally reviewed by myself)  Labs:  Results for orders placed or performed during the hospital encounter of 02/15/23   CBC with Auto Differential   Result Value Ref Range    WBC 5.5 4.5 - 11.5 E9/L    RBC 4.07 3.50 - 5.50 E12/L    Hemoglobin 12.9 11.5 - 15.5 g/dL    Hematocrit 37.6 34.0 - 48.0 %    MCV 92.4 80.0 - 99.9 fL    MCH 31.7 26.0 - 35.0 pg    MCHC 34.3 32.0 - 34.5 %    RDW 13.7 11.5 - 15.0 fL    Platelets 862 077 - 378 E9/L    MPV 9.2 7.0 - 12.0 fL    Neutrophils % 54.7 43.0 - 80.0 %    Immature Granulocytes % 0.4 0.0 - 5.0 %    Lymphocytes % 34.4 20.0 - 42.0 %    Monocytes % 8.7 2.0 - 12.0 %    Eosinophils % 1.3 0.0 - 6.0 %    Basophils % 0.5 0.0 - 2.0 %    Neutrophils Absolute 3.00 1.80 - 7.30 E9/L    Immature Granulocytes # 0.02 E9/L    Lymphocytes Absolute 1.89 1.50 - 4.00 E9/L    Monocytes Absolute 0.48 0.10 - 0.95 E9/L    Eosinophils Absolute 0.07 0.05 - 0.50 E9/L    Basophils Absolute 0.03 0.00 - 0.20 E9/L   Sedimentation Rate   Result Value Ref Range    Sed Rate 0 0 - 20 mm/Hr   Comprehensive Metabolic Panel w/ Reflex to MG   Result Value Ref Range    Sodium 132 132 - 146 mmol/L    Potassium reflex Magnesium 3.4 (L) 3.5 - 5.0 mmol/L    Chloride 98 98 - 107 mmol/L    CO2 24 22 - 29 mmol/L    Anion Gap 10 7 - 16 mmol/L    Glucose 95 74 - 99 mg/dL    BUN 5 (L) 6 - 23 mg/dL    Creatinine 0.6 0.5 - 1.0 mg/dL    Est, Glom Filt Rate >60 >=60 mL/min/1.73    Calcium 8.8 8.6 - 10.2 mg/dL    Total Protein 6.5 6.4 - 8.3 g/dL    Albumin 4.3 3.5 - 5.2 g/dL    Total Bilirubin 0.4 0.0 - 1.2 mg/dL    Alkaline Phosphatase 70 35 - 104 U/L    ALT 16 0 - 32 U/L    AST 13 0 - 31 U/L   Urinalysis   Result Value Ref Range    Color, UA Yellow Straw/Yellow    Clarity, UA Clear Clear    Glucose, Ur Negative Negative mg/dL    Bilirubin Urine Negative Negative    Ketones, Urine Negative Negative mg/dL    Specific Gravity, UA <=1.005 1.005 - 1.030    Blood, Urine TRACE-INTACT Negative    pH, UA 5.5 5.0 - 9.0    Protein, UA Negative Negative mg/dL    Urobilinogen, Urine 0.2 <2.0 E.U./dL    Nitrite, Urine Negative Negative    Leukocyte Esterase, Urine SMALL (A) Negative   Microscopic Urinalysis   Result Value Ref Range    WBC, UA 0-1 0 - 5 /HPF    RBC, UA 0-1 0 - 2 /HPF    Epithelial Cells, UA RARE /HPF    Bacteria, UA NONE SEEN None Seen /HPF   Magnesium   Result Value Ref Range    Magnesium 2.1 1.6 - 2.6 mg/dL     Imaging: All Radiology results interpreted by Radiologist unless otherwise noted. CT HEAD WO CONTRAST   Final Result   No acute intracranial abnormality.          CT CERVICAL SPINE WO CONTRAST   Final Result   No acute abnormality of the cervical spine. Stable C5-6 fusion. Stable moderate degenerative change at C6-7 and C7-T1. CT LUMBAR SPINE WO CONTRAST   Final Result   1. No acute fracture is identified. 2. Rounded 17 x 14 mm sclerotic lesion seen posteriorly with patchy sclerosis   seen within the posterior T7 vertebral body on the right. Correlate if any   known history of primary malignancy, as a metastatic lesion is in the   differential.  This could be further evaluated with MR imaging. 3. Small densely sclerotic foci are noted as above. These are possible bone   islands, though metastatic disease is also in the differential.   4. Multilevel degenerative changes seen throughout the thoracic and lumbar   spine, with multilevel Schmorl's nodes seen throughout the lumbar spine. Greatest degree of degenerative change at L2-L3 and L3-L4 with disc space   narrowing, osteophyte formation, spinal canal and osseous foraminal narrowing. CT THORACIC SPINE WO CONTRAST   Final Result   1. No acute fracture is identified. 2. Rounded 17 x 14 mm sclerotic lesion seen posteriorly with patchy sclerosis   seen within the posterior T7 vertebral body on the right. Correlate if any   known history of primary malignancy, as a metastatic lesion is in the   differential.  This could be further evaluated with MR imaging. 3. Small densely sclerotic foci are noted as above. These are possible bone   islands, though metastatic disease is also in the differential.   4. Multilevel degenerative changes seen throughout the thoracic and lumbar   spine, with multilevel Schmorl's nodes seen throughout the lumbar spine. Greatest degree of degenerative change at L2-L3 and L3-L4 with disc space   narrowing, osteophyte formation, spinal canal and osseous foraminal narrowing.              ED COURSE   Vitals:    Vitals:    02/15/23 1811 02/15/23 1828   BP:  (!) 150/80   Pulse:  100   Resp:  16   Temp:  97.2 °F (36.2 °C)   SpO2:  100% Weight: 133 lb (60.3 kg) 133 lb (60.3 kg)   Height: 5' 6\" (1.676 m) 5' 6\" (1.676 m)       Patient was given the following medications:  Medications   oxyCODONE-acetaminophen (PERCOCET) 5-325 MG per tablet 1 tablet (1 tablet Oral Given 2/16/23 0143)   potassium chloride (KLOR-CON M) extended release tablet 20 mEq (20 mEq Oral Given 2/16/23 0143)          PROCEDURES   none    REASSESSMENT   2/15/23       Time: 0140  Patients condition is improving after treatment. CONSULTS:  None  DIFFERENTIAL DX_MDM   MDM:   Social Determinants : None    Records Reviewed : Outpatient Notes from Dr. Nikita Garzon office    CC/HPI Summary, DDx, ED Course, and Reassessment: Patient presents with Tingling (burning and pins and needles bilateral feet, sx onset Thursday ) and Back Pain (Neck pain, \"brain fog\" )  Patient states that she has been having neck pain diffuse mid and lower back pain over the past week. She states that she was seen by her primary care doctor today and told to come to the ER for evaluation. Patient states that she stopped taking her gabapentin about 2 weeks ago because she was having side effects. She states now she is starting to have tingling in her hands and feet. She denies chest pain, shortness of breath or hemoptysis. Denies fevers or chills. Denies any injury or trauma. Denies any red flag signs or symptoms or cauda equina epidural abscess such as bowel or bladder incontinence, saddle paresthesia, fevers, chills, abdominal pain. Patient has midline lumbar tenderness noted. And paravertebral tenderness upon palpation. No crepitus noted. There is no erythema, rash or swelling noted to the area. Patient is neurovascularly intact. Differential diagnosis included urinary tract infection, bulging disc, degenerative disc disease, cauda equina, epidural abscess  Blood work and imaging were obtained. Blood work showed no evidence of leukocytosis, anemia.   Patient's potassium was slightly decreased at 3.4, and was supplemented in the emergency department. Magnesium is normal.  Urinalysis shows no evidence of urinary tract infection. ESR is 0.  CT of the thoracic spine shows no acute fracture identified. There is a round of 17 x 14 mm sclerotic lesion seen posteriorly with patchy sclerosis seen within the posterior T7 vertebrae on the right. Correlate if any known history of primary malignancy as a metastatic disease cannot be ruled out. Multilevel degenerative changes seen throughout the thoracic and lumbar spine with multilevel Schmorl's node seen throughout the lumbar spine. Greatest degree of degenerative changes at L2-L3 and L3-L4 with disc base narrowing, osteophyte formation, spinal canal and ostial foraminal narrowing. CT of the cervical spine shows no acute abnormality of the cervical spine. Stable C5-6 fusion. Stable moderate degenerative changes at C6-7 and C7-T1. CT of head shows no acute intracranial abnormality. These findings are thoroughly discussed with the patient and her family, and they verbalized understanding. Patient was given p.o. Percocet, and p.o. potassium while in the emergency department with mild relief of her symptoms. Patient was given Medrol Dosepak, and Robaxin prescriptions for at home. She is educated on the risk of muscle relaxers as it may make her drowsy, she is advised to use with caution. Patient verbalized understanding. Plan is for discharge with outpatient management and follow-up with primary care as well as given referral for neurosurgery. Patient verbalized understanding, and agreed with the plan. She is advised return for any new or worsening of symptoms. Patient verbalizes understanding. At this time the patient is without objective evidence of an acute process requiring hospitalization or inpatient management. They have remained hemodynamically stable throughout their entire ED visit and are stable for discharge with outpatient follow-up. The plan has been discussed in detail and they are aware of the specific conditions for emergent return, as well as the importance of follow-up. Plan of Care/Counseling:  ASHLEY Zuniga CNP reviewed today's visit with the patient in addition to providing specific details for the plan of care and counseling regarding the diagnosis and prognosis. Questions are answered at this time and are agreeable with the plan. ASSESSMENT     1. Chronic neck pain    2. Chronic low back pain without sciatica, unspecified back pain laterality    3. Tingling in extremities        DISPOSITION   Discharged home. Patient condition is good    NEW MEDICATIONS     Discharge Medication List as of 2/16/2023  1:45 AM        START taking these medications    Details   methylPREDNISolone (MEDROL, KATHRIN,) 4 MG tablet Take by mouth., Disp-1 kit, R-0Normal      methocarbamol (ROBAXIN) 500 MG tablet Take 1 tablet by mouth 4 times daily for 10 days, Disp-40 tablet, R-0Normal           Electronically signed by ASHLEY Zuniga CNP   DD: 2/15/23  **This report was transcribed using voice recognition software. Every effort was made to ensure accuracy; however, inadvertent computerized transcription errors may be present.   END OF ED PROVIDER NOTE       ASHLEY Zuniga CNP  02/16/23 [id number]

## 2023-02-18 LAB — URINE CULTURE, ROUTINE: NORMAL

## 2023-02-20 ENCOUNTER — TELEPHONE (OUTPATIENT)
Dept: FAMILY MEDICINE CLINIC | Age: 63
End: 2023-02-20

## 2023-02-20 ENCOUNTER — OFFICE VISIT (OUTPATIENT)
Dept: FAMILY MEDICINE CLINIC | Age: 63
End: 2023-02-20

## 2023-02-20 VITALS
SYSTOLIC BLOOD PRESSURE: 108 MMHG | HEIGHT: 66 IN | WEIGHT: 126 LBS | HEART RATE: 93 BPM | DIASTOLIC BLOOD PRESSURE: 60 MMHG | RESPIRATION RATE: 16 BRPM | BODY MASS INDEX: 20.25 KG/M2 | OXYGEN SATURATION: 98 % | TEMPERATURE: 97.6 F

## 2023-02-20 DIAGNOSIS — R21 RASH AND OTHER NONSPECIFIC SKIN ERUPTION: ICD-10-CM

## 2023-02-20 DIAGNOSIS — R20.2 BILATERAL LEG PARESTHESIA: Primary | ICD-10-CM

## 2023-02-20 DIAGNOSIS — U07.1 COVID: ICD-10-CM

## 2023-02-20 DIAGNOSIS — F06.4 ANXIETY DISORDER DUE TO GENERAL MEDICAL CONDITION: ICD-10-CM

## 2023-02-20 RX ORDER — LORAZEPAM 0.5 MG/1
0.5 TABLET ORAL 3 TIMES DAILY PRN
Qty: 30 TABLET | Refills: 0 | Status: SHIPPED
Start: 2023-02-20 | End: 2023-02-20 | Stop reason: CLARIF

## 2023-02-20 RX ORDER — METHYLPREDNISOLONE ACETATE 40 MG/ML
40 INJECTION, SUSPENSION INTRA-ARTICULAR; INTRALESIONAL; INTRAMUSCULAR; SOFT TISSUE ONCE
Qty: 1 ML | Refills: 0
Start: 2023-02-20 | End: 2023-02-20 | Stop reason: CLARIF

## 2023-02-20 RX ORDER — LORAZEPAM 0.5 MG/1
0.5 TABLET ORAL 2 TIMES DAILY
Qty: 30 TABLET | Refills: 0 | Status: SHIPPED | OUTPATIENT
Start: 2023-02-20 | End: 2023-03-22

## 2023-02-20 RX ORDER — METHYLPREDNISOLONE ACETATE 40 MG/ML
40 INJECTION, SUSPENSION INTRA-ARTICULAR; INTRALESIONAL; INTRAMUSCULAR; SOFT TISSUE ONCE
Status: COMPLETED | OUTPATIENT
Start: 2023-02-20 | End: 2023-02-20

## 2023-02-20 RX ADMIN — METHYLPREDNISOLONE ACETATE 40 MG: 40 INJECTION, SUSPENSION INTRA-ARTICULAR; INTRALESIONAL; INTRAMUSCULAR; SOFT TISSUE at 11:42

## 2023-02-20 ASSESSMENT — ENCOUNTER SYMPTOMS
VOMITING: 0
BACK PAIN: 0
EYE PAIN: 0
ALLERGIC/IMMUNOLOGIC NEGATIVE: 1
TROUBLE SWALLOWING: 0
NAUSEA: 0
CHEST TIGHTNESS: 0
DIARRHEA: 0
PHOTOPHOBIA: 0
EYE REDNESS: 0
SHORTNESS OF BREATH: 0
COUGH: 0
SINUS PAIN: 0
SORE THROAT: 0
EYE DISCHARGE: 0
BLOOD IN STOOL: 0
ABDOMINAL PAIN: 0

## 2023-02-20 NOTE — TELEPHONE ENCOUNTER
Pharmacist from [facility] calling about the [address] [employer]. It sates twice a day for 30 days, but only a quantity of 30 pills. Please advise.

## 2023-02-20 NOTE — PROGRESS NOTES
23  [place] : 1960 Sex: female  Age: 65 y.o. Assessment and Plan:  Michelle Tellez was seen today for otalgia, rash, cough, chills, follow-up, fatigue and weight loss. Diagnoses and all orders for this visit:    Bilateral leg paresthesia  -     methylPREDNISolone acetate (DEPO-MEDROL) 40 MG/ML injection; Inject 1 mL into the muscle once for 1 dose    Anxiety disorder due to general medical condition  -     Discontinue: LORazepam (ATIVAN) 0.5 MG tablet; Take 1 tablet by mouth 3 times daily as needed for Anxiety for up to 30 days. Max Daily Amount: 1.5 mg  -     LORazepam (ATIVAN) 0.5 MG tablet; Take 1 tablet by mouth in the morning and 1 tablet in the evening. Do all this for 30 days. Max Daily Amount: 1 mg. Rash and other nonspecific skin eruption  -     methylPREDNISolone acetate (DEPO-MEDROL) 40 MG/ML injection; Inject 1 mL into the muscle once for 1 dose    COVID  This may be the root cause of all of her recent complaints. She peers to developed neurologic and [place] problems since she diagnosed with COVID is 3 months ago. Return in about 2 weeks (around 3/6/2023). Chief Complaint   Patient presents with    Otalgia    Rash    Cough    Chills    Follow-up     e      Fatigue    Weight Loss             Presents for post emergency room follow-up. 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 Is also questionable sclerotic area of the T7 vertebrae which a neurosurgery consult was obtained. She continues with stages of her legs and has not noted much response to the from the Medrol Dosepak and muscle relaxant. He is also quite anxious, difficulty focusing, concentrating, because of her medical condition. Review of Systems   Constitutional:  Negative for appetite change, fatigue and unexpected weight change. HENT:  Positive for congestion and postnasal drip.  Negative for ear pain, hearing loss, sinus pain, sore throat and trouble swallowing. Eyes:  Negative for photophobia, pain, discharge and redness. Respiratory:  Negative for cough, chest tightness and shortness of breath. Cardiovascular:  Negative for chest pain, palpitations and leg swelling. Gastrointestinal:  Negative for abdominal pain, blood in stool, diarrhea, nausea and vomiting. Endocrine: Negative. Genitourinary:  Negative for dysuria, flank pain, frequency and hematuria. Musculoskeletal:  Negative for arthralgias, back pain, joint swelling and myalgias. Skin: Negative. Allergic/Immunologic: Negative. Neurological:  Positive for numbness. Negative for dizziness, seizures, syncope, weakness, light-headedness and headaches. Hematological:  Negative for adenopathy. Does not bruise/bleed easily. Psychiatric/Behavioral:  The patient is nervous/anxious. Current Outpatient Medications:     methylPREDNISolone acetate (DEPO-MEDROL) 40 MG/ML injection, Inject 1 mL into the muscle once for 1 dose, Disp: 1 mL, Rfl: 0    LORazepam (ATIVAN) 0.5 MG tablet, Take 1 tablet by mouth in the morning and 1 tablet in the evening. Do all this for 30 days.  Max Daily Amount: 1 mg., Disp: 30 tablet, Rfl: 0    methylPREDNISolone (MEDROL, KATHRIN,) 4 MG tablet, Take by mouth., Disp: 1 kit, Rfl: 0    methocarbamol (ROBAXIN) 500 MG tablet, Take 1 tablet by mouth 4 times daily for 10 days, Disp: 40 tablet, Rfl: 0    polyethylene glycol (GLYCOLAX) 17 GM/SCOOP powder, Take 17 g by mouth daily, Disp: , Rfl:     atorvastatin (LIPITOR) 20 MG tablet, Take 20 mg by mouth every morning, Disp: , Rfl:     famotidine (PEPCID) 20 MG tablet, Take 20 mg by mouth 2 times daily, Disp: , Rfl:     Probiotic Product (PROBIOTIC ADVANCED PO), Take 4 capsules by mouth every morning PRODUCT: BIO COMPLETE 3 (Patient not taking: Reported on 2/20/2023), Disp: , Rfl:     gabapentin (NEURONTIN) 100 MG capsule, Take 100 mg by mouth in the morning and 100 mg in the evening. (Patient not taking: Reported on 2023), Disp: , Rfl:   Allergies   Allergen Reactions    Lansoprazole Hives    Naproxen Rash    Prilosec [Omeprazole] Hives    Trintellix [Vortioxetine] Rash    Zoloft [Sertraline Hcl] Rash       Past Medical History:   Diagnosis Date    Anxiety     Dental caries     Depression     Hyperlipidemia      Past Surgical History:   Procedure Laterality Date    CARPAL TUNNEL RELEASE Bilateral     CERVICAL FUSION       SECTION      x 3    DENTAL SURGERY N/A 2019    DENTAL EXAM FULL MOUTH EXTRACTIONS ALEOPOLASTY performed by Trista Shin DDS at [facility] OR     Family History   Problem Relation Age of Onset    Heart Disease Mother     Diabetes Mother      Social History     Socioeconomic History    Marital status:      Spouse name: Not on file    Number of children: Not on file    Years of education: Not on file    Highest education level: Not on file   Occupational History    Not on file   Tobacco Use    Smoking status: Every Day     Packs/day: 0.50     Years: 42.00     Pack years: 21.00     Types: Cigarettes    Smokeless tobacco: Never   Vaping Use    Vaping Use: Every day   Substance and Sexual Activity    Alcohol use: No    Drug use: No    Sexual activity: Not on file   Other Topics Concern    Not on file   Social History Narrative    Not on file     Social Determinants of Health     Financial Resource Strain: Low Risk     Difficulty of Paying Living Expenses: Not hard at all   Food Insecurity: No Food Insecurity    Worried About Running Out of Food in the Last Year: Never true    [address] in the Last Year: Never true   Transportation Needs: Unknown    Lack of Transportation (Medical): Not on file    Lack of Transportation (Non-Medical):  No   Physical Activity: Inactive    Days of Exercise per Week: 0 days    Minutes of Exercise per Session: 0 min   Stress: Not on file   Social Connections: Not on file   Intimate Partner Violence: Not on file Housing Stability: Unknown    Unable to Pay for Housing in the Last Year: Not on file    Number of Places Lived in the Last Year: Not on file    Unstable Housing in the Last Year: No       Vitals:    02/20/23 0927   BP: 108/60   Pulse: 93   Resp: 16   Temp: 97.6 °F (36.4 °C)   TempSrc: Temporal   SpO2: 98%   Weight: 126 lb (57.2 kg)   Height: 5' 6\" (1.676 m)       Physical Exam  Vitals and nursing note reviewed. Constitutional:       Appearance: She is well-developed. HENT:      Head: Atraumatic. Right Ear: External ear normal.      Left Ear: External ear normal.      Nose: Congestion present. Mouth/Throat:      Pharynx: No oropharyngeal exudate. Eyes:      Conjunctiva/sclera: Conjunctivae normal.      Pupils: Pupils are equal, round, and reactive to light. Neck:      Thyroid: No thyromegaly. Trachea: No tracheal deviation. Cardiovascular:      Rate and Rhythm: Normal rate and regular rhythm. Heart sounds: No murmur heard. No friction rub. No gallop. Pulmonary:      Effort: Pulmonary effort is normal. No respiratory distress. Breath sounds: Normal breath sounds. Abdominal:      General: Bowel sounds are normal.      Palpations: Abdomen is soft. Musculoskeletal:         General: No tenderness or deformity. Normal range of motion. Cervical back: Normal range of motion and neck supple. Lymphadenopathy:      Cervical: No cervical adenopathy. Skin:     General: Skin is warm and dry. Capillary Refill: Capillary refill takes less than 2 seconds. Findings: No rash. Neurological:      Mental Status: She is alert and oriented to person, place, and time. Sensory: Sensory deficit present. Motor: No abnormal muscle tone. Coordination: Coordination normal.      Deep Tendon Reflexes: Reflexes normal.      Comments: Paresthesias bilateral legs.    Psychiatric:         Mood and Affect: Mood normal.         Behavior: Behavior normal.         Thought Content:  Thought content normal.         Judgment: Judgment normal.      Comments: Increased anxiety           Seen By:  Hema Carvajal,

## 2023-02-21 ENCOUNTER — OFFICE VISIT (OUTPATIENT)
Dept: FAMILY MEDICINE CLINIC | Age: 63
End: 2023-02-21

## 2023-02-21 VITALS
TEMPERATURE: 97.5 F | OXYGEN SATURATION: 98 % | BODY MASS INDEX: 20.57 KG/M2 | WEIGHT: 128 LBS | DIASTOLIC BLOOD PRESSURE: 72 MMHG | HEIGHT: 66 IN | RESPIRATION RATE: 16 BRPM | SYSTOLIC BLOOD PRESSURE: 120 MMHG | HEART RATE: 100 BPM

## 2023-02-21 DIAGNOSIS — J02.0 ACUTE STREPTOCOCCAL PHARYNGITIS: Primary | ICD-10-CM

## 2023-02-21 LAB — S PYO AG THROAT QL: POSITIVE

## 2023-02-21 RX ORDER — AMOXICILLIN 400 MG/5ML
400 POWDER, FOR SUSPENSION ORAL 3 TIMES DAILY
Qty: 150 ML | Refills: 0 | Status: SHIPPED
Start: 2023-02-21 | End: 2023-02-22 | Stop reason: SDDI

## 2023-02-21 ASSESSMENT — ENCOUNTER SYMPTOMS
ABDOMINAL PAIN: 0
PHOTOPHOBIA: 0
BLOOD IN STOOL: 0
BACK PAIN: 0
DIARRHEA: 0
EYE DISCHARGE: 0
EYE PAIN: 0
VOMITING: 0
ALLERGIC/IMMUNOLOGIC NEGATIVE: 1
EYE REDNESS: 0
SINUS PAIN: 0
SORE THROAT: 1
COUGH: 0
CHEST TIGHTNESS: 0
SHORTNESS OF BREATH: 0
TROUBLE SWALLOWING: 0
NAUSEA: 0

## 2023-02-21 NOTE — PROGRESS NOTES
23  Emerita Cruz : 1960 Sex: female  Age: 65 y.o. Assessment and Plan:  Amadeo Louis was seen today for pharyngitis, chills and dysphagia. Diagnoses and all orders for this visit:    Acute streptococcal pharyngitis  -     POCT rapid strep A  -     amoxicillin (AMOXIL) 400 MG/5ML suspension; Take 5 mLs by mouth 3 times daily for 10 days    Rapid antigen test for strep was positive. We will go ahead and treat with 10 days of amoxicillin suspension. Symptomatic treatment can include Tylenol, fluids, rest, Mucinex, Claritin, coolmist.  Complaints not improve, or worsen in any way, present back to the office. Return 3 to 5-day recheck if not improved. .    Chief Complaint   Patient presents with    Pharyngitis    Chills    Dysphagia       Congestion, pressure, drainage, facial tenderness, sore throat onset 2 days ago. Her daughter and granddaughter both had strep last week. Denies fever, chills, diaphoresis, nausea, vomiting, decreased oral intake. Denies other GI or  complaints. OTC treatments minimally effective. Review of Systems   Constitutional:  Negative for appetite change, fatigue and unexpected weight change. HENT:  Positive for congestion, postnasal drip and sore throat. Negative for ear pain, hearing loss, sinus pain and trouble swallowing. Eyes:  Negative for photophobia, pain, discharge and redness. Respiratory:  Negative for cough, chest tightness and shortness of breath. Cardiovascular:  Negative for chest pain, palpitations and leg swelling. Gastrointestinal:  Negative for abdominal pain, blood in stool, diarrhea, nausea and vomiting. Endocrine: Negative. Genitourinary:  Negative for dysuria, flank pain, frequency and hematuria. Musculoskeletal:  Negative for arthralgias, back pain, joint swelling and myalgias. Skin: Negative. Allergic/Immunologic: Negative.     Neurological:  Negative for dizziness, seizures, syncope, weakness, light-headedness, numbness and headaches. Hematological:  Negative for adenopathy. Does not bruise/bleed easily. Psychiatric/Behavioral: Negative. Current Outpatient Medications:     methocarbamol (ROBAXIN) 500 MG tablet, Take 1 tablet by mouth 4 times daily for 10 days, Disp: 40 tablet, Rfl: 0    polyethylene glycol (GLYCOLAX) 17 GM/SCOOP powder, Take 17 g by mouth daily, Disp: , Rfl:     atorvastatin (LIPITOR) 20 MG tablet, Take 20 mg by mouth every morning, Disp: , Rfl:     gabapentin (NEURONTIN) 100 MG capsule, Take 100 mg by mouth in the morning and 100 mg in the evening., Disp: , Rfl:     amoxicillin (AMOXIL) 400 MG/5ML suspension, Take 5 mLs by mouth 3 times daily for 10 days, Disp: 150 mL, Rfl: 0    LORazepam (ATIVAN) 0.5 MG tablet, Take 1 tablet by mouth in the morning and 1 tablet in the evening. Do all this for 30 days. Max Daily Amount: 1 mg.  (Patient not taking: Reported on 2023), Disp: 30 tablet, Rfl: 0    Probiotic Product (PROBIOTIC ADVANCED PO), Take 4 capsules by mouth every morning PRODUCT: BIO COMPLETE 3 (Patient not taking: Reported on 2023), Disp: , Rfl:     famotidine (PEPCID) 20 MG tablet, Take 20 mg by mouth 2 times daily (Patient not taking: Reported on 2023), Disp: , Rfl:   Allergies   Allergen Reactions    Lansoprazole Hives    Naproxen Rash    Prilosec [Omeprazole] Hives    Trintellix [Vortioxetine] Rash    Zoloft [Sertraline Hcl] Rash       Past Medical History:   Diagnosis Date    Anxiety     Dental caries     Depression     Hyperlipidemia      Past Surgical History:   Procedure Laterality Date    CARPAL TUNNEL RELEASE Bilateral     CERVICAL FUSION       SECTION      x 3    DENTAL SURGERY N/A 2019    DENTAL EXAM FULL MOUTH EXTRACTIONS ALEOPOLASTY performed by Juliano Bonilla DDS at [facility] OR     Family History   Problem Relation Age of Onset    Heart Disease Mother     Diabetes Mother      Social History     Socioeconomic History    Marital status:      Spouse name: Not on file    Number of children: Not on file    Years of education: Not on file    Highest education level: Not on file   Occupational History    Not on file   Tobacco Use    Smoking status: Every Day     Packs/day: 0.50     Years: 42.00     Pack years: 21.00     Types: Cigarettes    Smokeless tobacco: Never   Vaping Use    Vaping Use: Every day   Substance and Sexual Activity    Alcohol use: No    Drug use: No    Sexual activity: Not on file   Other Topics Concern    Not on file   Social History Narrative    Not on file     Social Determinants of Health     Financial Resource Strain: Low Risk     Difficulty of Paying Living Expenses: Not hard at all   Food Insecurity: No Food Insecurity    Worried About Running Out of Food in the Last Year: Never true    [address] in the Last Year: Never true   Transportation Needs: Unknown    Lack of Transportation (Medical): Not on file    Lack of Transportation (Non-Medical): No   Physical Activity: Inactive    Days of Exercise per Week: 0 days    Minutes of Exercise per Session: 0 min   Stress: Not on file   Social Connections: Not on file   Intimate Partner Violence: Not on file   Housing Stability: Unknown    Unable to Pay for Housing in the Last Year: Not on file    Number of Places Lived in the Last Year: Not on file    Unstable Housing in the Last Year: No       Vitals:    02/21/23 1358   BP: 120/72   Pulse: 100   Resp: 16   Temp: 97.5 °F (36.4 °C)   TempSrc: Temporal   SpO2: 98%   Weight: 128 lb (58.1 kg)   Height: 5' 6\" (1.676 m)       Physical Exam  Vitals and nursing note reviewed. Constitutional:       Appearance: She is well-developed. HENT:      Head: Atraumatic. Right Ear: External ear normal.      Left Ear: External ear normal.      Nose: Congestion present. Mouth/Throat:      Pharynx: Posterior oropharyngeal erythema present. No oropharyngeal exudate.    Eyes:      Conjunctiva/sclera: Conjunctivae normal. Pupils: Pupils are equal, round, and reactive to light. Neck:      Thyroid: No thyromegaly. Trachea: No tracheal deviation. Cardiovascular:      Rate and Rhythm: Normal rate and regular rhythm. Heart sounds: No murmur heard. No friction rub. No gallop. Pulmonary:      Effort: Pulmonary effort is normal. No respiratory distress. Breath sounds: Normal breath sounds. Abdominal:      General: Bowel sounds are normal.      Palpations: Abdomen is soft. Musculoskeletal:         General: No tenderness or deformity. Normal range of motion. Cervical back: Normal range of motion and neck supple. Lymphadenopathy:      Cervical: Cervical adenopathy present. Skin:     General: Skin is warm and dry. Capillary Refill: Capillary refill takes less than 2 seconds. Findings: No rash. Neurological:      Mental Status: She is alert and oriented to person, place, and time. Sensory: No sensory deficit. Motor: No abnormal muscle tone.       Coordination: Coordination normal.      Deep Tendon Reflexes: Reflexes normal.           Seen By:  Brittney Smoke, DO

## 2023-02-22 ENCOUNTER — TELEPHONE (OUTPATIENT)
Dept: FAMILY MEDICINE CLINIC | Age: 63
End: 2023-02-22

## 2023-02-22 DIAGNOSIS — J02.0 ACUTE STREPTOCOCCAL PHARYNGITIS: ICD-10-CM

## 2023-02-22 RX ORDER — AMOXICILLIN 500 MG/1
500 CAPSULE ORAL 3 TIMES DAILY
Qty: 30 CAPSULE | Refills: 0 | Status: SHIPPED | OUTPATIENT
Start: 2023-02-22 | End: 2023-03-04

## 2023-02-22 NOTE — TELEPHONE ENCOUNTER
Patient called. She cannot take the liquid amoxicillin. Its burning the inside of her mouth and its too thick to swallow. She would like the pills instead and wants those called into [facility]. Please advise.

## 2023-02-24 ENCOUNTER — OFFICE VISIT (OUTPATIENT)
Dept: NEUROSURGERY | Age: 63
End: 2023-02-24
Payer: MEDICARE

## 2023-02-24 VITALS
BODY MASS INDEX: 20.25 KG/M2 | OXYGEN SATURATION: 98 % | WEIGHT: 126 LBS | DIASTOLIC BLOOD PRESSURE: 49 MMHG | HEART RATE: 101 BPM | HEIGHT: 66 IN | SYSTOLIC BLOOD PRESSURE: 117 MMHG

## 2023-02-24 DIAGNOSIS — M54.42 ACUTE BILATERAL LOW BACK PAIN WITH BILATERAL SCIATICA: Primary | ICD-10-CM

## 2023-02-24 DIAGNOSIS — M54.16 LUMBAR RADICULOPATHY: ICD-10-CM

## 2023-02-24 DIAGNOSIS — Z98.1 S/P CERVICAL SPINAL FUSION: ICD-10-CM

## 2023-02-24 DIAGNOSIS — M51.36 LUMBAR DEGENERATIVE DISC DISEASE: ICD-10-CM

## 2023-02-24 DIAGNOSIS — M54.41 ACUTE BILATERAL LOW BACK PAIN WITH BILATERAL SCIATICA: Primary | ICD-10-CM

## 2023-02-24 DIAGNOSIS — M89.9 LESION OF VERTEBRA: ICD-10-CM

## 2023-02-24 DIAGNOSIS — R20.0 NUMBNESS IN BOTH HANDS: ICD-10-CM

## 2023-02-24 PROCEDURE — 99203 OFFICE O/P NEW LOW 30 MIN: CPT | Performed by: STUDENT IN AN ORGANIZED HEALTH CARE EDUCATION/TRAINING PROGRAM

## 2023-02-24 PROCEDURE — 3017F COLORECTAL CA SCREEN DOC REV: CPT | Performed by: STUDENT IN AN ORGANIZED HEALTH CARE EDUCATION/TRAINING PROGRAM

## 2023-02-24 PROCEDURE — 99202 OFFICE O/P NEW SF 15 MIN: CPT

## 2023-02-24 PROCEDURE — G8427 DOCREV CUR MEDS BY ELIG CLIN: HCPCS | Performed by: STUDENT IN AN ORGANIZED HEALTH CARE EDUCATION/TRAINING PROGRAM

## 2023-02-24 PROCEDURE — G8420 CALC BMI NORM PARAMETERS: HCPCS | Performed by: STUDENT IN AN ORGANIZED HEALTH CARE EDUCATION/TRAINING PROGRAM

## 2023-02-24 PROCEDURE — G8484 FLU IMMUNIZE NO ADMIN: HCPCS | Performed by: STUDENT IN AN ORGANIZED HEALTH CARE EDUCATION/TRAINING PROGRAM

## 2023-02-24 PROCEDURE — 4004F PT TOBACCO SCREEN RCVD TLK: CPT | Performed by: STUDENT IN AN ORGANIZED HEALTH CARE EDUCATION/TRAINING PROGRAM

## 2023-02-24 RX ORDER — METHOCARBAMOL 500 MG/1
500 TABLET, FILM COATED ORAL 4 TIMES DAILY
Qty: 40 TABLET | Refills: 0 | Status: SHIPPED | OUTPATIENT
Start: 2023-02-24 | End: 2023-03-06

## 2023-02-24 ASSESSMENT — ENCOUNTER SYMPTOMS
ABDOMINAL PAIN: 0
TROUBLE SWALLOWING: 0
BACK PAIN: 1
SHORTNESS OF BREATH: 0
PHOTOPHOBIA: 0

## 2023-02-24 NOTE — TELEPHONE ENCOUNTER
Last Appointment:  2/21/2023  No future appointments. Patient got this through the ER. She was wanting to know if she could get this refilled?

## 2023-02-24 NOTE — PROGRESS NOTES
Subjective:      Patient ID: Alondra Rios is a 65 y.o. female with a PMH of depression, HLD, tobacco use, GERD and previous C5-C6 fusion done 15 years ago who presents for evaluation of low back pain. Patient states she has had this low back pain since December. She describes this pain as a constant burning, stabbing pain that radiates up her mid back and down both of her legs, no leg worse than the other. She admits to associated numbness and weakness in with this pain. This pain is worse with movement. She has tried robaxin, gabapentin and steroids for this pain. She has not tried PT or CHESTER for this pain. She also admits to recent weight loss and fatigue. She denies any bowel or bladder incontinence. She is a current smoker, usually smoking about 1 ppd, but has been cutting back. She denies any blood thinner medications. CT scans reviewed with patient. Review of Systems   Constitutional:  Positive for fatigue and unexpected weight change. Negative for fever. HENT:  Negative for trouble swallowing. Eyes:  Negative for photophobia. Respiratory:  Negative for shortness of breath. Cardiovascular:  Negative for chest pain. Gastrointestinal:  Negative for abdominal pain. Endocrine: Negative for heat intolerance. Genitourinary:  Positive for frequency and urgency. Negative for flank pain. Musculoskeletal:  Positive for arthralgias and back pain. Negative for myalgias. Skin:  Negative for wound. Neurological:  Positive for weakness, numbness and headaches. Psychiatric/Behavioral:  Negative for confusion. Objective:   Physical Exam  HENT:      Head: Normocephalic. Eyes:      Pupils: Pupils are equal, round, and reactive to light. Cardiovascular:      Rate and Rhythm: Normal rate. Pulmonary:      Effort: Pulmonary effort is normal.   Abdominal:      General: There is no distension. Musculoskeletal:         General: Normal range of motion.       Cervical back: Normal range of motion. Skin:     General: Skin is warm and dry. Neurological:      Mental Status: She is alert. Comments: A&Ox3  CN3-12 intact  Motor Strength full   Sensation intact to light touch   Reflexes normal   Mild tenderness to palpation of lumbar spine, non tender to palpation of thoracic spine   Psychiatric:         Thought Content: Thought content normal.     Imagin/15/2023 CT Cervical Spine  Impression   No acute abnormality of the cervical spine. Stable C5-6 fusion. Stable moderate degenerative change at C6-7 and C7-T1.             2/15/2023 CT Thoracic and Lumbar Spine   Impression   1. No acute fracture is identified. 2. Rounded 17 x 14 mm sclerotic lesion seen posteriorly with patchy sclerosis   seen within the posterior T7 vertebral body on the right. Correlate if any   known history of primary malignancy, as a metastatic lesion is in the   differential.  This could be further evaluated with MR imaging. 3. Small densely sclerotic foci are noted as above. These are possible bone   islands, though metastatic disease is also in the differential.   4. Multilevel degenerative changes seen throughout the thoracic and lumbar   spine, with multilevel Schmorl's nodes seen throughout the lumbar spine. Greatest degree of degenerative change at L2-L3 and L3-L4 with disc space   narrowing, osteophyte formation, spinal canal and osseous foraminal narrowing. Assessment:      -Bethel Mejia is a 66 y/o female who presents with mid and low back pain that radiates down both her legs. She has not tried PT or CHESTER. CT thoracic spine with T7 lesion.        Plan:      -Pain control and expectations discussed  -CT reviewed and discussed in detail  -Obtain MRI Thoracic spine to evaluate lesion  -Obtain MRI Lumbar spine to evaluate for stenosis   -EMG UE and LE  -OARRS report reviewed   -Return to Neurosurgery clinic after completion of imaging to discuss results and further treatment plan  -Call/return sooner if symptoms worsen or new issues arise in the interim         Imelda Banuelos PA-C

## 2023-02-27 RX ORDER — GABAPENTIN 100 MG/1
CAPSULE ORAL
Qty: 60 CAPSULE | Refills: 2 | OUTPATIENT
Start: 2023-02-27 | End: 2023-05-27

## 2023-02-27 RX ORDER — METHOCARBAMOL 500 MG/1
TABLET, FILM COATED ORAL
Qty: 40 TABLET | Refills: 0 | OUTPATIENT
Start: 2023-02-27

## 2023-02-27 RX ORDER — AMOXICILLIN 500 MG/1
500 CAPSULE ORAL 3 TIMES DAILY
Qty: 30 CAPSULE | Refills: 0 | OUTPATIENT
Start: 2023-02-27 | End: 2023-03-09

## 2023-02-28 ENCOUNTER — TELEPHONE (OUTPATIENT)
Dept: FAMILY MEDICINE CLINIC | Age: 63
End: 2023-02-28

## 2023-02-28 NOTE — TELEPHONE ENCOUNTER
How many mls is she to take 4 times daily ?  nystatin (MYCOSTATIN) 431682 UNIT/ML suspension [[id number]]     Order Details  Dose, Route, Frequency: As Directed   Dispense Quantity: 100 mL Refills: 0          Sig: Milliliters 4 times a day, retain in mouth as long as possible

## 2023-03-03 ENCOUNTER — HOSPITAL ENCOUNTER (EMERGENCY)
Age: 63
Discharge: HOME | End: 2023-03-03
Payer: MEDICARE

## 2023-03-03 ENCOUNTER — APPOINTMENT (OUTPATIENT)
Dept: CT IMAGING | Age: 63
End: 2023-03-03
Payer: MEDICARE

## 2023-03-03 ENCOUNTER — OFFICE VISIT (OUTPATIENT)
Dept: FAMILY MEDICINE CLINIC | Age: 63
End: 2023-03-03

## 2023-03-03 VITALS
RESPIRATION RATE: 17 BRPM | DIASTOLIC BLOOD PRESSURE: 80 MMHG | HEART RATE: 109 BPM | SYSTOLIC BLOOD PRESSURE: 122 MMHG | TEMPERATURE: 97.3 F | OXYGEN SATURATION: 99 %

## 2023-03-03 VITALS
RESPIRATION RATE: 17 BRPM | BODY MASS INDEX: 20.25 KG/M2 | OXYGEN SATURATION: 96 % | HEART RATE: 82 BPM | WEIGHT: 126 LBS | SYSTOLIC BLOOD PRESSURE: 143 MMHG | TEMPERATURE: 97.4 F | HEIGHT: 66 IN | DIASTOLIC BLOOD PRESSURE: 67 MMHG

## 2023-03-03 DIAGNOSIS — R55 NEAR SYNCOPE: Primary | ICD-10-CM

## 2023-03-03 DIAGNOSIS — E86.0 DEHYDRATION: ICD-10-CM

## 2023-03-03 DIAGNOSIS — R42 DIZZINESS AND GIDDINESS: Primary | ICD-10-CM

## 2023-03-03 DIAGNOSIS — R93.7 ABNORMAL CT OF THORACIC SPINE: ICD-10-CM

## 2023-03-03 DIAGNOSIS — R63.4 UNEXPLAINED WEIGHT LOSS: ICD-10-CM

## 2023-03-03 DIAGNOSIS — R10.84 GENERALIZED ABDOMINAL PAIN: ICD-10-CM

## 2023-03-03 LAB
ALBUMIN SERPL-MCNC: 4.2 G/DL (ref 3.5–5.2)
ALP BLD-CCNC: 60 U/L (ref 35–104)
ALT SERPL-CCNC: 20 U/L (ref 0–32)
ANION GAP SERPL CALCULATED.3IONS-SCNC: 12 MMOL/L (ref 7–16)
AST SERPL-CCNC: 20 U/L (ref 0–31)
BACTERIA: NORMAL /HPF
BASOPHILS ABSOLUTE: 0.03 E9/L (ref 0–0.2)
BASOPHILS RELATIVE PERCENT: 0.4 % (ref 0–2)
BILIRUB SERPL-MCNC: 0.3 MG/DL (ref 0–1.2)
BILIRUBIN URINE: NEGATIVE
BLOOD, URINE: ABNORMAL
BUN BLDV-MCNC: 7 MG/DL (ref 6–23)
CALCIUM SERPL-MCNC: 9 MG/DL (ref 8.6–10.2)
CHLORIDE BLD-SCNC: 99 MMOL/L (ref 98–107)
CLARITY: CLEAR
CO2: 23 MMOL/L (ref 22–29)
COLOR: ABNORMAL
CREAT SERPL-MCNC: 0.6 MG/DL (ref 0.5–1)
EKG ATRIAL RATE: 75 BPM
EKG P AXIS: 57 DEGREES
EKG P-R INTERVAL: 166 MS
EKG Q-T INTERVAL: 370 MS
EKG QRS DURATION: 72 MS
EKG QTC CALCULATION (BAZETT): 413 MS
EKG R AXIS: 55 DEGREES
EKG T AXIS: 42 DEGREES
EKG VENTRICULAR RATE: 75 BPM
EOSINOPHILS ABSOLUTE: 0.04 E9/L (ref 0.05–0.5)
EOSINOPHILS RELATIVE PERCENT: 0.5 % (ref 0–6)
EPITHELIAL CELLS, UA: NORMAL /HPF
GFR SERPL CREATININE-BSD FRML MDRD: >60 ML/MIN/1.73
GLUCOSE BLD-MCNC: 89 MG/DL (ref 74–99)
GLUCOSE URINE: NEGATIVE MG/DL
HCT VFR BLD CALC: 37.2 % (ref 34–48)
HEMOGLOBIN: 12.8 G/DL (ref 11.5–15.5)
IMMATURE GRANULOCYTES #: 0.02 E9/L
IMMATURE GRANULOCYTES %: 0.2 % (ref 0–5)
KETONES, URINE: ABNORMAL MG/DL
LEUKOCYTE ESTERASE, URINE: NEGATIVE
LYMPHOCYTES ABSOLUTE: 1.91 E9/L (ref 1.5–4)
LYMPHOCYTES RELATIVE PERCENT: 23.7 % (ref 20–42)
MCH RBC QN AUTO: 31.6 PG (ref 26–35)
MCHC RBC AUTO-ENTMCNC: 34.4 % (ref 32–34.5)
MCV RBC AUTO: 91.9 FL (ref 80–99.9)
MONOCYTES ABSOLUTE: 0.52 E9/L (ref 0.1–0.95)
MONOCYTES RELATIVE PERCENT: 6.5 % (ref 2–12)
NEUTROPHILS ABSOLUTE: 5.54 E9/L (ref 1.8–7.3)
NEUTROPHILS RELATIVE PERCENT: 68.7 % (ref 43–80)
NITRITE, URINE: NEGATIVE
PDW BLD-RTO: 14.1 FL (ref 11.5–15)
PH UA: 7 (ref 5–9)
PLATELET # BLD: 174 E9/L (ref 130–450)
PMV BLD AUTO: 9 FL (ref 7–12)
POTASSIUM REFLEX MAGNESIUM: 4 MMOL/L (ref 3.5–5)
PROTEIN UA: NEGATIVE MG/DL
RBC # BLD: 4.05 E12/L (ref 3.5–5.5)
RBC UA: NORMAL /HPF (ref 0–2)
SODIUM BLD-SCNC: 134 MMOL/L (ref 132–146)
SPECIFIC GRAVITY UA: 1.01 (ref 1–1.03)
TOTAL PROTEIN: 6.6 G/DL (ref 6.4–8.3)
TROPONIN, HIGH SENSITIVITY: <6 NG/L (ref 0–9)
TSH SERPL DL<=0.05 MIU/L-ACNC: 2.57 UIU/ML (ref 0.27–4.2)
UROBILINOGEN, URINE: 0.2 E.U./DL
WBC # BLD: 8.1 E9/L (ref 4.5–11.5)
WBC UA: NORMAL /HPF (ref 0–5)

## 2023-03-03 PROCEDURE — 74177 CT ABD & PELVIS W/CONTRAST: CPT

## 2023-03-03 PROCEDURE — 6360000004 HC RX CONTRAST MEDICATION: Performed by: RADIOLOGY

## 2023-03-03 PROCEDURE — 93005 ELECTROCARDIOGRAM TRACING: CPT | Performed by: PHYSICIAN ASSISTANT

## 2023-03-03 PROCEDURE — 71275 CT ANGIOGRAPHY CHEST: CPT

## 2023-03-03 PROCEDURE — 84484 ASSAY OF TROPONIN QUANT: CPT

## 2023-03-03 PROCEDURE — 84443 ASSAY THYROID STIM HORMONE: CPT

## 2023-03-03 PROCEDURE — 2580000003 HC RX 258: Performed by: PHYSICIAN ASSISTANT

## 2023-03-03 PROCEDURE — 84436 ASSAY OF TOTAL THYROXINE: CPT

## 2023-03-03 PROCEDURE — 6370000000 HC RX 637 (ALT 250 FOR IP): Performed by: PHYSICIAN ASSISTANT

## 2023-03-03 PROCEDURE — 93010 ELECTROCARDIOGRAM REPORT: CPT | Performed by: INTERNAL MEDICINE

## 2023-03-03 PROCEDURE — 81001 URINALYSIS AUTO W/SCOPE: CPT

## 2023-03-03 PROCEDURE — 80053 COMPREHEN METABOLIC PANEL: CPT

## 2023-03-03 PROCEDURE — 85025 COMPLETE CBC W/AUTO DIFF WBC: CPT

## 2023-03-03 PROCEDURE — 99285 EMERGENCY DEPT VISIT HI MDM: CPT

## 2023-03-03 RX ORDER — OXYCODONE HYDROCHLORIDE AND ACETAMINOPHEN 5; 325 MG/1; MG/1
1 TABLET ORAL ONCE
Status: DISCONTINUED | OUTPATIENT
Start: 2023-03-03 | End: 2023-03-03

## 2023-03-03 RX ORDER — HYDROCODONE BITARTRATE AND ACETAMINOPHEN 5; 325 MG/1; MG/1
1 TABLET ORAL ONCE
Status: COMPLETED | OUTPATIENT
Start: 2023-03-03 | End: 2023-03-03

## 2023-03-03 RX ORDER — METHOCARBAMOL 500 MG/1
500 TABLET, FILM COATED ORAL ONCE
Status: COMPLETED | OUTPATIENT
Start: 2023-03-03 | End: 2023-03-03

## 2023-03-03 RX ORDER — HYDROCODONE BITARTRATE AND ACETAMINOPHEN 5; 325 MG/1; MG/1
1 TABLET ORAL EVERY 6 HOURS PRN
Qty: 12 TABLET | Refills: 0 | Status: SHIPPED | OUTPATIENT
Start: 2023-03-03 | End: 2023-03-04 | Stop reason: SDUPTHER

## 2023-03-03 RX ORDER — 0.9 % SODIUM CHLORIDE 0.9 %
1000 INTRAVENOUS SOLUTION INTRAVENOUS ONCE
Status: COMPLETED | OUTPATIENT
Start: 2023-03-03 | End: 2023-03-03

## 2023-03-03 RX ADMIN — METHOCARBAMOL TABLETS 500 MG: 500 TABLET, COATED ORAL at 20:34

## 2023-03-03 RX ADMIN — IOPAMIDOL 75 ML: 755 INJECTION, SOLUTION INTRAVENOUS at 18:30

## 2023-03-03 RX ADMIN — HYDROCODONE BITARTRATE AND ACETAMINOPHEN 1 TABLET: 5; 325 TABLET ORAL at 20:34

## 2023-03-03 RX ADMIN — SODIUM CHLORIDE 1000 ML: 9 INJECTION, SOLUTION INTRAVENOUS at 16:49

## 2023-03-03 ASSESSMENT — ENCOUNTER SYMPTOMS
ABDOMINAL PAIN: 1
ALLERGIC/IMMUNOLOGIC NEGATIVE: 1
SORE THROAT: 0
VOMITING: 0
EYE DISCHARGE: 0
DIARRHEA: 0
SINUS PAIN: 0
CHEST TIGHTNESS: 0
BACK PAIN: 1
PHOTOPHOBIA: 0
SHORTNESS OF BREATH: 0
EYE REDNESS: 0
TROUBLE SWALLOWING: 0
EYE PAIN: 0
NAUSEA: 1
COUGH: 0
BLOOD IN STOOL: 0

## 2023-03-03 ASSESSMENT — PAIN DESCRIPTION - ORIENTATION: ORIENTATION: LOWER;MID

## 2023-03-03 ASSESSMENT — PAIN DESCRIPTION - LOCATION: LOCATION: BACK

## 2023-03-03 ASSESSMENT — PAIN SCALES - GENERAL: PAINLEVEL_OUTOF10: 9

## 2023-03-03 NOTE — ED PROVIDER NOTES
Independent [facility] Visit. Department of Emergency Medicine   ED  Provider Note  Admit Date/RoomTime: 3/3/2023  3:51 PM  ED Room: 16/16        HPI:  3/3/23,   Time: 4:10 PM COURTNEY Bone is a 65 y.o. female presenting to the ED for near syncope, dizziness, CP and abdominal pain, beginning 1 month ago but worse prior to arrival.  The complaint has been intermittent, moderate in severity, and worsened by nothing. The patient states that she really has not felt well for almost 6 months. She has multiple recurrent symptoms including nausea and dizziness chest pain and near syncope. The patient has been seen in the emergency room on several occasions. Her last visit here she complained of severe neck and back pain. .  Imaging was done of her neck thoracic and lumbar spine. There were some concerning lesions seen suggestive of possible metastatic disease. The patient did follow-up with the neurosurgeon and is scheduled on Monday for an MRI of the thoracic and lumbar spine. She states that she went in to see her PCP today to discuss all of these findings. She never even got to talk to her PCP. She states that she started having increased dizziness, shortness of breath and chest pain prompting the office to call the ambulance. The patient was brought to the emergency room for further evaluation. She tells me she has been having numbness and tingling in her hands and feet. She had some left-sided abdominal pain today which was new. The patient is a chronic smoker though she stopped a month ago. She has had persistent cough and intermittent shortness of breath. She states that she has lost 30 pounds since the fall. When asked why she states that she just does not feel like there is much of an appetite.   She is insistent that she feels dehydrated and needs fluids. I did review her workup from both Feb ED visits. ROS:     Constitutional:  See HPI  HENT: Negative for ear pain, sore throat and sinus pressure  Eyes: Negative for pain, discharge and redness  Respiratory: See HPI  Cardiovascular: See HPI  Gastrointestinal: See HPI  Genitourinary: Negative for dysuria and frequency  Musculoskeletal: See HPI  Skin: Negative for rash and wound  Neurological: Negative for weakness and headaches  Hematological: Negative for adenopathy    All other systems reviewed and are negative      -------------------------------- PAST HISTORY ----------------------------------  Past Medical History:  has a past medical history of Anxiety, Dental caries, Depression, and Hyperlipidemia. Past Surgical History:  has a past surgical history that includes  section; Carpal tunnel release (Bilateral); cervical fusion; and Dental surgery (N/A, 2019). Social History:  reports that she has been smoking cigarettes. She has a 21.00 pack-year smoking history. She has never used smokeless tobacco. She reports that she does not drink alcohol and does not use drugs. Family History: family history includes Diabetes in her mother; Heart Disease in her mother. The patients home medications have been reviewed.     Allergies: Lansoprazole, Naproxen, Prilosec [omeprazole], Trintellix [vortioxetine], and Zoloft [sertraline hcl]    --------------------------------- RESULTS ------------------------------------------  All laboratory and radiology results have been personally reviewed by myself   LABS:  Results for orders placed or performed during the hospital encounter of 23   CBC with Auto Differential   Result Value Ref Range    WBC 8.1 4.5 - 11.5 E9/L    RBC 4.05 3.50 - 5.50 E12/L    Hemoglobin 12.8 11.5 - 15.5 g/dL    Hematocrit 37.2 34.0 - 48.0 %    MCV 91.9 80.0 - 99.9 fL    MCH 31.6 26.0 - 35.0 pg    MCHC 34.4 32.0 - 34.5 %    RDW 14.1 11.5 - 15.0 fL    Platelets 259 130 - 450 E9/L    MPV 9.0 7.0 - 12.0 fL    Neutrophils % 68.7 43.0 - 80.0 %    Immature Granulocytes % 0.2 0.0 - 5.0 %    Lymphocytes % 23.7 20.0 - 42.0 %    Monocytes % 6.5 2.0 - 12.0 %    Eosinophils % 0.5 0.0 - 6.0 %    Basophils % 0.4 0.0 - 2.0 %    Neutrophils Absolute 5.54 1.80 - 7.30 E9/L    Immature Granulocytes # 0.02 E9/L    Lymphocytes Absolute 1.91 1.50 - 4.00 E9/L    Monocytes Absolute 0.52 0.10 - 0.95 E9/L    Eosinophils Absolute 0.04 (L) 0.05 - 0.50 E9/L    Basophils Absolute 0.03 0.00 - 0.20 E9/L   Comprehensive Metabolic Panel w/ Reflex to MG   Result Value Ref Range    Sodium 134 132 - 146 mmol/L    Potassium reflex Magnesium 4.0 3.5 - 5.0 mmol/L    Chloride 99 98 - 107 mmol/L    CO2 23 22 - 29 mmol/L    Anion Gap 12 7 - 16 mmol/L    Glucose 89 74 - 99 mg/dL    BUN 7 6 - 23 mg/dL    Creatinine 0.6 0.5 - 1.0 mg/dL    Est, Glom Filt Rate >60 >=60 mL/min/1.73    Calcium 9.0 8.6 - 10.2 mg/dL    Total Protein 6.6 6.4 - 8.3 g/dL    Albumin 4.2 3.5 - 5.2 g/dL    Total Bilirubin 0.3 0.0 - 1.2 mg/dL    Alkaline Phosphatase 60 35 - 104 U/L    ALT 20 0 - 32 U/L    AST 20 0 - 31 U/L   Troponin   Result Value Ref Range    Troponin, High Sensitivity <6 0 - 9 ng/L   Urinalysis   Result Value Ref Range    Color, UA Straw Straw/Yellow    Clarity, UA Clear Clear    Glucose, Ur Negative Negative mg/dL    Bilirubin Urine Negative Negative    Ketones, Urine TRACE (A) Negative mg/dL    Specific Gravity, UA 1.010 1.005 - 1.030    Blood, Urine SMALL (A) Negative    pH, UA 7.0 5.0 - 9.0    Protein, UA Negative Negative mg/dL    Urobilinogen, Urine 0.2 <2.0 E.U./dL    Nitrite, Urine Negative Negative    Leukocyte Esterase, Urine Negative Negative   T4   Result Value Ref Range    T4, Total 5.9 4.5 - 11.7 mcg/dL   TSH   Result Value Ref Range    TSH 2.570 0.270 - 4.200 uIU/mL   Microscopic Urinalysis   Result Value Ref Range    WBC, UA NONE 0 - 5 /HPF    RBC, UA 0-1 0 - 2 /HPF    Epithelial Cells, UA RARE /HPF    Bacteria, UA NONE SEEN None Seen /HPF   EKG 12 Lead   Result Value Ref Range    Ventricular Rate 75 BPM    Atrial Rate 75 BPM    P-R Interval 166 ms    QRS Duration 72 ms    Q-T Interval 370 ms    QTc Calculation (Bazett) 413 ms    P Axis 57 degrees    R Axis 55 degrees    T Axis 42 degrees       RADIOLOGY:  Interpreted by Radiologist.  CTA CHEST W CONTRAST   Final Result   CTA OF THE CHEST:      1. No pulmonary embolism. 2.  No acute cardiopulmonary abnormality. 3. Nonspecific sclerotic lesion in the T7 vertebral body. Bone scan   recommended for further evaluation. CT OF THE ABDOMEN AND PELVIS:      1. No evidence of acute or metastatic disease. CT ABDOMEN PELVIS W IV CONTRAST Additional Contrast? None   Final Result   CTA OF THE CHEST:      1. No pulmonary embolism. 2.  No acute cardiopulmonary abnormality. 3. Nonspecific sclerotic lesion in the T7 vertebral body. Bone scan   recommended for further evaluation. CT OF THE ABDOMEN AND PELVIS:      1. No evidence of acute or metastatic disease.             ----------------- NURSING NOTES AND VITALS REVIEWED ---------------   The nursing notes within the ED encounter and vital signs as below have been reviewed. BP (!) 143/67   Pulse 82   Temp 97.4 °F (36.3 °C)   Resp 17   Ht 5' 6\" (1.676 m)   Wt 126 lb (57.2 kg)   SpO2 96%   BMI 20.34 kg/m²   Oxygen Saturation Interpretation: Normal      --------------------------------PHYSICAL EXAM------------------------------------      Constitutional/General: Alert and oriented x3, well appearing, mild distress. Anxious  Head: NC/AT  Eyes: PERRL, EOMI  Mouth: Oropharynx clear, handling secretions, no trismus  Neck: Supple, full ROM, no meningeal signs  Pulmonary: Lungs clear to auscultation bilaterally, no wheezes, rales, or rhonchi. Not in respiratory distress  Cardiovascular:  Regular rate and rhythm, no murmurs, gallops, or rubs. 2+ distal pulses  Abdomen: Soft, + BS. No distension. Nontender.   No palpable rigidity, rebound or guarding  Extremities: Moves all extremities x 4. Warm and well perfused  Skin: warm and dry without rash  Neurologic: GCS 15,  Intact. No focal deficits  Psych: Normal Affect      ------------------------ ED COURSE/MEDICAL DECISION MAKING----------------------  Medications   0.9 % sodium chloride bolus (0 mLs IntraVENous Stopped 3/3/23 1826)   methocarbamol (ROBAXIN) tablet 500 mg (500 mg Oral Given 3/3/23 2034)   iopamidol (ISOVUE-370) 76 % injection 75 mL (75 mLs IntraVENous Given 3/3/23 1830)   HYDROcodone-acetaminophen (NORCO) 5-325 MG per tablet 1 tablet (1 tablet Oral Given 3/3/23 2034)         Medical Decision Making:    The patient arrives today via EMS from her PCPs office with multiple complaints including abdominal pain, chest pain, numbness and tingling in her extremities, back pain and unexplained weight loss. I did review all of her prior imaging and labs from her last few visits. An exact explanation for all of the symptoms remains unclear. She does have a concerning lesion at T7 and is scheduled for an MRI of the thoracic and lumbar spine Monday. The patient does not have any known history of cancer. I did complete a full work-up today in our department. There is no evidence of ischemic disease and her troponin is normal.  A CTA of the chest was obtained without evidence of pulmonary embolus, pneumonia or mass. CT scan of the abdominal and pelvic region was also unremarkable. They again saw the lesion at T7. The patient did not have any meds for pain at home. I did give her something here and will send her home with a prescription for just a few Vicodin. She is advised to proceed with the MRI as planned on Monday and follow-up with her PCP. Consider additional work-up as indicated depending on these tests. She is already been seen by the neurosurgeon. I do not see any need to admit her tonight and the patient was fine with that.   Her son was in the room as we went over all of her results. She is aware and agreeable to this plan. Counseling: The emergency provider has spoken with the patient and family member patient and son and discussed todays results, in addition to providing specific details for the plan of care and counseling regarding the diagnosis and prognosis. Questions are answered at this time and they are agreeable with the plan.      ------------------------ IMPRESSION AND DISPOSITION -------------------------------    IMPRESSION  1. Near syncope    2. Generalized abdominal pain    3. Abnormal CT of thoracic spine    4.  Unexplained weight loss        DISPOSITION  Disposition: Discharge to home  Patient condition is stable                   Hilary Donald PA-C  03/05/23 2025

## 2023-03-03 NOTE — ED NOTES
Patient complains of \"feeling like she was going to pass out\". No actual loss of consciousness per patient. Complains of nausea, vomiting, dizziness at this time. Suma Roe RN  03/03/23 [id number]   Patient states she is currently being treated for oral thrush with Nystatin.  Has been taking this since 3/2/2023     Suma Roe RN  03/03/23 2677

## 2023-03-03 NOTE — PROGRESS NOTES
3/3/23  Annabella Walsh : 1960 Sex: female  Age: 65 y.o. Assessment and Plan:  Shelly See was seen today for nausea, other, shortness of breath, insomnia and abdominal pain. Diagnoses and all orders for this visit:    Dizziness and giddiness    Dehydration    Patient was seen for similar complaints about a month ago where she had a syncopal episode and was transported up to the hospital by ambulance. They she is continue to feel worse, has no body to take her up to the emergency room, is unable to drive herself. Called ambulance to have her transported to [facility] where she can be evaluated for dehydration, possible electrolyte imbalance. Squad was here to take her. Return if symptoms worsen or fail to improve. Chief Complaint   Patient presents with    Nausea    Other     Feels faint, drove herself    Shortness of Breath    Insomnia    Abdominal Pain     No change in bowels       Patient presents with increasing back pain accompanied by lower abdominal pain and nausea onset this morning. Also complaining of shortness of breath and feeling lightheaded, diaphoretic. Edda Waldrop Denies fever, chills, exposure, COVID in January of this year. Review of Systems   Constitutional:  Positive for diaphoresis. Negative for appetite change, fatigue and unexpected weight change. HENT:  Negative for congestion, ear pain, hearing loss, sinus pain, sore throat and trouble swallowing. Eyes:  Negative for photophobia, pain, discharge and redness. Respiratory:  Negative for cough, chest tightness and shortness of breath. Cardiovascular:  Negative for chest pain, palpitations and leg swelling. Gastrointestinal:  Positive for abdominal pain and nausea. Negative for blood in stool, diarrhea and vomiting. Endocrine: Negative. Genitourinary:  Negative for dysuria, flank pain, frequency and hematuria. Musculoskeletal:  Positive for back pain.  Negative for arthralgias, joint swelling and myalgias. Skin: Negative. Allergic/Immunologic: Negative. Neurological:  Positive for dizziness and light-headedness. Negative for seizures, syncope, weakness, numbness and headaches. Hematological:  Negative for adenopathy. Does not bruise/bleed easily. Psychiatric/Behavioral: Negative. Current Outpatient Medications:     nystatin (MYCOSTATIN) 444315 UNIT/ML suspension, Milliliters 4 times a day, retain in mouth as long as possible., Disp: 100 mL, Rfl: 0    methocarbamol (ROBAXIN) 500 MG tablet, Take 1 tablet by mouth 4 times daily for 10 days, Disp: 40 tablet, Rfl: 0    amoxicillin (AMOXIL) 500 MG capsule, Take 1 capsule by mouth 3 times daily for 10 days, Disp: 30 capsule, Rfl: 0    LORazepam (ATIVAN) 0.5 MG tablet, Take 1 tablet by mouth in the morning and 1 tablet in the evening. Do all this for 30 days.  Max Daily Amount: 1 mg., Disp: 30 tablet, Rfl: 0    polyethylene glycol (GLYCOLAX) 17 GM/SCOOP powder, Take 17 g by mouth daily, Disp: , Rfl:     Probiotic Product (PROBIOTIC ADVANCED PO), Take 4 capsules by mouth every morning PRODUCT: BIO COMPLETE 3, Disp: , Rfl:     atorvastatin (LIPITOR) 20 MG tablet, Take 20 mg by mouth every morning, Disp: , Rfl:     famotidine (PEPCID) 20 MG tablet, Take 20 mg by mouth 2 times daily, Disp: , Rfl:   Allergies   Allergen Reactions    Lansoprazole Hives    Naproxen Rash    Prilosec [Omeprazole] Hives    Trintellix [Vortioxetine] Rash    Zoloft [Sertraline Hcl] Rash       Past Medical History:   Diagnosis Date    Anxiety     Dental caries     Depression     Hyperlipidemia      Past Surgical History:   Procedure Laterality Date    CARPAL TUNNEL RELEASE Bilateral     CERVICAL FUSION       SECTION      x 3    DENTAL SURGERY N/A 2019    DENTAL EXAM FULL MOUTH EXTRACTIONS ALEOPOLASTY performed by Dev Andrade DDS at [facility] OR     Family History   Problem Relation Age of Onset    Heart Disease Mother     Diabetes Mother      Social History Socioeconomic History    Marital status:      Spouse name: Not on file    Number of children: Not on file    Years of education: Not on file    Highest education level: Not on file   Occupational History    Not on file   Tobacco Use    Smoking status: Every Day     Packs/day: 0.50     Years: 42.00     Pack years: 21.00     Types: Cigarettes    Smokeless tobacco: Never   Vaping Use    Vaping Use: Every day   Substance and Sexual Activity    Alcohol use: No    Drug use: No    Sexual activity: Not on file   Other Topics Concern    Not on file   Social History Narrative    Not on file     Social Determinants of Health     Financial Resource Strain: Low Risk     Difficulty of Paying Living Expenses: Not hard at all   Food Insecurity: No Food Insecurity    Worried About Running Out of Food in the Last Year: Never true    [address] in the Last Year: Never true   Transportation Needs: Unknown    Lack of Transportation (Medical): Not on file    Lack of Transportation (Non-Medical): No   Physical Activity: Inactive    Days of Exercise per Week: 0 days    Minutes of Exercise per Session: 0 min   Stress: Not on file   Social Connections: Not on file   Intimate Partner Violence: Not on file   Housing Stability: Unknown    Unable to Pay for Housing in the Last Year: Not on file    Number of Places Lived in the Last Year: Not on file    Unstable Housing in the Last Year: No       Vitals:    03/03/23 1417   BP: 122/80   Pulse: (!) 109   Resp: 17   Temp: 97.3 °F (36.3 °C)   TempSrc: Temporal   SpO2: 99%       Physical Exam  Vitals and nursing note reviewed. Constitutional:       Appearance: She is well-developed. She is ill-appearing and diaphoretic. HENT:      Head: Atraumatic. Right Ear: External ear normal.      Left Ear: External ear normal.      Nose: Nose normal.      Mouth/Throat:      Mouth: Mucous membranes are dry. Pharynx: No oropharyngeal exudate.    Eyes:      Conjunctiva/sclera: Conjunctivae normal.      Pupils: Pupils are equal, round, and reactive to light. Neck:      Thyroid: No thyromegaly. Trachea: No tracheal deviation. Cardiovascular:      Rate and Rhythm: Normal rate and regular rhythm. Heart sounds: No murmur heard. No friction rub. No gallop. Pulmonary:      Effort: Pulmonary effort is normal. No respiratory distress. Breath sounds: Normal breath sounds. Abdominal:      General: Bowel sounds are normal.      Palpations: Abdomen is soft. Musculoskeletal:         General: No tenderness or deformity. Normal range of motion. Cervical back: Normal range of motion and neck supple. Lymphadenopathy:      Cervical: No cervical adenopathy. Skin:     General: Skin is warm. Capillary Refill: Capillary refill takes less than 2 seconds. Findings: No rash. Neurological:      Mental Status: She is alert and oriented to person, place, and time. Sensory: No sensory deficit. Motor: No abnormal muscle tone.       Coordination: Coordination normal.      Deep Tendon Reflexes: Reflexes normal.           Seen By:  Bhavna Muñiz DO

## 2023-03-04 DIAGNOSIS — R10.84 GENERALIZED ABDOMINAL PAIN: ICD-10-CM

## 2023-03-04 DIAGNOSIS — R93.7 ABNORMAL CT OF THORACIC SPINE: ICD-10-CM

## 2023-03-04 DIAGNOSIS — R63.4 UNEXPLAINED WEIGHT LOSS: ICD-10-CM

## 2023-03-04 LAB — T4 TOTAL: 5.9 MCG/DL (ref 4.5–11.7)

## 2023-03-04 RX ORDER — HYDROCODONE BITARTRATE AND ACETAMINOPHEN 5; 325 MG/1; MG/1
1 TABLET ORAL EVERY 6 HOURS PRN
Qty: 12 TABLET | Refills: 0 | Status: SHIPPED | OUTPATIENT
Start: 2023-03-04 | End: 2023-03-07

## 2023-03-06 ENCOUNTER — HOSPITAL ENCOUNTER (OUTPATIENT)
Dept: MRI IMAGING | Age: 63
Discharge: HOME | End: 2023-03-08
Payer: MEDICARE

## 2023-03-06 DIAGNOSIS — M89.9 LESION OF VERTEBRA: ICD-10-CM

## 2023-03-06 DIAGNOSIS — M51.36 LUMBAR DEGENERATIVE DISC DISEASE: ICD-10-CM

## 2023-03-06 DIAGNOSIS — M54.16 LUMBAR RADICULOPATHY: ICD-10-CM

## 2023-03-06 DIAGNOSIS — R20.0 NUMBNESS IN BOTH HANDS: ICD-10-CM

## 2023-03-06 DIAGNOSIS — M54.42 ACUTE BILATERAL LOW BACK PAIN WITH BILATERAL SCIATICA: ICD-10-CM

## 2023-03-06 DIAGNOSIS — Z98.1 S/P CERVICAL SPINAL FUSION: ICD-10-CM

## 2023-03-06 DIAGNOSIS — M54.41 ACUTE BILATERAL LOW BACK PAIN WITH BILATERAL SCIATICA: ICD-10-CM

## 2023-03-06 PROCEDURE — 72158 MRI LUMBAR SPINE W/O & W/DYE: CPT

## 2023-03-06 PROCEDURE — A9577 INJ MULTIHANCE: HCPCS | Performed by: RADIOLOGY

## 2023-03-06 PROCEDURE — 6360000004 HC RX CONTRAST MEDICATION: Performed by: RADIOLOGY

## 2023-03-06 PROCEDURE — 72157 MRI CHEST SPINE W/O & W/DYE: CPT

## 2023-03-06 RX ADMIN — GADOBENATE DIMEGLUMINE 11 ML: 529 INJECTION, SOLUTION INTRAVENOUS at 15:43

## 2023-03-07 ENCOUNTER — PATIENT MESSAGE (OUTPATIENT)
Dept: FAMILY MEDICINE CLINIC | Age: 63
End: 2023-03-07

## 2023-03-07 ENCOUNTER — TELEPHONE (OUTPATIENT)
Dept: FAMILY MEDICINE CLINIC | Age: 63
End: 2023-03-07

## 2023-03-07 DIAGNOSIS — F06.4 ANXIETY DISORDER DUE TO GENERAL MEDICAL CONDITION: ICD-10-CM

## 2023-03-07 RX ORDER — METHOCARBAMOL 500 MG/1
500 TABLET, FILM COATED ORAL 4 TIMES DAILY
Qty: 40 TABLET | Refills: 0 | Status: CANCELLED | OUTPATIENT
Start: 2023-03-07 | End: 2023-03-17

## 2023-03-07 RX ORDER — LORAZEPAM 0.5 MG/1
0.5 TABLET ORAL 2 TIMES DAILY
Qty: 30 TABLET | Refills: 0 | Status: CANCELLED | OUTPATIENT
Start: 2023-03-07 | End: 2023-04-06

## 2023-03-07 NOTE — TELEPHONE ENCOUNTER
Sathish Disla calling to renew 3 medications:       Last Appointment:  3/3/2023  Future Appointments   Date Time Provider Vicky Daley   3/13/2023  9:00 AM Jael Wallis MD [address]   3/16/2023  9:00 AM CHANG Frazier [facility]

## 2023-03-08 RX ORDER — LORAZEPAM 0.5 MG/1
0.5 TABLET ORAL 2 TIMES DAILY
Qty: 30 TABLET | Refills: 0 | OUTPATIENT
Start: 2023-03-08 | End: 2023-04-07

## 2023-03-08 RX ORDER — METHOCARBAMOL 500 MG/1
500 TABLET, FILM COATED ORAL 4 TIMES DAILY
Qty: 40 TABLET | Refills: 0 | Status: SHIPPED | OUTPATIENT
Start: 2023-03-08 | End: 2023-03-18

## 2023-03-08 NOTE — TELEPHONE ENCOUNTER
From: Lord Gorge Wilcox  To: Dr. Therese Maxwell: 3/7/2023 6:40 PM EST  Subject: Refill    I am not taking Norco. I do not like this medicine. I need Robaxin 500 mg muscle relaxer. This medicine does work and I am taking it.  Thank you

## 2023-03-09 DIAGNOSIS — F06.4 ANXIETY DISORDER DUE TO GENERAL MEDICAL CONDITION: ICD-10-CM

## 2023-03-09 RX ORDER — LORAZEPAM 0.5 MG/1
0.5 TABLET ORAL 2 TIMES DAILY
Qty: 30 TABLET | Refills: 0 | OUTPATIENT
Start: 2023-03-09 | End: 2023-04-08

## 2023-03-09 RX ORDER — LORAZEPAM 0.5 MG/1
0.5 TABLET ORAL 2 TIMES DAILY PRN
Qty: 30 TABLET | Refills: 0 | Status: SHIPPED
Start: 2023-03-09 | End: 2023-03-10

## 2023-03-09 RX ORDER — METHOCARBAMOL 500 MG/1
500 TABLET, FILM COATED ORAL 4 TIMES DAILY
Qty: 40 TABLET | Refills: 0 | OUTPATIENT
Start: 2023-03-09 | End: 2023-03-19

## 2023-03-09 NOTE — TELEPHONE ENCOUNTER
Last Appointment:  3/3/2023  Future Appointments   Date Time Provider Vicky Daley   3/13/2023  9:00 AM Win Shipley MD [address]   3/16/2023  9:00 AM CHANG Gann [address]  [facility]      Patient is requesting a refill on Lorazepam. Robaxin was sent yesterday. Last Rx for Lorazepam was sent 2/20 #30 1 po bid prn for 30 day supply. Rx did not last full 30 days.

## 2023-03-10 ENCOUNTER — TELEPHONE (OUTPATIENT)
Dept: FAMILY MEDICINE CLINIC | Age: 63
End: 2023-03-10

## 2023-03-10 ENCOUNTER — OFFICE VISIT (OUTPATIENT)
Dept: FAMILY MEDICINE CLINIC | Age: 63
End: 2023-03-10

## 2023-03-10 VITALS
WEIGHT: 126 LBS | SYSTOLIC BLOOD PRESSURE: 122 MMHG | HEART RATE: 91 BPM | OXYGEN SATURATION: 97 % | TEMPERATURE: 97.4 F | HEIGHT: 66 IN | DIASTOLIC BLOOD PRESSURE: 84 MMHG | BODY MASS INDEX: 20.25 KG/M2

## 2023-03-10 DIAGNOSIS — R30.0 DYSURIA: Primary | ICD-10-CM

## 2023-03-10 DIAGNOSIS — B37.0 THRUSH: ICD-10-CM

## 2023-03-10 DIAGNOSIS — R31.9 HEMATURIA, UNSPECIFIED TYPE: ICD-10-CM

## 2023-03-10 DIAGNOSIS — F06.4 ANXIETY DISORDER DUE TO GENERAL MEDICAL CONDITION: ICD-10-CM

## 2023-03-10 DIAGNOSIS — J02.0 ACUTE STREPTOCOCCAL PHARYNGITIS: ICD-10-CM

## 2023-03-10 LAB
BILIRUBIN, POC: NORMAL
BLOOD URINE, POC: NORMAL
CLARITY, POC: CLEAR
COLOR, POC: NORMAL
GLUCOSE URINE, POC: NORMAL
KETONES, POC: NORMAL
LEUKOCYTE EST, POC: NORMAL
NITRITE, POC: NORMAL
PH, POC: 7
PROTEIN, POC: NORMAL
S PYO AG THROAT QL: NORMAL
SPECIFIC GRAVITY, POC: 1.01
UROBILINOGEN, POC: 0.2

## 2023-03-10 RX ORDER — DULOXETIN HYDROCHLORIDE 30 MG/1
30 CAPSULE, DELAYED RELEASE ORAL DAILY
COMMUNITY

## 2023-03-10 RX ORDER — HYDROXYZINE PAMOATE 50 MG/1
50 CAPSULE ORAL 3 TIMES DAILY PRN
COMMUNITY

## 2023-03-10 ASSESSMENT — ENCOUNTER SYMPTOMS
ABDOMINAL PAIN: 0
SINUS PAIN: 0
EYE REDNESS: 0
ALLERGIC/IMMUNOLOGIC NEGATIVE: 1
NAUSEA: 0
BACK PAIN: 1
TROUBLE SWALLOWING: 0
PHOTOPHOBIA: 0
SORE THROAT: 0
SHORTNESS OF BREATH: 0
COUGH: 0
VOMITING: 0
CHEST TIGHTNESS: 0
DIARRHEA: 0
EYE DISCHARGE: 0
EYE PAIN: 0
BLOOD IN STOOL: 0

## 2023-03-10 NOTE — PROGRESS NOTES
3/10/23  Elizabeth Hidden : 1960 Sex: female  Age: 65 y.o. Assessment and Plan:  Comfort Alvarez was seen today for follow-up and urinary tract infection. Diagnoses and all orders for this visit:    Dysuria  -     POCT Urinalysis no Micro    Hematuria, unspecified type  -     Culture, Urine; Future    Thrush  -     nystatin (MYCOSTATIN) 913801 UNIT/ML suspension; Take 5 mLs by mouth 4 times daily for 10 days Retain in mouth as long as possible    Anxiety disorder due to general medical condition    Acute streptococcal pharyngitis  -     POCT rapid strep A      Return in about 2 weeks (around 3/24/2023), or Recheck if not improving. .    Chief Complaint   Patient presents with    Follow-up     Patient had thrush starting at the end of February    Urinary Tract Infection     Burning, pressure, urinary frequency       Patient returns for recheck. She is complaining of pain in her oral cavity including the tongue. She just got off antibiotics last week, that she may have an early thrush. Is in the process of work-up for her back and leg pain neurologist will be seeing her next week. She sees a psychiatrist who adjusted her medications anxiety and leave that in their hands. Review of Systems   Constitutional:  Negative for appetite change, fatigue and unexpected weight change. HENT:  Negative for congestion, ear pain, hearing loss, sinus pain, sore throat and trouble swallowing. Pain tongue and oral cavity. Eyes:  Negative for photophobia, pain, discharge and redness. Respiratory:  Negative for cough, chest tightness and shortness of breath. Cardiovascular:  Negative for chest pain, palpitations and leg swelling. Gastrointestinal:  Negative for abdominal pain, blood in stool, diarrhea, nausea and vomiting. Endocrine: Negative. Genitourinary:  Negative for dysuria, flank pain, frequency and hematuria. Musculoskeletal:  Positive for arthralgias, back pain and myalgias. Negative for joint swelling. Lumbar spine, legs. Skin: Negative. Allergic/Immunologic: Negative. Neurological:  Negative for dizziness, seizures, syncope, weakness, light-headedness, numbness and headaches. Hematological:  Negative for adenopathy. Does not bruise/bleed easily. Psychiatric/Behavioral: Negative.          Current Outpatient Medications:     DULoxetine (CYMBALTA) 30 MG extended release capsule, Take 30 mg by mouth daily, Disp: , Rfl:     hydrOXYzine pamoate (VISTARIL) 50 MG capsule, Take 50 mg by mouth 3 times daily as needed for Itching, Disp: , Rfl:     nystatin (MYCOSTATIN) 119852 UNIT/ML suspension, Take 5 mLs by mouth 4 times daily for 10 days Retain in mouth as long as possible, Disp: 200 mL, Rfl: 0    methocarbamol (ROBAXIN) 500 MG tablet, Take 1 tablet by mouth 4 times daily for 10 days, Disp: 40 tablet, Rfl: 0    polyethylene glycol (GLYCOLAX) 17 GM/SCOOP powder, Take 17 g by mouth daily, Disp: , Rfl:     Probiotic Product (PROBIOTIC ADVANCED PO), Take 4 capsules by mouth every morning PRODUCT: BIO COMPLETE 3, Disp: , Rfl:     atorvastatin (LIPITOR) 20 MG tablet, Take 20 mg by mouth every morning, Disp: , Rfl:     famotidine (PEPCID) 20 MG tablet, Take 20 mg by mouth 2 times daily, Disp: , Rfl:   Allergies   Allergen Reactions    Lansoprazole Hives    Naproxen Rash    Prilosec [Omeprazole] Hives    Trintellix [Vortioxetine] Rash    Zoloft [Sertraline Hcl] Rash       Past Medical History:   Diagnosis Date    Anxiety     Dental caries     Depression     Hyperlipidemia      Past Surgical History:   Procedure Laterality Date    CARPAL TUNNEL RELEASE Bilateral     CERVICAL FUSION       SECTION      x 3    DENTAL SURGERY N/A 2019    DENTAL EXAM FULL MOUTH EXTRACTIONS ALEOPOLASTY performed by Rosendo Romberg, DDS at [facility] OR     Family History   Problem Relation Age of Onset    Heart Disease Mother     Diabetes Mother      Social History     Socioeconomic History    Marital status:      Spouse name: Not on file    Number of children: Not on file    Years of education: Not on file    Highest education level: Not on file   Occupational History    Not on file   Tobacco Use    Smoking status: Every Day     Packs/day: 0.50     Years: 42.00     Pack years: 21.00     Types: Cigarettes    Smokeless tobacco: Never   Vaping Use    Vaping Use: Every day   Substance and Sexual Activity    Alcohol use: No    Drug use: No    Sexual activity: Not on file   Other Topics Concern    Not on file   Social History Narrative    Not on file     Social Determinants of Health     Financial Resource Strain: Low Risk     Difficulty of Paying Living Expenses: Not hard at all   Food Insecurity: No Food Insecurity    Worried About [address] in the Last Year: Never true    [address] [employer] in the Last Year: Never true   Transportation Needs: Unknown    Lack of Transportation (Medical): Not on file    Lack of Transportation (Non-Medical): No   Physical Activity: Inactive    Days of Exercise per Week: 0 days    Minutes of Exercise per Session: 0 min   Stress: Not on file   Social Connections: Not on file   Intimate Partner Violence: Not on file   Housing Stability: Unknown    Unable to Pay for Housing in the Last Year: Not on file    Number of Places Lived in the Last Year: Not on file    Unstable Housing in the Last Year: No       Vitals:    03/10/23 1333   BP: 122/84   Pulse: 91   Temp: 97.4 °F (36.3 °C)   TempSrc: Temporal   SpO2: 97%   Weight: 126 lb (57.2 kg)   Height: 5' 6\" (1.676 m)       Physical Exam  Vitals and nursing note reviewed. Constitutional:       Appearance: She is well-developed. HENT:      Head: Atraumatic. Right Ear: External ear normal.      Left Ear: External ear normal.      Nose: Nose normal.      Mouth/Throat:      Pharynx: Posterior oropharyngeal erythema present. No oropharyngeal exudate.    Eyes:      Conjunctiva/sclera: Conjunctivae normal.      Pupils: Pupils are equal, round, and reactive to light. Neck:      Thyroid: No thyromegaly. Trachea: No tracheal deviation. Cardiovascular:      Rate and Rhythm: Normal rate and regular rhythm. Heart sounds: No murmur heard. No friction rub. No gallop. Pulmonary:      Effort: Pulmonary effort is normal. No respiratory distress. Breath sounds: Normal breath sounds. Abdominal:      General: Bowel sounds are normal.      Palpations: Abdomen is soft. Musculoskeletal:         General: No tenderness or deformity. Cervical back: Normal range of motion and neck supple. Comments: Pain, stiffness, spasm, decreased range of motion lumbar spine. Lymphadenopathy:      Cervical: No cervical adenopathy. Skin:     General: Skin is warm and dry. Capillary Refill: Capillary refill takes less than 2 seconds. Findings: No rash. Neurological:      Mental Status: She is alert and oriented to person, place, and time. Sensory: No sensory deficit. Motor: No abnormal muscle tone.       Coordination: Coordination normal.      Deep Tendon Reflexes: Reflexes normal.           Seen By:  Kimberly Anguiano DO

## 2023-03-10 NOTE — TELEPHONE ENCOUNTER
Patient called to see if she can continue to take Ibuprofen 600mg for mouth pain. States that she forgot to ask you when she was here. She is also taking Tylenol. She is concerned because she has GERD and does not know if she can use Ibuprofen.

## 2023-03-12 LAB — URINE CULTURE, ROUTINE: NORMAL

## 2023-03-13 ENCOUNTER — APPOINTMENT (OUTPATIENT)
Dept: GENERAL RADIOLOGY | Age: 63
DRG: 479 | End: 2023-03-13
Payer: MEDICARE

## 2023-03-13 ENCOUNTER — HOSPITAL ENCOUNTER (INPATIENT)
Age: 63
LOS: 1 days | Discharge: HOME | DRG: 479 | End: 2023-03-18
Attending: EMERGENCY MEDICINE | Admitting: INTERNAL MEDICINE
Payer: MEDICARE

## 2023-03-13 ENCOUNTER — HOSPITAL ENCOUNTER (OUTPATIENT)
Dept: NEUROLOGY | Age: 63
Discharge: HOME | End: 2023-03-13

## 2023-03-13 DIAGNOSIS — R68.83 CHILLS: ICD-10-CM

## 2023-03-13 DIAGNOSIS — R51.9 INTRACTABLE HEADACHE, UNSPECIFIED CHRONICITY PATTERN, UNSPECIFIED HEADACHE TYPE: ICD-10-CM

## 2023-03-13 DIAGNOSIS — M54.9 INTRACTABLE BACK PAIN: Primary | ICD-10-CM

## 2023-03-13 DIAGNOSIS — M54.9 BACK PAIN, UNSPECIFIED BACK LOCATION, UNSPECIFIED BACK PAIN LATERALITY, UNSPECIFIED CHRONICITY: ICD-10-CM

## 2023-03-13 DIAGNOSIS — M79.10 MYALGIA: ICD-10-CM

## 2023-03-13 DIAGNOSIS — R26.2 AMBULATORY DYSFUNCTION: ICD-10-CM

## 2023-03-13 LAB
ADENOVIRUS BY PCR: NOT DETECTED
ALBUMIN SERPL-MCNC: 4.1 G/DL (ref 3.5–5.2)
ALP BLD-CCNC: 59 U/L (ref 35–104)
ALT SERPL-CCNC: 23 U/L (ref 0–32)
ANION GAP SERPL CALCULATED.3IONS-SCNC: 14 MMOL/L (ref 7–16)
AST SERPL-CCNC: 22 U/L (ref 0–31)
BACTERIA: ABNORMAL /HPF
BASOPHILS ABSOLUTE: 0.03 E9/L (ref 0–0.2)
BASOPHILS RELATIVE PERCENT: 0.5 % (ref 0–2)
BILIRUB SERPL-MCNC: 0.7 MG/DL (ref 0–1.2)
BILIRUBIN URINE: NEGATIVE
BLOOD, URINE: ABNORMAL
BORDETELLA PARAPERTUSSIS BY PCR: NOT DETECTED
BORDETELLA PERTUSSIS BY PCR: NOT DETECTED
BUN BLDV-MCNC: 5 MG/DL (ref 6–23)
C-REACTIVE PROTEIN: <0.3 MG/DL (ref 0–0.4)
CALCIUM SERPL-MCNC: 8.8 MG/DL (ref 8.6–10.2)
CHLAMYDOPHILIA PNEUMONIAE BY PCR: NOT DETECTED
CHLORIDE BLD-SCNC: 86 MMOL/L (ref 98–107)
CLARITY: CLEAR
CO2: 21 MMOL/L (ref 22–29)
COLOR: YELLOW
CORONAVIRUS 229E BY PCR: NOT DETECTED
CORONAVIRUS HKU1 BY PCR: NOT DETECTED
CORONAVIRUS NL63 BY PCR: NOT DETECTED
CORONAVIRUS OC43 BY PCR: NOT DETECTED
CREAT SERPL-MCNC: 0.6 MG/DL (ref 0.5–1)
EKG ATRIAL RATE: 92 BPM
EKG P AXIS: 66 DEGREES
EKG P-R INTERVAL: 164 MS
EKG Q-T INTERVAL: 352 MS
EKG QRS DURATION: 76 MS
EKG QTC CALCULATION (BAZETT): 435 MS
EKG R AXIS: 73 DEGREES
EKG T AXIS: 57 DEGREES
EKG VENTRICULAR RATE: 92 BPM
EOSINOPHILS ABSOLUTE: 0.02 E9/L (ref 0.05–0.5)
EOSINOPHILS RELATIVE PERCENT: 0.3 % (ref 0–6)
GFR SERPL CREATININE-BSD FRML MDRD: >60 ML/MIN/1.73
GLUCOSE BLD-MCNC: 96 MG/DL (ref 74–99)
GLUCOSE URINE: NEGATIVE MG/DL
HCT VFR BLD CALC: 35.4 % (ref 34–48)
HEMOGLOBIN: 12.7 G/DL (ref 11.5–15.5)
HUMAN METAPNEUMOVIRUS BY PCR: NOT DETECTED
HUMAN RHINOVIRUS/ENTEROVIRUS BY PCR: NOT DETECTED
IMMATURE GRANULOCYTES #: 0.01 E9/L
IMMATURE GRANULOCYTES %: 0.2 % (ref 0–5)
INFLUENZA A BY PCR: NOT DETECTED
INFLUENZA B BY PCR: NOT DETECTED
KETONES, URINE: ABNORMAL MG/DL
LACTIC ACID, SEPSIS: 1 MMOL/L (ref 0.5–1.9)
LEUKOCYTE ESTERASE, URINE: ABNORMAL
LIPASE: 33 U/L (ref 13–60)
LYMPHOCYTES ABSOLUTE: 1.17 E9/L (ref 1.5–4)
LYMPHOCYTES RELATIVE PERCENT: 19.7 % (ref 20–42)
MAGNESIUM: 1.9 MG/DL (ref 1.6–2.6)
MAGNESIUM: 2 MG/DL (ref 1.6–2.6)
MCH RBC QN AUTO: 32.2 PG (ref 26–35)
MCHC RBC AUTO-ENTMCNC: 35.9 % (ref 32–34.5)
MCV RBC AUTO: 89.8 FL (ref 80–99.9)
MONOCYTES ABSOLUTE: 0.57 E9/L (ref 0.1–0.95)
MONOCYTES RELATIVE PERCENT: 9.6 % (ref 2–12)
MYCOPLASMA PNEUMONIAE BY PCR: NOT DETECTED
NEUTROPHILS ABSOLUTE: 4.13 E9/L (ref 1.8–7.3)
NEUTROPHILS RELATIVE PERCENT: 69.7 % (ref 43–80)
NITRITE, URINE: NEGATIVE
PARAINFLUENZA VIRUS 1 BY PCR: NOT DETECTED
PARAINFLUENZA VIRUS 2 BY PCR: NOT DETECTED
PARAINFLUENZA VIRUS 3 BY PCR: NOT DETECTED
PARAINFLUENZA VIRUS 4 BY PCR: NOT DETECTED
PDW BLD-RTO: 13.1 FL (ref 11.5–15)
PH UA: 6.5 (ref 5–9)
PLATELET # BLD: 144 E9/L (ref 130–450)
PMV BLD AUTO: 9.3 FL (ref 7–12)
POTASSIUM SERPL-SCNC: 4 MMOL/L (ref 3.5–5)
PRO-BNP: 214 PG/ML (ref 0–125)
PROCALCITONIN: 0.03 NG/ML (ref 0–0.08)
PROTEIN UA: NEGATIVE MG/DL
RBC # BLD: 3.94 E12/L (ref 3.5–5.5)
RBC UA: ABNORMAL /HPF (ref 0–2)
RESPIRATORY SYNCYTIAL VIRUS BY PCR: NOT DETECTED
SARS-COV-2, PCR: NOT DETECTED
SEDIMENTATION RATE, ERYTHROCYTE: 0 MM/HR (ref 0–20)
SODIUM BLD-SCNC: 121 MMOL/L (ref 132–146)
SPECIFIC GRAVITY UA: 1.01 (ref 1–1.03)
TOTAL CK: 53 U/L (ref 20–180)
TOTAL PROTEIN: 6.6 G/DL (ref 6.4–8.3)
TROPONIN, HIGH SENSITIVITY: <6 NG/L (ref 0–9)
UROBILINOGEN, URINE: 0.2 E.U./DL
WBC # BLD: 5.9 E9/L (ref 4.5–11.5)
WBC UA: ABNORMAL /HPF (ref 0–5)

## 2023-03-13 PROCEDURE — 93010 ELECTROCARDIOGRAM REPORT: CPT | Performed by: INTERNAL MEDICINE

## 2023-03-13 PROCEDURE — 6360000002 HC RX W HCPCS: Performed by: FAMILY MEDICINE

## 2023-03-13 PROCEDURE — 85025 COMPLETE CBC W/AUTO DIFF WBC: CPT

## 2023-03-13 PROCEDURE — 96376 TX/PRO/DX INJ SAME DRUG ADON: CPT

## 2023-03-13 PROCEDURE — 6360000002 HC RX W HCPCS: Performed by: EMERGENCY MEDICINE

## 2023-03-13 PROCEDURE — 81001 URINALYSIS AUTO W/SCOPE: CPT

## 2023-03-13 PROCEDURE — 84145 PROCALCITONIN (PCT): CPT

## 2023-03-13 PROCEDURE — 82550 ASSAY OF CK (CPK): CPT

## 2023-03-13 PROCEDURE — 80053 COMPREHEN METABOLIC PANEL: CPT

## 2023-03-13 PROCEDURE — 83690 ASSAY OF LIPASE: CPT

## 2023-03-13 PROCEDURE — 96372 THER/PROPH/DIAG INJ SC/IM: CPT

## 2023-03-13 PROCEDURE — 6370000000 HC RX 637 (ALT 250 FOR IP): Performed by: FAMILY MEDICINE

## 2023-03-13 PROCEDURE — 99285 EMERGENCY DEPT VISIT HI MDM: CPT

## 2023-03-13 PROCEDURE — 86140 C-REACTIVE PROTEIN: CPT

## 2023-03-13 PROCEDURE — 84484 ASSAY OF TROPONIN QUANT: CPT

## 2023-03-13 PROCEDURE — 2580000003 HC RX 258: Performed by: FAMILY MEDICINE

## 2023-03-13 PROCEDURE — G0378 HOSPITAL OBSERVATION PER HR: HCPCS

## 2023-03-13 PROCEDURE — 96374 THER/PROPH/DIAG INJ IV PUSH: CPT

## 2023-03-13 PROCEDURE — 0202U NFCT DS 22 TRGT SARS-COV-2: CPT

## 2023-03-13 PROCEDURE — 93005 ELECTROCARDIOGRAM TRACING: CPT | Performed by: PHYSICIAN ASSISTANT

## 2023-03-13 PROCEDURE — 83880 ASSAY OF NATRIURETIC PEPTIDE: CPT

## 2023-03-13 PROCEDURE — 36415 COLL VENOUS BLD VENIPUNCTURE: CPT

## 2023-03-13 PROCEDURE — 85651 RBC SED RATE NONAUTOMATED: CPT

## 2023-03-13 PROCEDURE — 87040 BLOOD CULTURE FOR BACTERIA: CPT

## 2023-03-13 PROCEDURE — 96375 TX/PRO/DX INJ NEW DRUG ADDON: CPT

## 2023-03-13 PROCEDURE — 83735 ASSAY OF MAGNESIUM: CPT

## 2023-03-13 PROCEDURE — 2580000003 HC RX 258: Performed by: EMERGENCY MEDICINE

## 2023-03-13 PROCEDURE — 71045 X-RAY EXAM CHEST 1 VIEW: CPT

## 2023-03-13 PROCEDURE — 83605 ASSAY OF LACTIC ACID: CPT

## 2023-03-13 RX ORDER — POLYETHYLENE GLYCOL 3350 17 G/17G
17 POWDER, FOR SOLUTION ORAL DAILY PRN
Status: DISCONTINUED | OUTPATIENT
Start: 2023-03-13 | End: 2023-03-18 | Stop reason: HOSPADM

## 2023-03-13 RX ORDER — POTASSIUM CHLORIDE 7.45 MG/ML
10 INJECTION INTRAVENOUS PRN
Status: DISCONTINUED | OUTPATIENT
Start: 2023-03-13 | End: 2023-03-18 | Stop reason: HOSPADM

## 2023-03-13 RX ORDER — ONDANSETRON 2 MG/ML
4 INJECTION INTRAMUSCULAR; INTRAVENOUS EVERY 6 HOURS PRN
Status: DISCONTINUED | OUTPATIENT
Start: 2023-03-13 | End: 2023-03-18 | Stop reason: HOSPADM

## 2023-03-13 RX ORDER — METHOCARBAMOL 500 MG/1
TABLET, FILM COATED ORAL
Qty: 40 TABLET | Refills: 0 | OUTPATIENT
Start: 2023-03-13

## 2023-03-13 RX ORDER — MIDAZOLAM HYDROCHLORIDE 2 MG/2ML
1 INJECTION, SOLUTION INTRAMUSCULAR; INTRAVENOUS ONCE
Status: COMPLETED | OUTPATIENT
Start: 2023-03-13 | End: 2023-03-13

## 2023-03-13 RX ORDER — SODIUM CHLORIDE 0.9 % (FLUSH) 0.9 %
10 SYRINGE (ML) INJECTION PRN
Status: DISCONTINUED | OUTPATIENT
Start: 2023-03-13 | End: 2023-03-18 | Stop reason: HOSPADM

## 2023-03-13 RX ORDER — MORPHINE SULFATE 2 MG/ML
2 INJECTION, SOLUTION INTRAMUSCULAR; INTRAVENOUS EVERY 4 HOURS PRN
Status: DISCONTINUED | OUTPATIENT
Start: 2023-03-13 | End: 2023-03-15

## 2023-03-13 RX ORDER — ORPHENADRINE CITRATE 30 MG/ML
60 INJECTION INTRAMUSCULAR; INTRAVENOUS ONCE
Status: COMPLETED | OUTPATIENT
Start: 2023-03-13 | End: 2023-03-13

## 2023-03-13 RX ORDER — HYDROXYZINE PAMOATE 25 MG/1
50 CAPSULE ORAL 3 TIMES DAILY PRN
Status: DISCONTINUED | OUTPATIENT
Start: 2023-03-13 | End: 2023-03-18 | Stop reason: HOSPADM

## 2023-03-13 RX ORDER — ONDANSETRON 4 MG/1
4 TABLET, ORALLY DISINTEGRATING ORAL EVERY 8 HOURS PRN
Status: DISCONTINUED | OUTPATIENT
Start: 2023-03-13 | End: 2023-03-18 | Stop reason: HOSPADM

## 2023-03-13 RX ORDER — ENOXAPARIN SODIUM 100 MG/ML
40 INJECTION SUBCUTANEOUS DAILY
Status: DISCONTINUED | OUTPATIENT
Start: 2023-03-13 | End: 2023-03-18 | Stop reason: HOSPADM

## 2023-03-13 RX ORDER — OXYCODONE HYDROCHLORIDE AND ACETAMINOPHEN 5; 325 MG/1; MG/1
1 TABLET ORAL EVERY 4 HOURS PRN
Status: DISCONTINUED | OUTPATIENT
Start: 2023-03-13 | End: 2023-03-18 | Stop reason: HOSPADM

## 2023-03-13 RX ORDER — ACETAMINOPHEN 325 MG/1
650 TABLET ORAL EVERY 6 HOURS PRN
Status: DISCONTINUED | OUTPATIENT
Start: 2023-03-13 | End: 2023-03-18 | Stop reason: HOSPADM

## 2023-03-13 RX ORDER — SODIUM CHLORIDE 0.9 % (FLUSH) 0.9 %
5-40 SYRINGE (ML) INJECTION EVERY 12 HOURS SCHEDULED
Status: DISCONTINUED | OUTPATIENT
Start: 2023-03-13 | End: 2023-03-18 | Stop reason: HOSPADM

## 2023-03-13 RX ORDER — ATORVASTATIN CALCIUM 20 MG/1
20 TABLET, FILM COATED ORAL EVERY MORNING
Status: DISCONTINUED | OUTPATIENT
Start: 2023-03-14 | End: 2023-03-18 | Stop reason: HOSPADM

## 2023-03-13 RX ORDER — SODIUM CHLORIDE 9 MG/ML
INJECTION, SOLUTION INTRAVENOUS CONTINUOUS
Status: DISCONTINUED | OUTPATIENT
Start: 2023-03-13 | End: 2023-03-14

## 2023-03-13 RX ORDER — SODIUM CHLORIDE 9 MG/ML
INJECTION, SOLUTION INTRAVENOUS CONTINUOUS
Status: DISCONTINUED | OUTPATIENT
Start: 2023-03-13 | End: 2023-03-18 | Stop reason: HOSPADM

## 2023-03-13 RX ORDER — MORPHINE SULFATE 4 MG/ML
4 INJECTION, SOLUTION INTRAMUSCULAR; INTRAVENOUS ONCE
Status: COMPLETED | OUTPATIENT
Start: 2023-03-13 | End: 2023-03-13

## 2023-03-13 RX ORDER — POTASSIUM CHLORIDE 20 MEQ/1
40 TABLET, EXTENDED RELEASE ORAL PRN
Status: DISCONTINUED | OUTPATIENT
Start: 2023-03-13 | End: 2023-03-18 | Stop reason: HOSPADM

## 2023-03-13 RX ORDER — ACETAMINOPHEN 650 MG/1
650 SUPPOSITORY RECTAL EVERY 6 HOURS PRN
Status: DISCONTINUED | OUTPATIENT
Start: 2023-03-13 | End: 2023-03-18 | Stop reason: HOSPADM

## 2023-03-13 RX ORDER — METHOCARBAMOL 500 MG/1
500 TABLET, FILM COATED ORAL 4 TIMES DAILY
Status: DISCONTINUED | OUTPATIENT
Start: 2023-03-13 | End: 2023-03-17

## 2023-03-13 RX ORDER — DULOXETIN HYDROCHLORIDE 30 MG/1
30 CAPSULE, DELAYED RELEASE ORAL DAILY
Status: DISCONTINUED | OUTPATIENT
Start: 2023-03-13 | End: 2023-03-18 | Stop reason: HOSPADM

## 2023-03-13 RX ORDER — FAMOTIDINE 20 MG/1
20 TABLET, FILM COATED ORAL 2 TIMES DAILY
Status: DISCONTINUED | OUTPATIENT
Start: 2023-03-13 | End: 2023-03-18 | Stop reason: HOSPADM

## 2023-03-13 RX ORDER — SODIUM CHLORIDE 9 MG/ML
INJECTION, SOLUTION INTRAVENOUS PRN
Status: DISCONTINUED | OUTPATIENT
Start: 2023-03-13 | End: 2023-03-18 | Stop reason: HOSPADM

## 2023-03-13 RX ADMIN — MIDAZOLAM 1 MG: 1 INJECTION INTRAMUSCULAR; INTRAVENOUS at 12:00

## 2023-03-13 RX ADMIN — DULOXETINE 30 MG: 30 CAPSULE, DELAYED RELEASE ORAL at 21:33

## 2023-03-13 RX ADMIN — FAMOTIDINE 20 MG: 20 TABLET ORAL at 21:33

## 2023-03-13 RX ADMIN — HYDROMORPHONE HYDROCHLORIDE 0.5 MG: 1 INJECTION, SOLUTION INTRAMUSCULAR; INTRAVENOUS; SUBCUTANEOUS at 19:07

## 2023-03-13 RX ADMIN — SODIUM CHLORIDE: 9 INJECTION, SOLUTION INTRAVENOUS at 21:32

## 2023-03-13 RX ADMIN — SODIUM CHLORIDE: 9 INJECTION, SOLUTION INTRAVENOUS at 12:56

## 2023-03-13 RX ADMIN — HYDROMORPHONE HYDROCHLORIDE 1 MG: 1 INJECTION, SOLUTION INTRAMUSCULAR; INTRAVENOUS; SUBCUTANEOUS at 13:41

## 2023-03-13 RX ADMIN — MORPHINE SULFATE 4 MG: 4 INJECTION, SOLUTION INTRAMUSCULAR; INTRAVENOUS at 11:59

## 2023-03-13 RX ADMIN — METHOCARBAMOL 500 MG: 500 TABLET ORAL at 21:33

## 2023-03-13 RX ADMIN — Medication 10 ML: at 22:36

## 2023-03-13 RX ADMIN — ORPHENADRINE CITRATE 60 MG: 30 INJECTION INTRAMUSCULAR; INTRAVENOUS at 12:01

## 2023-03-13 RX ADMIN — ACETAMINOPHEN 650 MG: 325 TABLET ORAL at 21:33

## 2023-03-13 RX ADMIN — MIDAZOLAM 1 MG: 1 INJECTION, SOLUTION INTRAMUSCULAR; INTRAVENOUS at 13:41

## 2023-03-13 RX ADMIN — ENOXAPARIN SODIUM 40 MG: 100 INJECTION SUBCUTANEOUS at 21:34

## 2023-03-13 ASSESSMENT — PAIN DESCRIPTION - ORIENTATION: ORIENTATION: LOWER

## 2023-03-13 ASSESSMENT — PAIN SCALES - GENERAL
PAINLEVEL_OUTOF10: 7
PAINLEVEL_OUTOF10: 10
PAINLEVEL_OUTOF10: 9
PAINLEVEL_OUTOF10: 10
PAINLEVEL_OUTOF10: 7

## 2023-03-13 ASSESSMENT — PAIN DESCRIPTION - LOCATION
LOCATION: ABDOMEN
LOCATION: ABDOMEN
LOCATION: BACK;HEAD
LOCATION: ABDOMEN

## 2023-03-13 ASSESSMENT — PAIN - FUNCTIONAL ASSESSMENT: PAIN_FUNCTIONAL_ASSESSMENT: 0-10

## 2023-03-13 ASSESSMENT — PAIN DESCRIPTION - DESCRIPTORS: DESCRIPTORS: ACHING

## 2023-03-13 NOTE — ED NOTES
Pt refused straight cath.  Pt urinated in bedpan approx 100cc urine     Lily White, BRAD  03/13/23 0735

## 2023-03-13 NOTE — ED NOTES
FIRST PROVIDER CONTACT ASSESSMENT NOTE       Department of Emergency Medicine                 First Provider Note            3/13/23  10:06 AM EDT    Date of Encounter: No admission date for patient encounter. Patient Name: Alondra Rios  : 1960  MRN: [id number]    Chief Complaint: Other (Flank pain, headache, nausea, vomiting, chills   all this going on for over a month, getting EMG today and couldn't take the test.  They sent her here.)      History of Present Illness:   Alondra Rios is a 65 y.o. female who presents to the ED for ongoing headache, chills, nausea, vomiting, abdominal pain x 1 month. 5th ER visit for the same. Focused Physical Exam:  VS:    ED Triage Vitals   BP Temp Temp src Heart Rate Resp SpO2 Height Weight   23 1005 23 0959 -- 23 0959 23 1005 23 0959 -- 23 1005   (!) 154/88 97.1 °F (36.2 °C)  98 18 100 %  128 lb (58.1 kg)        Physical Ex: Constitutional: Alert and non-toxic. Medical History:  has a past medical history of Anxiety, Dental caries, Depression, and Hyperlipidemia. Surgical History:  has a past surgical history that includes  section; Carpal tunnel release (Bilateral); cervical fusion; and Dental surgery (N/A, 2019). Social History:  reports that she has been smoking cigarettes. She has a 21.00 pack-year smoking history. She has never used smokeless tobacco. She reports that she does not drink alcohol and does not use drugs. Family History: family history includes Diabetes in her mother; Heart Disease in her mother.     Allergies: Lansoprazole, Naproxen, Prilosec [omeprazole], Trintellix [vortioxetine], and Zoloft [sertraline hcl]     Initial Plan of Care: Initiate Treatment-Testing, Proceed toTreatment Area When Bed Available for ED Attending/MLP to Continue Care      ---END OF FIRST PROVIDER CONTACT ASSESSMENT NOTE---  Electronically signed by CHANG Woodall   DD: 3/13/23 CHANG Peres  03/13/23 100

## 2023-03-13 NOTE — ED NOTES
Pt resting in position of comfort on cot presenting in no acute/ apparent distress (NAD). Respirations are noted even and unlabored with good rise and fall of the chest observed. Pt updated with current plan of care (POC) and all questions/ concerns addressed. Patient voices no needs at this time. Cot noted in lowest position, locked, with side rails X 2 up for patient safety. Will continue to monitor patient for acute changes.        Belinda Laguna RN  03/13/23 4794

## 2023-03-13 NOTE — ED PROVIDER NOTES
[address]        Pt Name: Yana Herrera  MRN: [id number]  [place] 1960  Date of evaluation: 3/13/2023  Provider: Adina Rod DO  PCP: Chas Rodriguez DO  Note Started: 11:30 AM EDT 3/13/23    CHIEF COMPLAINT       Chief Complaint   Patient presents with    Other     Flank pain, headache, nausea, vomiting, chills   all this going on for over a month, getting EMG today and couldn't take the test.  They sent her here. HISTORY OF PRESENT ILLNESS: 1 or more Elements   History From: patient, Son, EMR    Limitations to history : None    Yana Herrera is a 65 y.o. female who presents flank pain nausea vomiting chills myalgias. Has been worked up for flank pain and back pain. Previously on gabapentin was taken off this 2 months ago has been put on Norco.  She has had multiple CT scans and MRIs notably of her back which showed a sclerotic lesion. She was in the hospital today for an EMG however she was unable to obtain it. She complains of chills chest pressure nausea, son accompanies her states she has not been out of bed for 5 days not been able to eat or drink. Last took [address] last night did not take her 7 AM dose. She reports pain has been worsening for the last month however in the last 7 days she has had chills nausea has been intolerant of food generalized weakness. Nursing Notes were all reviewed and agreed with or any disagreements were addressed in the HPI. REVIEW OF EXTERNAL NOTE :       PCP office note from 3/10/2023, she is complaining of thrush, hematuria, urine culture was sent. REVIEW OF SYSTEMS :           Positives and Pertinent negatives as per HPI.      SURGICAL HISTORY     Past Surgical History:   Procedure Laterality Date    CARPAL TUNNEL RELEASE Bilateral     CERVICAL FUSION       SECTION      x 3    DENTAL SURGERY N/A 2019    DENTAL EXAM FULL MOUTH EXTRACTIONS ALEOPOLASTY performed by Jed Donaldson DDS at [facility]       Previous Medications    ATORVASTATIN (LIPITOR) 20 MG TABLET    Take 20 mg by mouth every morning    DULOXETINE (CYMBALTA) 30 MG EXTENDED RELEASE CAPSULE    Take 30 mg by mouth daily    FAMOTIDINE (PEPCID) 20 MG TABLET    Take 20 mg by mouth 2 times daily    HYDROXYZINE PAMOATE (VISTARIL) 50 MG CAPSULE    Take 50 mg by mouth 3 times daily as needed for Itching    METHOCARBAMOL (ROBAXIN) 500 MG TABLET    Take 1 tablet by mouth 4 times daily for 10 days    NYSTATIN (MYCOSTATIN) 550140 UNIT/ML SUSPENSION    Take 5 mLs by mouth 4 times daily for 10 days Retain in mouth as long as possible    POLYETHYLENE GLYCOL (GLYCOLAX) 17 GM/SCOOP POWDER    Take 17 g by mouth daily    PROBIOTIC PRODUCT (PROBIOTIC ADVANCED PO)    Take 4 capsules by mouth every morning PRODUCT: BIO COMPLETE 3       ALLERGIES     Lansoprazole, Naproxen, Prilosec [omeprazole], Trintellix [vortioxetine], and Zoloft [sertraline hcl]    FAMILYHISTORY       Family History   Problem Relation Age of Onset    Heart Disease Mother     Diabetes Mother         SOCIAL HISTORY       Social History     Tobacco Use    Smoking status: Every Day     Packs/day: 0.50     Years: 42.00     Pack years: 21.00     Types: Cigarettes    Smokeless tobacco: Never   Vaping Use    Vaping Use: Every day   Substance Use Topics    Alcohol use: No    Drug use: No       SCREENINGS        [place] Coma Scale  Eye Opening: Spontaneous  Best Verbal Response: Oriented  Best Motor Response: Obeys commands  [place] Coma Scale Score: 15                CIWA Assessment  BP: 133/84  Heart Rate: 82           PHYSICAL EXAM  1 or more Elements     ED Triage Vitals   BP Temp Temp src Heart Rate Resp SpO2 Height Weight   03/13/23 1005 03/13/23 0959 -- 03/13/23 0959 03/13/23 1005 03/13/23 0959 -- 03/13/23 1005   (!) 154/88 97.1 °F (36.2 °C)  98 18 100 %  128 lb (58.1 kg) Constitutional/General: Alert and oriented x3 in distress due to pain  Head: Normocephalic and atraumatic  Eyes: PERRL, EOMI, conjunctiva normal, sclera non icteric  ENT:  Oropharynx clear, handling secretions, no trismus, no asymmetry of the posterior oropharynx or uvular edema  Neck: Supple, full ROM, no stridor, no meningeal signs  Respiratory: Lungs clear but diminished throughout not in respiratory distress  Cardiovascular:  Regular rate. Regular rhythm. 2+ distal pulses. Equal extremity pulses. Chest: No chest wall tenderness  GI:  Abdomen Soft, tenderness along the left flank and suprapubic area. No rebound, guarding, or rigidity. No pulsatile masses. Musculoskeletal: Moves all extremities x 4. Warm and well perfused Capillary refill <3 seconds  Integument: skin warm and dry. No rashes.    Neurologic: GCS 15,    Psychiatric: Normal Affect            DIAGNOSTIC RESULTS   LABS:    Labs Reviewed   CBC WITH AUTO DIFFERENTIAL - Abnormal; Notable for the following components:       Result Value    MCHC 35.9 (*)     Lymphocytes % 19.7 (*)     Lymphocytes Absolute 1.17 (*)     Eosinophils Absolute 0.02 (*)     All other components within normal limits   URINALYSIS WITH MICROSCOPIC - Abnormal; Notable for the following components:    Ketones, Urine TRACE (*)     Blood, Urine SMALL (*)     Leukocyte Esterase, Urine TRACE (*)     Bacteria, UA RARE (*)     All other components within normal limits   COMPREHENSIVE METABOLIC PANEL - Abnormal; Notable for the following components:    Sodium 121 (*)     Chloride 86 (*)     CO2 21 (*)     BUN 5 (*)     All other components within normal limits   BRAIN NATRIURETIC PEPTIDE - Abnormal; Notable for the following components:    Pro- (*)     All other components within normal limits   RESPIRATORY PANEL, MOLECULAR, WITH COVID-19   CULTURE, BLOOD 1   CULTURE, BLOOD 2   TROPONIN   MAGNESIUM   LIPASE   C-REACTIVE PROTEIN   SEDIMENTATION RATE   LACTATE, SEPSIS PROCALCITONIN   CK   LACTATE, SEPSIS   MAGNESIUM       As interpreted by me, the above displayed labs are abnormal. All other labs obtained during this visit were within normal range or not returned as of this dictation. EKG Interpretation  Interpreted by emergency department physician, Aishwarya Cunningham DO      EKG @ [phone]: This EKG is signed and interpreted by Dr Fatou Pappas. Rate: 92  Rhythm: Sinus  Interpretation: Sinus rhythm, normal axis, ME is 164, QRS is 76, QTc is 435, notes preexcitation ST elevation, nonspecific, mild motion artifact,, compared to 3/3/2023 to stable  Comparison: stable as compared to patient's most recent EKG          RADIOLOGY:   Non-plain film images such as CT, Ultrasound and MRI are read by the radiologist. Plain radiographic images are visualized and preliminarily interpreted by the ED Provider with the below findings: This X-Ray is independently viewed and interpreted by me:   - Study: Chest X-Ray   - Number of Views: 1  - Findings: No infiltrate and No pneumothorax      Interpretation per the Radiologist below, if available at the time of this note:    XR CHEST PORTABLE   Final Result   No acute cardiopulmonary process. MRI THORACIC SPINE W WO CONTRAST    Result Date: 3/6/2023  EXAMINATION: MRI OF THE THORACIC SPINE WITHOUT AND WITH CONTRAST  3/6/2023 2:54 pm TECHNIQUE: Multiplanar multisequence MRI of the thoracic spine was performed without and with the administration of intravenous contrast. COMPARISON: CT of the thoracic spine without contrast, 02/15/2023. HISTORY: ORDERING SYSTEM PROVIDED HISTORY: Acute bilateral low back pain with bilateral sciatica TECHNOLOGIST PROVIDED HISTORY: STAT Creatinine as needed:->No FINDINGS: BONES/ALIGNMENT: Corresponding to the sclerotic lesion identified in the T7 vertebral body is a T1 hypointense and T2 hyperintense enhancing lesion. No other lesion is seen. No fracture or joint dislocation.   The thoracic kyphosis is preserved. SPINAL CORD: No abnormal cord signal is seen. SOFT TISSUES:  No paraspinal mass or edema seen. DEGENERATIVE CHANGES: Small disc protrusions are noted at multiple levels in the thoracic spine. None result in any significant central canal stenosis. Facet hypertrophic changes result in mild neural foraminal stenoses at T1-2.     1. Corresponding to the 1.7 cm sclerotic lesion in the T7 vertebral body is a T1 hypointense, T2 hyperintense enhancing lesion. It is nonspecific by imaging. A metastatic etiology cannot be excluded. Further evaluation with a bone scan is recommended. 2. No significant central canal stenosis. 3. Mild neural foraminal stenoses at T1-2, resulting from facet hypertrophic changes. MRI LUMBAR SPINE W WO CONTRAST    Result Date: 3/6/2023  EXAMINATION: MRI OF THE LUMBAR SPINE WITHOUT AND WITH CONTRAST  3/6/2023 2:20 pm TECHNIQUE: Multiplanar multisequence MRI of the lumbar spine was performed without and with the administration of intravenous contrast. COMPARISON: CT the lumbar spine 02/15/2023. HISTORY: ORDERING SYSTEM PROVIDED HISTORY: Acute bilateral low back pain with bilateral sciatica TECHNOLOGIST PROVIDED HISTORY: STAT Creatinine as needed:->No FINDINGS: BONES/ALIGNMENT: There is normal alignment of the spine. The vertebral body heights are maintained. The bone marrow signal appears unremarkable. Small nodes are seen along the superior endplate of L2 and the inferior endplate of the L3 vertebral bodies. Enhancement is seen associated with 1 of the Schmorl's node along the inferior endplate of the L3 vertebral body small 5 mm T1 hypointense and T2 hyperintense lesion in the L3 vertebral body (series 3, image 7) does not demonstrate any enhancement. SPINAL CORD:  The conus terminates normally. SOFT TISSUES: No abnormal enhancement is seen of the lumbar spine. No paraspinal mass identified. L1-L2: Moderate loss of disc height with a disc bulge.   Mild facet hypertrophy. Mild central canal, lateral recess and neural foraminal stenoses. L2-L3: Prominent loss of disc height with a disc osteophyte complex. Small disc protrusion in the left lateral recess. Mild facet hypertrophy. No significant central canal stenosis. Moderate stenosis of the left lateral recess. Mild-to-moderate neural foraminal stenoses. L3-L4: Prominent loss of disc height with a small disc osteophyte complex. Mild facet and ligamentous hypertrophy. No significant central canal stenosis. Mild lateral recess stenoses. Moderate to severe right and moderate left neural foraminal stenoses. L4-L5: Disc desiccation with a small disc bulge. Mild facet hypertrophy. No significant central canal stenosis. Mild lateral recess and neural foraminal stenoses. L5-S1: Disc desiccation with small disc bulge. Small left foraminal and extraforaminal disc protrusion. No significant central canal or lateral recess stenosis. Moderate left neural foraminal stenosis. 1. 5 mm T1 hypointense nonenhancing lesion in the L3 vertebral body is nonspecific. Absence of enhancement would be atypical for un treated metastatic disease. 2. Schmorl nodes along L2 and L3 vertebral body endplates. 3. Mild central canal stenosis at L1-2. 4.  Multilevel neural foraminal stenoses, worst (moderate to severe) at the right L3-4 level. No results found. PROCEDURES   Unless otherwise noted below, none               PAST MEDICAL HISTORY/Chronic Conditions Affecting Care      has a past medical history of Anxiety, Dental caries, Depression, and Hyperlipidemia.      EMERGENCY DEPARTMENT COURSE    Vitals:    Vitals:    03/13/23 1300 03/13/23 1338 03/13/23 1345 03/13/23 1401   BP:  (!) 148/86  133/84   Pulse: 89 88  82   Resp: 18 29 23   Temp:    98.1 °F (36.7 °C)   TempSrc:    Oral   SpO2: 99% 99% 94% 97%   Weight:           Patient was given the following medications:  Medications   0.9 % sodium chloride infusion ( IntraVENous New Bag 3/13/23 1256)   HYDROmorphone (DILAUDID) injection 0.5 mg (has no administration in time range)   orphenadrine (NORFLEX) injection 60 mg (60 mg IntraVENous Given 3/13/23 1201)   midazolam PF (VERSED) injection 1 mg (1 mg IntraVENous Given 3/13/23 1200)   morphine sulfate (PF) injection 4 mg (4 mg IntraVENous Given 3/13/23 1159)   midazolam PF (VERSED) injection 1 mg (1 mg IntraVENous Given 3/13/23 1341)   HYDROmorphone (DILAUDID) injection 1 mg (1 mg IntraVENous Given 3/13/23 1341)           Is this patient to be included in the SEP-1 Core Measure due to severe sepsis or septic shock? No   Exclusion criteria - the patient is NOT to be included for SEP-1 Core Measure due to:  2+ SIRS criteria are not met        Medical Decision Making/Differential Diagnosis:    CC/HPI Summary, Social Determinants of health, Records Reviewed, DDx, testing done/not done, ED Course, Reassessment, disposition considerations/shared decision making with patient, consults, disposition:            Medical Decision Making  Trae Ferguson is a 65 y.o. female who presents flank pain nausea vomiting chills myalgias. Has been worked up for flank pain and back pain. Previously on gabapentin was taken off this 2 months ago has been put on Norco.  She has had multiple CT scans and MRIs notably of her back which showed a sclerotic lesion. She was in the hospital today for an EMG however she was unable to obtain it. She complains of chills chest pressure nausea, son accompanies her states she has not been out of bed for 5 days not been able to eat or drink. Last took [address] last night did not take her 7 AM dose. She reports pain has been worsening for the last month however in the last 7 days she has had chills nausea has been intolerant of food generalized weakness.   She also states difficulty urinating, states she was able to void this morning but feels pressure that she needs to urinate now has been unable to do so    Differential diagnosis includes infectious rhabdo myelosis intractable back pain, to name a few    Previous imaging reviewed, she has had outpatient work-up and has upcoming appointment with neurosurgery. Pain poorly controlled, 1 from 10 to an 8 to a 6, has not been out of bed for several days been tolerant of eating or drinking secondary to pain. Lab work relatively reassuring, unable to ambulate secondary to pain, spoke with medicine patient kept for further care and work-up    EKG is ordered to have documentation of patient's current rhythm, and to rule out any obvious acute cardiac illnesses such as ACS. Additionally, QT interval may be of use in decision making regarding any medications administered here in the ED. Patient is placed on cardiac monitor and continuous pulse ox for monitoring. CBC is ordered to evaluate for any signs of infection or inflammation by obtaining a WBC count, or any signs of acute anemia by interpreting hemoglobin. CMP was ordered to evaluate for any electrolyte imbalances, kidney function, or any elevations in anion gap. A metabolic panel with electrolytes was ordered to evaluate for an electrolyte abnormalities and/or to evaluate for kidney function which may impact decision on types, doses of medications or imaging studies ordered here in the ER. Lactic acid levels were ordered to evaluate for signs of ischemia or decrease perfusion to organ systems. Viral swabs are ordered to evaluate possible viral etiology of symptoms. Blood cultures are ordered to evaluate, rule out and, if present, treat bacteremia with antibiotics narrowed down to the found organism. CK levels ordered to rule out rhabdomyolysis as cause of symptoms. Chest x-ray is ordered to evaluate for any possible signs of pneumonia, pleural effusions, cardiomegaly, pneumothorax, atelectasis, rib or sternal abnormalities including fractures.       Amount and/or Complexity of Data Reviewed  External Data Reviewed: radiology, ECG and notes. Labs: ordered. Radiology: ordered and independent interpretation performed. Decision-making details documented in ED Course. ECG/medicine tests: ordered and independent interpretation performed. Decision-making details documented in ED Course. Risk  Prescription drug management. Decision regarding hospitalization. CONSULTS: (Who and What was discussed)  IP CONSULT TO INTERNAL MEDICINE         I am the Primary Clinician of Record. FINAL IMPRESSION      1. Intractable back pain    2. Chills    3. Intractable headache, unspecified chronicity pattern, unspecified headache type    4. Myalgia    5.  Ambulatory dysfunction          DISPOSITION/PLAN     DISPOSITION Admitted 03/13/2023 03:54:41 PM                  (Please note that portions of this note were completed with a voice recognition program.  Efforts were made to edit the dictations but occasionally words are mis-transcribed.)    Obed Peabody, DO (electronically signed)            Obed Peabody, DO  03/13/23 [address] [place] [address], DO  03/13/23 1600

## 2023-03-14 ENCOUNTER — APPOINTMENT (OUTPATIENT)
Dept: CT IMAGING | Age: 63
DRG: 479 | End: 2023-03-14
Payer: MEDICARE

## 2023-03-14 LAB
ANION GAP SERPL CALCULATED.3IONS-SCNC: 8 MMOL/L (ref 7–16)
BASOPHILS ABSOLUTE: 0.03 E9/L (ref 0–0.2)
BASOPHILS RELATIVE PERCENT: 0.7 % (ref 0–2)
BUN BLDV-MCNC: 7 MG/DL (ref 6–23)
CALCIUM SERPL-MCNC: 8.2 MG/DL (ref 8.6–10.2)
CHLORIDE BLD-SCNC: 99 MMOL/L (ref 98–107)
CO2: 24 MMOL/L (ref 22–29)
CREAT SERPL-MCNC: 0.6 MG/DL (ref 0.5–1)
EOSINOPHILS ABSOLUTE: 0.05 E9/L (ref 0.05–0.5)
EOSINOPHILS RELATIVE PERCENT: 1.2 % (ref 0–6)
GFR SERPL CREATININE-BSD FRML MDRD: >60 ML/MIN/1.73
GLUCOSE BLD-MCNC: 82 MG/DL (ref 74–99)
HCT VFR BLD CALC: 33.5 % (ref 34–48)
HEMOGLOBIN: 11.5 G/DL (ref 11.5–15.5)
IMMATURE GRANULOCYTES #: 0.01 E9/L
IMMATURE GRANULOCYTES %: 0.2 % (ref 0–5)
LYMPHOCYTES ABSOLUTE: 1.45 E9/L (ref 1.5–4)
LYMPHOCYTES RELATIVE PERCENT: 34 % (ref 20–42)
MCH RBC QN AUTO: 31.9 PG (ref 26–35)
MCHC RBC AUTO-ENTMCNC: 34.3 % (ref 32–34.5)
MCV RBC AUTO: 93.1 FL (ref 80–99.9)
MONOCYTES ABSOLUTE: 0.41 E9/L (ref 0.1–0.95)
MONOCYTES RELATIVE PERCENT: 9.6 % (ref 2–12)
NEUTROPHILS ABSOLUTE: 2.31 E9/L (ref 1.8–7.3)
NEUTROPHILS RELATIVE PERCENT: 54.3 % (ref 43–80)
PDW BLD-RTO: 13.8 FL (ref 11.5–15)
PLATELET # BLD: 135 E9/L (ref 130–450)
PMV BLD AUTO: 9.2 FL (ref 7–12)
POTASSIUM REFLEX MAGNESIUM: 3.8 MMOL/L (ref 3.5–5)
RBC # BLD: 3.6 E12/L (ref 3.5–5.5)
SODIUM BLD-SCNC: 131 MMOL/L (ref 132–146)
WBC # BLD: 4.3 E9/L (ref 4.5–11.5)

## 2023-03-14 PROCEDURE — 97161 PT EVAL LOW COMPLEX 20 MIN: CPT

## 2023-03-14 PROCEDURE — 96376 TX/PRO/DX INJ SAME DRUG ADON: CPT

## 2023-03-14 PROCEDURE — 70450 CT HEAD/BRAIN W/O DYE: CPT

## 2023-03-14 PROCEDURE — 36415 COLL VENOUS BLD VENIPUNCTURE: CPT

## 2023-03-14 PROCEDURE — G0378 HOSPITAL OBSERVATION PER HR: HCPCS

## 2023-03-14 PROCEDURE — 97165 OT EVAL LOW COMPLEX 30 MIN: CPT

## 2023-03-14 PROCEDURE — 6360000002 HC RX W HCPCS: Performed by: FAMILY MEDICINE

## 2023-03-14 PROCEDURE — 80048 BASIC METABOLIC PNL TOTAL CA: CPT

## 2023-03-14 PROCEDURE — 6370000000 HC RX 637 (ALT 250 FOR IP): Performed by: INTERNAL MEDICINE

## 2023-03-14 PROCEDURE — 2580000003 HC RX 258: Performed by: FAMILY MEDICINE

## 2023-03-14 PROCEDURE — 97530 THERAPEUTIC ACTIVITIES: CPT

## 2023-03-14 PROCEDURE — 85025 COMPLETE CBC W/AUTO DIFF WBC: CPT

## 2023-03-14 PROCEDURE — 96372 THER/PROPH/DIAG INJ SC/IM: CPT

## 2023-03-14 PROCEDURE — 97535 SELF CARE MNGMENT TRAINING: CPT

## 2023-03-14 PROCEDURE — 99222 1ST HOSP IP/OBS MODERATE 55: CPT | Performed by: NEUROLOGICAL SURGERY

## 2023-03-14 PROCEDURE — 6370000000 HC RX 637 (ALT 250 FOR IP): Performed by: FAMILY MEDICINE

## 2023-03-14 RX ORDER — OXYCODONE HCL 10 MG/1
10 TABLET, FILM COATED, EXTENDED RELEASE ORAL EVERY 12 HOURS SCHEDULED
Status: DISCONTINUED | OUTPATIENT
Start: 2023-03-14 | End: 2023-03-18 | Stop reason: HOSPADM

## 2023-03-14 RX ADMIN — OXYCODONE AND ACETAMINOPHEN 1 TABLET: 5; 325 TABLET ORAL at 13:50

## 2023-03-14 RX ADMIN — DULOXETINE 30 MG: 30 CAPSULE, DELAYED RELEASE ORAL at 21:38

## 2023-03-14 RX ADMIN — ENOXAPARIN SODIUM 40 MG: 100 INJECTION SUBCUTANEOUS at 08:14

## 2023-03-14 RX ADMIN — HYDROXYZINE PAMOATE 50 MG: 25 CAPSULE ORAL at 17:04

## 2023-03-14 RX ADMIN — FAMOTIDINE 20 MG: 20 TABLET ORAL at 21:38

## 2023-03-14 RX ADMIN — METHOCARBAMOL 500 MG: 500 TABLET ORAL at 11:28

## 2023-03-14 RX ADMIN — MORPHINE SULFATE 2 MG: 2 INJECTION, SOLUTION INTRAMUSCULAR; INTRAVENOUS at 00:26

## 2023-03-14 RX ADMIN — MORPHINE SULFATE 2 MG: 2 INJECTION, SOLUTION INTRAMUSCULAR; INTRAVENOUS at 11:28

## 2023-03-14 RX ADMIN — OXYCODONE HYDROCHLORIDE 10 MG: 10 TABLET, FILM COATED, EXTENDED RELEASE ORAL at 21:38

## 2023-03-14 RX ADMIN — OXYCODONE AND ACETAMINOPHEN 1 TABLET: 5; 325 TABLET ORAL at 08:14

## 2023-03-14 RX ADMIN — OXYCODONE AND ACETAMINOPHEN 1 TABLET: 5; 325 TABLET ORAL at 23:42

## 2023-03-14 RX ADMIN — HYDROXYZINE PAMOATE 50 MG: 25 CAPSULE ORAL at 05:41

## 2023-03-14 RX ADMIN — Medication 10 ML: at 21:38

## 2023-03-14 RX ADMIN — METHOCARBAMOL 500 MG: 500 TABLET ORAL at 17:04

## 2023-03-14 RX ADMIN — METHOCARBAMOL 500 MG: 500 TABLET ORAL at 08:14

## 2023-03-14 RX ADMIN — METHOCARBAMOL 500 MG: 500 TABLET ORAL at 21:38

## 2023-03-14 RX ADMIN — OXYCODONE AND ACETAMINOPHEN 1 TABLET: 5; 325 TABLET ORAL at 18:25

## 2023-03-14 RX ADMIN — OXYCODONE AND ACETAMINOPHEN 1 TABLET: 5; 325 TABLET ORAL at 02:10

## 2023-03-14 RX ADMIN — ATORVASTATIN CALCIUM 20 MG: 20 TABLET, FILM COATED ORAL at 08:14

## 2023-03-14 RX ADMIN — FAMOTIDINE 20 MG: 20 TABLET ORAL at 08:14

## 2023-03-14 RX ADMIN — MORPHINE SULFATE 2 MG: 2 INJECTION, SOLUTION INTRAMUSCULAR; INTRAVENOUS at 15:47

## 2023-03-14 ASSESSMENT — PAIN DESCRIPTION - DESCRIPTORS
DESCRIPTORS: CRAMPING;ACHING;DISCOMFORT
DESCRIPTORS: ACHING;DULL;DISCOMFORT
DESCRIPTORS: BURNING;CRAMPING
DESCRIPTORS: DISCOMFORT;DULL;ACHING
DESCRIPTORS: DISCOMFORT
DESCRIPTORS: ACHING

## 2023-03-14 ASSESSMENT — PAIN SCALES - GENERAL
PAINLEVEL_OUTOF10: 6
PAINLEVEL_OUTOF10: 7
PAINLEVEL_OUTOF10: 8
PAINLEVEL_OUTOF10: 8
PAINLEVEL_OUTOF10: 6
PAINLEVEL_OUTOF10: 8
PAINLEVEL_OUTOF10: 8
PAINLEVEL_OUTOF10: 6
PAINLEVEL_OUTOF10: 9

## 2023-03-14 ASSESSMENT — PAIN DESCRIPTION - LOCATION
LOCATION: BACK;HEAD
LOCATION: BACK;HEAD
LOCATION: BACK;NECK
LOCATION: GENERALIZED
LOCATION: BACK
LOCATION: BACK
LOCATION: BACK;HAND
LOCATION: GENERALIZED

## 2023-03-14 ASSESSMENT — PAIN DESCRIPTION - FREQUENCY
FREQUENCY: CONTINUOUS
FREQUENCY: CONTINUOUS

## 2023-03-14 ASSESSMENT — PAIN DESCRIPTION - ORIENTATION
ORIENTATION: LOWER
ORIENTATION: INNER;MID
ORIENTATION: INNER;LOWER;MID
ORIENTATION: LOWER

## 2023-03-14 ASSESSMENT — ENCOUNTER SYMPTOMS
BACK PAIN: 1
SHORTNESS OF BREATH: 0
PHOTOPHOBIA: 1
TROUBLE SWALLOWING: 0
ABDOMINAL PAIN: 0

## 2023-03-14 ASSESSMENT — PAIN - FUNCTIONAL ASSESSMENT

## 2023-03-14 ASSESSMENT — PAIN SCALES - WONG BAKER: WONGBAKER_NUMERICALRESPONSE: 2

## 2023-03-14 ASSESSMENT — PAIN DESCRIPTION - PAIN TYPE
TYPE: ACUTE PAIN

## 2023-03-14 ASSESSMENT — PAIN DESCRIPTION - ONSET
ONSET: ON-GOING
ONSET: ON-GOING

## 2023-03-14 NOTE — PROGRESS NOTES
Floor notified dose will be ordered from [facility] and bone scan will be done 3/15 am

## 2023-03-14 NOTE — CARE COORDINATION
3/14/2023social work transition of care planning  Sw spoke with pt and dtr Veronica Left at bedside. Pt is from home alone(son has been there for a pear). Pt pcp is Dr. Carin Olszewski and pharmacy is [facility] in [facility]. Pt did not report any hx of snf or hhc. Explained sw role in transition of care planning. Pt plan is home,pt will have a ride at discharged.   Electronically signed by CHAI Gamboa on 3/14/2023 at 11:34 AM

## 2023-03-14 NOTE — PROGRESS NOTES
Physical Therapy  Physical Therapy Initial Evaluation    Name: Erika Deng  : 1960  MRN: [id number]      Date of Service: 3/14/2023    Evaluating PT:  Rosy Matthews PT [id number]    Referring provider/PT Order:  PT Eval and Treat   23 2100  PT evaluation and treat      Babak ASHLEY Byrd CNP       Room #:  6801/9786-T  Diagnosis:  Chills [R68.83]  Myalgia [M79.10]  Intractable back pain [M54.9]  Ambulatory dysfunction [R26.2]  Intractable headache, unspecified chronicity pattern, unspecified headache type [R51.9]  PMHx/PSHx:     has a past medical history of Anxiety, Dental caries, Depression, and Hyperlipidemia. has a past surgical history that includes  section; Carpal tunnel release (Bilateral); cervical fusion; and Dental surgery (N/A, 2019). Procedure/Surgery:  none  Precautions:  Falls, FWB (full weight bearing)   Equipment Needs: To be determined,    SUBJECTIVE:    Patient lives with son  in a ranch home  with No steps to enter. Bed is on 1 floor and bath is on 1 floor. Patient ambulated independently  PTA. Equipment owned: None,      OBJECTIVE:   Initial Evaluation  Date: 3/14/23 Treatment Short Term/ Long Term   Goals   AM-PAC 6 Clicks 38/     Was pt agreeable to Eval/treatment? yes     Does pt have pain? 10/10 L hip BLE and headaches     Bed Mobility  Rolling: Ind  Supine to sit: Ind   Sit to supine: Ind   Scooting: Ind   Rolling: Ind  Supine to sit: Ind  Sit to supine: Ind  Scooting: Ind   Transfers Sit to stand: SBA  Stand to sit: SBA  Stand pivot: SBA   Sit to stand: Ind   Stand to sit: Ind   Stand pivot:  Ind    Ambulation    100 feet with  SBA  150+ feet with Ind    Stair negotiation: ascended and descended  NT       Strength/ROM:     RLE grossly 4+/5  LLE grossly 4+/5  RLE AROM WFL  LLE AROM WFL    Balance:     Static Sitting: Ind  Dynamic Sitting: Ind  Static Standing: SBA   Dynamic Standing: SBA       Patient is Alert & Oriented x person, place, time, and situation and follows directions   Sensation:  Pt denies numbness and tingling to extremities  Edema:  none  Therapeutic Exercises:  Functional activity no device required. SBA for safety. Patient education  Pt educated on role of Physical Therapy, risks of immobility, safety and plan of care and  importance of mobility while in hospital  and use of call light for safety. Patient response to education:   Pt verbalized understanding Pt demonstrated skill Pt requires further education in this area   yes yes Reminders     ASSESSMENT:    Conditions Requiring Skilled Therapeutic Intervention:    [x]Decreased strength     [x]Decreased ROM  [x]Decreased functional mobility  [x]Decreased balance   [x]Decreased endurance   [x]Decreased posture  []Decreased sensation  []Decreased coordination   []Decreased vision  [x]Decreased safety awareness   []Increased pain       Comments:    RN cleared patient for participation in therapy session. Patient was seen this date for PT evaluation. Patient was agreeable to intervention. Results of the functional assessment are noted above. Upon entering the room patient was found supine in bed. Reporting headache. . Ind bed mobility. Sat EOB x 5 minutes to increase dynamic sitting balance and activity tolerance. Transfers and gait completed into hallway no device. No LOB noted. At end of session, patient in bed with  call light and phone within reach,  all lines and tubes intact, nursing notified. This patient can benefit from the continuation of skilled PT  to maximize functional level and return to PLOF.    Treatment:  Patient completed and was instructed in the following treatment:      Bed mobility training - pt given verbal and tactile cues to facilitate proper sequencing and safety during rolling and supine>sit as well as provided with physical assistance to complete task    STS and transfer training - educated on hand/foot placement, safety, and sequencing during STS and pivot transfers using assistive device  Gait training - verbal cues for, upright posture, and safety during 90 and 180 degree turns during gait   Education regarding use of call light for safety. Vitals and symptoms were closely monitored throughout session. Pt's/ family goals      Return Home reduce pain symptoms. Prognosis is good  for reaching above PT goals. Patient and or family understand(s) diagnosis, prognosis, and plan of care.  yes,     PHYSICAL THERAPY PLAN OF CARE:    PT POC is established based on physician order and patient diagnosis     Diagnosis:  Chills [R68.83]  Myalgia [M79.10]  Intractable back pain [M54.9]  Ambulatory dysfunction [R26.2]  Intractable headache, unspecified chronicity pattern, unspecified headache type [R51.9]  Specific instructions for next treatment:  Increase ambulation distance and Stair negotiation training  Current Treatment Recommendations:     [x] Strengthening to improve independence with functional mobility   [x] ROM to improve independence with functional mobility   [x] Balance Training to improve static/dynamic balance and to reduce fall risk  [x] Endurance Training to improve activity tolerance during functional mobility   [x] Transfer Training to improve safety and independence with all functional transfers   [x] Gait Training to improve gait mechanics, endurance and asses need for appropriate assistive device  [x] Stair Training in preparation for safe discharge home and/or into the community when appropriate  [] Positioning to prevent skin breakdown and contractures  [x] Safety and Education Training   [] Patient/Caregiver Education   [] HEP  [] Gait Team to be added to POC  [] Other     PT long term treatment goals are located in above grid    Frequency of treatments: 2-5x/week x 1-2 weeks.     Time in  1445  Time out  1510    Total Treatment Time  0 minutes     Evaluation Time includes thorough review of current medical information, gathering information on past medical history/social history and prior level of function, completion of standardized testing/informal observation of tasks, assessment of data and education on plan of care and goals.     CPT codes:  [x] Low Complexity PT evaluation 29119  [] Moderate Complexity PT evaluation 67237  [] High Complexity PT evaluation 55850  [] PT Re-evaluation 78985  [] Gait training 45553 - minutes  [] Manual therapy [id number]  minutes  [] Therapeutic activities 00128 - minutes  [] Therapeutic exercises 50090 - minutes  [] Neuromuscular reeducation [id number] minutes     Mesfin Rebollar, [address] [facility]

## 2023-03-14 NOTE — ED NOTES
Rn here to medicate pt as ordered for pain, voiced pain was now #8. Will continue to monitor.      Vivi Belcher LPN  86/66/28 0877

## 2023-03-14 NOTE — CONSULTS
NEUROSURGERY INPATIENT CONSULT NOTE    Chief Complaint: Increased back pain, headaches    HPI:   Bethel Mejia is a 65 y.o.  female who has been previously seen by our services for similar complaints. Patient states she was attempting to obtain EMG when her headaches and back pain became so severe that she could not finish the EMG and come to the ED. She admits to a burning pain that radiates down into both of her legs. She admits to associated numbness and weakness with this pain. She denies any bowel or bladder incontinence. She also admits to new worsening headache. She states this headache is constant and is worsened by sound, light and movement. MRI Lumbar and Thoracic Spine showed L3 and T7 lesion which is why Neurosurgery was consulted.      Past Medical History:   Diagnosis Date    Anxiety     Dental caries     Depression     Hyperlipidemia      Past Surgical History:   Procedure Laterality Date    CARPAL TUNNEL RELEASE Bilateral     CERVICAL FUSION       SECTION      x 3    DENTAL SURGERY N/A 2019    DENTAL EXAM FULL MOUTH EXTRACTIONS ALEOPOLASTY performed by [facility], CHRISTI at [facility] OR      Family History   Problem Relation Age of Onset    Heart Disease Mother     Diabetes Mother         Medications:   Current Facility-Administered Medications   Medication Dose Route Frequency Provider Last Rate Last Admin    0.9 % sodium chloride infusion   IntraVENous Continuous Emile Liu DO   Stopped at 23    atorvastatin (LIPITOR) tablet 20 mg  20 mg Oral QAM Sudeep Connolly, APRN - CNP   20 mg at 23 0814    DULoxetine (CYMBALTA) extended release capsule 30 mg  30 mg Oral Daily Sudeep Connolly, APRN - CNP   30 mg at 23    famotidine (PEPCID) tablet 20 mg  20 mg Oral BID Sudeep Connolly APRN - CNP   20 mg at 23 0814    hydrOXYzine pamoate (VISTARIL) capsule 50 mg  50 mg Oral TID PRN Sudeep Connolly, APRN - CNP   50 mg at 23 0541 methocarbamol (ROBAXIN) tablet 500 mg  500 mg Oral 4x Daily Earnstine Lanius Learn, APRN - CNP   500 mg at 03/14/23 0814    0.9 % sodium chloride infusion   IntraVENous Continuous Earnstine Lanius Learn, APRN - CNP 75 mL/hr at 03/13/23 2132 New Bag at 03/13/23 2132    sodium chloride flush 0.9 % injection 5-40 mL  5-40 mL IntraVENous 2 times per day Earnstine Lanius Learn, APRN - CNP   10 mL at 03/13/23 2236    sodium chloride flush 0.9 % injection 10 mL  10 mL IntraVENous PRN Earnstine Lanius Learn, APRN - CNP        0.9 % sodium chloride infusion   IntraVENous PRN Earnstine Lanius Learn, APRN - CNP        enoxaparin (LOVENOX) injection 40 mg  40 mg SubCUTAneous Daily Earnstine Lanius Learn, APRN - CNP   40 mg at 03/14/23 0814    ondansetron (ZOFRAN-ODT) disintegrating tablet 4 mg  4 mg Oral Q8H PRN Earnstine Lanius Learn, APRN - CNP        Or    ondansetron (ZOFRAN) injection 4 mg  4 mg IntraVENous Q6H PRN Earnstine Lanius Learn, APRN - CNP        polyethylene glycol (GLYCOLAX) packet 17 g  17 g Oral Daily PRN Earnstine Lanius Learn, APRN - CNP        acetaminophen (TYLENOL) tablet 650 mg  650 mg Oral Q6H PRN Earnstine Lanius Learn, APRN - CNP   650 mg at 03/13/23 2133    Or    acetaminophen (TYLENOL) suppository 650 mg  650 mg Rectal Q6H PRN Earnstine Lanius Learn, APRN - CNP        potassium chloride (KLOR-CON M) extended release tablet 40 mEq  40 mEq Oral PRN Earnstine Lanius Learn, APRN - CNP        Or    potassium bicarb-citric acid (EFFER-K) effervescent tablet 40 mEq  40 mEq Oral PRN Earnstine Lanius Learn, APRN - CNP        Or    potassium chloride 10 mEq/100 mL IVPB (Peripheral Line)  10 mEq IntraVENous PRN Earnstine Lanius Learn, APRN - CNP        morphine (PF) injection 2 mg  2 mg IntraVENous Q4H PRN Earnstine Lanius Learn, APRN - CNP   2 mg at 03/14/23 0026    oxyCODONE-acetaminophen (PERCOCET) 5-325 MG per tablet 1 tablet  1 tablet Oral Q4H PRN ASHLEY Dee - CNP   1 tablet at 03/14/23 0814        Allergies:    Lansoprazole, Naproxen, Prilosec [omeprazole], Trintellix [vortioxetine], and Zoloft [sertraline hcl]     /75   Pulse 79   Temp (!) 96.2 °F (35.7 °C) (Temporal)   Resp 18   Ht 5' 6\" (1.676 m)   Wt 132 lb 3.2 oz (60 kg)   SpO2 97%   BMI 21.34 kg/m²     Review of Systems   Constitutional:  Negative for chills and fever. HENT:  Negative for trouble swallowing. Eyes:  Positive for photophobia. Respiratory:  Negative for shortness of breath. Cardiovascular:  Negative for chest pain. Gastrointestinal:  Negative for abdominal pain. Endocrine: Negative for heat intolerance. Genitourinary:  Positive for frequency and urgency. Negative for flank pain. Musculoskeletal:  Positive for arthralgias and back pain. Negative for myalgias. Skin:  Negative for wound. Neurological:  Positive for weakness, numbness and headaches. Negative for seizures. Psychiatric/Behavioral:  Negative for confusion. Physical Exam  HENT:      Head: Normocephalic. Eyes:      Pupils: Pupils are equal, round, and reactive to light. Cardiovascular:      Rate and Rhythm: Normal rate. Pulmonary:      Effort: Pulmonary effort is normal.   Abdominal:      General: There is no distension. Musculoskeletal:      Cervical back: Normal range of motion. Skin:     General: Skin is warm and dry. Neurological:      Mental Status: She is alert. Comments: A&Ox3  CN3-12 intact  Motor Strength full   Sensation intact to light touch   Nontender to light and deep palpation of thoracic and lumbar spine   Psychiatric:         Thought Content: Thought content normal.      Imaging: 3/06/2023 MRI Thoracic Spine  Impression   1. Corresponding to the 1.7 cm sclerotic lesion in the T7 vertebral body is a   T1 hypointense, T2 hyperintense enhancing lesion. It is nonspecific by   imaging. A metastatic etiology cannot be excluded. Further evaluation with   a bone scan is recommended. 2. No significant central canal stenosis.    3. Mild neural foraminal stenoses at T1-2, resulting from facet hypertrophic   changes. 03/06/2023 MRI Lumbar Spine  Impression   1. 5 mm T1 hypointense nonenhancing lesion in the L3 vertebral body is   nonspecific. Absence of enhancement would be atypical for un treated   metastatic disease. 2. Schmorl nodes along L2 and L3 vertebral body endplates. 3. Mild central canal stenosis at L1-2.   4.  Multilevel neural foraminal stenoses, worst (moderate to severe) at the   right L3-4 level. Assessment:   -Odilia Fernando is a 64 y/o female who presents for worsening low back pain and new headaches. MRI with lesion at T7 and L3    Plan:  -Pain control  -CT Head  -Bone scan  -PT/OT  -Await imaging for further plan      Electronically signed by Krystle Moses PA-C on 3/14/2023 at 9:25 AM     Nsx Attending:    Patient was seen and examined by me with the team.  I personally reviewed all pertinent radiological images. I concur with Miss Hudson's clinical assessment and plan. In brief, this 65year old woman previously evaluated by our PA presents with LBP and now with new onset HA. She endorses radiation to the Les bilaterally and without loss of bowel/bladder. On my neurological examination she is awake alert oriented and appropriate. She demonstrates preserved power in the upper and lower extremities throughout. Reflexes are symmetric and benign. MRI of the thoracic and lumbar spine demonstrate previously seen sclerotic T7 vertebral body lesion with enhancement. L3 lesion does not enhance. Plan as above. Thank you so much for allowing us to participate in the care of this patient. I certify that I spent more than half of the total time on this encounter. NOTE: This report was transcribed using voice recognition software.  Every effort was made to ensure accuracy; however, inadvertent computerized transcription errors may be present

## 2023-03-14 NOTE — ED NOTES
This nurse initial contact with pt, alert and oriented x 4, skin warm and dry. Lung sounds clear bilaterally, abdomen soft and nondistended bowel sounds present x 4 . Iv nss infusing at 75cc/hr as ordered iv site 18 gauge rt ac. Pt voiced pain to back #7, and headache continues, this nurse offered percocet, pt voiced wanting morphine iv because pain is pretty bad right now\". This nurse informed Rn need for iv pain medication. Pt used bedpan voiding clear yellow urine with assist of daughter at bedside. Pt able to scoot self up in bed repositioned and pillows under legs for support, hob elevated, cool compress on head . srx 2 up and call light in reach. Pt aware still waiting for bed assignment.      Franchesca Rawls LPN  05/49/38 [id number]

## 2023-03-14 NOTE — PROGRESS NOTES
[address]      Date:3/14/2023                                                Patient Name: Donnie Tolentino  MRN: [id number]  : 1960  Room: [address] [place] [address]     Evaluating OT:Cindy Coats OTR/L   License #  [id number]       Referring Provider: ASHLEY Nelsno CNP    Specific Provider Orders/Date: OT evaluation & treatment per NS PA       Diagnosis: Chills [R68.83]  Myalgia [M79.10]  Intractable back pain [M54.9]  Ambulatory dysfunction [R26.2]  Intractable headache, unspecified chronicity pattern, unspecified headache type [R51.9]      Pertinent Medical History:  has a past medical history of Anxiety, Dental caries, Depression, and Hyperlipidemia. Surgery: none this admit    Past Surgical History:  has a past surgical history that includes  section; Carpal tunnel release (Bilateral); cervical fusion; and Dental surgery (N/A, 2019). Precautions:  Fall Risk, WBAT, will maintain neutral spine for comfort        Assessment of current deficits    [x] Functional mobility            [x]ADLs           [x] Strength                  [x]Cognition    [x] Functional transfers          [x] IADLs         [x] Safety Awareness   [x]Endurance    [x] Fine Coordination                         [x] Balance      [] Vision/perception   [x]Sensation      []Gross Motor Coordination             [] ROM           [] Delirium                   [] Motor Control      OT PLAN OF CARE   OT POC based on physician orders, patient diagnosis and results of clinical assessment     Frequency/Duration: 2-4 days/wk for 2 weeks PRN   Specific OT Treatment Interventions to include:    Instruction/training on adapted ADL techniques and AE recommendations to increase functional independence within precautions  Training on energy conservation strategies, correct breathing pattern and techniques to improve independence/tolerance for self-care routine  Functional transfer/mobility training/DME recommendations for increased independence, safety, and fall prevention  Patient/Family education to increase follow through with safety techniques and functional independence  Recommendation of environmental modifications for increased safety with functional transfers/mobility and ADLs  Therapeutic exercise to improve motor endurance, ROM, and functional strength for ADLs/functional transfers  Therapeutic activities to facilitate/challenge dynamic balance, stand tolerance for increased safety and independence with ADLs  Therapeutic activities to facilitate gross/fine motor skills for increased independence with ADLs  Positioning to improve skin integrity, interaction with environment and functional independence     Recommended Adaptive Equipment:  TBD      Home Living: Pt lives with son (works days) in a 1 story with no steps to enter with no HR. B&B on main level. Laundry basement level but states son can perform  Bathroom setup: tub/shower, std. commode   Equipment owned: none     Prior Level of Function: Ind. with ADLs , Ind. with IADLs; ambulated without A.D. Driving: active  Occupation: retired from retail     Pain Level: 7/10 headache; low back pain, B LE pain  Cognition: A&O: 4/4; Follows 2 step directions              Memory:  G              Sequencing:  G              Problem solving:  G              Judgement/safety:  F+                Functional Assessment:  AM-PAC Daily Activity Raw Score: 19/24    Initial Eval Status  Date: 3-14-23 Treatment Status  Date: STGs = LTGs  Time frame: 10-14 days   Feeding Ind. Mod I/ Ind   Grooming Set up seated  SBA standing   Modified [place]    UB Dressing SBA   Modified [place]    LB Dressing Min A seated EOB figure 4 technique   Modified [place]    Bathing Min A with sim.  task   Modified [place]    Toileting SBA for hygiene & clothing mgmt.   Modified [place]    Bed Mobility  [place]: Ind. Supine to sit: Mod I  Sit to supine: Mod I   Supine to sit: Modified [place]   Sit to supine: Modified [place]    Functional Transfers SBA with sit <> stand, SPT with ww   Modified [place]    Functional Mobility SBA with ww > home distances without A.D. Modified [place]    Balance Sitting:     Static:  Ind. Dynamic:Sup  Standing: SBA       Activity Tolerance F   G   Visual/  Perceptual Glasses: yes          Vitals spO2 & HR WFL   WFL      Hand Dominance R    AROM (PROM) Strength Additional Info:    RUE  WFL 4/5 good  and wfl FMC/dexterity noted during ADL tasks      LUE WFL 4/5 good  and wfl FMC/dexterity noted during ADL tasks         Hearing: WFL   Sensation:  No c/o numbness or tingling B UE  Tone: WFL B UE  Edema: none noted BUE     Comments: Upon arrival patient supine in bed, agreeable to OT, cleared by NS PA & Nursing. Therapist facilitated bed mobility/ADLs/functional transfers/mobility training with focus on safety, technique & precautions. Pt. Instructed RE: safe transfers/mobility, ADLs, role of OT, treatment plan, recs. , prec. At end of session, patient returned to bed per pt. request, all needs met, RN notified, with call light and phone within reach, all lines and tubes intact. Overall patient demonstrated decreased strength, balance, independence & safety during completion of ADL/functional transfer/mobility tasks. Pt would benefit from continued skilled OT to increase safety and independence with completion of ADL/IADL tasks for functional independence and quality of life.      Treatment: OT treatment provided this date includes:   Instruction/training on safety and adapted techniques for completion of ADLs: to increase [place] in self care   Instruction/training on safe functional mobility/transfer techniques: with focus on safety, technique & precautions   Instruction/training on energy conservation/work simplification for completion of ADLs: techniques to increase [place] with self care ADLs & iADLs, work simplification to improve endurance   Proper Positioning/Alignment: for optimal healing, skin integrity to prevent breakdown, decrease edema  Skilled monitoring of vitals: to include BP, spO2 & HR during session  Sitting/standing Balance/Tolerance- to increased balance & activity tolerance during ADLs as well as facilitate proper posture and/or positioning     Rehab Potential: Good for established goals     Patient / Family Goal: to regain strength       Patient and/or family were instructed on functional diagnosis, prognosis/goals and OT plan of care. Demonstrated G understanding. Eval Complexity: Low     Time In: 14:45  Time Out: 15:15  Total Treatment Time: 10    Min Units   OT Eval Low 33348  x     OT Eval Medium 18624       OT Eval High 56524       OT Re-Eval [id number]       Therapeutic Ex 40075       Therapeutic Activities 99965       ADL/Self Care 04942  10  1   Orthotic Management 78612       Manual [id number]       Neuro Re-Ed 91735       Non-Billable Time  05        Evaluation Time additionally includes thorough review of current medical information, gathering information on past medical history/social history and prior level of function, interpretation of standardized testing/informal observation of tasks, assessment of data and development of plan of care and goals. Cindy Coats, OTR/L   License #  [id number]

## 2023-03-14 NOTE — H&P
[facility]  History and Physical      CHIEF COMPLAINT:    Chief Complaint   Patient presents with    Other     Flank pain, headache, nausea, vomiting, chills   all this going on for over a month, getting EMG today and couldn't take the test.  They sent her here. Patient of Paige Cordova DO presents with: Intractable back pain    History of Present Illness:   Patient is 71-year-old female with past medical history of anxiety, dental caries, depression, hyperlipidemia. Patient presents to the ED with flank pain with nausea vomiting chills. Patient has been worked up for the flank pain and back pain. Patient was previously on gabapentin was taken off this approximately 2 months ago and was started on Norco.  Patient has had multiple CT scans and MRIs notably of her back which showed a sclerotic lesion. Patient was now in the hospital today for an EMG however she was unable to obtain  Patient has not been out of bed for the past 5 days nor has she been able to eat or drink. Patient states her pain has been worsening over the last month however in the past 7 days she has admitted to chills nausea and has been intolerant of food for generalized weakness. Patient is alert and oriented on examination. Patient states the morphine is controlling her back pain. Patient denies any chest pain, shortness of breath, headache, dizziness, blurred vision. ER work-up reveals sodium level 121,. Patient was started on IV hydration and admitted to [place] [address] unit for further treatment. On evaluation, she indicates her pain has improved somewhat from a 10 to a 7 with pain medications. She has been evaluated by neurosurgery who have recommended pain control      REVIEW OF SYSTEMS:  Pertinent negatives are above in HPI. 10 point ROS otherwise negative.       Past Medical History:   Diagnosis Date    Anxiety     Dental caries     Depression     Hyperlipidemia          Past Surgical History:   Procedure Laterality Date    CARPAL TUNNEL RELEASE Bilateral     CERVICAL FUSION       SECTION      x 3    DENTAL SURGERY N/A 2019    DENTAL EXAM FULL MOUTH EXTRACTIONS ALEOPOLASTY performed by Destiney Paez DDS at [facility] OR       Medications Prior to Admission:    Medications Prior to Admission: DULoxetine (CYMBALTA) 30 MG extended release capsule, Take 30 mg by mouth daily  hydrOXYzine pamoate (VISTARIL) 50 MG capsule, Take 50 mg by mouth 3 times daily as needed for Itching  nystatin (MYCOSTATIN) 291017 UNIT/ML suspension, Take 5 mLs by mouth 4 times daily for 10 days Retain in mouth as long as possible  methocarbamol (ROBAXIN) 500 MG tablet, Take 1 tablet by mouth 4 times daily for 10 days  polyethylene glycol (GLYCOLAX) 17 GM/SCOOP powder, Take 17 g by mouth daily  Probiotic Product (PROBIOTIC ADVANCED PO), Take 4 capsules by mouth every morning PRODUCT: BIO COMPLETE 3  atorvastatin (LIPITOR) 20 MG tablet, Take 20 mg by mouth every morning  famotidine (PEPCID) 20 MG tablet, Take 20 mg by mouth 2 times daily    Note that the patient's home medications were reviewed and the above list is accurate to the best of my knowledge at the time of the exam.    Allergies:    Lansoprazole, Naproxen, Prilosec [omeprazole], Trintellix [vortioxetine], and Zoloft [sertraline hcl]    Social History:    reports that she has been smoking cigarettes. She has a 21.00 pack-year smoking history. She has never used smokeless tobacco. She reports that she does not drink alcohol and does not use drugs. Family History:   family history includes Diabetes in her mother; Heart Disease in her mother.       PHYSICAL EXAM:    Vitals:  /75   Pulse 79   Temp (!) 96.2 °F (35.7 °C) (Temporal)   Resp 18   Ht 5' 6\" (1.676 m)   Wt 132 lb 3.2 oz (60 kg)   SpO2 97%   BMI 21.34 kg/m²       General appearance: NAD, conversant, frail  Eyes: Sclerae anicteric, PERRLA  HEENT: AT/NC, MMM  Neck: FROM, supple, no thyromegaly  Lymph: No cervical / supraclavicular lymphadenopathy  Lungs: Clear to auscultation, WOB normal  CV: RRR, no MRGs, no lower extremity edema  Abdomen: Soft, non-tender; no masses or HSM, +BS  Extremities: FROM without synovitis. No clubbing or cyanosis of the hands. Skin: no rash, induration, lesions, or ulcers  Psych: Calm and cooperative. Normal judgement and insight. Normal mood and affect. Neuro: Alert and interactive, face symmetric, speech fluent. LABS:  All labs reviewed. Of note:  CBC with Differential:    Lab Results   Component Value Date/Time    WBC 4.3 03/14/2023 06:29 AM    RBC 3.60 03/14/2023 06:29 AM    HGB 11.5 03/14/2023 06:29 AM    HCT 33.5 03/14/2023 06:29 AM     03/14/2023 06:29 AM    MCV 93.1 03/14/2023 06:29 AM    MCH 31.9 03/14/2023 06:29 AM    MCHC 34.3 03/14/2023 06:29 AM    RDW 13.8 03/14/2023 06:29 AM    LYMPHOPCT 34.0 03/14/2023 06:29 AM    MONOPCT 9.6 03/14/2023 06:29 AM    BASOPCT 0.7 03/14/2023 06:29 AM    MONOSABS 0.41 03/14/2023 06:29 AM    LYMPHSABS 1.45 03/14/2023 06:29 AM    EOSABS 0.05 03/14/2023 06:29 AM    BASOSABS 0.03 03/14/2023 06:29 AM     CMP:    Lab Results   Component Value Date/Time     03/14/2023 06:29 AM    K 3.8 03/14/2023 06:29 AM    CL 99 03/14/2023 06:29 AM    CO2 24 03/14/2023 06:29 AM    BUN 7 03/14/2023 06:29 AM    CREATININE 0.6 03/14/2023 06:29 AM    GFRAA >60 04/27/2022 09:20 AM    LABGLOM >60 03/14/2023 06:29 AM    GLUCOSE 82 03/14/2023 06:29 AM    PROT 6.6 03/13/2023 11:35 AM    LABALBU 4.1 03/13/2023 11:35 AM    CALCIUM 8.2 03/14/2023 06:29 AM    BILITOT 0.7 03/13/2023 11:35 AM    ALKPHOS 59 03/13/2023 11:35 AM    AST 22 03/13/2023 11:35 AM    ALT 23 03/13/2023 11:35 AM       Imaging:  CXR: No acute cardiopulmonary process.     EKG:  I've personally reviewed the patient's EKG:  NSR    Telemetry:  I've personally reviewed the patient's telemetry:      ASSESSMENT/PLAN:  Principal Problem:    Intractable back pain  Resolved Problems:    * No resolved hospital problems. *    63 year old female presents to the ED with complaints of back pain and is admitted to [place] [address] unit with    Intractable back pain of unknown etiology  Pain control-twice daily long-acting OxyContin with as needed Norco for breakthrough pain, continue Robaxin  MRI lumbar spine-5 mm T1 hypointense nonenhancing lesion in the L3 vertebral body. Schmorl nodes along L2 and L3. Consult neurosurgery-input appreciated  CT head-pending  Nuc med bone scan to be done on 3/15    Medication for other comorbidities continue as appropriate dose adjustment as necessary. DVT prophylaxis  PT OT  Discharge planning  Case discussed with attending and agreed upon plan of care. Code status: Full  Requires inpatient level of care  ASHLEY Arteaga CNP    9:33 AM  3/14/2023     Above note edited to reflect my thoughts     I personally saw, examined and provided care for the patient. Radiographs, labs and medication list were reviewed by me independently. The case was discussed in detail and plans for care were established. Review of Gayla SPAULDING CNP, documentation was conducted and revisions were made as appropriate directly by me. I agree with the above documented exam, problem list, and plan of care.      Jaylan Wilder MD  5:44 PM  3/14/2023

## 2023-03-14 NOTE — PLAN OF CARE
Problem: Discharge Planning  Goal: Discharge to home or other facility with appropriate resources  3/14/2023 1135 by Addis Paul RN  Outcome: Progressing  3/14/2023 0311 by Isabel Noriega RN  Outcome: Progressing

## 2023-03-15 ENCOUNTER — APPOINTMENT (OUTPATIENT)
Dept: NUCLEAR MEDICINE | Age: 63
DRG: 479 | End: 2023-03-15
Payer: MEDICARE

## 2023-03-15 LAB
ANION GAP SERPL CALCULATED.3IONS-SCNC: 7 MMOL/L (ref 7–16)
BASOPHILS ABSOLUTE: 0.03 E9/L (ref 0–0.2)
BASOPHILS RELATIVE PERCENT: 0.7 % (ref 0–2)
BUN BLDV-MCNC: 7 MG/DL (ref 6–23)
CALCIUM SERPL-MCNC: 8.3 MG/DL (ref 8.6–10.2)
CHLORIDE BLD-SCNC: 103 MMOL/L (ref 98–107)
CO2: 28 MMOL/L (ref 22–29)
CREAT SERPL-MCNC: 0.7 MG/DL (ref 0.5–1)
EOSINOPHILS ABSOLUTE: 0.09 E9/L (ref 0.05–0.5)
EOSINOPHILS RELATIVE PERCENT: 2.2 % (ref 0–6)
GFR SERPL CREATININE-BSD FRML MDRD: >60 ML/MIN/1.73
GLUCOSE BLD-MCNC: 89 MG/DL (ref 74–99)
HCT VFR BLD CALC: 34.1 % (ref 34–48)
HEMOGLOBIN: 11.5 G/DL (ref 11.5–15.5)
IMMATURE GRANULOCYTES #: 0.01 E9/L
IMMATURE GRANULOCYTES %: 0.2 % (ref 0–5)
LYMPHOCYTES ABSOLUTE: 1.5 E9/L (ref 1.5–4)
LYMPHOCYTES RELATIVE PERCENT: 36.6 % (ref 20–42)
MCH RBC QN AUTO: 31.9 PG (ref 26–35)
MCHC RBC AUTO-ENTMCNC: 33.7 % (ref 32–34.5)
MCV RBC AUTO: 94.5 FL (ref 80–99.9)
MONOCYTES ABSOLUTE: 0.43 E9/L (ref 0.1–0.95)
MONOCYTES RELATIVE PERCENT: 10.5 % (ref 2–12)
NEUTROPHILS ABSOLUTE: 2.04 E9/L (ref 1.8–7.3)
NEUTROPHILS RELATIVE PERCENT: 49.8 % (ref 43–80)
PDW BLD-RTO: 14.2 FL (ref 11.5–15)
PLATELET # BLD: 147 E9/L (ref 130–450)
PMV BLD AUTO: 9.1 FL (ref 7–12)
POTASSIUM REFLEX MAGNESIUM: 3.9 MMOL/L (ref 3.5–5)
RBC # BLD: 3.61 E12/L (ref 3.5–5.5)
SODIUM BLD-SCNC: 138 MMOL/L (ref 132–146)
WBC # BLD: 4.1 E9/L (ref 4.5–11.5)

## 2023-03-15 PROCEDURE — G0378 HOSPITAL OBSERVATION PER HR: HCPCS

## 2023-03-15 PROCEDURE — 2580000003 HC RX 258: Performed by: FAMILY MEDICINE

## 2023-03-15 PROCEDURE — 3430000000 HC RX DIAGNOSTIC RADIOPHARMACEUTICAL: Performed by: RADIOLOGY

## 2023-03-15 PROCEDURE — 80048 BASIC METABOLIC PNL TOTAL CA: CPT

## 2023-03-15 PROCEDURE — 6360000002 HC RX W HCPCS: Performed by: FAMILY MEDICINE

## 2023-03-15 PROCEDURE — 36415 COLL VENOUS BLD VENIPUNCTURE: CPT

## 2023-03-15 PROCEDURE — 6370000000 HC RX 637 (ALT 250 FOR IP): Performed by: INTERNAL MEDICINE

## 2023-03-15 PROCEDURE — 6370000000 HC RX 637 (ALT 250 FOR IP): Performed by: FAMILY MEDICINE

## 2023-03-15 PROCEDURE — 78306 BONE IMAGING WHOLE BODY: CPT | Performed by: STUDENT IN AN ORGANIZED HEALTH CARE EDUCATION/TRAINING PROGRAM

## 2023-03-15 PROCEDURE — A9503 TC99M MEDRONATE: HCPCS | Performed by: RADIOLOGY

## 2023-03-15 PROCEDURE — 96376 TX/PRO/DX INJ SAME DRUG ADON: CPT

## 2023-03-15 PROCEDURE — 85025 COMPLETE CBC W/AUTO DIFF WBC: CPT

## 2023-03-15 PROCEDURE — 96372 THER/PROPH/DIAG INJ SC/IM: CPT

## 2023-03-15 RX ORDER — MORPHINE SULFATE 2 MG/ML
2 INJECTION, SOLUTION INTRAMUSCULAR; INTRAVENOUS
Status: DISCONTINUED | OUTPATIENT
Start: 2023-03-15 | End: 2023-03-16

## 2023-03-15 RX ORDER — GABAPENTIN 100 MG/1
200 CAPSULE ORAL 3 TIMES DAILY
Status: DISCONTINUED | OUTPATIENT
Start: 2023-03-15 | End: 2023-03-16

## 2023-03-15 RX ORDER — TC 99M MEDRONATE 20 MG/10ML
30 INJECTION, POWDER, LYOPHILIZED, FOR SOLUTION INTRAVENOUS
Status: COMPLETED | OUTPATIENT
Start: 2023-03-15 | End: 2023-03-15

## 2023-03-15 RX ORDER — POLYETHYLENE GLYCOL 3350 17 G/17G
17 POWDER, FOR SOLUTION ORAL DAILY
Status: DISCONTINUED | OUTPATIENT
Start: 2023-03-15 | End: 2023-03-18 | Stop reason: HOSPADM

## 2023-03-15 RX ADMIN — ENOXAPARIN SODIUM 40 MG: 100 INJECTION SUBCUTANEOUS at 09:15

## 2023-03-15 RX ADMIN — OXYCODONE AND ACETAMINOPHEN 1 TABLET: 5; 325 TABLET ORAL at 04:13

## 2023-03-15 RX ADMIN — OXYCODONE AND ACETAMINOPHEN 1 TABLET: 5; 325 TABLET ORAL at 23:08

## 2023-03-15 RX ADMIN — MORPHINE SULFATE 2 MG: 2 INJECTION, SOLUTION INTRAMUSCULAR; INTRAVENOUS at 21:33

## 2023-03-15 RX ADMIN — MORPHINE SULFATE 2 MG: 2 INJECTION, SOLUTION INTRAMUSCULAR; INTRAVENOUS at 16:41

## 2023-03-15 RX ADMIN — MORPHINE SULFATE 2 MG: 2 INJECTION, SOLUTION INTRAMUSCULAR; INTRAVENOUS at 12:27

## 2023-03-15 RX ADMIN — POLYETHYLENE GLYCOL 3350 17 G: 17 POWDER, FOR SOLUTION ORAL at 09:17

## 2023-03-15 RX ADMIN — ATORVASTATIN CALCIUM 20 MG: 20 TABLET, FILM COATED ORAL at 09:17

## 2023-03-15 RX ADMIN — OXYCODONE AND ACETAMINOPHEN 1 TABLET: 5; 325 TABLET ORAL at 09:44

## 2023-03-15 RX ADMIN — GABAPENTIN 200 MG: 100 CAPSULE ORAL at 14:29

## 2023-03-15 RX ADMIN — FAMOTIDINE 20 MG: 20 TABLET ORAL at 20:18

## 2023-03-15 RX ADMIN — DULOXETINE 30 MG: 30 CAPSULE, DELAYED RELEASE ORAL at 20:18

## 2023-03-15 RX ADMIN — OXYCODONE AND ACETAMINOPHEN 1 TABLET: 5; 325 TABLET ORAL at 14:17

## 2023-03-15 RX ADMIN — TC 99M MEDRONATE 30 MILLICURIE: 20 INJECTION, POWDER, LYOPHILIZED, FOR SOLUTION INTRAVENOUS at 08:35

## 2023-03-15 RX ADMIN — FAMOTIDINE 20 MG: 20 TABLET ORAL at 09:17

## 2023-03-15 RX ADMIN — METHOCARBAMOL 500 MG: 500 TABLET ORAL at 13:05

## 2023-03-15 RX ADMIN — METHOCARBAMOL 500 MG: 500 TABLET ORAL at 09:17

## 2023-03-15 RX ADMIN — METHOCARBAMOL 500 MG: 500 TABLET ORAL at 20:17

## 2023-03-15 RX ADMIN — METHOCARBAMOL 500 MG: 500 TABLET ORAL at 16:41

## 2023-03-15 RX ADMIN — MORPHINE SULFATE 2 MG: 2 INJECTION, SOLUTION INTRAMUSCULAR; INTRAVENOUS at 01:12

## 2023-03-15 RX ADMIN — GABAPENTIN 200 MG: 100 CAPSULE ORAL at 20:17

## 2023-03-15 RX ADMIN — OXYCODONE HYDROCHLORIDE 10 MG: 10 TABLET, FILM COATED, EXTENDED RELEASE ORAL at 09:16

## 2023-03-15 RX ADMIN — Medication 10 ML: at 20:19

## 2023-03-15 RX ADMIN — OXYCODONE HYDROCHLORIDE 10 MG: 10 TABLET, FILM COATED, EXTENDED RELEASE ORAL at 20:17

## 2023-03-15 RX ADMIN — OXYCODONE AND ACETAMINOPHEN 1 TABLET: 5; 325 TABLET ORAL at 18:58

## 2023-03-15 RX ADMIN — MORPHINE SULFATE 2 MG: 2 INJECTION, SOLUTION INTRAMUSCULAR; INTRAVENOUS at 06:25

## 2023-03-15 ASSESSMENT — PAIN - FUNCTIONAL ASSESSMENT

## 2023-03-15 ASSESSMENT — PAIN SCALES - WONG BAKER
WONGBAKER_NUMERICALRESPONSE: 0
WONGBAKER_NUMERICALRESPONSE: 2
WONGBAKER_NUMERICALRESPONSE: 4
WONGBAKER_NUMERICALRESPONSE: 0
WONGBAKER_NUMERICALRESPONSE: 2

## 2023-03-15 ASSESSMENT — PAIN DESCRIPTION - ORIENTATION
ORIENTATION: LOWER

## 2023-03-15 ASSESSMENT — PAIN DESCRIPTION - LOCATION
LOCATION: NECK;BACK
LOCATION: NECK;BACK
LOCATION: GENERALIZED
LOCATION: NECK;BACK
LOCATION: NECK;BACK
LOCATION: GENERALIZED
LOCATION: NECK;BACK;GENERALIZED
LOCATION: GENERALIZED
LOCATION: NECK;BACK
LOCATION: BACK;NECK
LOCATION: BACK;HEAD

## 2023-03-15 ASSESSMENT — PAIN SCALES - GENERAL
PAINLEVEL_OUTOF10: 5
PAINLEVEL_OUTOF10: 7
PAINLEVEL_OUTOF10: 8
PAINLEVEL_OUTOF10: 7
PAINLEVEL_OUTOF10: 6
PAINLEVEL_OUTOF10: 8
PAINLEVEL_OUTOF10: 7
PAINLEVEL_OUTOF10: 4
PAINLEVEL_OUTOF10: 6
PAINLEVEL_OUTOF10: 6
PAINLEVEL_OUTOF10: 7
PAINLEVEL_OUTOF10: 6
PAINLEVEL_OUTOF10: 7
PAINLEVEL_OUTOF10: 5
PAINLEVEL_OUTOF10: 9
PAINLEVEL_OUTOF10: 9
PAINLEVEL_OUTOF10: 6
PAINLEVEL_OUTOF10: 8
PAINLEVEL_OUTOF10: 5

## 2023-03-15 ASSESSMENT — PAIN DESCRIPTION - FREQUENCY: FREQUENCY: CONTINUOUS

## 2023-03-15 ASSESSMENT — PAIN DESCRIPTION - DESCRIPTORS
DESCRIPTORS: ACHING;THROBBING;BURNING
DESCRIPTORS: ACHING;DISCOMFORT;HEAVINESS
DESCRIPTORS: ACHING;THROBBING
DESCRIPTORS: ACHING;THROBBING;NUMBNESS
DESCRIPTORS: ACHING;BURNING;DISCOMFORT
DESCRIPTORS: ACHING;THROBBING
DESCRIPTORS: ACHING;BURNING;HEAVINESS
DESCRIPTORS: ACHING;POUNDING;THROBBING

## 2023-03-15 ASSESSMENT — PAIN DESCRIPTION - PAIN TYPE: TYPE: ACUTE PAIN

## 2023-03-15 ASSESSMENT — PAIN DESCRIPTION - ONSET: ONSET: ON-GOING

## 2023-03-15 NOTE — CARE COORDINATION
3/15/2023 social work transition of care planning  Pt plan is to return home with family. Family to transport at discharge.   Electronically signed by CHAI Haider on 3/15/2023 at 8:08 AM

## 2023-03-15 NOTE — PROGRESS NOTES
[facility]                                Progress note    Subjective: The patient is awake and alert. Lying in bed without complaints  No acute events overnight. Denies chest pain, angina, SOB     Objective:    /65   Pulse 79   Temp (!) 96.7 °F (35.9 °C) (Temporal)   Resp 17   Ht 5' 6\" (1.676 m)   Wt 132 lb 3.2 oz (60 kg)   SpO2 100%   BMI 21.34 kg/m²     In: 680 [P.O.:680]  Out: -   In: 680   Out: -     General appearance: NAD, conversant, pleasant  HEENT: AT/NC, MMM  Neck: FROM, supple  Lungs: Clear to auscultation  CV: RRR, no MRGs  Vasc: Radial pulses 2+  Abdomen: Soft, non-tender; no masses or HSM  Extremities: No peripheral edema or digital cyanosis  Skin: no rash, lesions or ulcers  Psych: Alert and oriented to person, place and time  Neuro: Alert and interactive     Recent Labs     03/13/23  1135 03/14/23  0629 03/15/23  0513   WBC 5.9 4.3* 4.1*   HGB 12.7 11.5 11.5   HCT 35.4 33.5* 34.1    135 147       Recent Labs     03/13/23  1135 03/14/23  0629 03/15/23  0513   * 131* 138   K 4.0 3.8 3.9   CL 86* 99 103   CO2 21* 24 28   BUN 5* 7 7   CREATININE 0.6 0.6 0.7   CALCIUM 8.8 8.2* 8.3*       Assessment:    Principal Problem:    Intractable back pain  Resolved Problems:    * No resolved hospital problems. *      Plan:  65year old female presents to the ED with complaints of back pain and is admitted to [place] [address] unit with     Intractable back pain of unknown etiology  Pain control-twice daily long-acting OxyContin with as needed Norco for breakthrough pain, continue Robaxin  MRI lumbar spine-5 mm T1 hypointense nonenhancing lesion in the L3 vertebral body. Schmorl nodes along L2 and L3.   Consult neurosurgery-input appreciated  CT head-pending  Nuc med bone scan to be done on 3/15    3/15/2023  Pain is well controlled  CT head - negative  Nuc med bone scan to be done today  Await neurosurgery's input  Vital signs stable  Family have been discussed case.  -Unfortunately bone scan does not provide any helpful information indicating both benign and malignant etiologies are considerations-would consider biopsy, defer to neurosurgery team  -Initiate Neurontin for severe burning sensation, sounds like neuropathy diffusely in bilateral lower extremities, can also trial Lyrica if no improvement with Neurontin  -Case discussed at length with daughter at bedside and patient herself    Code status: Full  Consultants: Neurosurgery    DVT Prophylaxis   PT/OT  Discharge planning     ASHLEY Hill CNP  10:57 AM  3/15/2023     Above note edited to reflect my thoughts     I personally saw, examined and provided care for the patient. Radiographs, labs and medication list were reviewed by me independently. The case was discussed in detail and plans for care were established. Review of Gayla SPAULDING CNP, documentation was conducted and revisions were made as appropriate directly by me. I agree with the above documented exam, problem list, and plan of care.      Odilia Fernando MD  2:20 PM  3/15/2023

## 2023-03-16 LAB
ANION GAP SERPL CALCULATED.3IONS-SCNC: 10 MMOL/L (ref 7–16)
BASOPHILS # BLD: 0.03 E9/L (ref 0–0.2)
BASOPHILS NFR BLD: 0.6 % (ref 0–2)
BUN SERPL-MCNC: 5 MG/DL (ref 6–23)
CALCIUM SERPL-MCNC: 9.2 MG/DL (ref 8.6–10.2)
CANCER AG125 SERPL-ACNC: 17.1 U/ML (ref 0–35)
CEA SERPL-MCNC: 1.9 NG/ML (ref 0–5.2)
CHLORIDE SERPL-SCNC: 103 MMOL/L (ref 98–107)
CO2 SERPL-SCNC: 28 MMOL/L (ref 22–29)
CREAT SERPL-MCNC: 0.6 MG/DL (ref 0.5–1)
EOSINOPHIL # BLD: 0.16 E9/L (ref 0.05–0.5)
EOSINOPHIL NFR BLD: 3.2 % (ref 0–6)
ERYTHROCYTE [DISTWIDTH] IN BLOOD BY AUTOMATED COUNT: 14.3 FL (ref 11.5–15)
GLUCOSE SERPL-MCNC: 101 MG/DL (ref 74–99)
HCT VFR BLD AUTO: 40.1 % (ref 34–48)
HGB BLD-MCNC: 13 G/DL (ref 11.5–15.5)
IMM GRANULOCYTES # BLD: 0.01 E9/L
IMM GRANULOCYTES NFR BLD: 0.2 % (ref 0–5)
INR BLD: 0.9
LYMPHOCYTES # BLD: 1.58 E9/L (ref 1.5–4)
LYMPHOCYTES NFR BLD: 31.2 % (ref 20–42)
MCH RBC QN AUTO: 31.6 PG (ref 26–35)
MCHC RBC AUTO-ENTMCNC: 32.4 % (ref 32–34.5)
MCV RBC AUTO: 97.3 FL (ref 80–99.9)
METER GLUCOSE: 138 MG/DL (ref 74–99)
MONOCYTES # BLD: 0.51 E9/L (ref 0.1–0.95)
MONOCYTES NFR BLD: 10.1 % (ref 2–12)
NEUTROPHILS # BLD: 2.77 E9/L (ref 1.8–7.3)
NEUTS SEG NFR BLD: 54.7 % (ref 43–80)
PLATELET # BLD AUTO: 164 E9/L (ref 130–450)
PMV BLD AUTO: 8.9 FL (ref 7–12)
POTASSIUM SERPL-SCNC: 4.1 MMOL/L (ref 3.5–5)
PROTHROMBIN TIME: 10.3 SEC (ref 9.3–12.4)
RBC # BLD AUTO: 4.12 E12/L (ref 3.5–5.5)
SODIUM SERPL-SCNC: 141 MMOL/L (ref 132–146)
WBC # BLD: 5.1 E9/L (ref 4.5–11.5)

## 2023-03-16 PROCEDURE — 99232 SBSQ HOSP IP/OBS MODERATE 35: CPT | Performed by: NEUROLOGICAL SURGERY

## 2023-03-16 PROCEDURE — 36415 COLL VENOUS BLD VENIPUNCTURE: CPT

## 2023-03-16 PROCEDURE — 6370000000 HC RX 637 (ALT 250 FOR IP): Performed by: INTERNAL MEDICINE

## 2023-03-16 PROCEDURE — G0378 HOSPITAL OBSERVATION PER HR: HCPCS

## 2023-03-16 PROCEDURE — 96376 TX/PRO/DX INJ SAME DRUG ADON: CPT

## 2023-03-16 PROCEDURE — 96372 THER/PROPH/DIAG INJ SC/IM: CPT

## 2023-03-16 PROCEDURE — 85610 PROTHROMBIN TIME: CPT

## 2023-03-16 PROCEDURE — 86301 IMMUNOASSAY TUMOR CA 19-9: CPT

## 2023-03-16 PROCEDURE — 86304 IMMUNOASSAY TUMOR CA 125: CPT

## 2023-03-16 PROCEDURE — 82962 GLUCOSE BLOOD TEST: CPT

## 2023-03-16 PROCEDURE — 6360000002 HC RX W HCPCS: Performed by: FAMILY MEDICINE

## 2023-03-16 PROCEDURE — 82378 CARCINOEMBRYONIC ANTIGEN: CPT

## 2023-03-16 PROCEDURE — 6370000000 HC RX 637 (ALT 250 FOR IP): Performed by: FAMILY MEDICINE

## 2023-03-16 PROCEDURE — 80048 BASIC METABOLIC PNL TOTAL CA: CPT

## 2023-03-16 PROCEDURE — 85025 COMPLETE CBC W/AUTO DIFF WBC: CPT

## 2023-03-16 PROCEDURE — 2580000003 HC RX 258: Performed by: FAMILY MEDICINE

## 2023-03-16 RX ORDER — PREGABALIN 75 MG/1
75 CAPSULE ORAL 2 TIMES DAILY
Status: DISCONTINUED | OUTPATIENT
Start: 2023-03-16 | End: 2023-03-18 | Stop reason: HOSPADM

## 2023-03-16 RX ORDER — MORPHINE SULFATE 4 MG/ML
4 INJECTION, SOLUTION INTRAMUSCULAR; INTRAVENOUS
Status: DISCONTINUED | OUTPATIENT
Start: 2023-03-16 | End: 2023-03-18 | Stop reason: HOSPADM

## 2023-03-16 RX ADMIN — OXYCODONE HYDROCHLORIDE 10 MG: 10 TABLET, FILM COATED, EXTENDED RELEASE ORAL at 09:24

## 2023-03-16 RX ADMIN — METHOCARBAMOL 500 MG: 500 TABLET ORAL at 13:02

## 2023-03-16 RX ADMIN — METHOCARBAMOL 500 MG: 500 TABLET ORAL at 09:24

## 2023-03-16 RX ADMIN — DULOXETINE 30 MG: 30 CAPSULE, DELAYED RELEASE ORAL at 20:38

## 2023-03-16 RX ADMIN — OXYCODONE AND ACETAMINOPHEN 1 TABLET: 5; 325 TABLET ORAL at 04:51

## 2023-03-16 RX ADMIN — SODIUM CHLORIDE, PRESERVATIVE FREE 10 ML: 5 INJECTION INTRAVENOUS at 06:39

## 2023-03-16 RX ADMIN — MORPHINE SULFATE 2 MG: 2 INJECTION, SOLUTION INTRAMUSCULAR; INTRAVENOUS at 03:28

## 2023-03-16 RX ADMIN — Medication 10 ML: at 09:25

## 2023-03-16 RX ADMIN — POLYETHYLENE GLYCOL 3350 17 G: 17 POWDER, FOR SOLUTION ORAL at 09:24

## 2023-03-16 RX ADMIN — OXYCODONE HYDROCHLORIDE 10 MG: 10 TABLET, FILM COATED, EXTENDED RELEASE ORAL at 22:03

## 2023-03-16 RX ADMIN — MORPHINE SULFATE 2 MG: 2 INJECTION, SOLUTION INTRAMUSCULAR; INTRAVENOUS at 06:39

## 2023-03-16 RX ADMIN — PREGABALIN 75 MG: 75 CAPSULE ORAL at 13:02

## 2023-03-16 RX ADMIN — SODIUM CHLORIDE: 9 INJECTION, SOLUTION INTRAVENOUS at 20:23

## 2023-03-16 RX ADMIN — MORPHINE SULFATE 4 MG: 4 INJECTION, SOLUTION INTRAMUSCULAR; INTRAVENOUS at 13:02

## 2023-03-16 RX ADMIN — Medication 10 ML: at 20:38

## 2023-03-16 RX ADMIN — FAMOTIDINE 20 MG: 20 TABLET ORAL at 20:38

## 2023-03-16 RX ADMIN — OXYCODONE AND ACETAMINOPHEN 1 TABLET: 5; 325 TABLET ORAL at 20:36

## 2023-03-16 RX ADMIN — ATORVASTATIN CALCIUM 20 MG: 20 TABLET, FILM COATED ORAL at 09:24

## 2023-03-16 RX ADMIN — OXYCODONE AND ACETAMINOPHEN 1 TABLET: 5; 325 TABLET ORAL at 10:43

## 2023-03-16 RX ADMIN — GABAPENTIN 200 MG: 100 CAPSULE ORAL at 09:24

## 2023-03-16 RX ADMIN — ENOXAPARIN SODIUM 40 MG: 100 INJECTION SUBCUTANEOUS at 09:24

## 2023-03-16 RX ADMIN — FAMOTIDINE 20 MG: 20 TABLET ORAL at 09:25

## 2023-03-16 RX ADMIN — PREGABALIN 75 MG: 75 CAPSULE ORAL at 20:38

## 2023-03-16 ASSESSMENT — PAIN DESCRIPTION - DESCRIPTORS
DESCRIPTORS: ACHING;DISCOMFORT;BURNING
DESCRIPTORS: BURNING;ACHING;DISCOMFORT
DESCRIPTORS: ACHING;BURNING
DESCRIPTORS: BURNING;ACHING;THROBBING
DESCRIPTORS: ACHING;PINS AND NEEDLES;BURNING

## 2023-03-16 ASSESSMENT — ENCOUNTER SYMPTOMS
PHOTOPHOBIA: 1
ABDOMINAL PAIN: 0
SHORTNESS OF BREATH: 0
BACK PAIN: 1
TROUBLE SWALLOWING: 0

## 2023-03-16 ASSESSMENT — PAIN DESCRIPTION - LOCATION
LOCATION: BACK;GENERALIZED
LOCATION: BACK;NECK
LOCATION: BACK
LOCATION: GENERALIZED
LOCATION: BACK
LOCATION: BACK;NECK
LOCATION: BACK
LOCATION: BACK;LEG

## 2023-03-16 ASSESSMENT — PAIN - FUNCTIONAL ASSESSMENT
PAIN_FUNCTIONAL_ASSESSMENT: ACTIVITIES ARE NOT PREVENTED

## 2023-03-16 ASSESSMENT — PAIN SCALES - GENERAL
PAINLEVEL_OUTOF10: 6
PAINLEVEL_OUTOF10: 6
PAINLEVEL_OUTOF10: 3
PAINLEVEL_OUTOF10: 6
PAINLEVEL_OUTOF10: 7
PAINLEVEL_OUTOF10: 8
PAINLEVEL_OUTOF10: 4
PAINLEVEL_OUTOF10: 6
PAINLEVEL_OUTOF10: 6
PAINLEVEL_OUTOF10: 9
PAINLEVEL_OUTOF10: 8

## 2023-03-16 ASSESSMENT — PAIN DESCRIPTION - FREQUENCY: FREQUENCY: CONTINUOUS

## 2023-03-16 ASSESSMENT — PAIN SCALES - WONG BAKER: WONGBAKER_NUMERICALRESPONSE: 0

## 2023-03-16 ASSESSMENT — PAIN DESCRIPTION - PAIN TYPE
TYPE: CHRONIC PAIN

## 2023-03-16 ASSESSMENT — PAIN DESCRIPTION - ONSET
ONSET: ON-GOING
ONSET: ON-GOING

## 2023-03-16 ASSESSMENT — PAIN DESCRIPTION - ORIENTATION
ORIENTATION: LOWER
ORIENTATION: LOWER

## 2023-03-16 NOTE — PROGRESS NOTES
Spoke with RN regarding patient's ordered Bone Biopsy. Will need INR, keep patient NPO after midnight, and hold all thinners for procedure. Patient is able to sign consent. IR to discuss with physician, will contact nurse with further instructions. Procedure to take place tomorrow 3/17/23. Hold Lovenox and keep patient NPO after midnight.

## 2023-03-16 NOTE — CARE COORDINATION
3/16/2023 social work transition of care planning  Pt plan remains to return home with family. Family to transport at discharge.   Electronically signed by CHAI Griggs on 3/16/2023 at 12:27 PM

## 2023-03-16 NOTE — PROGRESS NOTES
[facility]                                Progress note    Subjective: The patient is awake and alert. Lying in bed without complaints  No acute events overnight. States her pain is still significant. Denies chest pain, angina, SOB     Objective:    /66   Pulse (!) 105   Temp 98.2 °F (36.8 °C) (Temporal)   Resp 16   Ht 5' 6\" (1.676 m)   Wt 132 lb 3.2 oz (60 kg)   SpO2 98%   BMI 21.34 kg/m²     In: 440 [P.O.:440]  Out: 400   In: 440   Out: 400 [Urine:400]    General appearance: NAD, conversant, pleasant  HEENT: AT/NC, MMM  Neck: FROM, supple  Lungs: Clear to auscultation  CV: RRR, no MRGs  Vasc: Radial pulses 2+  Abdomen: Soft, non-tender; no masses or HSM  Extremities: No peripheral edema or digital cyanosis  Skin: no rash, lesions or ulcers  Psych: Alert and oriented to person, place and time  Neuro: Alert and interactive     Recent Labs     03/14/23  0629 03/15/23  0513 03/16/23  0429   WBC 4.3* 4.1* 5.1   HGB 11.5 11.5 13.0   HCT 33.5* 34.1 40.1    147 164       Recent Labs     03/14/23  0629 03/15/23  0513 03/16/23  0429   * 138 141   K 3.8 3.9 4.1   CL 99 103 103   CO2 24 28 28   BUN 7 7 5*   CREATININE 0.6 0.7 0.6   CALCIUM 8.2* 8.3* 9.2       Assessment:    Principal Problem:    Intractable back pain  Resolved Problems:    * No resolved hospital problems. *      Plan:  65year old female presents to the ED with complaints of back pain and is admitted to [place] [address] unit with     Intractable back pain of unknown etiology  Pain control-twice daily long-acting OxyContin with as needed Norco for breakthrough pain, continue Robaxin  MRI lumbar spine-5 mm T1 hypointense nonenhancing lesion in the L3 vertebral body. Schmorl nodes along L2 and L3.   Consult neurosurgery-input appreciated  CT head-pending  Nuc med bone scan to be done on 3/15    3/15/2023  Pain is well controlled  CT head - negative  Nuc med bone scan to be done today  Await neurosurgery's input  Vital signs stable  Family have been discussed case. -Unfortunately bone scan does not provide any helpful information indicating both benign and malignant etiologies are considerations-would consider biopsy, defer to neurosurgery team  -Initiate Neurontin for severe burning sensation, sounds like neuropathy diffusely in bilateral lower extremities, can also trial Lyrica if no improvement with Neurontin  -Case discussed at length with daughter at bedside and patient herself    3/16/2023  IR bone biopsy of sclerotic lesion found on bone scan  Check cancer markers  Discontinue Neurontin initiate Lyrica, patient continues to complain of excruciating burning pain throughout  -Case discussed with neurosurgery  Increase morphine to 4 mg every 4 hours-transition to p.o. pain meds tomorrow in preparation for discharge  Keep patient n.p.o. for biopsy  Hold all anticoagulation until biopsy is completed. Code status: Full  Consultants: Neurosurgery    DVT Prophylaxis   PT/OT  Discharge planning     ASHLEY Spear CNP  1:23 PM  3/16/2023     Above note edited to reflect my thoughts     I personally saw, examined and provided care for the patient. Radiographs, labs and medication list were reviewed by me independently. The case was discussed in detail and plans for care were established. Review of Gayla SPAULDING CNP, documentation was conducted and revisions were made as appropriate directly by me. I agree with the above documented exam, problem list, and plan of care.      Mihir George MD  2:08 PM  3/16/2023

## 2023-03-16 NOTE — PROGRESS NOTES
Department of Neurosurgery  Progress Note    CHIEF COMPLAINT: Back pain, T7 and L3 lesion    SUBJECTIVE:  Patient still with back pain, ice packs are helping. Bone scan reviewed. REVIEW OF SYSTEMS :  Constitutional: Negative for chills and fever. Neurological: Negative for dizziness, tremors and speech change.      OBJECTIVE:   VITALS:  BP (!) 143/89   Pulse (!) 109   Temp 97.9 °F (36.6 °C) (Temporal)   Resp 17   Ht 5' 6\" (1.676 m)   Wt 132 lb 3.2 oz (60 kg)   SpO2 100%   BMI 21.34 kg/m²     PHYSICAL:  Alert, oriented  Appears stated age  PERRL  EOMI  Strength full  Sensation intact to light touch    DATA:  CBC:   Lab Results   Component Value Date/Time    WBC 5.1 03/16/2023 04:29 AM    RBC 4.12 03/16/2023 04:29 AM    HGB 13.0 03/16/2023 04:29 AM    HCT 40.1 03/16/2023 04:29 AM    MCV 97.3 03/16/2023 04:29 AM    MCH 31.6 03/16/2023 04:29 AM    MCHC 32.4 03/16/2023 04:29 AM    RDW 14.3 03/16/2023 04:29 AM     03/16/2023 04:29 AM    MPV 8.9 03/16/2023 04:29 AM     BMP:    Lab Results   Component Value Date/Time     03/16/2023 04:29 AM    K 4.1 03/16/2023 04:29 AM     03/16/2023 04:29 AM    CO2 28 03/16/2023 04:29 AM    BUN 5 03/16/2023 04:29 AM    LABALBU 4.1 03/13/2023 11:35 AM    CREATININE 0.6 03/16/2023 04:29 AM    CALCIUM 9.2 03/16/2023 04:29 AM    GFRAA >60 04/27/2022 09:20 AM    LABGLOM >60 03/16/2023 04:29 AM    GLUCOSE 101 03/16/2023 04:29 AM     PT/INR:  No results found for: PROTIME, INR  PTT:  No results found for: APTT, PTT[APTT}    Current Inpatient Medications  Current Facility-Administered Medications: polyethylene glycol (GLYCOLAX) packet 17 g, 17 g, Oral, Daily  gabapentin (NEURONTIN) capsule 200 mg, 200 mg, Oral, TID  morphine (PF) injection 2 mg, 2 mg, IntraVENous, Q3H PRN  oxyCODONE (OXYCONTIN) extended release tablet 10 mg, 10 mg, Oral, 2 times per day  atorvastatin (LIPITOR) tablet 20 mg, 20 mg, Oral, QAM  DULoxetine (CYMBALTA) extended release capsule 30 mg, 30 mg, Oral, Daily  famotidine (PEPCID) tablet 20 mg, 20 mg, Oral, BID  hydrOXYzine pamoate (VISTARIL) capsule 50 mg, 50 mg, Oral, TID PRN  methocarbamol (ROBAXIN) tablet 500 mg, 500 mg, Oral, 4x Daily  0.9 % sodium chloride infusion, , IntraVENous, Continuous  sodium chloride flush 0.9 % injection 5-40 mL, 5-40 mL, IntraVENous, 2 times per day  sodium chloride flush 0.9 % injection 10 mL, 10 mL, IntraVENous, PRN  0.9 % sodium chloride infusion, , IntraVENous, PRN  enoxaparin (LOVENOX) injection 40 mg, 40 mg, SubCUTAneous, Daily  ondansetron (ZOFRAN-ODT) disintegrating tablet 4 mg, 4 mg, Oral, Q8H PRN **OR** ondansetron (ZOFRAN) injection 4 mg, 4 mg, IntraVENous, Q6H PRN  polyethylene glycol (GLYCOLAX) packet 17 g, 17 g, Oral, Daily PRN  acetaminophen (TYLENOL) tablet 650 mg, 650 mg, Oral, Q6H PRN **OR** acetaminophen (TYLENOL) suppository 650 mg, 650 mg, Rectal, Q6H PRN  potassium chloride (KLOR-CON M) extended release tablet 40 mEq, 40 mEq, Oral, PRN **OR** potassium bicarb-citric acid (EFFER-K) effervescent tablet 40 mEq, 40 mEq, Oral, PRN **OR** potassium chloride 10 mEq/100 mL IVPB (Peripheral Line), 10 mEq, IntraVENous, PRN  oxyCODONE-acetaminophen (PERCOCET) 5-325 MG per tablet 1 tablet, 1 tablet, Oral, Q4H PRN    ASSESSMENT:   -Annamaria Wilcox is a 63 y/o female who bone scan shows increased activity at T7 lesion     PLAN:  -Pain control   -IR consult for bone biopsy  -PT/OT  -No neurosurgical interventions at this time      Electronically signed by Dary Hudson PA-C on 3/16/2023 at 8:30 AM     Nsx Attending:    Patient was seen and examined by me with the team.  I personally reviewed all pertinent radiological images.  I concur with  Becca's clinical assessment and plan.  Back pain persists.  Remains neurologically nonfocal for me on my exam.  Bone scan reviewed and nonspecific.  Recommend IR biopsy T7 lesion.    Thank you so much for allowing us to participate in the care of this  patient. I certify that I spent more than half of the total time on this encounter. NOTE: This report was transcribed using voice recognition software.  Every effort was made to ensure accuracy; however, inadvertent computerized transcription errors may be present

## 2023-03-17 ENCOUNTER — APPOINTMENT (OUTPATIENT)
Dept: CT IMAGING | Age: 63
DRG: 479 | End: 2023-03-17
Payer: MEDICARE

## 2023-03-17 LAB
ANION GAP SERPL CALCULATED.3IONS-SCNC: 8 MMOL/L (ref 7–16)
BUN SERPL-MCNC: 5 MG/DL (ref 6–23)
CALCIUM SERPL-MCNC: 9.2 MG/DL (ref 8.6–10.2)
CHLORIDE SERPL-SCNC: 101 MMOL/L (ref 98–107)
CO2 SERPL-SCNC: 28 MMOL/L (ref 22–29)
CREAT SERPL-MCNC: 0.7 MG/DL (ref 0.5–1)
ERYTHROCYTE [DISTWIDTH] IN BLOOD BY AUTOMATED COUNT: 14.6 FL (ref 11.5–15)
GLUCOSE SERPL-MCNC: 97 MG/DL (ref 74–99)
HCT VFR BLD AUTO: 38.5 % (ref 34–48)
HGB BLD-MCNC: 12.5 G/DL (ref 11.5–15.5)
MCH RBC QN AUTO: 31.3 PG (ref 26–35)
MCHC RBC AUTO-ENTMCNC: 32.5 % (ref 32–34.5)
MCV RBC AUTO: 96.5 FL (ref 80–99.9)
PLATELET # BLD AUTO: 154 E9/L (ref 130–450)
PMV BLD AUTO: 8.9 FL (ref 7–12)
POTASSIUM SERPL-SCNC: 3.8 MMOL/L (ref 3.5–5)
RBC # BLD AUTO: 3.99 E12/L (ref 3.5–5.5)
SODIUM SERPL-SCNC: 137 MMOL/L (ref 132–146)
WBC # BLD: 4.2 E9/L (ref 4.5–11.5)

## 2023-03-17 PROCEDURE — 6370000000 HC RX 637 (ALT 250 FOR IP): Performed by: FAMILY MEDICINE

## 2023-03-17 PROCEDURE — 2709999900 CT GUIDED NEEDLE PLACEMENT

## 2023-03-17 PROCEDURE — 6370000000 HC RX 637 (ALT 250 FOR IP): Performed by: INTERNAL MEDICINE

## 2023-03-17 PROCEDURE — 36415 COLL VENOUS BLD VENIPUNCTURE: CPT

## 2023-03-17 PROCEDURE — 85027 COMPLETE CBC AUTOMATED: CPT

## 2023-03-17 PROCEDURE — 6360000002 HC RX W HCPCS: Performed by: RADIOLOGY

## 2023-03-17 PROCEDURE — 0PB43ZX EXCISION OF THORACIC VERTEBRA, PERCUTANEOUS APPROACH, DIAGNOSTIC: ICD-10-PCS | Performed by: RADIOLOGY

## 2023-03-17 PROCEDURE — 96375 TX/PRO/DX INJ NEW DRUG ADDON: CPT

## 2023-03-17 PROCEDURE — 2580000003 HC RX 258: Performed by: FAMILY MEDICINE

## 2023-03-17 PROCEDURE — 2500000003 HC RX 250 WO HCPCS: Performed by: RADIOLOGY

## 2023-03-17 PROCEDURE — 88342 IMHCHEM/IMCYTCHM 1ST ANTB: CPT

## 2023-03-17 PROCEDURE — 20225 BONE BIOPSY TROCAR/NDL DEEP: CPT

## 2023-03-17 PROCEDURE — 96376 TX/PRO/DX INJ SAME DRUG ADON: CPT

## 2023-03-17 PROCEDURE — 88307 TISSUE EXAM BY PATHOLOGIST: CPT

## 2023-03-17 PROCEDURE — G0378 HOSPITAL OBSERVATION PER HR: HCPCS

## 2023-03-17 PROCEDURE — 97535 SELF CARE MNGMENT TRAINING: CPT

## 2023-03-17 PROCEDURE — 80048 BASIC METABOLIC PNL TOTAL CA: CPT

## 2023-03-17 PROCEDURE — 88311 DECALCIFY TISSUE: CPT

## 2023-03-17 RX ORDER — LIDOCAINE HYDROCHLORIDE 20 MG/ML
INJECTION, SOLUTION INFILTRATION; PERINEURAL
Status: COMPLETED | OUTPATIENT
Start: 2023-03-17 | End: 2023-03-17

## 2023-03-17 RX ORDER — MIDAZOLAM HYDROCHLORIDE 2 MG/2ML
INJECTION, SOLUTION INTRAMUSCULAR; INTRAVENOUS
Status: COMPLETED | OUTPATIENT
Start: 2023-03-17 | End: 2023-03-17

## 2023-03-17 RX ORDER — FENTANYL CITRATE 50 UG/ML
INJECTION, SOLUTION INTRAMUSCULAR; INTRAVENOUS
Status: COMPLETED | OUTPATIENT
Start: 2023-03-17 | End: 2023-03-17

## 2023-03-17 RX ADMIN — FAMOTIDINE 20 MG: 20 TABLET ORAL at 20:53

## 2023-03-17 RX ADMIN — ATORVASTATIN CALCIUM 20 MG: 20 TABLET, FILM COATED ORAL at 08:48

## 2023-03-17 RX ADMIN — Medication 10 ML: at 08:48

## 2023-03-17 RX ADMIN — FENTANYL CITRATE 50 MCG: 50 INJECTION, SOLUTION INTRAMUSCULAR; INTRAVENOUS at 11:49

## 2023-03-17 RX ADMIN — HYDROXYZINE PAMOATE 50 MG: 25 CAPSULE ORAL at 00:56

## 2023-03-17 RX ADMIN — MIDAZOLAM HYDROCHLORIDE 1 MG: 1 INJECTION, SOLUTION INTRAMUSCULAR; INTRAVENOUS at 11:49

## 2023-03-17 RX ADMIN — Medication 10 ML: at 20:57

## 2023-03-17 RX ADMIN — DULOXETINE 30 MG: 30 CAPSULE, DELAYED RELEASE ORAL at 20:52

## 2023-03-17 RX ADMIN — OXYCODONE HYDROCHLORIDE 10 MG: 10 TABLET, FILM COATED, EXTENDED RELEASE ORAL at 20:52

## 2023-03-17 RX ADMIN — FENTANYL CITRATE 50 MCG: 50 INJECTION, SOLUTION INTRAMUSCULAR; INTRAVENOUS at 11:43

## 2023-03-17 RX ADMIN — OXYCODONE AND ACETAMINOPHEN 1 TABLET: 5; 325 TABLET ORAL at 00:56

## 2023-03-17 RX ADMIN — OXYCODONE AND ACETAMINOPHEN 1 TABLET: 5; 325 TABLET ORAL at 22:15

## 2023-03-17 RX ADMIN — POLYETHYLENE GLYCOL 3350 17 G: 17 POWDER, FOR SOLUTION ORAL at 08:48

## 2023-03-17 RX ADMIN — METHOCARBAMOL 500 MG: 500 TABLET ORAL at 00:46

## 2023-03-17 RX ADMIN — LIDOCAINE HYDROCHLORIDE 19 ML: 20 INJECTION, SOLUTION INFILTRATION; PERINEURAL at 11:46

## 2023-03-17 RX ADMIN — FAMOTIDINE 20 MG: 20 TABLET ORAL at 08:47

## 2023-03-17 RX ADMIN — PREGABALIN 75 MG: 75 CAPSULE ORAL at 08:47

## 2023-03-17 RX ADMIN — METHOCARBAMOL 500 MG: 500 TABLET ORAL at 08:48

## 2023-03-17 RX ADMIN — OXYCODONE AND ACETAMINOPHEN 1 TABLET: 5; 325 TABLET ORAL at 16:53

## 2023-03-17 RX ADMIN — OXYCODONE AND ACETAMINOPHEN 1 TABLET: 5; 325 TABLET ORAL at 12:38

## 2023-03-17 RX ADMIN — PREGABALIN 75 MG: 75 CAPSULE ORAL at 20:52

## 2023-03-17 RX ADMIN — SODIUM CHLORIDE: 9 INJECTION, SOLUTION INTRAVENOUS at 02:41

## 2023-03-17 RX ADMIN — MIDAZOLAM HYDROCHLORIDE 1 MG: 1 INJECTION, SOLUTION INTRAMUSCULAR; INTRAVENOUS at 11:43

## 2023-03-17 RX ADMIN — OXYCODONE HYDROCHLORIDE 10 MG: 10 TABLET, FILM COATED, EXTENDED RELEASE ORAL at 08:47

## 2023-03-17 ASSESSMENT — PAIN SCALES - GENERAL
PAINLEVEL_OUTOF10: 5
PAINLEVEL_OUTOF10: 2
PAINLEVEL_OUTOF10: 2
PAINLEVEL_OUTOF10: 3
PAINLEVEL_OUTOF10: 5
PAINLEVEL_OUTOF10: 10
PAINLEVEL_OUTOF10: 8
PAINLEVEL_OUTOF10: 7
PAINLEVEL_OUTOF10: 2
PAINLEVEL_OUTOF10: 2

## 2023-03-17 ASSESSMENT — PAIN - FUNCTIONAL ASSESSMENT
PAIN_FUNCTIONAL_ASSESSMENT: ACTIVITIES ARE NOT PREVENTED
PAIN_FUNCTIONAL_ASSESSMENT: PREVENTS OR INTERFERES SOME ACTIVE ACTIVITIES AND ADLS
PAIN_FUNCTIONAL_ASSESSMENT: ACTIVITIES ARE NOT PREVENTED

## 2023-03-17 ASSESSMENT — PAIN DESCRIPTION - ORIENTATION
ORIENTATION: LOWER
ORIENTATION: LOWER;MID
ORIENTATION: LOWER
ORIENTATION: MID
ORIENTATION: RIGHT;LEFT

## 2023-03-17 ASSESSMENT — PAIN DESCRIPTION - FREQUENCY: FREQUENCY: CONTINUOUS

## 2023-03-17 ASSESSMENT — PAIN DESCRIPTION - DESCRIPTORS
DESCRIPTORS: BURNING;DISCOMFORT;ACHING
DESCRIPTORS: STABBING;ACHING;DISCOMFORT
DESCRIPTORS: BURNING;ACHING;DISCOMFORT
DESCRIPTORS: ACHING;DISCOMFORT

## 2023-03-17 ASSESSMENT — PAIN DESCRIPTION - LOCATION
LOCATION: BACK
LOCATION: HEAD;BACK
LOCATION: HEAD;BACK
LOCATION: LEG
LOCATION: BACK
LOCATION: BACK

## 2023-03-17 ASSESSMENT — PAIN DESCRIPTION - ONSET
ONSET: ON-GOING
ONSET: ON-GOING

## 2023-03-17 ASSESSMENT — PAIN DESCRIPTION - PAIN TYPE
TYPE: SURGICAL PAIN
TYPE: SURGICAL PAIN
TYPE: CHRONIC PAIN

## 2023-03-17 NOTE — BRIEF OP NOTE
Brief Postoperative Note      Patient: Ann Batista  YOB: 1960  MRN: [id number]    Date of Procedure: 3/17/2023    Pre-Op Diagnosis: 65year old female with backpain , T7 lesion seen on CT scan. Post-Op Diagnosis: Same       Percutaneous CT guided bone biopsy    GAYATRI Wild MD    Assistant:  * No surgical staff found *    Anesthesia: Conscious sedation. Estimated Blood Loss (mL): Minimal    Complications: None    Specimens: Three core biopsy specimens sent to lab for analysis. Implants:  * No implants in log *      Drains: * No LDAs found *    Findings:   Percutaneous CT guided bone biopsy. Three core biopsy specimens sent to lab for analysis.     Electronically signed by John Chavez MD on 3/17/2023 at 12:53 PM

## 2023-03-17 NOTE — CARE COORDINATION
Care coordination:  Patient to IR today for biopsy of spinal lesion. [place] [address] of 24. No needs. Family will transport.

## 2023-03-17 NOTE — PROCEDURES
1230 Patient returned from procedure. Dressing checked, clean, dry, and intact. Patient stable. No s/s of complications noted or reported. Vitals will be checked q 15min, see flow sheets.    1245 Admin PRN percocet for c/o pain in mid/lower back. Report called to inpt RN, no concerns at this time. VS remain stable. Dressing clean, dry, and intact.     1300 transport arrived. Pt stable. Dressing continues to be clean, dry and intact. Hemodynamically stable for transport.

## 2023-03-17 NOTE — PROGRESS NOTES
Occupational Therapy  OT BEDSIDE TREATMENT NOTE   [address] [facility] [address] [facility]  [address] [place] [address]      Date:3/17/2023  Patient Name: Yana Herrera  MRN: [id number]  : 1960  Room: [address] [place] [address]     Evaluating OT:Cindy Coats OTR/L   License #  [id number]        Referring Provider: ASHLEY Spring CNP    Specific Provider Orders/Date: OT evaluation & treatment per NS PA       Diagnosis: Chills [R68.83]  Myalgia [M79.10]  Intractable back pain [M54.9]  Ambulatory dysfunction [R26.2]  Intractable headache, unspecified chronicity pattern, unspecified headache type [R51.9]      Pertinent Medical History:  has a past medical history of Anxiety, Dental caries, Depression, and Hyperlipidemia. Surgery: none this admit    Past Surgical History:  has a past surgical history that includes  section; Carpal tunnel release (Bilateral); cervical fusion; and Dental surgery (N/A, 2019). Precautions:  Fall Risk, WBAT, will maintain neutral spine for comfort         Assessment of current deficits    [x] Functional mobility            [x]ADLs           [x] Strength                  [x]Cognition    [x] Functional transfers          [x] IADLs         [x] Safety Awareness   [x]Endurance    [x] Fine Coordination                         [x] Balance      [] Vision/perception   [x]Sensation      []Gross Motor Coordination             [] ROM           [] Delirium                   [] Motor Control      OT PLAN OF CARE   OT POC based on physician orders, patient diagnosis and results of clinical assessment     Frequency/Duration: 2-4 days/wk for 2 weeks PRN   Specific OT Treatment Interventions to include:    Instruction/training on adapted ADL techniques and AE recommendations to increase functional independence within precautions  Training on energy conservation strategies, correct breathing pattern and techniques to improve independence/tolerance for self-care routine  Functional transfer/mobility training/DME recommendations for increased independence, safety, and fall prevention  Patient/Family education to increase follow through with safety techniques and functional independence  Recommendation of environmental modifications for increased safety with functional transfers/mobility and ADLs  Therapeutic exercise to improve motor endurance, ROM, and functional strength for ADLs/functional transfers  Therapeutic activities to facilitate/challenge dynamic balance, stand tolerance for increased safety and independence with ADLs  Therapeutic activities to facilitate gross/fine motor skills for increased independence with ADLs  Positioning to improve skin integrity, interaction with environment and functional independence     Recommended Adaptive Equipment:  TBD      Home Living: Pt lives with son (works days) in a 1 story with no steps to enter with no HR. B&B on main level. Laundry basement level but states son can perform  Bathroom setup: tub/shower, std. commode   Equipment owned: none     Prior Level of Function: Ind. with ADLs , Ind. with IADLs; ambulated without A.D. Driving: active  Occupation: retired from retail     Pain Level: Min low back pain  Cognition: A&O: 4/4; Follows 2 step directions              Memory:  G              Sequencing:  G              Problem solving:  G              Judgement/safety:  F+                Functional Assessment:  AM-PAC Daily Activity Raw Score: 22/24    Initial Eval Status  Date: 3-14-23 Treatment Status  Date: 3/17/23 STGs = LTGs  Time frame: 10-14 days   Feeding Ind. Ind Mod I/ Ind   Grooming Set up seated  SBA standing  Ind  To wash hands standing at sink Modified [place]    UB Dressing SBA  per last tx Modified [place]    LB Dressing Min A seated EOB figure 4 technique Ind  To don/doff socks seated in bedside chair  Modified [place]    Bathing Min A with sim.  task Per last tx  Modified [place]    Toileting SBA for hygiene & clothing mgmt. Ind- clothing management  Ind- hygiene  Modified [place]    Bed Mobility  Logroll: Ind. Supine to sit: Mod I  Sit to supine: Mod I Ind- supine>sit  Supine to sit: Modified [place]   Sit to supine: Modified [place]    Functional Transfers SBA with sit <> stand, SPT with ww Ind- sit<->stand  Ind- toilet transfer  Modified [place]    Functional Mobility SBA with ww > home distances without A.D. Ind  To and from bathroom no AD  Modified [place]    Balance Sitting:     Static:  Ind. Dynamic:Sup  Standing: SBA Sitting:     Static:  Ind. Dynamic: Ind  Standing: Ind     Activity Tolerance F F+  G   Visual/  Perceptual Glasses: yes          Vitals spO2 & HR WFL   WFL       Comments: Upon arrival pt supine in bed. Discussed home set up with pt, giving suggestions to increase safety at discharge. At end of session pt left seated in bedside chair, call light within reach. Pt has made good progress towards set goals.      Continue with current plan of care    Treatment Time In: 1:50            Treatment Time Out: 2:04             Treatment Charges: Mins Units   Ther Ex  28082     Manual Therapy 39817     Thera Activities 85691     ADL/Home Mgt 66965 14 1   Neuro Re-ed 14530     Group Therapy      Orthotic manage/training  39286     Non-Billable Time     Total Timed Treatment 14 69 Denise Solo

## 2023-03-17 NOTE — PROGRESS NOTES
Patient arrived via Stretcher to Radiology department for a CT Guided . Allergies, home medications, H&P and fasting instructions reviewed with patient. Vital signs taken. Left arm IV flushed. Procedural instructions given, questions answered, understanding expressed and consent signed. No questions at this time.

## 2023-03-17 NOTE — PROGRESS NOTES
[facility]                                Progress note    Subjective: The patient is awake and alert. Lying in bed without complaints  No acute events overnight. States her pain is still significant. Denies chest pain, angina, SOB     Objective:    BP (!) 166/91   Pulse 98   Temp 98 °F (36.7 °C)   Resp 10   Ht 5' 6\" (1.676 m)   Wt 132 lb 3.2 oz (60 kg)   SpO2 100%   BMI 21.34 kg/m²     In: 1570 [P.O.:460; I.V.:1110]  Out: -   In: 1570   Out: -     General appearance: NAD, conversant, pleasant  HEENT: AT/NC, MMM  Neck: FROM, supple  Lungs: Clear to auscultation  CV: RRR, no MRGs  Vasc: Radial pulses 2+  Abdomen: Soft, non-tender; no masses or HSM  Extremities: No peripheral edema or digital cyanosis  Skin: no rash, lesions or ulcers  Psych: Alert and oriented to person, place and time  Neuro: Alert and interactive     Recent Labs     03/15/23  0513 03/16/23  0429   WBC 4.1* 5.1   HGB 11.5 13.0   HCT 34.1 40.1    164       Recent Labs     03/15/23  0513 03/16/23  0429    141   K 3.9 4.1    103   CO2 28 28   BUN 7 5*   CREATININE 0.7 0.6   CALCIUM 8.3* 9.2       Assessment:    Principal Problem:    Intractable back pain  Resolved Problems:    * No resolved hospital problems. *      Plan:  65year old female presents to the ED with complaints of back pain and is admitted to [place] [address] unit with     Intractable back pain of unknown etiology  Pain control-twice daily long-acting OxyContin with as needed Norco for breakthrough pain, continue Robaxin  MRI lumbar spine-5 mm T1 hypointense nonenhancing lesion in the L3 vertebral body. Schmorl nodes along L2 and L3. Consult neurosurgery-input appreciated  CT head-pending  Nuc med bone scan to be done on 3/15    3/15/2023  Pain is well controlled  CT head - negative  Nuc med bone scan to be done today  Await neurosurgery's input  Vital signs stable  Family have been discussed case.   -Unfortunately bone scan does not provide any helpful information indicating both benign and malignant etiologies are considerations-would consider biopsy, defer to neurosurgery team  -Initiate Neurontin for severe burning sensation, sounds like neuropathy diffusely in bilateral lower extremities, can also trial Lyrica if no improvement with Neurontin  -Case discussed at length with daughter at bedside and patient herself    3/16/2023  IR bone biopsy of sclerotic lesion found on bone scan  Check cancer markers  Discontinue Neurontin initiate Lyrica, patient continues to complain of excruciating burning pain throughout  -Case discussed with neurosurgery  Increase morphine to 4 mg every 4 hours-transition to p.o. pain meds tomorrow in preparation for discharge  Keep patient n.p.o. for biopsy  Hold all anticoagulation until biopsy is completed. 3/17/2023  Patient had bone biopsy this a.m.   Monitor labs  Vital signs stable prior to procedure  Cancer markers-negative    Code status: Full  Consultants: Neurosurgery    DVT Prophylaxis   PT/OT  Discharge planning     ASHLEY Forbes - CNP  11:41 AM  3/17/2023

## 2023-03-17 NOTE — PROGRESS NOTES
Spoke with RN regarding patient's ordered bone biopsy. INR ok for procedure. Lovenox held since 3/16 am, Pt has been NPO since midnight. Patient is able to sign consent.

## 2023-03-17 NOTE — PROGRESS NOTES
This RN notified Dr. Macie Kinsey that pt feels she is having reaction to Robaxin. Order given to D/C robaxin.

## 2023-03-17 NOTE — PRE SEDATION
Sedation Pre-Procedure Note    Patient Name: Alessia Leigh   YOB: 1960  Room/Bed: 6023/9549-Q  Medical Record Number: [id number]  Date: 3/17/2023   Time: 11:00 AM       Indication:  65year old female with back pain , sclerotic bone lesions. Consent: I have discussed with the patient and/or the patient representative the indication, alternatives, and the possible risks and/or complications of the planned procedure and the anesthesia methods. The patient and/or patient representative appear to understand and agree to proceed. Vital Signs:   Vitals:    23 1000   BP: (!) 158/79   Pulse: 95   Resp: 16   Temp: 98 °F (36.7 °C)   SpO2: 96%       Past Medical History:   has a past medical history of Anxiety, Dental caries, Depression, and Hyperlipidemia. Past Surgical History:   has a past surgical history that includes  section; Carpal tunnel release (Bilateral); cervical fusion; and Dental surgery (N/A, 2019). Medications:   Scheduled Meds:    pregabalin  75 mg Oral BID    polyethylene glycol  17 g Oral Daily    oxyCODONE  10 mg Oral 2 times per day    atorvastatin  20 mg Oral QAM    DULoxetine  30 mg Oral Daily    famotidine  20 mg Oral BID    methocarbamol  500 mg Oral 4x Daily    sodium chloride flush  5-40 mL IntraVENous 2 times per day    [Held by provider] enoxaparin  40 mg SubCUTAneous Daily     Continuous Infusions:    sodium chloride 75 mL/hr at 23 0241    sodium chloride       PRN Meds: morphine, hydrOXYzine pamoate, sodium chloride flush, sodium chloride, ondansetron **OR** ondansetron, polyethylene glycol, acetaminophen **OR** acetaminophen, potassium chloride **OR** potassium alternative oral replacement **OR** potassium chloride, oxyCODONE-acetaminophen  Home Meds:   Prior to Admission medications    Medication Sig Start Date End Date Taking?  Authorizing Provider   DULoxetine (CYMBALTA) 30 MG extended release capsule Take 30 mg by mouth daily Historical Provider, MD   hydrOXYzine pamoate (VISTARIL) 50 MG capsule Take 50 mg by mouth 3 times daily as needed for Itching    Historical Provider, MD   nystatin (MYCOSTATIN) 564955 UNIT/ML suspension Take 5 mLs by mouth 4 times daily for 10 days Retain in mouth as long as possible 3/10/23 3/20/23  [place] FAN Art DO   methocarbamol (ROBAXIN) 500 MG tablet Take 1 tablet by mouth 4 times daily for 10 days 3/8/23 3/18/23  [place] FAN Art DO   polyethylene glycol (GLYCOLAX) 17 GM/SCOOP powder Take 17 g by mouth daily    Historical Provider, MD   Probiotic Product (PROBIOTIC ADVANCED PO) Take 4 capsules by mouth every morning PRODUCT: BIO COMPLETE 3    Historical Provider, MD   atorvastatin (LIPITOR) 20 MG tablet Take 20 mg by mouth every morning    Historical Provider, MD   famotidine (PEPCID) 20 MG tablet Take 20 mg by mouth 2 times daily    Historical Provider, MD     Coumadin Use Last 7 Days:  no  Antiplatelet drug therapy use last 7 days: no  Other anticoagulant use last 7 days: no  Additional Medication Information:  NA      Pre-Sedation Documentation and Exam:   I have reviewed the patient's history and review of systems.     Mallampati Airway Assessment:  normal    Prior History of Anesthesia Complications:   none    ASA Classification:  Class 1 - A normal healthy patient    Sedation/ Anesthesia Plan:   intravenous sedation    Medications Planned:   midazolam (Versed) intravenously and fentanyl intravenously    Patient is an appropriate candidate for plan of sedation: yes    Electronically signed by John Chavez MD on 3/17/2023 at 11:00 AM

## 2023-03-17 NOTE — PROGRESS NOTES
Patient gone for biopsy on multiple attempts to see  We will recheck tomorrow  Chart reviewed, Robaxin discontinued secondary to patient feeling like she has a reaction to it  Relief noted from Lyrica per nursing

## 2023-03-18 VITALS
DIASTOLIC BLOOD PRESSURE: 60 MMHG | RESPIRATION RATE: 16 BRPM | SYSTOLIC BLOOD PRESSURE: 124 MMHG | HEART RATE: 76 BPM | WEIGHT: 132.2 LBS | HEIGHT: 66 IN | BODY MASS INDEX: 21.24 KG/M2 | TEMPERATURE: 97.3 F | OXYGEN SATURATION: 98 %

## 2023-03-18 LAB
BACTERIA BLD CULT ORG #2: NORMAL
BACTERIA BLD CULT: NORMAL
CANCER AG19-9 SERPL-ACNC: 9 U/ML

## 2023-03-18 PROCEDURE — 6370000000 HC RX 637 (ALT 250 FOR IP): Performed by: INTERNAL MEDICINE

## 2023-03-18 PROCEDURE — 6370000000 HC RX 637 (ALT 250 FOR IP): Performed by: FAMILY MEDICINE

## 2023-03-18 PROCEDURE — G0378 HOSPITAL OBSERVATION PER HR: HCPCS

## 2023-03-18 PROCEDURE — 1200000000 HC SEMI PRIVATE

## 2023-03-18 RX ORDER — OXYCODONE HYDROCHLORIDE AND ACETAMINOPHEN 5; 325 MG/1; MG/1
1 TABLET ORAL EVERY 8 HOURS PRN
Qty: 9 TABLET | Refills: 0 | Status: CANCELLED | OUTPATIENT
Start: 2023-03-18 | End: 2023-03-21

## 2023-03-18 RX ORDER — OXYCODONE HCL 10 MG/1
10 TABLET, FILM COATED, EXTENDED RELEASE ORAL EVERY 12 HOURS SCHEDULED
Qty: 6 EACH | Refills: 0 | Status: CANCELLED | OUTPATIENT
Start: 2023-03-18 | End: 2023-03-21

## 2023-03-18 RX ORDER — METHOCARBAMOL 500 MG/1
500 TABLET, FILM COATED ORAL 4 TIMES DAILY
Qty: 20 TABLET | Refills: 0 | Status: SHIPPED | OUTPATIENT
Start: 2023-03-18 | End: 2023-03-18 | Stop reason: HOSPADM

## 2023-03-18 RX ORDER — PREGABALIN 75 MG/1
75 CAPSULE ORAL 2 TIMES DAILY
Qty: 10 CAPSULE | Refills: 0 | Status: SHIPPED | OUTPATIENT
Start: 2023-03-18 | End: 2023-03-18 | Stop reason: HOSPADM

## 2023-03-18 RX ADMIN — ATORVASTATIN CALCIUM 20 MG: 20 TABLET, FILM COATED ORAL at 08:08

## 2023-03-18 RX ADMIN — POLYETHYLENE GLYCOL 3350 17 G: 17 POWDER, FOR SOLUTION ORAL at 08:06

## 2023-03-18 RX ADMIN — OXYCODONE HYDROCHLORIDE 10 MG: 10 TABLET, FILM COATED, EXTENDED RELEASE ORAL at 08:07

## 2023-03-18 RX ADMIN — FAMOTIDINE 20 MG: 20 TABLET ORAL at 08:08

## 2023-03-18 RX ADMIN — OXYCODONE AND ACETAMINOPHEN 1 TABLET: 5; 325 TABLET ORAL at 01:56

## 2023-03-18 RX ADMIN — OXYCODONE AND ACETAMINOPHEN 1 TABLET: 5; 325 TABLET ORAL at 06:17

## 2023-03-18 RX ADMIN — OXYCODONE AND ACETAMINOPHEN 1 TABLET: 5; 325 TABLET ORAL at 11:04

## 2023-03-18 RX ADMIN — PREGABALIN 75 MG: 75 CAPSULE ORAL at 08:06

## 2023-03-18 ASSESSMENT — PAIN SCALES - GENERAL
PAINLEVEL_OUTOF10: 7
PAINLEVEL_OUTOF10: 2
PAINLEVEL_OUTOF10: 4
PAINLEVEL_OUTOF10: 6
PAINLEVEL_OUTOF10: 4
PAINLEVEL_OUTOF10: 3
PAINLEVEL_OUTOF10: 6

## 2023-03-18 ASSESSMENT — PAIN DESCRIPTION - DESCRIPTORS
DESCRIPTORS: BURNING;TINGLING
DESCRIPTORS: ACHING;CRAMPING;DISCOMFORT
DESCRIPTORS: ACHING;DISCOMFORT;SORE

## 2023-03-18 ASSESSMENT — PAIN DESCRIPTION - ORIENTATION
ORIENTATION: LOWER
ORIENTATION: LOWER

## 2023-03-18 ASSESSMENT — PAIN DESCRIPTION - PAIN TYPE
TYPE: CHRONIC PAIN
TYPE: CHRONIC PAIN

## 2023-03-18 ASSESSMENT — PAIN DESCRIPTION - LOCATION
LOCATION: BACK
LOCATION: HEAD;BACK
LOCATION: BACK

## 2023-03-18 NOTE — PROGRESS NOTES
Patient discharged home with Daughter, left unit via w/c. Medications and discharge instructions reviewed with patient and daughter, both acknowledge understanding.

## 2023-03-18 NOTE — DISCHARGE SUMMARY
[facility]   Discharge summary   Patient ID:  Mary Jane Diamond  [id number]  65 y.o.  1960    Admit date: 3/13/2023    Discharge date and time: 3/18/2023    Admission Diagnoses:   Patient Active Problem List   Diagnosis    Depression    Anxiety    Hyperlipidemia    Tobacco use    Urticaria, chronic    Acute pharyngitis    Arthralgia    Other chest pain    Gastroesophageal reflux disease without esophagitis    Recurrent major depressive disorder, in partial remission (HCC)    Intractable back pain       Discharge Diagnoses: intractable back pain, sclerotic lesions of spine    Consults: Neurosurgery     Procedures: IR bone biopsy     Hospital Course:     65year old female presents to the ED with complaints of back pain and is admitted to [place] [address] unit with     Intractable back pain of unknown etiology  Pain control-twice daily long-acting OxyContin with as needed Norco for breakthrough pain, continue Robaxin  MRI lumbar spine-5 mm T1 hypointense nonenhancing lesion in the L3 vertebral body. Schmorl nodes along L2 and L3. Consult neurosurgery-input appreciated  CT head-pending  Nuc med bone scan to be done on 3/15     3/15/2023  Pain is well controlled  CT head - negative  Nuc med bone scan to be done today  Await neurosurgery's input  Vital signs stable  Family have been discussed case.   -Unfortunately bone scan does not provide any helpful information indicating both benign and malignant etiologies are considerations-would consider biopsy, defer to neurosurgery team  -Initiate Neurontin for severe burning sensation, sounds like neuropathy diffusely in bilateral lower extremities, can also trial Lyrica if no improvement with Neurontin  -Case discussed at length with daughter at bedside and patient herself     3/16/2023  IR bone biopsy of sclerotic lesion found on bone scan  Check cancer markers  Discontinue Neurontin initiate Lyrica, patient continues to complain of excruciating burning pain throughout  -Case discussed with neurosurgery  Increase morphine to 4 mg every 4 hours-transition to p.o. pain meds tomorrow in preparation for discharge  Keep patient n.p.o. for biopsy  Hold all anticoagulation until biopsy is completed. 3/17/2023  Patient had bone biopsy this a.m. Monitor labs  Vital signs stable prior to procedure  Cancer markers-negative    3/18/23:  -Follow up outpatient with neurosurgery/PCP for results on bone biopsy  -Patient adamantly refused Lyrica for discharge and asked for it to be taken off her med list. She is to follow up with PCP within 1 week   -Patient is okay for discharge home    Recent Labs     03/16/23 0429 03/17/23  1342   WBC 5.1 4.2*   HGB 13.0 12.5   HCT 40.1 38.5    154       Recent Labs     03/16/23 0429 03/17/23  1342    137   K 4.1 3.8    101   CO2 28 28   BUN 5* 5*   CREATININE 0.6 0.7   CALCIUM 9.2 9.2       CT HEAD WO CONTRAST    Result Date: 3/14/2023  EXAMINATION: CT OF THE HEAD WITHOUT CONTRAST  3/14/2023 12:36 pm TECHNIQUE: CT of the head was performed without the administration of intravenous contrast. Automated exposure control, iterative reconstruction, and/or weight based adjustment of the mA/kV was utilized to reduce the radiation dose to as low as reasonably achievable. COMPARISON: 02/15/2023 HISTORY: ORDERING SYSTEM PROVIDED HISTORY: worsenig headache TECHNOLOGIST PROVIDED HISTORY: Reason for exam:->worsenig headache Has a \"code stroke\" or \"stroke alert\" been called? ->No What reading provider will be dictating this exam?->CRC FINDINGS: BRAIN/VENTRICLES: There is no acute intracranial hemorrhage, mass effect or midline shift. No abnormal extra-axial fluid collection. The gray-white differentiation is maintained without evidence of an acute infarct. There is no evidence of hydrocephalus. ORBITS: The visualized portion of the orbits demonstrate no acute abnormality.  SINUSES: The visualized paranasal sinuses and mastoid air cells demonstrate no acute abnormality. SOFT TISSUES/SKULL:  No acute abnormality of the visualized skull or soft tissues. No acute intracranial abnormality. XR CHEST PORTABLE    Result Date: 3/13/2023  EXAMINATION: ONE XRAY VIEW OF THE CHEST 3/13/2023 12:49 pm COMPARISON: February 3, 2022. Correlated with CTA of the chest from March 3, 2023. HISTORY: ORDERING SYSTEM PROVIDED HISTORY: chest pain TECHNOLOGIST PROVIDED HISTORY: Reason for exam:->chest pain What reading provider will be dictating this exam?->CRC FINDINGS: Heart size is within normal limits. No evidence of a focal airspace opacity, pneumothorax or pleural effusion. No acute osseous abnormality. No acute cardiopulmonary process. Discharge Exam:    HEENT: NCAT,  PERRLA, No JVD  Heart:  RRR, no murmurs, gallops, or rubs. Lungs:  CTA bilaterally, no wheeze, rales or rhonchi  Abd: bowel sounds present, nontender, nondistended, no masses  Extrem:  No clubbing, cyanosis, or edema    Disposition: home     Patient Condition at Discharge: Stable     Patient Instructions:      Medication List        CONTINUE taking these medications      atorvastatin 20 MG tablet  Commonly known as: LIPITOR     DULoxetine 30 MG extended release capsule  Commonly known as: CYMBALTA     famotidine 20 MG tablet  Commonly known as: PEPCID     hydrOXYzine pamoate 50 MG capsule  Commonly known as: VISTARIL     nystatin [id number] UNIT/ML suspension  Commonly known as: MYCOSTATIN  Take 5 mLs by mouth 4 times daily for 10 days Retain in mouth as long as possible     polyethylene glycol 17 GM/SCOOP powder  Commonly known as: GLYCOLAX     PROBIOTIC ADVANCED PO            STOP taking these medications      methocarbamol 500 MG tablet  Commonly known as: Robaxin            Activity: activity as tolerated  Diet: regular diet    Pt has been advised to: Follow-up with Gwynn FAN Art DO in 1 week.   Follow-up with consultants as recommended by them    Note that over 30 minutes was spent in preparing discharge papers, discussing discharge with patient, medication review, etc.    Signed:  Art Couch MD

## 2023-03-18 NOTE — FLOWSHEET NOTE
Spoke with Graciela about pt wanting home meds for D/C. At this time they will not be ordering an home pain meds.

## 2023-03-20 ENCOUNTER — PATIENT MESSAGE (OUTPATIENT)
Dept: FAMILY MEDICINE CLINIC | Age: 63
End: 2023-03-20

## 2023-03-20 ENCOUNTER — CARE COORDINATION (OUTPATIENT)
Dept: CASE MANAGEMENT | Age: 63
End: 2023-03-20

## 2023-03-20 DIAGNOSIS — R63.4 UNEXPLAINED WEIGHT LOSS: ICD-10-CM

## 2023-03-20 DIAGNOSIS — R93.7 ABNORMAL CT OF THORACIC SPINE: ICD-10-CM

## 2023-03-20 DIAGNOSIS — R10.84 GENERALIZED ABDOMINAL PAIN: ICD-10-CM

## 2023-03-20 RX ORDER — HYDROCODONE BITARTRATE AND ACETAMINOPHEN 5; 325 MG/1; MG/1
1 TABLET ORAL EVERY 6 HOURS PRN
Qty: 12 TABLET | Refills: 0 | OUTPATIENT
Start: 2023-03-20 | End: 2023-03-23

## 2023-03-20 RX ORDER — HYDROCODONE BITARTRATE AND ACETAMINOPHEN 5; 325 MG/1; MG/1
1 TABLET ORAL EVERY 6 HOURS PRN
Qty: 20 TABLET | Refills: 0 | Status: SHIPPED | OUTPATIENT
Start: 2023-03-20 | End: 2023-03-25

## 2023-03-20 NOTE — CARE COORDINATION
[facility] Follow Up Call    Call within 2 business days of discharge: Yes      Patient: Igor Chapman Patient : 1960   MRN: [id number]  Reason for Admission: intractable back pain  Discharge Date: 3/18/23 RARS: Readmission Risk Score: 9.8      Last Discharge [address]       Date Complaint Diagnosis Description Type Department Provider    3/13/23 Other Intractable back pain . .. ED to Hosp-Admission (Discharged) (ADMITTED) Deondre Landrum MD; Sean Bell...          -First attempt to reach the patient for initial Care Transition call post hospital discharge. Message left with CTN's contact information requesting return phone call.             Follow Up  Future Appointments   Date Time Provider Vicky Daley   3/22/2023  8:30 AM DO GRICELDA Palmer JEB Nettles RN

## 2023-03-20 NOTE — TELEPHONE ENCOUNTER
From: Jane Wilcox  To: Dr. Joycie Peabody: 3/20/2023 6:24 AM EDT  Subject: Benjamin Perkins    Please refill Brian

## 2023-03-21 ENCOUNTER — CARE COORDINATION (OUTPATIENT)
Dept: CASE MANAGEMENT | Age: 63
End: 2023-03-21

## 2023-03-21 NOTE — CARE COORDINATION
[facility] Care Transitions Initial Follow Up Call    Call within 2 business days of discharge: Yes      Patient: Christian Kirk Patient : 1960   MRN: [id number]  Reason for Admission: intractable back pain  Discharge Date: 3/18/23 RARS: Readmission Risk Score: 9.8      Last Discharge  Street       Date Complaint Diagnosis Description Type Department Provider    3/13/23 Other Intractable back pain . .. ED to Hosp-Admission (Discharged) (ADMITTED) Lashonda Cuevas MD; Tito Villalta...          -Second attempt to reach the patient for initial Care Transition call post hospital discharge. Message left with CTN's contact information requesting return phone call. -PCP appointment as noted below. CTN will route to PCP front office pool under high priority consideration to change visit type to TCM/hospital follow up appointment.   -Per chart review the patient is  active on [facility], CTN will send unable to reach letter in [facility].             Follow Up  Future Appointments   Date Time Provider Vicky Daley   3/22/2023  8:30 AM Wesley DO GRICELDA Ward [facility]   2023  9:00 AM Erika Hernandez MD [id number] [place] [address], RN

## 2023-03-21 NOTE — PROGRESS NOTES
20  Falguni Berkley : 1960 Sex: female  Age: 63 y.o. Chief Complaint   Patient presents with    Thyroid Problem     pt is here to discuss their weight gain, and under active thyroid with Dr. Moreno Haider. Weight gain with new thyroid medication, 8#. No new mood medications      Review of Systems   Constitutional: Negative for appetite change, fatigue and unexpected weight change. HENT: Negative for congestion, ear pain, hearing loss, sinus pain, sore throat and trouble swallowing. Eyes: Negative for photophobia, pain, discharge and redness. Respiratory: Negative for cough, chest tightness and shortness of breath. Cardiovascular: Negative for chest pain, palpitations and leg swelling. Gastrointestinal: Negative for abdominal pain, blood in stool, diarrhea, nausea and vomiting. Endocrine: Negative. Genitourinary: Negative for dysuria, flank pain, frequency and hematuria. Musculoskeletal: Negative for arthralgias, back pain, joint swelling and myalgias. Skin: Negative. Allergic/Immunologic: Negative. Neurological: Negative for dizziness, seizures, syncope, weakness, light-headedness, numbness and headaches. Hematological: Negative for adenopathy. Does not bruise/bleed easily. Psychiatric/Behavioral: Negative.           Current Outpatient Medications:     hydrOXYzine (ATARAX) 50 MG tablet, 50 mg 1 po bid prn anxiety, Disp: , Rfl:     atorvastatin (LIPITOR) 20 MG tablet, take 1 tablet by mouth once daily, Disp: 90 tablet, Rfl: 1    traZODone (DESYREL) 50 MG tablet, Take 1 tablet by mouth nightly, Disp: 30 tablet, Rfl: 2    PARoxetine (PAXIL) 40 MG tablet, Take 40 mg by mouth every morning Instructed to take morning of surgery with a sip of water, Disp: , Rfl:     brexpiprazole (REXULTI) 1 MG TABS tablet, Take 1 mg by mouth daily Instructed to take morning of surgery with a sip of water, Disp: , Rfl:   Allergies   Allergen Reactions    Lansoprazole     [employer] is the [facility]’s non-emergency medical transportation (NEMT) manager.   [employer] provides rides to [place] [employer] and [facility] covered health care appointments if you have no other way to get there. Our goal is to provide a safe and reliable transportation service.    To schedule a ride to routine services, you must call [employer] at [phone] at least two business days before your appointment. If you call with less notice and the trip is not urgent, we may ask you to reschedule your visit. Two business days’ notice includes the day of the call but not the day of the appointment.    You can request a routine ride by calling [employer] at [phone], Monday through Friday, 7:00 a.m. - 6:00 p.m. Central Time.    When you call [employer] to schedule transportation, have the following information available:    [employer] ID (the ten-digit number listed on your [employer] Card)  First and last Name  Date of birth  Phone number where you can be reached  Pick-up address, including apartment number  General reason for the appointment (check-up, eye appointment, etc.)  The date and start of your appointment  The end time of your appointment, if you know it  Provider / facility name  Address of appointment, including office or suite number  Any special ride needs, including if you need someone to ride with you     Alok Standard, DO

## 2023-03-22 ENCOUNTER — OFFICE VISIT (OUTPATIENT)
Dept: FAMILY MEDICINE CLINIC | Age: 63
End: 2023-03-22

## 2023-03-22 VITALS
TEMPERATURE: 97.2 F | BODY MASS INDEX: 21.21 KG/M2 | HEIGHT: 66 IN | HEART RATE: 91 BPM | RESPIRATION RATE: 16 BRPM | DIASTOLIC BLOOD PRESSURE: 80 MMHG | SYSTOLIC BLOOD PRESSURE: 124 MMHG | WEIGHT: 132 LBS | OXYGEN SATURATION: 98 %

## 2023-03-22 DIAGNOSIS — U09.9 LONG COVID: ICD-10-CM

## 2023-03-22 DIAGNOSIS — G89.29 CHRONIC INTRACTABLE HEADACHE, UNSPECIFIED HEADACHE TYPE: ICD-10-CM

## 2023-03-22 DIAGNOSIS — M47.26 OSTEOARTHRITIS OF SPINE WITH RADICULOPATHY, LUMBAR REGION: ICD-10-CM

## 2023-03-22 DIAGNOSIS — R51.9 CHRONIC INTRACTABLE HEADACHE, UNSPECIFIED HEADACHE TYPE: ICD-10-CM

## 2023-03-22 DIAGNOSIS — R20.2 BILATERAL LEG PARESTHESIA: Primary | ICD-10-CM

## 2023-03-22 DIAGNOSIS — G89.4 CHRONIC PAIN SYNDROME: ICD-10-CM

## 2023-03-22 DIAGNOSIS — K21.9 GASTROESOPHAGEAL REFLUX DISEASE WITHOUT ESOPHAGITIS: ICD-10-CM

## 2023-03-22 RX ORDER — DULOXETIN HYDROCHLORIDE 30 MG/1
30 CAPSULE, DELAYED RELEASE ORAL 2 TIMES DAILY
Qty: 60 CAPSULE | Refills: 3 | Status: SHIPPED | OUTPATIENT
Start: 2023-03-22

## 2023-03-22 ASSESSMENT — ENCOUNTER SYMPTOMS
ALLERGIC/IMMUNOLOGIC NEGATIVE: 1
SHORTNESS OF BREATH: 0
ABDOMINAL PAIN: 0
BACK PAIN: 1
NAUSEA: 0
DIARRHEA: 0
COUGH: 0
EYE REDNESS: 0
PHOTOPHOBIA: 0
SORE THROAT: 0
EYE DISCHARGE: 0
TROUBLE SWALLOWING: 0
VOMITING: 0
BLOOD IN STOOL: 0
EYE PAIN: 0
CHEST TIGHTNESS: 0
SINUS PAIN: 0

## 2023-03-22 NOTE — PROGRESS NOTES
included serial scans and MRIs showed a suspicious area of T7.  This area was biopsied, results are pending.  Continues with headaches, lower back pain with radiation into her legs as well.  These plaints have prominent after COVID illness earlier this year.  Her labs were reviewed and metabolically she is intact.  Last mammogram was in April 2022 and was category 1.      Review of Systems   Constitutional:  Negative for appetite change, fatigue and unexpected weight change.   HENT:  Negative for congestion, ear pain, hearing loss, sinus pain, sore throat and trouble swallowing.    Eyes:  Negative for photophobia, pain, discharge and redness.   Respiratory:  Negative for cough, chest tightness and shortness of breath.    Cardiovascular:  Negative for chest pain, palpitations and leg swelling.   Gastrointestinal:  Negative for abdominal pain, blood in stool, diarrhea, nausea and vomiting.   Endocrine: Negative.    Genitourinary:  Negative for dysuria, flank pain, frequency and hematuria.   Musculoskeletal:  Positive for back pain and myalgias. Negative for arthralgias and joint swelling.   Skin: Negative.    Allergic/Immunologic: Negative.    Neurological:  Positive for numbness and headaches. Negative for dizziness, seizures, syncope, weakness and light-headedness.   Hematological:  Negative for adenopathy. Does not bruise/bleed easily.   Psychiatric/Behavioral: Negative.         Current Outpatient Medications:     DULoxetine (CYMBALTA) 30 MG extended release capsule, Take 1 capsule by mouth 2 times daily, Disp: 60 capsule, Rfl: 3    HYDROcodone-acetaminophen (NORCO) 5-325 MG per tablet, Take 1 tablet by mouth every 6 hours as needed for Pain for up to 5 days. Max Daily Amount: 4 tablets, Disp: 20 tablet, Rfl: 0    hydrOXYzine pamoate (VISTARIL) 50 MG capsule, Take 50 mg by mouth 3 times daily as needed for Itching, Disp: , Rfl:     polyethylene glycol (GLYCOLAX) 17 GM/SCOOP powder, Take 17 g by mouth daily, Disp: ,

## 2023-03-24 ENCOUNTER — OFFICE VISIT (OUTPATIENT)
Dept: PAIN MANAGEMENT | Age: 63
End: 2023-03-24
Payer: MEDICARE

## 2023-03-24 VITALS
TEMPERATURE: 97.2 F | WEIGHT: 132 LBS | OXYGEN SATURATION: 95 % | HEIGHT: 66 IN | BODY MASS INDEX: 21.21 KG/M2 | RESPIRATION RATE: 16 BRPM | DIASTOLIC BLOOD PRESSURE: 88 MMHG | HEART RATE: 102 BPM | SYSTOLIC BLOOD PRESSURE: 140 MMHG

## 2023-03-24 DIAGNOSIS — G89.29 CHRONIC BILATERAL LOW BACK PAIN WITH BILATERAL SCIATICA: Primary | ICD-10-CM

## 2023-03-24 DIAGNOSIS — M47.817 LUMBOSACRAL SPONDYLOSIS WITHOUT MYELOPATHY: ICD-10-CM

## 2023-03-24 DIAGNOSIS — R63.4 UNEXPLAINED WEIGHT LOSS: ICD-10-CM

## 2023-03-24 DIAGNOSIS — M54.41 CHRONIC BILATERAL LOW BACK PAIN WITH BILATERAL SCIATICA: Primary | ICD-10-CM

## 2023-03-24 DIAGNOSIS — R93.7 ABNORMAL CT OF THORACIC SPINE: ICD-10-CM

## 2023-03-24 DIAGNOSIS — G89.4 CHRONIC PAIN SYNDROME: ICD-10-CM

## 2023-03-24 DIAGNOSIS — M54.42 CHRONIC BILATERAL LOW BACK PAIN WITH BILATERAL SCIATICA: Primary | ICD-10-CM

## 2023-03-24 DIAGNOSIS — R10.84 GENERALIZED ABDOMINAL PAIN: ICD-10-CM

## 2023-03-24 PROCEDURE — 3017F COLORECTAL CA SCREEN DOC REV: CPT | Performed by: STUDENT IN AN ORGANIZED HEALTH CARE EDUCATION/TRAINING PROGRAM

## 2023-03-24 PROCEDURE — G8427 DOCREV CUR MEDS BY ELIG CLIN: HCPCS | Performed by: STUDENT IN AN ORGANIZED HEALTH CARE EDUCATION/TRAINING PROGRAM

## 2023-03-24 PROCEDURE — G8420 CALC BMI NORM PARAMETERS: HCPCS | Performed by: STUDENT IN AN ORGANIZED HEALTH CARE EDUCATION/TRAINING PROGRAM

## 2023-03-24 PROCEDURE — G8484 FLU IMMUNIZE NO ADMIN: HCPCS | Performed by: STUDENT IN AN ORGANIZED HEALTH CARE EDUCATION/TRAINING PROGRAM

## 2023-03-24 PROCEDURE — 4004F PT TOBACCO SCREEN RCVD TLK: CPT | Performed by: STUDENT IN AN ORGANIZED HEALTH CARE EDUCATION/TRAINING PROGRAM

## 2023-03-24 PROCEDURE — 1111F DSCHRG MED/CURRENT MED MERGE: CPT | Performed by: STUDENT IN AN ORGANIZED HEALTH CARE EDUCATION/TRAINING PROGRAM

## 2023-03-24 RX ORDER — LIDOCAINE 50 MG/G
1 PATCH TOPICAL DAILY
Qty: 30 PATCH | Refills: 0 | Status: SHIPPED | OUTPATIENT
Start: 2023-03-24 | End: 2023-04-23

## 2023-03-24 RX ORDER — GABAPENTIN 300 MG/1
CAPSULE ORAL
Qty: 81 CAPSULE | Refills: 0 | Status: SHIPPED | OUTPATIENT
Start: 2023-03-24 | End: 2023-04-23

## 2023-03-24 RX ORDER — HYDROCODONE BITARTRATE AND ACETAMINOPHEN 5; 325 MG/1; MG/1
1 TABLET ORAL EVERY 6 HOURS PRN
Qty: 20 TABLET | Refills: 0 | OUTPATIENT
Start: 2023-03-24 | End: 2023-03-29

## 2023-03-24 NOTE — PROGRESS NOTES
Patient:  KIRAN Willis 1960  Date of Service:  3/24/23      Patient presents to [facility] with complaints of low back  pain that started 2 months ago and has been getting worse. She states the pain began following No specific cause    Pain is constant and is described as aching, shooting, and stabbing. She rates the pain as a 9/10 on her worst day , 6/10 on her best day, and a 8/10 on average on the VAS scale. Pain does radiate to right leg and left leg. She  has numbness, tingling, weakness of the right leg and left leg. Alleviating factors include: ice and rest.  Aggravating factors include:  movement, walking, standing, bending. She states that the pain does keep her from sleeping at night. She took her last dose of Norco this morning . She is not on NSAIDS and  is not on anticoagulation medications   Previous treatments: medications. .      Personal Expectations from this treatment: increase activity and decrease pain    Resp 16   Ht 5' 6\" (1.676 m)   Wt 132 lb (59.9 kg)   BMI 21.31 kg/m²     No LMP recorded.  Patient is postmenopausal.
b/l    Abdomen: non-distended and normal Non tender, no guarding.     Cervical spine: Inspection: normal   Palpation: Yes tenderness over the midline and paraspinal area, bilaterally   Range of motion: Normal flexion, extension   Spurling's: negative bilaterally   Fulton's: negative bilaterally     Thoracic spine: Inspection: normal - band aid over thoracic spine   Palpation:Yes tenderness over the midline and paraspinal area   Range of motion: normal in flexion, extension rotation bilateral and is not painful.      Lumbar spine: Inspection: normal   Spine Range of motion: Decreased, flexion Normal,  extension Normal, Lateral bending, and rotation bilaterally reduced is somewhat painful.   Palpation:tenderness paravertebral muscles and midline tenderness.  Facet Loading: left -positive and right-positive.    Musculoskeletal:  SIJ Compression Test: negative bilaterally  SIJ Distraction:negative bilaterally  KIRTI test: positive bilaterally   Gaenslen's test:negative bilaterally  Thigh Thrust: negative bilaterally  SLR : negative bilaterally   Trochanteric bursa tenderness: negative bilaterally     Extremities:  Tremors: No  - bilaterally upper and lower   Hips: some pain with internal rotation of hips   Varicose veins: No    Pulses: present Lt radial   Cyanosis: none   Edema: No  x all 4 extremities     Neurological: Sensory:normal to light touch  , CN 2-12 grossly intact     MOTOR Left (x/5) Right (x/5)   Shoulder Abduction (C5)  5 5   Biceps - Elbow Flexion (C6)  5 5   Triceps - Elbow Extension (C7)  5 5   Hand  (C8)  5 5   Iliopsoas - Hip flex /Abd (L2)  5 5   Quadriceps - Knee Ext (L3)  5 5   Ant Tibialis - Ankle Dorsiflex (L4)  5 5   Post Tibialis - Hip Ext/Abd Foot dorsiflex (L5)  5 5   Gastrocnemius - Plantar flex (S1)  5 5     REFLEXES Left Right   Brachioradialis (C5/6)  2+ 2+   Biceps (C5/6)  2+ 2+   Triceps (C7)  2+ 2+   Quadriceps / Patellar (L4)  2+ 2+   Achilles (S1)  2+ 2+

## 2023-03-27 ENCOUNTER — TELEPHONE (OUTPATIENT)
Dept: PAIN MANAGEMENT | Age: 63
End: 2023-03-27

## 2023-03-27 DIAGNOSIS — M54.42 CHRONIC BILATERAL LOW BACK PAIN WITH BILATERAL SCIATICA: Primary | ICD-10-CM

## 2023-03-27 DIAGNOSIS — M47.817 LUMBOSACRAL SPONDYLOSIS WITHOUT MYELOPATHY: ICD-10-CM

## 2023-03-27 DIAGNOSIS — G89.29 CHRONIC BILATERAL LOW BACK PAIN WITH BILATERAL SCIATICA: Primary | ICD-10-CM

## 2023-03-27 DIAGNOSIS — G89.4 CHRONIC PAIN SYNDROME: ICD-10-CM

## 2023-03-27 DIAGNOSIS — M54.41 CHRONIC BILATERAL LOW BACK PAIN WITH BILATERAL SCIATICA: Primary | ICD-10-CM

## 2023-03-27 RX ORDER — TIZANIDINE 2 MG/1
2 TABLET ORAL NIGHTLY PRN
Qty: 30 TABLET | Refills: 0 | Status: SHIPPED | OUTPATIENT
Start: 2023-03-27 | End: 2023-04-26

## 2023-03-27 NOTE — TELEPHONE ENCOUNTER
----- Message from Faustino Mustafa sent at 3/27/2023  8:01 AM EDT -----  Regarding: FW: Alot of pain    ----- Message -----  From: Niurka Muro  Sent: 3/26/2023   5:26 PM EDT  To: [facility]  Subject: Alot of pain                                     Not sure what is right. Will go off of what doctor says. If he thinks Gabapintin is the right choice then I will go by his decision. Alot of back pain, tingling,  numb, stiff and achy.

## 2023-04-03 ENCOUNTER — EVALUATION (OUTPATIENT)
Dept: PHYSICAL THERAPY | Age: 63
End: 2023-04-03
Payer: COMMERCIAL

## 2023-04-03 DIAGNOSIS — M54.41 CHRONIC BILATERAL LOW BACK PAIN WITH BILATERAL SCIATICA: Primary | ICD-10-CM

## 2023-04-03 DIAGNOSIS — G89.29 CHRONIC BILATERAL LOW BACK PAIN WITH BILATERAL SCIATICA: Primary | ICD-10-CM

## 2023-04-03 DIAGNOSIS — M54.42 CHRONIC BILATERAL LOW BACK PAIN WITH BILATERAL SCIATICA: Primary | ICD-10-CM

## 2023-04-03 PROCEDURE — 97161 PT EVAL LOW COMPLEX 20 MIN: CPT | Performed by: PHYSICAL THERAPIST

## 2023-04-03 NOTE — PROGRESS NOTES
[facility]   Phone: [phone]   Fax: [phone]      Physical Therapy Treatment Note    Date: 4/3/2023  Patient Name: Annamaria Wilcox  : 1960   MRN: [id number]  DOInjury: 2 months   DOSx: NA  Referring Provider: Lore Mahajan MD  [address] [place] [address]     Medical Diagnosis:   M54.42, M54.41, G89.29 (ICD-10-CM) - Chronic bilateral low back pain with bilateral sciatica    Outcome Measure:  Oswestry 90%    S: see eval  O:  Time 7766-1959     Visit  Repeat outcome measure at mid point and end.    Pain 6-10/10     ROM      Modalities      MH + ES            Exercise      ALL EXERCISE DONE WITH DRAW-IN TECHNIQUE                            Functional activities To aid in reaching , pushing, pulling tasks at home     ROWS: H  \"    ROWS: M  \"    ROWS: L  \"    Obliques - high  \"    Obliques - low  \"     THEREX     Bike      Punches      Lat pulldowns      Triceps ext standing      Marching            Trunk ext TB      Trunk flex TB      Hip abd      Hip EXT      TG Squats                  A:  Tolerated fair.     P: Continue with rehab plan  Holly Pizarro, PT  PT DPT [id number]    Treatment Charges: Mins Units   Initial Evaluation 37 1   Re-Evaluation     Ther Exercise         TE     Manual Therapy     MT     Ther Activities        TA     Gait Training          GT     Neuro Re-education NR     Modalities     Non-Billable Service Time     Other     Total Time/Units 37 1         
Most Recent MEREDITH 7 Score       Date MEREDITH 7 Score   10/23/2020 4     
decreased   R LE: 5/5   L LE: 5/5    Palpation: Tender to palpation over L spine and sacrum, Non-tender to palpation lower T  spine, B SI     Sensation: Pt reports N/T down B Le's , entirety of leg (front / back ). Special Tests:   [] Nerve Root Compression           Right []+ / [] -    Left []+ / [] -  [] Slump           Right []+ / [x] -    Left [x]+ / [] -  [] FADIR          Right []+ / [] -    Left []+ / [] -  [] S-I Distraction          Right []+ / [] -    Left []+ / [] -     [] SLR           Right []+ / [x] -    Left []+ / [x] -     [] KIRTI          Right []+ / [x] -    Left [x]+ / [] -  [] S-I Compression          Right []+ / [] -    Left []+ / [] -   [] Other: []+ / [] -           ASSESSMENT     Outcome Measure:   Modified Oswestry 90% disability    Problems:   Pain reported 6-10/10  ROM decreased   Strength decreased  Decreased functional ability with sitting tolerance, standing tolerance , walking, bending, driving, sleep quality    [x] There are no barriers affecting plan of care or recovery    [] Barriers to this patient's plan of care or recovery include. Domestic Concerns:  [x] No  [] Yes:        Long Term goals (4-6 weeks)  Decrease reported pain to 0-6/10  Increase ROM to WNL  Increase Strength to 5/5   Able to perform/complete the following functions/tasks: Pt able to stand 20 + minutes with no to min pain/limitation. Pt able to sit 30 + minutes with no to min pain/limitation. Pt able to walk 20 + minutes with no to min pain/limitation.      Oswestry Low Back Disability Questionnaire 50% disability   Independent with Home Exercise Programs    Rehab Potential: [x] Good  [x] Fair  [] Poor    PLAN       Treatment Plan:   [x] Therapeutic Exercise  [x] Therapeutic Activity  [x] Neuromuscular Re-education   [x] Gait Training  [x] Balance Training  [x] Aerobic conditioning  [x] Manual Therapy  [x] Massage/Fascial release   [] Work/Sport specific activities    [] Pain Neuroscience [x]

## 2023-04-07 ENCOUNTER — TREATMENT (OUTPATIENT)
Dept: PHYSICAL THERAPY | Age: 63
End: 2023-04-07

## 2023-04-07 DIAGNOSIS — G89.29 CHRONIC BILATERAL LOW BACK PAIN WITH BILATERAL SCIATICA: Primary | ICD-10-CM

## 2023-04-07 DIAGNOSIS — M54.42 CHRONIC BILATERAL LOW BACK PAIN WITH BILATERAL SCIATICA: Primary | ICD-10-CM

## 2023-04-07 DIAGNOSIS — M54.41 CHRONIC BILATERAL LOW BACK PAIN WITH BILATERAL SCIATICA: Primary | ICD-10-CM

## 2023-04-07 NOTE — PROGRESS NOTES
[address] [facility] and [facility]   Phone: [phone]   Fax: [phone]      Physical Therapy Treatment Note    Date: 2023  Patient Name: Kimo Fitzpatrick  : 1960   MRN: [id number]  DOInjury: 2 months   DOSx: NA  Referring Provider: No referring provider defined for this encounter. Medical Diagnosis:   M54.42, M54.41, G89.29 (ICD-10-CM) - Chronic bilateral low back pain with bilateral sciatica    Outcome Measure:  Oswestry 90%    S: Pt reports no pain just stiffness. O:  Time 3572-756     Visit  Repeat outcome measure at mid point and end. Pain 0/10     ROM      Modalities      MH + ES            Exercise      ALL EXERCISE DONE WITH DRAW-IN TECHNIQUE                            Functional activities To aid in reaching , pushing, pulling tasks at home     ROWS: H  \"    ROWS: M 2 x 10 OTB    ROWS: L  \"    Lat pull down  2 x 10 OTB    Obliques - low  \"     THEREX     Bike 5     Punches      Lat pulldowns      Sb roll out fwd/lateral  X 10 with 1 s hold      bridges 2 x 10     LTR X 10     PPT 2 x 10     Bridges 2 x 10     Hip abd 2 x 10 OTB    Hip EXT 2 x 10     TG Squats      Hip add 2 x 10 Ball     Hip abd standing  2 x 10     A:  Tolerated fair. Pt reports a slight increase in pain.      P: Continue with rehab plan  Alyson Knox, PTA  [id number]   Treatment Charges: Mins Units   Initial Evaluation     Re-Evaluation     Ther Exercise         TE 20 2   Manual Therapy     MT     Ther Activities        TA     Gait Training          GT     Neuro Re-education NR 18 1   Modalities     Non-Billable Service Time     Other     Total Time/Units 38 3

## 2023-04-10 RX ORDER — GABAPENTIN 300 MG/1
CAPSULE ORAL
Qty: 81 CAPSULE | Refills: 0 | OUTPATIENT
Start: 2023-04-10

## 2023-04-19 ENCOUNTER — OFFICE VISIT (OUTPATIENT)
Dept: FAMILY MEDICINE CLINIC | Age: 63
End: 2023-04-19
Payer: COMMERCIAL

## 2023-04-19 VITALS
DIASTOLIC BLOOD PRESSURE: 78 MMHG | SYSTOLIC BLOOD PRESSURE: 136 MMHG | BODY MASS INDEX: 23.95 KG/M2 | RESPIRATION RATE: 16 BRPM | TEMPERATURE: 96.8 F | HEIGHT: 66 IN | WEIGHT: 149 LBS | OXYGEN SATURATION: 98 % | HEART RATE: 88 BPM

## 2023-04-19 DIAGNOSIS — M15.9 PRIMARY OSTEOARTHRITIS INVOLVING MULTIPLE JOINTS: ICD-10-CM

## 2023-04-19 DIAGNOSIS — R20.2 BILATERAL LEG PARESTHESIA: Primary | ICD-10-CM

## 2023-04-19 DIAGNOSIS — R20.2 BILATERAL LEG PARESTHESIA: ICD-10-CM

## 2023-04-19 PROBLEM — M15.0 PRIMARY OSTEOARTHRITIS INVOLVING MULTIPLE JOINTS: Status: ACTIVE | Noted: 2023-04-19

## 2023-04-19 LAB
ALBUMIN SERPL-MCNC: 4.5 G/DL (ref 3.5–5.2)
ALP SERPL-CCNC: 75 U/L (ref 35–104)
ALT SERPL-CCNC: 45 U/L (ref 0–32)
ANION GAP SERPL CALCULATED.3IONS-SCNC: 13 MMOL/L (ref 7–16)
AST SERPL-CCNC: 32 U/L (ref 0–31)
BILIRUB SERPL-MCNC: 0.3 MG/DL (ref 0–1.2)
BUN SERPL-MCNC: 7 MG/DL (ref 6–23)
CALCIUM SERPL-MCNC: 9.1 MG/DL (ref 8.6–10.2)
CHLORIDE SERPL-SCNC: 98 MMOL/L (ref 98–107)
CO2 SERPL-SCNC: 23 MMOL/L (ref 22–29)
CREAT SERPL-MCNC: 0.8 MG/DL (ref 0.5–1)
FOLATE SERPL-MCNC: 11.7 NG/ML (ref 4.8–24.2)
GLUCOSE SERPL-MCNC: 89 MG/DL (ref 74–99)
POTASSIUM SERPL-SCNC: 4.8 MMOL/L (ref 3.5–5)
PROT SERPL-MCNC: 6.6 G/DL (ref 6.4–8.3)
SODIUM SERPL-SCNC: 134 MMOL/L (ref 132–146)
VIT B12 SERPL-MCNC: 340 PG/ML (ref 211–946)
VITAMIN D 25-HYDROXY: 24 NG/ML (ref 30–100)

## 2023-04-19 PROCEDURE — 99213 OFFICE O/P EST LOW 20 MIN: CPT | Performed by: FAMILY MEDICINE

## 2023-04-19 ASSESSMENT — ENCOUNTER SYMPTOMS
SHORTNESS OF BREATH: 0
EYE DISCHARGE: 0
BACK PAIN: 0
VOMITING: 0
NAUSEA: 0
SORE THROAT: 0
BLOOD IN STOOL: 0
EYE PAIN: 0
SINUS PAIN: 0
CHEST TIGHTNESS: 0
COUGH: 0
DIARRHEA: 0
TROUBLE SWALLOWING: 0
PHOTOPHOBIA: 0
ABDOMINAL PAIN: 0
EYE REDNESS: 0
ALLERGIC/IMMUNOLOGIC NEGATIVE: 1

## 2023-04-19 NOTE — PROGRESS NOTES
23  Nicole Almaguer : 1960 Sex: female  Age: 59 y.o. Assessment and Plan:  Mariam Jonas was seen today for numbness. Diagnoses and all orders for this visit:    Bilateral leg paresthesia  -     Vitamin D 25 Hydroxy; Future  -     Vitamin B12 & Folate; Future  -     Comprehensive Metabolic Panel; Future    Primary osteoarthritis involving multiple joints  -     Vitamin D 25 Hydroxy; Future  -     Comprehensive Metabolic Panel; Future    She is to continue current medications at prescribed doses. We will check chemistries and vitamin D levels today. Recheck in 3 months. Return in about 3 months (around 2023). Chief Complaint   Patient presents with    Numbness     Feet and hands       Patient returns for recheck. Thoracic vertebrae biopsy was benign. She continues with paresthesias of her legs and feet. These are improved now that she is on gabapentin. Has appointment with neurology in August for evaluation of said paresthesias. Review of Systems   Constitutional:  Negative for appetite change, fatigue and unexpected weight change. HENT:  Negative for congestion, ear pain, hearing loss, sinus pain, sore throat and trouble swallowing. Eyes:  Negative for photophobia, pain, discharge and redness. Respiratory:  Negative for cough, chest tightness and shortness of breath. Cardiovascular:  Negative for chest pain, palpitations and leg swelling. Gastrointestinal:  Negative for abdominal pain, blood in stool, diarrhea, nausea and vomiting. Endocrine: Negative. Genitourinary:  Negative for dysuria, flank pain, frequency and hematuria. Musculoskeletal:  Negative for arthralgias, back pain, joint swelling and myalgias. Skin: Negative. Allergic/Immunologic: Negative. Neurological:  Positive for numbness. Negative for dizziness, seizures, syncope, weakness, light-headedness and headaches. Hematological:  Negative for adenopathy.  Does not bruise/bleed

## 2023-04-20 RX ORDER — GABAPENTIN 300 MG/1
CAPSULE ORAL
Qty: 81 CAPSULE | Refills: 0 | Status: CANCELLED | OUTPATIENT
Start: 2023-04-20 | End: 2023-05-20

## 2023-04-21 ENCOUNTER — TELEPHONE (OUTPATIENT)
Dept: PAIN MANAGEMENT | Age: 63
End: 2023-04-21

## 2023-04-21 RX ORDER — GABAPENTIN 300 MG/1
300 CAPSULE ORAL 3 TIMES DAILY
Qty: 90 CAPSULE | Refills: 1 | Status: SHIPPED | OUTPATIENT
Start: 2023-04-21 | End: 2023-06-20

## 2023-04-24 ENCOUNTER — OFFICE VISIT (OUTPATIENT)
Dept: FAMILY MEDICINE CLINIC | Age: 63
End: 2023-04-24
Payer: COMMERCIAL

## 2023-04-24 ENCOUNTER — TREATMENT (OUTPATIENT)
Dept: PHYSICAL THERAPY | Age: 63
End: 2023-04-24

## 2023-04-24 VITALS
HEART RATE: 95 BPM | WEIGHT: 153 LBS | TEMPERATURE: 97.5 F | OXYGEN SATURATION: 99 % | BODY MASS INDEX: 24.59 KG/M2 | DIASTOLIC BLOOD PRESSURE: 78 MMHG | SYSTOLIC BLOOD PRESSURE: 130 MMHG | HEIGHT: 66 IN | RESPIRATION RATE: 16 BRPM

## 2023-04-24 DIAGNOSIS — E53.8 VITAMIN B12 DEFICIENCY: ICD-10-CM

## 2023-04-24 DIAGNOSIS — E55.9 VITAMIN D DEFICIENCY: Primary | ICD-10-CM

## 2023-04-24 DIAGNOSIS — M54.42 CHRONIC BILATERAL LOW BACK PAIN WITH BILATERAL SCIATICA: Primary | ICD-10-CM

## 2023-04-24 DIAGNOSIS — M54.41 CHRONIC BILATERAL LOW BACK PAIN WITH BILATERAL SCIATICA: Primary | ICD-10-CM

## 2023-04-24 DIAGNOSIS — G89.29 CHRONIC BILATERAL LOW BACK PAIN WITH BILATERAL SCIATICA: Primary | ICD-10-CM

## 2023-04-24 PROCEDURE — 96372 THER/PROPH/DIAG INJ SC/IM: CPT | Performed by: FAMILY MEDICINE

## 2023-04-24 PROCEDURE — 99213 OFFICE O/P EST LOW 20 MIN: CPT | Performed by: FAMILY MEDICINE

## 2023-04-24 RX ORDER — GABAPENTIN 300 MG/1
CAPSULE ORAL
Qty: 81 CAPSULE | OUTPATIENT
Start: 2023-04-24

## 2023-04-24 RX ORDER — CYANOCOBALAMIN 1000 UG/ML
1000 INJECTION, SOLUTION INTRAMUSCULAR; SUBCUTANEOUS ONCE
Status: COMPLETED | OUTPATIENT
Start: 2023-04-24 | End: 2023-04-24

## 2023-04-24 RX ADMIN — CYANOCOBALAMIN 1000 MCG: 1000 INJECTION, SOLUTION INTRAMUSCULAR; SUBCUTANEOUS at 15:28

## 2023-04-24 ASSESSMENT — ENCOUNTER SYMPTOMS
EYE DISCHARGE: 0
EYE PAIN: 0
ABDOMINAL PAIN: 0
BLOOD IN STOOL: 0
NAUSEA: 0
CHEST TIGHTNESS: 0
SHORTNESS OF BREATH: 0
SINUS PAIN: 0
BACK PAIN: 0
VOMITING: 0
COUGH: 0
TROUBLE SWALLOWING: 0
ALLERGIC/IMMUNOLOGIC NEGATIVE: 1
EYE REDNESS: 0
SORE THROAT: 0
PHOTOPHOBIA: 0
DIARRHEA: 0

## 2023-04-24 NOTE — PROGRESS NOTES
23  Ann Batista : 1960 Sex: female  Age: 59 y.o. Assessment and Plan:  Shreyas Ozuna was seen today for numbness and headache. Diagnoses and all orders for this visit:    Vitamin D deficiency  Vitamin D3 2000 international units daily. Vitamin B12 deficiency  -     cyanocobalamin injection 1,000 mcg    She is to get 3 shots of B12 over the next couple of months. Recheck in 3 months where Veet levels can be repeated. Return in about 3 months (around 2023). Chief Complaint   Patient presents with    Numbness     Tingling feet    Headache       Patient returns for of lab work. Her B12 level was low as well as her vitamin D level. Supplement these over the next 3 months and recheck at that time. End of her lab work was baseline. Review of Systems   Constitutional:  Negative for appetite change, fatigue and unexpected weight change. HENT:  Negative for congestion, ear pain, hearing loss, sinus pain, sore throat and trouble swallowing. Eyes:  Negative for photophobia, pain, discharge and redness. Respiratory:  Negative for cough, chest tightness and shortness of breath. Cardiovascular:  Negative for chest pain, palpitations and leg swelling. Gastrointestinal:  Negative for abdominal pain, blood in stool, diarrhea, nausea and vomiting. Endocrine: Negative. Genitourinary:  Negative for dysuria, flank pain, frequency and hematuria. Musculoskeletal:  Negative for arthralgias, back pain, joint swelling and myalgias. Skin: Negative. Allergic/Immunologic: Negative. Neurological:  Positive for numbness. Negative for dizziness, seizures, syncope, weakness, light-headedness and headaches. Hematological:  Negative for adenopathy. Does not bruise/bleed easily. Psychiatric/Behavioral: Negative. Current Outpatient Medications:     gabapentin (NEURONTIN) 300 MG capsule, Take 1 capsule by mouth 3 times daily for 60 days. , Disp: 90 capsule, Rfl:

## 2023-04-24 NOTE — PROGRESS NOTES
[address] [facility] and [facility]   Phone: [phone]   Fax: [phone]      Physical Therapy Treatment Note    Date: 2023  Patient Name: Art Couch  : 1960   MRN: [id number]  DOInjury: 2 months   DOSx: NA  Referring Provider: No referring provider defined for this encounter. Medical Diagnosis:   M54.42, M54.41, G89.29 (ICD-10-CM) - Chronic bilateral low back pain with bilateral sciatica    Outcome Measure:  Oswestry 90%    S: Pt reports no pain just stiffness. O:  Time 838-449     Visit  Repeat outcome measure at mid point and end. Pain 0/10     ROM      Modalities      MH + ES            Exercise      ALL EXERCISE DONE WITH DRAW-IN TECHNIQUE                            Functional activities To aid in reaching , pushing, pulling tasks at home     ROWS: H  \"    ROWS: M 2 x 10 GTB    ROWS: L  \"    Lat pull down  2 x 10 GTB    Cross country ski  2 x 10     Obliques - walk out X 5 each  10# cable system      THEREX     Bike 8 min. Punches      Lat pulldowns      Sb roll out fwd/lateral  X 10 with 5 s hold      bridges 2 x 10     LTR X 10     PPT 2 x 10     Bridges 2 x 10     Hip abd 2 x 10 OTB    Hip EXT 2 x 10     TG Squats      Hip add 2 x 10 Ball     Hip abd standing  2 x 10     A:  Tolerated fair. Pt reports no increase in pain.      P: Continue with rehab plan  Shannon Coon, PTA  [id number]   Treatment Charges: Mins Units   Initial Evaluation     Re-Evaluation     Ther Exercise         TE 20 2   Manual Therapy     MT     Ther Activities        TA     Gait Training          GT     Neuro Re-education NR 18 1   Modalities     Non-Billable Service Time     Other     Total Time/Units 38 3

## 2023-04-26 ENCOUNTER — TREATMENT (OUTPATIENT)
Dept: PHYSICAL THERAPY | Age: 63
End: 2023-04-26
Payer: COMMERCIAL

## 2023-04-26 DIAGNOSIS — M54.41 CHRONIC BILATERAL LOW BACK PAIN WITH BILATERAL SCIATICA: Primary | ICD-10-CM

## 2023-04-26 DIAGNOSIS — M54.42 CHRONIC BILATERAL LOW BACK PAIN WITH BILATERAL SCIATICA: Primary | ICD-10-CM

## 2023-04-26 DIAGNOSIS — G89.29 CHRONIC BILATERAL LOW BACK PAIN WITH BILATERAL SCIATICA: Primary | ICD-10-CM

## 2023-04-26 PROCEDURE — 97112 NEUROMUSCULAR REEDUCATION: CPT

## 2023-04-26 PROCEDURE — 97110 THERAPEUTIC EXERCISES: CPT

## 2023-04-26 NOTE — PROGRESS NOTES
[address] [facility] and [facility]   Phone: [phone]   Fax: [phone]      Physical Therapy Treatment Note    Date: 2023  Patient Name: Wing Puckett  : 1960   MRN: [id number]  DOInjury: 2 months   DOSx: NA  Referring Provider: No referring provider defined for this encounter. Medical Diagnosis:   M54.42, M54.41, G89.29 (ICD-10-CM) - Chronic bilateral low back pain with bilateral sciatica    Outcome Measure:  Oswestry 90%    S: Pt reports no pain today. O:  Time 7375-0257     Visit  Repeat outcome measure at mid point and end. Pain 0/10     ROM      Modalities      MH + ES            Exercise      ALL EXERCISE DONE WITH DRAW-IN TECHNIQUE                            Functional activities To aid in reaching , pushing, pulling tasks at home     ROWS: H  \"    ROWS: M 2 x 10 GTB    ROWS: L  \"    Lat pull down  2 x 10 GTB    Cross country ski  2 x 10     Obliques - walk out X 5 each  10# cable system      THEREX     Bike 10 min. Punches      Lat pulldowns      Sb roll out fwd/lateral  X 10 with 5 s hold      bridges 2 x 10     LTR X 10     PPT 2 x 10     Bridges 2 x 10     Hip abd 2 x 10 OTB    Hip EXT 2 x 10     TG Squats      Hip add 2 x 10 Ball     Hip abd standing  2 x 10     A:  Tolerated fair. Pt reports no increase in pain. Pt does report fatigue after oblique walk out.     P: Continue with rehab plan  Alina Julian, PTA  [id number]   Treatment Charges: Mins Units   Initial Evaluation     Re-Evaluation     Ther Exercise         TE 20 2   Manual Therapy     MT     Ther Activities        TA     Gait Training          GT     Neuro Re-education NR 18 1   Modalities     Non-Billable Service Time     Other     Total Time/Units 38 3

## 2023-04-28 ENCOUNTER — OFFICE VISIT (OUTPATIENT)
Dept: PAIN MANAGEMENT | Age: 63
End: 2023-04-28
Payer: COMMERCIAL

## 2023-04-28 VITALS
HEART RATE: 87 BPM | SYSTOLIC BLOOD PRESSURE: 132 MMHG | OXYGEN SATURATION: 97 % | WEIGHT: 150 LBS | RESPIRATION RATE: 16 BRPM | BODY MASS INDEX: 24.11 KG/M2 | HEIGHT: 66 IN | DIASTOLIC BLOOD PRESSURE: 90 MMHG | TEMPERATURE: 98.2 F

## 2023-04-28 DIAGNOSIS — G89.4 CHRONIC PAIN SYNDROME: ICD-10-CM

## 2023-04-28 DIAGNOSIS — M47.817 LUMBOSACRAL SPONDYLOSIS WITHOUT MYELOPATHY: ICD-10-CM

## 2023-04-28 DIAGNOSIS — M54.42 CHRONIC BILATERAL LOW BACK PAIN WITH BILATERAL SCIATICA: Primary | ICD-10-CM

## 2023-04-28 DIAGNOSIS — M54.41 CHRONIC BILATERAL LOW BACK PAIN WITH BILATERAL SCIATICA: Primary | ICD-10-CM

## 2023-04-28 DIAGNOSIS — G89.29 CHRONIC BILATERAL LOW BACK PAIN WITH BILATERAL SCIATICA: Primary | ICD-10-CM

## 2023-04-28 PROCEDURE — 99213 OFFICE O/P EST LOW 20 MIN: CPT | Performed by: STUDENT IN AN ORGANIZED HEALTH CARE EDUCATION/TRAINING PROGRAM

## 2023-04-28 RX ORDER — TIZANIDINE 2 MG/1
2 TABLET ORAL NIGHTLY PRN
Qty: 30 TABLET | Refills: 0 | Status: SHIPPED | OUTPATIENT
Start: 2023-04-28

## 2023-04-28 NOTE — PROGRESS NOTES
[address] [facility], [address] [place] [address]    Pain Medicine Follow Up Note      Pamela Benjamin     Date of Visit:  4/28/2023    CC:  Patient presents for follow up   Chief Complaint   Patient presents with    Follow-up     Lower back        HPI:    Pain is better. Medication side effects:none. Recent interventional procedures:none. .  Blood Thinners/Anticoagulation:  no  Herbal Supplements: no  Pertinent Allergies: yes - robaxin, naproxen   Diabetic: no  Bowel/Bladder Incontinence: no    Previous Plan:  PT  Gabapentin 300 TID - taking with relief and no side effects  Tizanidine 2mg QHS - taking with some relief - as needed  Lidopatches - some relief  Voltaren Gel - minimal relief   NSGY - follow up - T7 lesion negative     Interval Changes:  NSGY recommended EMG - scheduling in June  Seeing Neurology in August     Procedures: no          Imaging: New: no     XRAY:    MRI:  MRI LUMBAR SPINE W WO CONTRAST 03/06/2023    Narrative  EXAMINATION:  MRI OF THE LUMBAR SPINE WITHOUT AND WITH CONTRAST  3/6/2023 2:20 pm    TECHNIQUE:  Multiplanar multisequence MRI of the lumbar spine was performed without and  with the administration of intravenous contrast.    COMPARISON:  CT the lumbar spine 02/15/2023. HISTORY:  ORDERING SYSTEM PROVIDED HISTORY: Acute bilateral low back pain with  bilateral sciatica  TECHNOLOGIST PROVIDED HISTORY:  STAT Creatinine as needed:->No    FINDINGS:  BONES/ALIGNMENT: There is normal alignment of the spine. The vertebral body  heights are maintained. The bone marrow signal appears unremarkable. Small  nodes are seen along the superior endplate of L2 and the inferior endplate of  the L3 vertebral bodies.   Enhancement is seen associated with 1 of the  Schmorl's node along the inferior endplate of the L3 vertebral body small 5  mm T1 hypointense and T2 hyperintense lesion in the L3 vertebral body (series  3, image 7) does not

## 2023-04-28 NOTE — PROGRESS NOTES
Shwetha Person presents to the [facility] on 4/28/2023. Em Hutton is complaining of pain lower back . The pain is constant. The pain is described as aching, throbbing, and shooting. Pain is rated on her best day at a 5, on her worst day at a 8, and on average at a 7 on the VAS scale. She took her last dose of Neurontin this morning . Any procedures since your last visit: No    Pacemaker or defibrillator: No    She is not on NSAIDS and is not on anticoagulation medications   Medication Contract and Consent for Opioid Use Documents Filed        No documents found                    Temp 98.2 °F (36.8 °C) (Temporal)   Resp 16   Ht 5' 6\" (1.676 m)   Wt 150 lb (68 kg)   BMI 24.21 kg/m²      No LMP recorded.  Patient is postmenopausal.

## 2023-05-01 ENCOUNTER — TREATMENT (OUTPATIENT)
Dept: PHYSICAL THERAPY | Age: 63
End: 2023-05-01
Payer: COMMERCIAL

## 2023-05-01 DIAGNOSIS — M54.41 CHRONIC BILATERAL LOW BACK PAIN WITH BILATERAL SCIATICA: Primary | ICD-10-CM

## 2023-05-01 DIAGNOSIS — M54.42 CHRONIC BILATERAL LOW BACK PAIN WITH BILATERAL SCIATICA: Primary | ICD-10-CM

## 2023-05-01 DIAGNOSIS — G89.29 CHRONIC BILATERAL LOW BACK PAIN WITH BILATERAL SCIATICA: Primary | ICD-10-CM

## 2023-05-01 PROCEDURE — 97112 NEUROMUSCULAR REEDUCATION: CPT | Performed by: PHYSICAL THERAPIST

## 2023-05-01 PROCEDURE — 97110 THERAPEUTIC EXERCISES: CPT | Performed by: PHYSICAL THERAPIST

## 2023-05-01 NOTE — PROGRESS NOTES
[address] [facility] and [facility]   Phone: [phone]   Fax: [phone]      Physical Therapy Treatment Note    Date: 2023  Patient Name: Samreen Pichardo  : 1960   MRN: [id number]  DOInjury: 2 months   DOSx: NA  Referring Provider: No referring provider defined for this encounter. Medical Diagnosis:   M54.42, M54.41, G89.29 (ICD-10-CM) - Chronic bilateral low back pain with bilateral sciatica    Outcome Measure:  Oswestry 90%    S: Pt reports L LBP 6/10. Reports pain started yesterday but not sure how. Reports got pain turning over on couch. O:  Time 0800- 9050      Visit  Repeat outcome measure at mid point and end. Pain See above. ROM      Modalities      MH + ES            Exercise      ALL EXERCISE DONE WITH DRAW-IN TECHNIQUE                            Functional activities To aid in reaching , pushing, pulling tasks at home     ROWS: H  \"    ROWS: M 2 x 10 GTB    ROWS: L  \"    Lat pull down  2 x 10 GTB    Cross country ski  2 x 10     Obliques - walk out X 5 each  10# cable system      THEREX     Bike 10 min. Punches      Lat pulldowns      Sb roll out fwd/lateral  X 10 with 5 s hold      bridges 2 x 10     LTR X 10     PPT 2 x 10     Bridges 2 x 10     Hip abd 2 x 10 OTB    Hip EXT 2 x 10     TG Squats      Hip add 2 x 10 Ball     Hip abd standing  2 x 10     A:  Tolerated fairly well. Reports pain improved end of session.      P: Continue with rehab plan  Suman Garcia [place] [id number]  Treatment Charges: Mins Units   Initial Evaluation     Re-Evaluation     Ther Exercise         TE 20 1   Manual Therapy     MT     Ther Activities        TA     Gait Training          GT     Neuro Re-education NR 15 1   Modalities     Non-Billable Service Time     Other     Total Time/Units 35 2

## 2023-05-04 ENCOUNTER — TREATMENT (OUTPATIENT)
Dept: PHYSICAL THERAPY | Age: 63
End: 2023-05-04

## 2023-05-04 DIAGNOSIS — G89.29 CHRONIC BILATERAL LOW BACK PAIN WITH BILATERAL SCIATICA: Primary | ICD-10-CM

## 2023-05-04 DIAGNOSIS — M54.41 CHRONIC BILATERAL LOW BACK PAIN WITH BILATERAL SCIATICA: Primary | ICD-10-CM

## 2023-05-04 DIAGNOSIS — M54.42 CHRONIC BILATERAL LOW BACK PAIN WITH BILATERAL SCIATICA: Primary | ICD-10-CM

## 2023-05-04 NOTE — PROGRESS NOTES
[address] [facility] and [facility]   Phone: [phone]   Fax: [phone]      Physical Therapy Treatment Note    Date: 2023  Patient Name: Jackson Meehan  : 1960   MRN: [id number]  DOInjury: 2 months   DOSx: NA  Referring Provider: No referring provider defined for this encounter. Medical Diagnosis:   M54.42, M54.41, G89.29 (ICD-10-CM) - Chronic bilateral low back pain with bilateral sciatica    Outcome Measure:  Oswestry 90%    S: Pt reports L LBP 5/10. Reports pain woke her in the middle of the night, she doesn't recall doing anything different yesterday that would cause increase in pain. O:  Time 1255- 5283      Visit  Repeat outcome measure at mid point and end. Pain See above. ROM      Modalities      MH + ES            Exercise      ALL EXERCISE DONE WITH DRAW-IN TECHNIQUE                            Functional activities To aid in reaching , pushing, pulling tasks at home     ROWS: H  \"    ROWS: M 2 x 10 GTB    ROWS: L  \"    Lat pull down  2 x 10 GTB    [place] country ski  2 x 10     Obliques - walk out X 10 each  10# cable system      THEREX     Bike 10 min. Punches      Lat pulldowns      Sb roll out fwd/lateral  X 10 with 5 s hold      bridges 2 x 10     LTR X 10     PPT 2 x 10     Bridges 2 x 10     Hip abd 2 x 10 OTB    Hip EXT 2 x 10     TG Squats      Hip add 2 x 10 Ball     Hip abd standing  2 x 10     A:  Tolerated fairly well. Reports increase in muscle fatigue following treatment.      P: Continue with rehab plan  Ml Barrios, ANDRÉS [address]  Treatment Charges: Mins Units   Initial Evaluation     Re-Evaluation     Ther Exercise         TE 23 2   Manual Therapy     MT     Ther Activities        TA     Gait Training          GT     Neuro Re-education NR 15 1   Modalities     Non-Billable Service Time     Other     Total Time/Units 38 3

## 2023-05-08 ENCOUNTER — TREATMENT (OUTPATIENT)
Dept: PHYSICAL THERAPY | Age: 63
End: 2023-05-08
Payer: COMMERCIAL

## 2023-05-08 DIAGNOSIS — M54.41 CHRONIC BILATERAL LOW BACK PAIN WITH BILATERAL SCIATICA: Primary | ICD-10-CM

## 2023-05-08 DIAGNOSIS — G89.29 CHRONIC BILATERAL LOW BACK PAIN WITH BILATERAL SCIATICA: Primary | ICD-10-CM

## 2023-05-08 DIAGNOSIS — M54.42 CHRONIC BILATERAL LOW BACK PAIN WITH BILATERAL SCIATICA: Primary | ICD-10-CM

## 2023-05-08 PROCEDURE — 97110 THERAPEUTIC EXERCISES: CPT

## 2023-05-08 PROCEDURE — 97112 NEUROMUSCULAR REEDUCATION: CPT

## 2023-05-08 NOTE — PROGRESS NOTES
[address] [facility] and [facility]   Phone: [phone]   Fax: [phone]      Physical Therapy Treatment Note    Date: 2023  Patient Name: Leroy Caballero  : 1960   MRN: [id number]  DOInjury: 2 months   DOSx: NA  Referring Provider: No referring provider defined for this encounter. Medical Diagnosis:   M54.42, M54.41, G89.29 (ICD-10-CM) - Chronic bilateral low back pain with bilateral sciatica    Outcome Measure:  Oswestry 90%    S: Pt reports pain in L thigh, describing as a \"muscle soreness\" no rating given. O:  Time [id number] 0400     Visit  Repeat outcome measure at mid point and end. Pain See above. ROM      Modalities      MH + ES            Exercise      ALL EXERCISE DONE WITH DRAW-IN TECHNIQUE                            Functional activities To aid in reaching , pushing, pulling tasks at home     ROWS: H  \"    ROWS: M 2 x 10 BTB    ROWS: L  \"    Lat pull down  GTB    Cross country ski  2 x 10     Obliques - walk out X 10 each  10# cable system      THEREX     Bike 10 min. Punches      Lat pulldowns      Sb roll out fwd/lateral  X 10 with 5 s hold      bridges 2 x 10     LTR X 10     PPT 2 x 10     Bridges 2 x 10     Hip abd 2 x 10 OTB    Hip EXT 2 x 10     TG Squats      Hip add 2 x 10 Ball     Hip abd standing  2 x 10     A:  Tolerated fairly well. Reports increase in muscle fatigue following treatment. No increase in pain.      P: Continue with rehab plan  Ryan Reilly, PTA [id number]  Treatment Charges: Mins Units   Initial Evaluation     Re-Evaluation     Ther Exercise         TE 23 2   Manual Therapy     MT     Ther Activities        TA     Gait Training          GT     Neuro Re-education NR 15 1   Modalities     Non-Billable Service Time     Other     Total Time/Units 38 3

## 2023-05-11 ENCOUNTER — TREATMENT (OUTPATIENT)
Dept: PHYSICAL THERAPY | Age: 63
End: 2023-05-11

## 2023-05-11 DIAGNOSIS — M54.42 CHRONIC BILATERAL LOW BACK PAIN WITH BILATERAL SCIATICA: Primary | ICD-10-CM

## 2023-05-11 DIAGNOSIS — G89.29 CHRONIC BILATERAL LOW BACK PAIN WITH BILATERAL SCIATICA: Primary | ICD-10-CM

## 2023-05-11 DIAGNOSIS — M54.41 CHRONIC BILATERAL LOW BACK PAIN WITH BILATERAL SCIATICA: Primary | ICD-10-CM

## 2023-05-11 NOTE — PROGRESS NOTES
[address] [facility]   Phone: [phone]   Fax: [phone]      Physical Therapy Treatment Note    Date: 2023  Patient Name: Celia Johnson  : 1960   MRN: [id number]  DOInjury: 2 months   DOSx: NA  Referring Provider: No referring provider defined for this encounter. Medical Diagnosis:   M54.42, M54.41, G89.29 (ICD-10-CM) - Chronic bilateral low back pain with bilateral sciatica    Outcome Measure:  Oswestry 90%    S: Pt reports pain in L thigh 4/10. Pt tender to palpation over L greater trochanter and proximal IT band , not R.     O:  Time 0840 - 0915     Visit  Repeat outcome measure at mid point and end. Pain See above. ROM      Modalities      MH + ES            Exercise      ALL EXERCISE DONE WITH DRAW-IN TECHNIQUE                            Functional activities To aid in reaching , pushing, pulling tasks at home     ROWS: H  \"    ROWS: M 2 x 10 BTB    ROWS: L  \"    Lat pull down  GTB    Cross country ski  2 x 10     Obliques - walk out 10# cable system      THEREX     Bike 10 min. Punches      Lat pulldowns      Sb roll out fwd/lateral  X 10 with 5 s hold      bridges 2 x 10     LTR X 10     PPT 2 x 10     Bridges 2 x 10     Hip abd OTB    Hip EXT 2 x 10     TG Squats      Hip add 2 x 10 Ball     Hamstring stretch  3 x 30s L     IT band stretch  3 x 30s L      Hip abd standing      A:  Tolerated fairly well. Reports feels better end of session.       P: Continue with rehab plan  [place], [place] [id number]  Treatment Charges: Mins Units   Initial Evaluation     Re-Evaluation     Ther Exercise         TE 20 1   Manual Therapy     MT     Ther Activities        TA     Gait Training          GT     Neuro Re-education NR 15 1   Modalities     Non-Billable Service Time     Other     Total Time/Units 35 2

## 2023-05-15 ENCOUNTER — HOSPITAL ENCOUNTER (OUTPATIENT)
Dept: NEUROLOGY | Age: 63
Discharge: HOME | End: 2023-05-15
Payer: COMMERCIAL

## 2023-05-15 VITALS — WEIGHT: 152 LBS | HEIGHT: 66 IN | BODY MASS INDEX: 24.43 KG/M2

## 2023-05-15 DIAGNOSIS — R20.2 BILATERAL LEG PARESTHESIA: ICD-10-CM

## 2023-05-15 PROCEDURE — 95886 MUSC TEST DONE W/N TEST COMP: CPT

## 2023-05-15 PROCEDURE — 95911 NRV CNDJ TEST 9-10 STUDIES: CPT

## 2023-05-15 NOTE — PROCEDURES
[facility]  [address] [facility], [address] [place] [address]  Phone: [phone]  Fax: [phone]      Referring Provider: Duane Paredes MD  Primary Care Physician: Chas Rodriguez DO  Patient Name: Yana Herrera  Patient YOB: 1960  Gender: female  BMI: There is no height or weight on file to calculate BMI. There were no vitals taken for this visit. 5/15/2023    Reason for referral: EMG    Description of clinical problem:   No chief complaint on file. Pain No   ; Numbness/tingling  Yes; Weakness  No       Brief physical exam:   Sensory deficit No; Weakness No; Atrophy  No; Reflex abnormality No      Study Limitations:  none    Summary of Findings:   Nerve conduction studies: The following nerve conduction studies were abnormal:   See table and interpretation. All other nerve conduction studies listed in the table above were normal in latency, amplitude and conduction velocity. [address]  Electrodiagnostic Laboratory   [place]         Full Name: Yana Herrera Gender: Female  MRN: [id number] YOB: 1960  Location[de-identified] [facility] (2)      Visit Date: 5/15/2023 08:07  Age: 59 Years 1 Months Old  Examining Physician: Dr. Vivi Yousif   Referring Physician:  Dr. Darby Alarcon   Technician: Phil Douglas   Height: 5 feet 6 inch  Weight: 152 lbs  Notes: Paresthesia in bilateral lower extremities      Motor NCS      Nerve / Sites Lat. Lat Diff Amplitude Amp. 1-2 Distance Velocity Temp.    ms ms mV % cm m/s °C   R Peroneal - EDB      Ankle 7.50  2.6 100 8  31      Fib head 16.35 8.85 2.3 86.8 27 30 31      Pop fossa 18.96 2.60 2.2 82.2 10 38 31   L Peroneal - EDB      Ankle 5.99  3.4 100 8  31      Fib head 13.33 7.34 3.4 102 27 37 31      Pop fossa 15.42 2.08 3.2 94.5 10 48 31   R Tibial - AH      Ankle 4.06  11.7 100 8  31      Pop fossa 14.38 10.31 8.0 68.3 40 39 31   L Tibial - AH      Ankle 4.32

## 2023-05-17 ENCOUNTER — OFFICE VISIT (OUTPATIENT)
Dept: FAMILY MEDICINE CLINIC | Age: 63
End: 2023-05-17

## 2023-05-17 VITALS
RESPIRATION RATE: 18 BRPM | TEMPERATURE: 97.9 F | BODY MASS INDEX: 25.91 KG/M2 | HEIGHT: 66 IN | SYSTOLIC BLOOD PRESSURE: 126 MMHG | HEART RATE: 90 BPM | WEIGHT: 161.2 LBS | OXYGEN SATURATION: 98 % | DIASTOLIC BLOOD PRESSURE: 88 MMHG

## 2023-05-17 DIAGNOSIS — L23.7 ALLERGIC CONTACT DERMATITIS DUE TO PLANTS, EXCEPT FOOD: Primary | ICD-10-CM

## 2023-05-17 RX ORDER — METHYLPREDNISOLONE ACETATE 40 MG/ML
40 INJECTION, SUSPENSION INTRA-ARTICULAR; INTRALESIONAL; INTRAMUSCULAR; SOFT TISSUE ONCE
Status: COMPLETED | OUTPATIENT
Start: 2023-05-17 | End: 2023-05-17

## 2023-05-17 RX ORDER — PREDNISONE 10 MG/1
TABLET ORAL
Qty: 18 TABLET | Refills: 0 | Status: SHIPPED | OUTPATIENT
Start: 2023-05-17 | End: 2023-05-26

## 2023-05-17 RX ADMIN — METHYLPREDNISOLONE ACETATE 40 MG: 40 INJECTION, SUSPENSION INTRA-ARTICULAR; INTRALESIONAL; INTRAMUSCULAR; SOFT TISSUE at 09:22

## 2023-05-17 NOTE — PROGRESS NOTES
Chief Complaint:   Rash (Onset x 2 weeks/Using calamine lotion - rash is spreading everywhere - states that she has severe itching with it )      History of Present Illness   Source of history provided by:  patient. Ann Batista is a 59 y.o. old female who presents to [facility], for persistent, worsening of red, raised, and itchy area on  face, BUE which began 3 week(s) prior to arrival.  The symptoms were caused by unknown cause. She does report she has been doing outside work recently. Since onset the symptoms have been worsening. Prior history of similar episodes: No.   Her symptoms are associated with rash and itching and relieved by nothing. She has tried calamine lotion with no relief. Denies any changes in soaps, deodorants, or detergents. Denies any close contact to any external known irritant. She denies any hives, welts, difficulty breathing, difficulty swallowing, wheezing, throat tightness, hoarseness, stridor, lightheadedness, dizziness, facial swelling, lip swelling, tongue swelling, fever, chills, chest pain, abd pain, drainage, increased redness, or increased swelling. ROS   Unless otherwise stated in this report or unable to obtain because of the patient's clinical or mental status as evidenced by the medical record, this patients's positive and negative responses for Review of Systems, constitutional, psych, eyes, ENT, cardiovascular, respiratory, gastrointestinal, neurological, genitourinary, musculoskeletal, integument systems and systems related to the presenting problem are either stated in the preceding or were not pertinent or were negative for the symptoms and/or complaints related to the medical problem. Past Medical History:  has a past medical history of Anxiety, Dental caries, Depression, and Hyperlipidemia.    Past Surgical History:  has a past surgical history that includes  section; Carpal tunnel release (Bilateral); cervical fusion; Dental

## 2023-05-19 RX ORDER — GABAPENTIN 300 MG/1
300 CAPSULE ORAL 3 TIMES DAILY
Qty: 90 CAPSULE | Refills: 1 | Status: SHIPPED
Start: 2023-05-19 | End: 2023-06-02 | Stop reason: SDUPTHER

## 2023-05-24 ENCOUNTER — TREATMENT (OUTPATIENT)
Dept: PHYSICAL THERAPY | Age: 63
End: 2023-05-24

## 2023-05-24 DIAGNOSIS — M54.41 CHRONIC BILATERAL LOW BACK PAIN WITH BILATERAL SCIATICA: Primary | ICD-10-CM

## 2023-05-24 DIAGNOSIS — G89.29 CHRONIC BILATERAL LOW BACK PAIN WITH BILATERAL SCIATICA: Primary | ICD-10-CM

## 2023-05-24 DIAGNOSIS — M54.42 CHRONIC BILATERAL LOW BACK PAIN WITH BILATERAL SCIATICA: Primary | ICD-10-CM

## 2023-05-24 NOTE — PROGRESS NOTES
[facility]   Phone: [phone]   Fax: [phone]      Physical Therapy Treatment Note    Date: 2023  Patient Name: Sebas Quinones  : 1960   MRN: [id number]  DOInjury: 2 months   DOSx: NA  Referring Provider: No referring provider defined for this encounter. Medical Diagnosis:   M54.42, M54.41, G89.29 (ICD-10-CM) - Chronic bilateral low back pain with bilateral sciatica    Outcome Measure:  Oswestry 90%    S: Pt reports pain 4-5/10 in back and L thigh. Reports has a few good days and then has a bad day, not sure what brings it on per pt.       O:  Time 0800 -  0835       Visit 10 /12 Repeat outcome measure at mid point and end. Pain See above. ROM      Modalities      MH + ES            Exercise      ALL EXERCISE DONE WITH DRAW-IN TECHNIQUE                            Functional activities To aid in reaching , pushing, pulling tasks at home     ROWS: H  \"    ROWS: M 2 x 10 BTB    ROWS: L  \"    Lat pull down  GTB    Cross country ski      Obliques - walk out 10# cable system      THEREX     Bike 10 min. Punches      Lat pulldowns      Sb roll out fwd/lateral  X 10 with 5 s hold      bridges 2 x 10     LTR X 10      SLR with core activation  X 10 each       PPT 2 x 10             Hip abd OTB    Hip EXT     TG Squats      Hip add 2 x 10 Ball     Hamstring stretch  3 x 30s L Seated     IT band stretch  3 x 30s L      Hip abd standing      A:  Tolerated fairly well. Reports no change in pain end of session.      P: Continue with rehab plan  Tiffanie Upton [id number]  Treatment Charges: Mins Units   Initial Evaluation     Re-Evaluation     Ther Exercise         TE 20 1   Manual Therapy     MT     Ther Activities        TA     Gait Training          GT     Neuro Re-education NR 15 1   Modalities     Non-Billable Service Time     Other     Total Time/Units 35 2

## 2023-05-29 ENCOUNTER — PATIENT MESSAGE (OUTPATIENT)
Dept: PRIMARY CARE CLINIC | Age: 63
End: 2023-05-29

## 2023-05-30 DIAGNOSIS — F06.4 ANXIETY DISORDER DUE TO GENERAL MEDICAL CONDITION: Primary | ICD-10-CM

## 2023-05-30 DIAGNOSIS — R21 RASH AND OTHER NONSPECIFIC SKIN ERUPTION: Primary | ICD-10-CM

## 2023-05-30 NOTE — TELEPHONE ENCOUNTER
From: Kimmy Wilcox  To: Marylee Pellet  Sent: 5/29/2023 11:53 AM EDT  Subject: Rash    Got rash on May 3rd, seen on May 17th. Rash not going away. Need to see dermatologist: Rachelle Larkin [address] [place] [address] Drive. [address] [place] [address]. [phone] need approval from insurance. Please call [phone] authorization.

## 2023-05-31 ENCOUNTER — TREATMENT (OUTPATIENT)
Dept: PHYSICAL THERAPY | Age: 63
End: 2023-05-31
Payer: COMMERCIAL

## 2023-05-31 ENCOUNTER — NURSE ONLY (OUTPATIENT)
Dept: FAMILY MEDICINE CLINIC | Age: 63
End: 2023-05-31
Payer: COMMERCIAL

## 2023-05-31 ENCOUNTER — TELEPHONE (OUTPATIENT)
Dept: FAMILY MEDICINE CLINIC | Age: 63
End: 2023-05-31

## 2023-05-31 DIAGNOSIS — M54.42 CHRONIC BILATERAL LOW BACK PAIN WITH BILATERAL SCIATICA: Primary | ICD-10-CM

## 2023-05-31 DIAGNOSIS — L23.7 ALLERGIC CONTACT DERMATITIS DUE TO PLANTS, EXCEPT FOOD: Primary | ICD-10-CM

## 2023-05-31 DIAGNOSIS — E53.8 B12 DEFICIENCY: Primary | ICD-10-CM

## 2023-05-31 DIAGNOSIS — G89.29 CHRONIC BILATERAL LOW BACK PAIN WITH BILATERAL SCIATICA: Primary | ICD-10-CM

## 2023-05-31 DIAGNOSIS — M54.41 CHRONIC BILATERAL LOW BACK PAIN WITH BILATERAL SCIATICA: Primary | ICD-10-CM

## 2023-05-31 PROCEDURE — 96372 THER/PROPH/DIAG INJ SC/IM: CPT | Performed by: FAMILY MEDICINE

## 2023-05-31 PROCEDURE — 97164 PT RE-EVAL EST PLAN CARE: CPT | Performed by: PHYSICAL THERAPIST

## 2023-05-31 RX ORDER — CYANOCOBALAMIN 1000 UG/ML
1000 INJECTION, SOLUTION INTRAMUSCULAR; SUBCUTANEOUS ONCE
Status: COMPLETED | OUTPATIENT
Start: 2023-05-31 | End: 2023-05-31

## 2023-05-31 RX ADMIN — CYANOCOBALAMIN 1000 MCG: 1000 INJECTION, SOLUTION INTRAMUSCULAR; SUBCUTANEOUS at 09:04

## 2023-05-31 NOTE — PROGRESS NOTES
[address] [facility]   Phone: [phone]   Fax: [phone]        Referring Provider:  Ana Cristina Talbert MD  [facility]        Medical Diagnosis:   M54.42, M54.41, G89.29 (ICD-10-CM) - Chronic bilateral low back pain with bilateral sciatica      CERTIFICATION PERIOD:  4/3/2023  to 5/19/2023. ATTENDANCE:  Patient has attended 6 of 15 scheduled treatments from 4/3/2023   to 5/31/2023 . TREATMENTS RECEIVED:  therapeutic exercise, neuromuscular reeducation. INITIAL STATUS:    Observations: well nourished female                   Gait:  slow guarded gait      Functional Strength:   Able to toe walk, heel walk, and mini squat with difficulty      Range of Motion:     Trunk:               Flexion:                       [x] Normal   [] Limited reports pain with               Extension:                   [] Normal   [x] Limited 10% reports pain with               Right Rotation:            [] Normal   [x] Limited 10%              Left Rotation:               [] Normal   [x] Limited 10%              Right Side Bending:    [x] Normal   [] Limited               Left Side Bending:      [x] Normal   [] Limited      Lower Extremity:              Right:                           [x] Normal   [] Limited               Left:                             [x] Normal   [] Limited         Strength:                 Trunk:  mildly decreased              R LE:   5/5              L LE:    5/5     Palpation: Tender to palpation over L spine and sacrum, Non-tender to palpation lower T  spine, B SI      Sensation: Pt reports N/T down B Le's , entirety of leg (front / back ).       Special Tests:   [] Nerve Root Compression           Right []+ / [] -    Left []+ / [] -  [] Slump           Right []+ / [x] -    Left [x]+ / [] -  [] FADIR          Right []+ / [] -    Left []+ / [] -  [] S-I Distraction          Right []+ / [] -    Left []+ / [] -      [] SLR           Right []+ / [x] -

## 2023-05-31 NOTE — PROGRESS NOTES
[address] [facility]   Phone: [phone]   Fax: [phone]      Physical Therapy Treatment Note    Date: 2023  Patient Name: Nicole Almaguer  : 1960   MRN: [id number]  DOInjury: 2 months   DOSx: NA  Referring Provider: No referring provider defined for this encounter. Medical Diagnosis:   M54.42, M54.41, G89.29 (ICD-10-CM) - Chronic bilateral low back pain with bilateral sciatica    Outcome Measure:  Oswestry 58%    S: Pt reports pain 5/10 today. Reports feels she is moving more but pain is worse than when started therapy. O:  Time [id number]     Visit  Repeat outcome measure at mid point and end. Pain See above. ROM      Modalities      MH + ES            Exercise      ALL EXERCISE DONE WITH DRAW-IN TECHNIQUE                            Functional activities To aid in reaching , pushing, pulling tasks at home     ROWS: H  \"    ROWS: M BTB    ROWS: L \"    Lat pull down  GTB    Cross country ski      Obliques - walk out 10# cable system          Bike     Punches     Lat pulldowns     Sb roll out fwd/lateral      bridges     LTR     SLR with core activation      PPT          Hip abd OTB    Hip EXT     TG Squats     Hip add Ball     Hamstring stretch  Seated     IT band stretch      Hip abd standing      A:  Tolerated fairly well. Reassessment completed today. See note for details. Recommend pt refer back to referring physician. Pt in agreement. P: Will discharge pt at this time.      Edgar Dakin, [place] [id number]  Treatment Charges: Mins Units   Initial Evaluation     Re-Evaluation 31 1   Ther Exercise         TE     Manual Therapy     MT     Ther Activities        TA     Gait Training          GT     Neuro Re-education NR     Modalities     Non-Billable Service Time     Other     Total Time/Units 31 1

## 2023-05-31 NOTE — TELEPHONE ENCOUNTER
Hi Dr Shawna Vargas    Could you please change dermatology referral to Dr Sean Jones due to patient's insurance     Thanks

## 2023-06-02 ENCOUNTER — OFFICE VISIT (OUTPATIENT)
Dept: PAIN MANAGEMENT | Age: 63
End: 2023-06-02

## 2023-06-02 VITALS
DIASTOLIC BLOOD PRESSURE: 90 MMHG | WEIGHT: 162 LBS | HEIGHT: 66 IN | SYSTOLIC BLOOD PRESSURE: 144 MMHG | HEART RATE: 84 BPM | TEMPERATURE: 98.2 F | BODY MASS INDEX: 26.03 KG/M2 | RESPIRATION RATE: 16 BRPM | OXYGEN SATURATION: 97 %

## 2023-06-02 DIAGNOSIS — M54.42 CHRONIC BILATERAL LOW BACK PAIN WITH BILATERAL SCIATICA: Primary | ICD-10-CM

## 2023-06-02 DIAGNOSIS — M54.41 CHRONIC BILATERAL LOW BACK PAIN WITH BILATERAL SCIATICA: Primary | ICD-10-CM

## 2023-06-02 DIAGNOSIS — G89.29 CHRONIC BILATERAL LOW BACK PAIN WITH BILATERAL SCIATICA: Primary | ICD-10-CM

## 2023-06-02 DIAGNOSIS — G89.4 CHRONIC PAIN SYNDROME: ICD-10-CM

## 2023-06-02 DIAGNOSIS — M47.817 LUMBOSACRAL SPONDYLOSIS WITHOUT MYELOPATHY: ICD-10-CM

## 2023-06-02 RX ORDER — TIZANIDINE 2 MG/1
2 TABLET ORAL NIGHTLY PRN
Qty: 30 TABLET | Refills: 1 | Status: SHIPPED | OUTPATIENT
Start: 2023-06-02

## 2023-06-02 RX ORDER — SODIUM CHLORIDE 9 MG/ML
INJECTION, SOLUTION INTRAVENOUS PRN
OUTPATIENT
Start: 2023-06-02

## 2023-06-02 RX ORDER — SODIUM CHLORIDE 0.9 % (FLUSH) 0.9 %
5-40 SYRINGE (ML) INJECTION EVERY 12 HOURS SCHEDULED
OUTPATIENT
Start: 2023-06-02

## 2023-06-02 RX ORDER — GABAPENTIN 300 MG/1
300 CAPSULE ORAL 3 TIMES DAILY
Qty: 90 CAPSULE | Refills: 2 | Status: SHIPPED | OUTPATIENT
Start: 2023-06-02 | End: 2023-08-31

## 2023-06-02 RX ORDER — SODIUM CHLORIDE 0.9 % (FLUSH) 0.9 %
5-40 SYRINGE (ML) INJECTION PRN
OUTPATIENT
Start: 2023-06-02

## 2023-06-02 NOTE — PROGRESS NOTES
Opal Mcmullen presents to [facility]23. Paramjit Duncan is complaining of pain low back. The pain is constant. The pain is described as aching, throbbing, shooting, and stabbing. Pain is rated on her best day at a 5, on her worst day at a 9, and on average at a 6 on the VAS scale. She took her last dose of Neurontin this morning . Any procedures since your last visit: No    Pacemaker or defibrillator: No     She is not on NSAIDS and is not on anticoagulation medications   Medication Contract and Consent for Opioid Use Documents Filed        No documents found                    Temp 98.2 °F (36.8 °C) (Temporal)   Resp 16   Ht 5' 6\" (1.676 m)   Wt 162 lb (73.5 kg)   SpO2 98%   BMI 26.15 kg/m²      No LMP recorded.  Patient is postmenopausal.

## 2023-06-02 NOTE — PROGRESS NOTES
[address] [facility], [address] [place] [address]    Pain Medicine Follow Up Note      Judy Jennings     Date of Visit:  6/2/2023    CC:  Patient presents for follow up   Chief Complaint   Patient presents with    Follow-up     Lower back        HPI:    Pain is better. Medication side effects:none. Recent interventional procedures:none. .  Blood Thinners/Anticoagulation:  no  Herbal Supplements: no  Pertinent Allergies: yes - robaxin, naproxen   Diabetic: no  Bowel/Bladder Incontinence: no    Previous Plan:  PT - ongoing   Gabapentin 300 TID - taking with relief and no side effects  Tizanidine 2mg QHS - taking with some relief - as needed  EMG - done    Interval Changes:  NSGY  follow up - awaiting  Seeing Neurology in August for headache  Still having some pain down the left leg to foot    Procedures: no          Imaging: New: no     XRAY:    MRI:  MRI LUMBAR SPINE W WO CONTRAST 03/06/2023    Narrative  EXAMINATION:  MRI OF THE LUMBAR SPINE WITHOUT AND WITH CONTRAST  3/6/2023 2:20 pm    TECHNIQUE:  Multiplanar multisequence MRI of the lumbar spine was performed without and  with the administration of intravenous contrast.    COMPARISON:  CT the lumbar spine 02/15/2023. HISTORY:  ORDERING SYSTEM PROVIDED HISTORY: Acute bilateral low back pain with  bilateral sciatica  TECHNOLOGIST PROVIDED HISTORY:  STAT Creatinine as needed:->No    FINDINGS:  BONES/ALIGNMENT: There is normal alignment of the spine. The vertebral body  heights are maintained. The bone marrow signal appears unremarkable. Small  nodes are seen along the superior endplate of L2 and the inferior endplate of  the L3 vertebral bodies. Enhancement is seen associated with 1 of the  Schmorl's node along the inferior endplate of the L3 vertebral body small 5  mm T1 hypointense and T2 hyperintense lesion in the L3 vertebral body (series  3, image 7) does not demonstrate any enhancement.     SPINAL

## 2023-06-12 ENCOUNTER — PREP FOR PROCEDURE (OUTPATIENT)
Dept: PAIN MANAGEMENT | Age: 63
End: 2023-06-12

## 2023-06-12 PROBLEM — M54.16 LUMBAR RADICULOPATHY: Status: ACTIVE | Noted: 2023-06-12

## 2023-06-20 ENCOUNTER — HOSPITAL ENCOUNTER (OUTPATIENT)
Age: 63
Setting detail: OUTPATIENT SURGERY
Discharge: HOME | End: 2023-06-20
Attending: STUDENT IN AN ORGANIZED HEALTH CARE EDUCATION/TRAINING PROGRAM | Admitting: STUDENT IN AN ORGANIZED HEALTH CARE EDUCATION/TRAINING PROGRAM
Payer: COMMERCIAL

## 2023-06-20 ENCOUNTER — HOSPITAL ENCOUNTER (OUTPATIENT)
Dept: GENERAL RADIOLOGY | Age: 63
Setting detail: OUTPATIENT SURGERY
Discharge: HOME | End: 2023-06-22
Attending: STUDENT IN AN ORGANIZED HEALTH CARE EDUCATION/TRAINING PROGRAM
Payer: COMMERCIAL

## 2023-06-20 VITALS
RESPIRATION RATE: 16 BRPM | DIASTOLIC BLOOD PRESSURE: 76 MMHG | HEART RATE: 74 BPM | WEIGHT: 160 LBS | SYSTOLIC BLOOD PRESSURE: 137 MMHG | OXYGEN SATURATION: 99 % | TEMPERATURE: 97 F | BODY MASS INDEX: 25.71 KG/M2 | HEIGHT: 66 IN

## 2023-06-20 DIAGNOSIS — M54.16 LUMBAR RADICULOPATHY: ICD-10-CM

## 2023-06-20 DIAGNOSIS — R52 PAIN MANAGEMENT: ICD-10-CM

## 2023-06-20 PROCEDURE — 7100000011 HC PHASE II RECOVERY - ADDTL 15 MIN: Performed by: STUDENT IN AN ORGANIZED HEALTH CARE EDUCATION/TRAINING PROGRAM

## 2023-06-20 PROCEDURE — 6360000004 HC RX CONTRAST MEDICATION: Performed by: STUDENT IN AN ORGANIZED HEALTH CARE EDUCATION/TRAINING PROGRAM

## 2023-06-20 PROCEDURE — 62323 NJX INTERLAMINAR LMBR/SAC: CPT | Performed by: STUDENT IN AN ORGANIZED HEALTH CARE EDUCATION/TRAINING PROGRAM

## 2023-06-20 PROCEDURE — 2709999900 HC NON-CHARGEABLE SUPPLY: Performed by: STUDENT IN AN ORGANIZED HEALTH CARE EDUCATION/TRAINING PROGRAM

## 2023-06-20 PROCEDURE — 2500000003 HC RX 250 WO HCPCS: Performed by: STUDENT IN AN ORGANIZED HEALTH CARE EDUCATION/TRAINING PROGRAM

## 2023-06-20 PROCEDURE — 3600000002 HC SURGERY LEVEL 2 BASE: Performed by: STUDENT IN AN ORGANIZED HEALTH CARE EDUCATION/TRAINING PROGRAM

## 2023-06-20 PROCEDURE — 7100000010 HC PHASE II RECOVERY - FIRST 15 MIN: Performed by: STUDENT IN AN ORGANIZED HEALTH CARE EDUCATION/TRAINING PROGRAM

## 2023-06-20 PROCEDURE — 6360000002 HC RX W HCPCS: Performed by: STUDENT IN AN ORGANIZED HEALTH CARE EDUCATION/TRAINING PROGRAM

## 2023-06-20 PROCEDURE — 3209999900 FLUORO FOR SURGICAL PROCEDURES

## 2023-06-20 RX ORDER — SODIUM CHLORIDE 9 MG/ML
INJECTION, SOLUTION INTRAVENOUS PRN
Status: DISCONTINUED | OUTPATIENT
Start: 2023-06-20 | End: 2023-06-20 | Stop reason: HOSPADM

## 2023-06-20 RX ORDER — SODIUM CHLORIDE 0.9 % (FLUSH) 0.9 %
5-40 SYRINGE (ML) INJECTION EVERY 12 HOURS SCHEDULED
Status: DISCONTINUED | OUTPATIENT
Start: 2023-06-20 | End: 2023-06-20 | Stop reason: HOSPADM

## 2023-06-20 RX ORDER — METHYLPREDNISOLONE ACETATE 40 MG/ML
INJECTION, SUSPENSION INTRA-ARTICULAR; INTRALESIONAL; INTRAMUSCULAR; SOFT TISSUE PRN
Status: DISCONTINUED | OUTPATIENT
Start: 2023-06-20 | End: 2023-06-20 | Stop reason: ALTCHOICE

## 2023-06-20 RX ORDER — LIDOCAINE HYDROCHLORIDE 5 MG/ML
INJECTION, SOLUTION INFILTRATION; INTRAVENOUS PRN
Status: DISCONTINUED | OUTPATIENT
Start: 2023-06-20 | End: 2023-06-20 | Stop reason: ALTCHOICE

## 2023-06-20 RX ORDER — SODIUM CHLORIDE 0.9 % (FLUSH) 0.9 %
5-40 SYRINGE (ML) INJECTION PRN
Status: DISCONTINUED | OUTPATIENT
Start: 2023-06-20 | End: 2023-06-20 | Stop reason: HOSPADM

## 2023-06-20 ASSESSMENT — PAIN SCALES - GENERAL
PAINLEVEL_OUTOF10: 8
PAINLEVEL_OUTOF10: 8

## 2023-06-20 ASSESSMENT — PAIN - FUNCTIONAL ASSESSMENT: PAIN_FUNCTIONAL_ASSESSMENT: 0-10

## 2023-06-20 ASSESSMENT — PAIN DESCRIPTION - ORIENTATION
ORIENTATION: LOWER
ORIENTATION: LOWER

## 2023-06-20 ASSESSMENT — PAIN DESCRIPTION - LOCATION
LOCATION: BACK
LOCATION: BACK

## 2023-06-20 NOTE — H&P
[address]  Pain Medicine  [facility]    Procedure History & Physical      Kaye Sample     HPI:    Patient  is here for LBP, LLE Pain for L4/5 [place]  Labs/imaging studies reviewed   All question and concerns addressed including R/B/A associated with the procedure    Past Medical History:   Diagnosis Date    Anxiety     Depression     Hyperlipidemia        Past Surgical History:   Procedure Laterality Date    CARPAL TUNNEL RELEASE Bilateral     CERVICAL FUSION       SECTION      x 3    CT BIOPSY PERCUTANEOUS DEEP BONE  3/17/2023    CT BIOPSY PERCUTANEOUS DEEP BONE 3/17/2023 Kristian Case MD [facility]    DENTAL SURGERY N/A 2019    DENTAL EXAM FULL MOUTH EXTRACTIONS ALEOPOLASTY performed by Rosalinda Ortiz DDS at [facility] OR       Prior to Admission medications    Medication Sig Start Date End Date Taking? Authorizing Provider   gabapentin (NEURONTIN) 300 MG capsule Take 1 capsule by mouth 3 times daily for 90 days.  23  Lore Richardson MD   tiZANidine (ZANAFLEX) 2 MG tablet Take 1 tablet by mouth nightly as needed (spasms) 23   Ye Casarez MD   diclofenac sodium (VOLTAREN) 1 % GEL Apply 2 g topically 4 times daily 3/24/23   Ye Casarez MD   DULoxetine (CYMBALTA) 30 MG extended release capsule Take 1 capsule by mouth 2 times daily 3/22/23   [place] FAN Art DO   hydrOXYzine pamoate (VISTARIL) 50 MG capsule Take 1 capsule by mouth 3 times daily as needed for Itching    Historical Provider, MD   polyethylene glycol (GLYCOLAX) 17 GM/SCOOP powder Take 17 g by mouth daily    Historical Provider, MD   Probiotic Product (PROBIOTIC ADVANCED PO) Take 4 capsules by mouth every morning PRODUCT: BIO COMPLETE 3    Historical Provider, MD   atorvastatin (LIPITOR) 20 MG tablet Take 1 tablet by mouth every morning    Historical Provider, MD   famotidine (PEPCID) 20 MG tablet Take 1 tablet by mouth 2 times daily

## 2023-06-20 NOTE — DISCHARGE INSTRUCTIONS
Leon Latif Block/Radiofrequency  Home Going Instructions    1-Go home, rest for the remainder of the day  2-Please do not lift over 20 pounds the day of the injection  3-If you received sedation No: alcohol, driving, operating lawn mowers, plows, tractors or other dangerous equipment until next morning. Do not make important decisions or sign legal documents for 24 hours. You may experience light headedness, dizziness, nausea or sleepiness after sedation. Do not stay alone. A responsible adult must be with you for 24 hours. You could be nauseated from the medications you have received. Your IV site may be sore and bruised. 4-No dietary restrictions     5-Resume all medications the same day, blood thinners to be resumed 24 hours after injection if you were instructed to stop any. 6-Keep the surgical site clean and dry, you may shower the next morning and remove the      dressing. 7- No sitz baths, tub baths or hot tubs/swimming for 24 hours. 8- If you have any pain at the injection site(s), application of an ice pack to the area should be       helpful, 20 minutes on/20 minutes off for next 48 hours. 9- Call [facility] immediately at if you develop.   Fever greater than 100.4 F  Have bleeding or drainage from the puncture site  Have progressive Leg/arm numbness and or weakness  Loss of control of bowel and or bladder (wet/soil yourself)  Severe headache with inability to lift head  10-You may return to work the next day

## 2023-06-20 NOTE — OP NOTE
2023    Patient: Judy Jennings  :  1960  Age:  59 y.o. Sex:  female     PRE-OPERATIVE DIAGNOSIS: Lumbar disc displacement, lumbar radiculopathy. POST-OPERATIVE DIAGNOSIS: Same. PROCEDURE: Fluoroscopic guided therapeutic lumbar epidural steroid injection at the L4/5 level (# 1). SURGEON:  Pamela Yusuf MD    ANESTHESIA: Local    ESTIMATED BLOOD LOSS: None.  ______________________________________________________________________    BRIEF HISTORY:  Judy Jennings comes in today for first lumbar epidural injection at L4/5 level. The potential complications of this procedure were discussed with her again today. She has elected to undergo the aforementioned procedure. Vesta complete History & Physical examination were reviewed in depth, a copy of which is in the chart. DESCRIPTION OF PROCEDURE:    After confirming written and informed consent, a time-out was performed and Vesta name and date of birth, the procedure to be performed as well as the plan for the location of the needle insertion were confirmed. The patient was brought into the procedure room and placed in the prone position on the fluoroscopy table. A pillow was placed under the patient's lower abdomen/upper pelvis to increase lumbar interlaminar space. Standard monitors were placed, and vital signs were observed throughout the procedure. The area of the lumbar spine was prepped with chloraprep and draped in a sterile manner. The L4/5 interspace was identified and marked under AP fluoroscopy. The skin and subcutaneous tissues at the above level were anesthestized with 0.5% lidocaine. With intermittent fluoroscopy, an # 18 gauge 3 1/2 tuohy epidural needle was inserted and directed toward the interlaminar space.  The needle was slowly advanced using loss of resistance technique and 5 cc glass syringe  until the tip of the epidural needle has passed through the ligamentum flavum and entered the

## 2023-06-28 ENCOUNTER — NURSE ONLY (OUTPATIENT)
Dept: FAMILY MEDICINE CLINIC | Age: 63
End: 2023-06-28
Payer: COMMERCIAL

## 2023-06-28 DIAGNOSIS — E53.8 B12 DEFICIENCY: Primary | ICD-10-CM

## 2023-06-28 PROCEDURE — 96372 THER/PROPH/DIAG INJ SC/IM: CPT | Performed by: FAMILY MEDICINE

## 2023-06-28 RX ORDER — CYANOCOBALAMIN 1000 UG/ML
1000 INJECTION, SOLUTION INTRAMUSCULAR; SUBCUTANEOUS ONCE
Status: COMPLETED | OUTPATIENT
Start: 2023-06-28 | End: 2023-06-28

## 2023-06-28 RX ADMIN — CYANOCOBALAMIN 1000 MCG: 1000 INJECTION, SOLUTION INTRAMUSCULAR; SUBCUTANEOUS at 08:56

## 2023-07-01 RX ORDER — ATORVASTATIN CALCIUM 20 MG/1
TABLET, FILM COATED ORAL
Qty: 90 TABLET | Refills: 1 | Status: SHIPPED | OUTPATIENT
Start: 2023-07-01

## 2023-07-20 ENCOUNTER — OFFICE VISIT (OUTPATIENT)
Dept: PAIN MANAGEMENT | Age: 63
End: 2023-07-20

## 2023-07-20 VITALS
WEIGHT: 167 LBS | HEART RATE: 88 BPM | TEMPERATURE: 98.6 F | OXYGEN SATURATION: 98 % | BODY MASS INDEX: 26.84 KG/M2 | HEIGHT: 66 IN | DIASTOLIC BLOOD PRESSURE: 78 MMHG | RESPIRATION RATE: 16 BRPM | SYSTOLIC BLOOD PRESSURE: 130 MMHG

## 2023-07-20 DIAGNOSIS — M54.42 CHRONIC BILATERAL LOW BACK PAIN WITH BILATERAL SCIATICA: Primary | ICD-10-CM

## 2023-07-20 DIAGNOSIS — M54.41 CHRONIC BILATERAL LOW BACK PAIN WITH BILATERAL SCIATICA: Primary | ICD-10-CM

## 2023-07-20 DIAGNOSIS — G89.4 CHRONIC PAIN SYNDROME: ICD-10-CM

## 2023-07-20 DIAGNOSIS — G89.29 CHRONIC BILATERAL LOW BACK PAIN WITH BILATERAL SCIATICA: Primary | ICD-10-CM

## 2023-07-20 DIAGNOSIS — M47.817 LUMBOSACRAL SPONDYLOSIS WITHOUT MYELOPATHY: ICD-10-CM

## 2023-07-20 RX ORDER — SODIUM CHLORIDE 0.9 % (FLUSH) 0.9 %
5-40 SYRINGE (ML) INJECTION PRN
OUTPATIENT
Start: 2023-07-20

## 2023-07-20 RX ORDER — SODIUM CHLORIDE 9 MG/ML
INJECTION, SOLUTION INTRAVENOUS PRN
OUTPATIENT
Start: 2023-07-20

## 2023-07-20 RX ORDER — SODIUM CHLORIDE 0.9 % (FLUSH) 0.9 %
5-40 SYRINGE (ML) INJECTION EVERY 12 HOURS SCHEDULED
OUTPATIENT
Start: 2023-07-20

## 2023-07-20 RX ORDER — GABAPENTIN 300 MG/1
600 CAPSULE ORAL 3 TIMES DAILY
Qty: 180 CAPSULE | Refills: 1 | Status: SHIPPED | OUTPATIENT
Start: 2023-07-20 | End: 2023-09-18

## 2023-07-24 ENCOUNTER — OFFICE VISIT (OUTPATIENT)
Dept: FAMILY MEDICINE CLINIC | Age: 63
End: 2023-07-24
Payer: COMMERCIAL

## 2023-07-24 VITALS
OXYGEN SATURATION: 99 % | SYSTOLIC BLOOD PRESSURE: 120 MMHG | BODY MASS INDEX: 27.68 KG/M2 | HEART RATE: 83 BPM | WEIGHT: 172.2 LBS | HEIGHT: 66 IN | TEMPERATURE: 97.7 F | DIASTOLIC BLOOD PRESSURE: 70 MMHG

## 2023-07-24 DIAGNOSIS — G89.4 CHRONIC PAIN SYNDROME: ICD-10-CM

## 2023-07-24 DIAGNOSIS — U09.9 LONG COVID: ICD-10-CM

## 2023-07-24 DIAGNOSIS — R20.2 BILATERAL LEG PARESTHESIA: ICD-10-CM

## 2023-07-24 DIAGNOSIS — E55.9 VITAMIN D DEFICIENCY: Primary | ICD-10-CM

## 2023-07-24 DIAGNOSIS — E55.9 VITAMIN D DEFICIENCY: ICD-10-CM

## 2023-07-24 DIAGNOSIS — E53.8 VITAMIN B12 DEFICIENCY: ICD-10-CM

## 2023-07-24 LAB — VITAMIN B-12: 441 PG/ML (ref 211–946)

## 2023-07-24 PROCEDURE — 99213 OFFICE O/P EST LOW 20 MIN: CPT | Performed by: FAMILY MEDICINE

## 2023-07-24 RX ORDER — DULOXETIN HYDROCHLORIDE 30 MG/1
30 CAPSULE, DELAYED RELEASE ORAL 2 TIMES DAILY
Qty: 60 CAPSULE | Refills: 3 | Status: SHIPPED | OUTPATIENT
Start: 2023-07-24

## 2023-07-24 ASSESSMENT — ENCOUNTER SYMPTOMS
CHEST TIGHTNESS: 0
ALLERGIC/IMMUNOLOGIC NEGATIVE: 1
EYE REDNESS: 0
PHOTOPHOBIA: 0
BLOOD IN STOOL: 0
NAUSEA: 0
SHORTNESS OF BREATH: 0
TROUBLE SWALLOWING: 0
SINUS PAIN: 0
BACK PAIN: 1
EYE DISCHARGE: 0
COUGH: 0
ABDOMINAL PAIN: 0
EYE PAIN: 0
VOMITING: 0
SORE THROAT: 0
DIARRHEA: 0

## 2023-07-24 NOTE — PROGRESS NOTES
99%   Weight: 172 lb 3.2 oz (78.1 kg)   Height: 5' 6\" (1.676 m)       Physical Exam  Vitals and nursing note reviewed. Constitutional:       Appearance: She is well-developed. HENT:      Head: Atraumatic. Right Ear: External ear normal.      Left Ear: External ear normal.      Nose: Nose normal.      Mouth/Throat:      Pharynx: No oropharyngeal exudate. Eyes:      Conjunctiva/sclera: Conjunctivae normal.      Pupils: Pupils are equal, round, and reactive to light. Neck:      Thyroid: No thyromegaly. Trachea: No tracheal deviation. Cardiovascular:      Rate and Rhythm: Normal rate and regular rhythm. Heart sounds: No murmur heard. No friction rub. No gallop. Pulmonary:      Effort: Pulmonary effort is normal. No respiratory distress. Breath sounds: Normal breath sounds. Abdominal:      General: Bowel sounds are normal.      Palpations: Abdomen is soft. Musculoskeletal:         General: No tenderness or deformity. Normal range of motion. Cervical back: Normal range of motion and neck supple. Comments: Pain, stiffness, spasm, lumbar spine. There is to radiculopathy to the legs. Lymphadenopathy:      Cervical: No cervical adenopathy. Skin:     General: Skin is warm and dry. Capillary Refill: Capillary refill takes less than 2 seconds. Findings: No rash. Neurological:      Mental Status: She is alert and oriented to person, place, and time. Sensory: No sensory deficit. Motor: No abnormal muscle tone.       Coordination: Coordination normal.      Deep Tendon Reflexes: Reflexes normal.           Seen By:  [address], DO

## 2023-07-25 LAB
ALBUMIN SERPL-MCNC: 4.5 G/DL (ref 3.5–5.2)
ALP BLD-CCNC: 99 U/L (ref 35–104)
ALT SERPL-CCNC: 31 U/L (ref 0–32)
ANION GAP SERPL CALCULATED.3IONS-SCNC: 12 MMOL/L (ref 7–16)
AST SERPL-CCNC: 27 U/L (ref 0–31)
BILIRUB SERPL-MCNC: <0.2 MG/DL (ref 0–1.2)
BUN BLDV-MCNC: 9 MG/DL (ref 6–23)
CALCIUM SERPL-MCNC: 9 MG/DL (ref 8.6–10.2)
CHLORIDE BLD-SCNC: 97 MMOL/L (ref 98–107)
CO2: 25 MMOL/L (ref 22–29)
CREAT SERPL-MCNC: 0.9 MG/DL (ref 0.5–1)
GFR SERPL CREATININE-BSD FRML MDRD: >60 ML/MIN/1.73M2
GLUCOSE BLD-MCNC: 83 MG/DL (ref 74–99)
POTASSIUM SERPL-SCNC: 4.7 MMOL/L (ref 3.5–5)
SODIUM BLD-SCNC: 134 MMOL/L (ref 132–146)
TOTAL PROTEIN: 6.6 G/DL (ref 6.4–8.3)
VITAMIN D 25-HYDROXY: 42.9 NG/ML (ref 30–100)

## 2023-08-09 ENCOUNTER — OFFICE VISIT (OUTPATIENT)
Dept: FAMILY MEDICINE CLINIC | Age: 63
End: 2023-08-09
Payer: COMMERCIAL

## 2023-08-09 ENCOUNTER — OFFICE VISIT (OUTPATIENT)
Dept: NEUROLOGY | Age: 63
End: 2023-08-09
Payer: COMMERCIAL

## 2023-08-09 VITALS
WEIGHT: 179 LBS | DIASTOLIC BLOOD PRESSURE: 70 MMHG | HEIGHT: 66 IN | TEMPERATURE: 97.8 F | OXYGEN SATURATION: 98 % | SYSTOLIC BLOOD PRESSURE: 100 MMHG | RESPIRATION RATE: 16 BRPM | HEART RATE: 99 BPM | BODY MASS INDEX: 28.77 KG/M2

## 2023-08-09 VITALS
HEART RATE: 99 BPM | TEMPERATURE: 97.5 F | OXYGEN SATURATION: 98 % | SYSTOLIC BLOOD PRESSURE: 100 MMHG | DIASTOLIC BLOOD PRESSURE: 70 MMHG | RESPIRATION RATE: 17 BRPM | HEIGHT: 66 IN | BODY MASS INDEX: 28.77 KG/M2 | WEIGHT: 179 LBS

## 2023-08-09 VITALS
HEART RATE: 104 BPM | WEIGHT: 175 LBS | OXYGEN SATURATION: 98 % | SYSTOLIC BLOOD PRESSURE: 132 MMHG | BODY MASS INDEX: 28.25 KG/M2 | DIASTOLIC BLOOD PRESSURE: 78 MMHG | TEMPERATURE: 98.1 F

## 2023-08-09 DIAGNOSIS — R51.9 CHRONIC INTRACTABLE HEADACHE, UNSPECIFIED HEADACHE TYPE: ICD-10-CM

## 2023-08-09 DIAGNOSIS — K21.9 GASTROESOPHAGEAL REFLUX DISEASE WITHOUT ESOPHAGITIS: ICD-10-CM

## 2023-08-09 DIAGNOSIS — F33.41 RECURRENT MAJOR DEPRESSIVE DISORDER, IN PARTIAL REMISSION (HCC): ICD-10-CM

## 2023-08-09 DIAGNOSIS — M54.81 BILATERAL OCCIPITAL NEURALGIA: Primary | ICD-10-CM

## 2023-08-09 DIAGNOSIS — Z00.00 MEDICARE ANNUAL WELLNESS VISIT, SUBSEQUENT: Primary | ICD-10-CM

## 2023-08-09 DIAGNOSIS — M54.16 LUMBAR RADICULOPATHY: ICD-10-CM

## 2023-08-09 DIAGNOSIS — M62.838 TRAPEZIUS MUSCLE SPASM: ICD-10-CM

## 2023-08-09 DIAGNOSIS — L27.0 DRUG RASH: Primary | ICD-10-CM

## 2023-08-09 DIAGNOSIS — G89.29 CHRONIC INTRACTABLE HEADACHE, UNSPECIFIED HEADACHE TYPE: ICD-10-CM

## 2023-08-09 PROCEDURE — 99205 OFFICE O/P NEW HI 60 MIN: CPT | Performed by: CLINICAL NURSE SPECIALIST

## 2023-08-09 PROCEDURE — G0439 PPPS, SUBSEQ VISIT: HCPCS | Performed by: FAMILY MEDICINE

## 2023-08-09 PROCEDURE — 99213 OFFICE O/P EST LOW 20 MIN: CPT | Performed by: FAMILY MEDICINE

## 2023-08-09 RX ORDER — TIZANIDINE 2 MG/1
2 TABLET ORAL 2 TIMES DAILY PRN
Qty: 60 TABLET | Refills: 1 | Status: SHIPPED | OUTPATIENT
Start: 2023-08-09

## 2023-08-09 RX ORDER — FAMOTIDINE 20 MG/1
20 TABLET, FILM COATED ORAL 2 TIMES DAILY
Qty: 180 TABLET | Refills: 1 | Status: SHIPPED | OUTPATIENT
Start: 2023-08-09

## 2023-08-09 ASSESSMENT — ENCOUNTER SYMPTOMS
BLOOD IN STOOL: 0
ABDOMINAL PAIN: 0
EYE DISCHARGE: 0
COUGH: 0
TROUBLE SWALLOWING: 0
EYE REDNESS: 0
EYE PAIN: 0
PHOTOPHOBIA: 0
SORE THROAT: 0
BACK PAIN: 0
VOMITING: 0
CHEST TIGHTNESS: 0
SINUS PAIN: 0
DIARRHEA: 0
SHORTNESS OF BREATH: 0
COLOR CHANGE: 1
NAUSEA: 0
ALLERGIC/IMMUNOLOGIC NEGATIVE: 1

## 2023-08-09 ASSESSMENT — PATIENT HEALTH QUESTIONNAIRE - PHQ9
8. MOVING OR SPEAKING SO SLOWLY THAT OTHER PEOPLE COULD HAVE NOTICED. OR THE OPPOSITE, BEING SO FIGETY OR RESTLESS THAT YOU HAVE BEEN MOVING AROUND A LOT MORE THAN USUAL: 0
5. POOR APPETITE OR OVEREATING: 0
10. IF YOU CHECKED OFF ANY PROBLEMS, HOW DIFFICULT HAVE THESE PROBLEMS MADE IT FOR YOU TO DO YOUR WORK, TAKE CARE OF THINGS AT HOME, OR GET ALONG WITH OTHER PEOPLE: 1
1. LITTLE INTEREST OR PLEASURE IN DOING THINGS: 0
SUM OF ALL RESPONSES TO PHQ9 QUESTIONS 1 & 2: 0
SUM OF ALL RESPONSES TO PHQ QUESTIONS 1-9: 3
7. TROUBLE CONCENTRATING ON THINGS, SUCH AS READING THE NEWSPAPER OR WATCHING TELEVISION: 0
9. THOUGHTS THAT YOU WOULD BE BETTER OFF DEAD, OR OF HURTING YOURSELF: 0
6. FEELING BAD ABOUT YOURSELF - OR THAT YOU ARE A FAILURE OR HAVE LET YOURSELF OR YOUR FAMILY DOWN: 0
SUM OF ALL RESPONSES TO PHQ QUESTIONS 1-9: 3
4. FEELING TIRED OR HAVING LITTLE ENERGY: 3
2. FEELING DOWN, DEPRESSED OR HOPELESS: 0
SUM OF ALL RESPONSES TO PHQ QUESTIONS 1-9: 3
3. TROUBLE FALLING OR STAYING ASLEEP: 0
SUM OF ALL RESPONSES TO PHQ QUESTIONS 1-9: 3

## 2023-08-09 ASSESSMENT — LIFESTYLE VARIABLES
HOW OFTEN DO YOU HAVE A DRINK CONTAINING ALCOHOL: MONTHLY OR LESS
HOW MANY STANDARD DRINKS CONTAINING ALCOHOL DO YOU HAVE ON A TYPICAL DAY: 1 OR 2

## 2023-08-09 NOTE — PROGRESS NOTES
23  Janette Ohm : 1960 Sex: female  Age: 59 y.o. Assessment and Plan:  María Elena Teague was seen today for abnormal test results. Diagnoses and all orders for this visit:    Drug rash    Gastroesophageal reflux disease without esophagitis  -     famotidine (PEPCID) 20 MG tablet; Take 1 tablet by mouth 2 times daily    Lumbar radiculopathy    Recurrent major depressive disorder, in partial remission ([address])    She will continue the cream until seen by dermatology in 4 weeks. She has an appointment with a pain doctor in 4 weeks as well for an additional injection of her back. She will continue with her medications as ordered until then. Will also have her start walking Dr Buddie Crigler 10 minutes a day and gradually working up to half an hour if able. Follow-up in 4 weeks. Return in about 4 weeks (around 2023). Chief Complaint   Patient presents with    Abnormal Test Results     rash       Patient was seen by dermatology and biopsy was done. It appeared to be drug reaction. Fortunately, she is needs all her medications at this time. Dermatology placed her on triamcinolone cream with an excellent response to this point. He has been on the medication for about 2 weeks and rash is starting to fade. Review of Systems   Constitutional:  Negative for appetite change, fatigue and unexpected weight change. HENT:  Negative for congestion, ear pain, hearing loss, sinus pain, sore throat and trouble swallowing. Eyes:  Negative for photophobia, pain, discharge and redness. Respiratory:  Negative for cough, chest tightness and shortness of breath. Cardiovascular:  Negative for chest pain, palpitations and leg swelling. Gastrointestinal:  Negative for abdominal pain, blood in stool, diarrhea, nausea and vomiting. Endocrine: Negative. Genitourinary:  Negative for dysuria, flank pain, frequency and hematuria.    Musculoskeletal:  Negative for arthralgias, back pain, joint

## 2023-08-17 ENCOUNTER — TELEPHONE (OUTPATIENT)
Dept: PAIN MANAGEMENT | Age: 63
End: 2023-08-17

## 2023-08-17 NOTE — TELEPHONE ENCOUNTER
Call to sreedhar Del Rio message that procedure was approved for 8/22/2023 and that Vicky Gtz should call her a few days before for the pre op call and between 2:00 PM and 4:00 PM  the business day before with the arrival time. Instructed Ken Lott to hold ibuprofen for 24 hours, naprosyn for 4 days and any aspirin containing products or fish oil for 7 days. Instructed to call office back if any questions.      Electronically signed by Tesfaye Campbell RN on 8/17/2023 at 11:13 AM

## 2023-08-18 NOTE — PROGRESS NOTES
[address] [facility] PAIN MANAGEMENT  INSTRUCTIONS  . .......................................................................................................................................... [x] Parking the day of Surgery is located in the [facility].   Upon entering the door, make immediate right into the surgery reception room    [x]  Bring photo ID and insurance card     [x] You may have a light breakfast day of procedure    [x]  Wear loose comfortable clothing    [x]  Please follow instructions for medications as given per Dr's office    [x] You can expect a call the business day prior to procedure to notify you of your arrival time     [x] Please arrange for     []  Other instructions

## 2023-08-22 ENCOUNTER — PREP FOR PROCEDURE (OUTPATIENT)
Dept: PAIN MANAGEMENT | Age: 63
End: 2023-08-22

## 2023-08-22 ENCOUNTER — HOSPITAL ENCOUNTER (OUTPATIENT)
Age: 63
Setting detail: OUTPATIENT SURGERY
Discharge: HOME | End: 2023-08-22
Attending: STUDENT IN AN ORGANIZED HEALTH CARE EDUCATION/TRAINING PROGRAM | Admitting: STUDENT IN AN ORGANIZED HEALTH CARE EDUCATION/TRAINING PROGRAM
Payer: COMMERCIAL

## 2023-08-22 ENCOUNTER — HOSPITAL ENCOUNTER (OUTPATIENT)
Dept: GENERAL RADIOLOGY | Age: 63
Setting detail: OUTPATIENT SURGERY
Discharge: HOME | End: 2023-08-24
Attending: STUDENT IN AN ORGANIZED HEALTH CARE EDUCATION/TRAINING PROGRAM
Payer: COMMERCIAL

## 2023-08-22 VITALS
WEIGHT: 176 LBS | BODY MASS INDEX: 28.28 KG/M2 | DIASTOLIC BLOOD PRESSURE: 70 MMHG | HEIGHT: 66 IN | TEMPERATURE: 97.7 F | SYSTOLIC BLOOD PRESSURE: 132 MMHG | HEART RATE: 76 BPM | RESPIRATION RATE: 18 BRPM | OXYGEN SATURATION: 97 %

## 2023-08-22 DIAGNOSIS — R52 PAIN MANAGEMENT: ICD-10-CM

## 2023-08-22 PROCEDURE — 3600000002 HC SURGERY LEVEL 2 BASE: Performed by: STUDENT IN AN ORGANIZED HEALTH CARE EDUCATION/TRAINING PROGRAM

## 2023-08-22 PROCEDURE — 2500000003 HC RX 250 WO HCPCS: Performed by: STUDENT IN AN ORGANIZED HEALTH CARE EDUCATION/TRAINING PROGRAM

## 2023-08-22 PROCEDURE — 7100000010 HC PHASE II RECOVERY - FIRST 15 MIN: Performed by: STUDENT IN AN ORGANIZED HEALTH CARE EDUCATION/TRAINING PROGRAM

## 2023-08-22 PROCEDURE — 62323 NJX INTERLAMINAR LMBR/SAC: CPT | Performed by: STUDENT IN AN ORGANIZED HEALTH CARE EDUCATION/TRAINING PROGRAM

## 2023-08-22 PROCEDURE — 2709999900 HC NON-CHARGEABLE SUPPLY: Performed by: STUDENT IN AN ORGANIZED HEALTH CARE EDUCATION/TRAINING PROGRAM

## 2023-08-22 PROCEDURE — 3600000012 HC SURGERY LEVEL 2 ADDTL 15MIN: Performed by: STUDENT IN AN ORGANIZED HEALTH CARE EDUCATION/TRAINING PROGRAM

## 2023-08-22 PROCEDURE — 7100000011 HC PHASE II RECOVERY - ADDTL 15 MIN: Performed by: STUDENT IN AN ORGANIZED HEALTH CARE EDUCATION/TRAINING PROGRAM

## 2023-08-22 RX ORDER — SODIUM CHLORIDE 9 MG/ML
INJECTION, SOLUTION INTRAVENOUS PRN
Status: DISCONTINUED | OUTPATIENT
Start: 2023-08-22 | End: 2023-08-22 | Stop reason: HOSPADM

## 2023-08-22 RX ORDER — LIDOCAINE HYDROCHLORIDE 5 MG/ML
INJECTION, SOLUTION INFILTRATION; INTRAVENOUS PRN
Status: DISCONTINUED | OUTPATIENT
Start: 2023-08-22 | End: 2023-08-22 | Stop reason: ALTCHOICE

## 2023-08-22 RX ORDER — SODIUM CHLORIDE 0.9 % (FLUSH) 0.9 %
5-40 SYRINGE (ML) INJECTION EVERY 12 HOURS SCHEDULED
Status: DISCONTINUED | OUTPATIENT
Start: 2023-08-22 | End: 2023-08-22 | Stop reason: HOSPADM

## 2023-08-22 RX ORDER — SODIUM CHLORIDE 9 MG/ML
INJECTION, SOLUTION INTRAVENOUS PRN
Status: CANCELLED | OUTPATIENT
Start: 2023-08-22

## 2023-08-22 RX ORDER — METHYLPREDNISOLONE ACETATE 40 MG/ML
INJECTION, SUSPENSION INTRA-ARTICULAR; INTRALESIONAL; INTRAMUSCULAR; SOFT TISSUE PRN
Status: DISCONTINUED | OUTPATIENT
Start: 2023-08-22 | End: 2023-08-22 | Stop reason: ALTCHOICE

## 2023-08-22 RX ORDER — SODIUM CHLORIDE 0.9 % (FLUSH) 0.9 %
5-40 SYRINGE (ML) INJECTION PRN
Status: CANCELLED | OUTPATIENT
Start: 2023-08-22

## 2023-08-22 RX ORDER — SODIUM CHLORIDE 0.9 % (FLUSH) 0.9 %
5-40 SYRINGE (ML) INJECTION PRN
Status: DISCONTINUED | OUTPATIENT
Start: 2023-08-22 | End: 2023-08-22 | Stop reason: HOSPADM

## 2023-08-22 RX ORDER — SODIUM CHLORIDE 0.9 % (FLUSH) 0.9 %
5-40 SYRINGE (ML) INJECTION EVERY 12 HOURS SCHEDULED
Status: CANCELLED | OUTPATIENT
Start: 2023-08-22

## 2023-08-22 ASSESSMENT — PAIN DESCRIPTION - DESCRIPTORS: DESCRIPTORS: DISCOMFORT

## 2023-08-22 ASSESSMENT — PAIN - FUNCTIONAL ASSESSMENT: PAIN_FUNCTIONAL_ASSESSMENT: 0-10

## 2023-08-22 ASSESSMENT — PAIN DESCRIPTION - LOCATION: LOCATION: BACK

## 2023-08-22 ASSESSMENT — PAIN DESCRIPTION - ORIENTATION: ORIENTATION: LOWER

## 2023-08-22 ASSESSMENT — PAIN SCALES - GENERAL: PAINLEVEL_OUTOF10: 5

## 2023-08-22 NOTE — PROGRESS NOTES
PATIENTS PROCEDURE ABORTED, PT VAGELED AND DR TEIXEIRA EXPLAINED TO PATIENT AND FAMILY THAT HE WILL RESCHEDULE HER FOR A LATER DATE.

## 2023-08-22 NOTE — H&P
[address] [facility]  Pain Medicine  [address] [facility], [address] [facility]    Procedure History & Physical      Luannepretty Hardin     HPI:    Patient  is here for LBP, LLE Pain for L2/3 CHESTER  Labs/imaging studies reviewed   All question and concerns addressed including R/B/A associated with the procedure    Past Medical History:   Diagnosis Date    Anxiety     Depression     Hyperlipidemia        Past Surgical History:   Procedure Laterality Date    CARPAL TUNNEL RELEASE Bilateral     CERVICAL FUSION       SECTION      x 3    CT BIOPSY PERCUTANEOUS DEEP BONE  3/17/2023    CT BIOPSY PERCUTANEOUS DEEP BONE 3/17/2023 Kristian Croft MD [facility]    DENTAL SURGERY N/A 2019    DENTAL EXAM FULL MOUTH EXTRACTIONS ALEOPOLASTY performed by Rita Ramsay DDS at [address] [place] [address] 2023    LUMBAR EPIDURAL STEROID INJECTION UNDER FLUOROSCOPIC GUIDANCE AT L4-L5 LEFT PARAMEDIAN performed by Kelsi Johns MD at [address] [place] [address]       Prior to Admission medications    Medication Sig Start Date End Date Taking? Authorizing Provider   tiZANidine (ZANAFLEX) 2 MG tablet Take 1 tablet by mouth 2 times daily as needed (spasms) 23   ASHLEY Berry - CNS   famotidine (PEPCID) 20 MG tablet Take 1 tablet by mouth 2 times daily 23   [place] FAN Art DO   DULoxetine (CYMBALTA) 30 MG extended release capsule Take 1 capsule by mouth 2 times daily 23   Colten Art DO   gabapentin (NEURONTIN) 300 MG capsule Take 2 capsules by mouth 3 times daily for 60 days.  Intended supply: 30 days 23  Kelsi Johns MD   atorvastatin (LIPITOR) 20 MG tablet TAKE 1 TABLET BY MOUTH DAILY 23   Colten Art DO   diclofenac sodium (VOLTAREN) 1 % GEL Apply 2 g topically 4 times daily 3/24/23   Kelsi Johns MD   hydrOXYzine pamoate (VISTARIL) 50 MG capsule Take 1 capsule by mouth 3 times daily as needed for Itching    Historical Provider, MD

## 2023-08-22 NOTE — OP NOTE
low as high 70s/50s. The decision was made to abort the procedure. The needle was removed intact . A Band-Aid was placed over the needle insertion point. The patient was then turned supine and in Trendelenburg position, where she instantaneously felt better. Her BP recovered to normal 110s/70s and the nausea resolved. Disposition the patient tolerated the procedure well and there were no complications . Vital signs remained normal after the brief hypotension. The patient was escorted to the recovery area where they remained until discharge and written discharge instructions for the procedure were given. Plan: Hayley Khanna will be rescheduled for L2/3 CHESTER with MAC anesthesia (with IV pre hydration) given this possible vasovagal reaction.      Leon Fulton MD

## 2023-09-22 ENCOUNTER — OFFICE VISIT (OUTPATIENT)
Dept: PAIN MANAGEMENT | Age: 63
End: 2023-09-22

## 2023-09-22 VITALS
OXYGEN SATURATION: 97 % | RESPIRATION RATE: 16 BRPM | SYSTOLIC BLOOD PRESSURE: 134 MMHG | HEART RATE: 84 BPM | TEMPERATURE: 97.2 F | WEIGHT: 176 LBS | HEIGHT: 66 IN | BODY MASS INDEX: 28.28 KG/M2 | DIASTOLIC BLOOD PRESSURE: 82 MMHG

## 2023-09-22 DIAGNOSIS — M54.41 CHRONIC BILATERAL LOW BACK PAIN WITH BILATERAL SCIATICA: ICD-10-CM

## 2023-09-22 DIAGNOSIS — G89.4 CHRONIC PAIN SYNDROME: ICD-10-CM

## 2023-09-22 DIAGNOSIS — M48.062 SPINAL STENOSIS OF LUMBAR REGION WITH NEUROGENIC CLAUDICATION: Primary | ICD-10-CM

## 2023-09-22 DIAGNOSIS — M54.42 CHRONIC BILATERAL LOW BACK PAIN WITH BILATERAL SCIATICA: ICD-10-CM

## 2023-09-22 DIAGNOSIS — M47.817 LUMBOSACRAL SPONDYLOSIS WITHOUT MYELOPATHY: ICD-10-CM

## 2023-09-22 DIAGNOSIS — G89.29 CHRONIC BILATERAL LOW BACK PAIN WITH BILATERAL SCIATICA: ICD-10-CM

## 2023-09-22 RX ORDER — GABAPENTIN 300 MG/1
600 CAPSULE ORAL 3 TIMES DAILY
Qty: 180 CAPSULE | Refills: 1 | Status: SHIPPED | OUTPATIENT
Start: 2023-09-22 | End: 2023-11-21

## 2023-10-05 ENCOUNTER — TELEPHONE (OUTPATIENT)
Dept: PAIN MANAGEMENT | Age: 63
End: 2023-10-05

## 2023-10-05 DIAGNOSIS — M54.16 LUMBAR RADICULOPATHY: Primary | ICD-10-CM

## 2023-10-05 NOTE — TELEPHONE ENCOUNTER
Call to Saji Paulino and message left that procedure was approved for 10/10/2023 and that Vicky Gtz should call her a few days before for the pre op call and between 2:00 PM and 4:00 PM  the business day before with the arrival time. Instructed Aliyah Gar to hold ibuprofen for 24 hours, naprosyn for 4 days and any aspirin containing products or fish oil for 7 days. Instructed to call office back. Advised that if they do not return the call it may result in their procedure being delayed.     Electronically signed by Mario Bro RN on 10/5/2023 at 2:26 PM

## 2023-10-09 ENCOUNTER — TELEPHONE (OUTPATIENT)
Dept: ADMINISTRATIVE | Age: 63
End: 2023-10-09

## 2023-10-09 NOTE — TELEPHONE ENCOUNTER
Pt had to cancel 10-12-23 appt because she is getting spinal block on same day. Pt needs appt rescheduled, and pt also needs rx refills. Please call pt back. Thanks!

## 2023-10-09 NOTE — PROGRESS NOTES
[facility] PRE-ADMISSION TESTING INSTRUCTIONS    The Preadmission Testing patient is instructed accordingly using the following criteria (check applicable):    ARRIVAL INSTRUCTIONS:  [x] Parking the day of Surgery is located in the Main Entrance lot. Upon entering the door, make an immediate right to the surgery reception desk    [x] Bring photo ID and insurance card    [] Bring in a copy of Living will or Durable Power of  papers. [x] Please be sure to arrange for responsible adult to provide transportation to and from the hospital    [x] Please arrange for responsible adult to be with you for the 24 hour period post procedure due to having anesthesia    [x] If you awake am of surgery not feeling well or have temperature >100 please call [phone]    GENERAL INSTRUCTIONS:    [x] Nothing by mouth after midnight, including gum, candy, mints or water    [x] You may brush your teeth, but do not swallow any water    [x] Take medications as instructed with 1-2 oz of water    [] Stop herbal supplements and vitamins 5 days prior to procedure    [x] Follow preop dosing of blood thinners per physician instructions    [] Take 1/2 dose of evening insulin, but no insulin after midnight    [] No oral diabetic medications after midnight    [] If diabetic and have low blood sugar or feel symptomatic, take 1-2oz apple juice only    [] Bring inhalers day of surgery    [] Bring C-PAP/ Bi-Pap day of surgery    [] Bring urine specimen day of surgery    [x] Shower or bath with soap, lather and rinse well, AM of Surgery, no lotion, powders or creams to surgical site    [] Follow bowel prep as instructed per surgeon    [x] No tobacco products within 24 hours of surgery     [x] No alcohol or illegal drug use within 24 hours of surgery.     [x] Jewelry, body piercing's, eyeglasses, contact lenses and dentures are not permitted into surgery (bring cases)      [x] Please do not wear any nail polish,

## 2023-10-10 ENCOUNTER — ANESTHESIA EVENT (OUTPATIENT)
Dept: OPERATING ROOM | Age: 63
End: 2023-10-10
Payer: COMMERCIAL

## 2023-10-10 ENCOUNTER — HOSPITAL ENCOUNTER (OUTPATIENT)
Age: 63
Setting detail: OUTPATIENT SURGERY
Discharge: HOME | End: 2023-10-10
Attending: STUDENT IN AN ORGANIZED HEALTH CARE EDUCATION/TRAINING PROGRAM | Admitting: STUDENT IN AN ORGANIZED HEALTH CARE EDUCATION/TRAINING PROGRAM
Payer: COMMERCIAL

## 2023-10-10 ENCOUNTER — HOSPITAL ENCOUNTER (OUTPATIENT)
Dept: GENERAL RADIOLOGY | Age: 63
Setting detail: OUTPATIENT SURGERY
Discharge: HOME | End: 2023-10-12
Attending: STUDENT IN AN ORGANIZED HEALTH CARE EDUCATION/TRAINING PROGRAM
Payer: COMMERCIAL

## 2023-10-10 ENCOUNTER — ANESTHESIA (OUTPATIENT)
Dept: OPERATING ROOM | Age: 63
End: 2023-10-10
Payer: COMMERCIAL

## 2023-10-10 VITALS
SYSTOLIC BLOOD PRESSURE: 140 MMHG | WEIGHT: 190 LBS | BODY MASS INDEX: 30.53 KG/M2 | HEIGHT: 66 IN | DIASTOLIC BLOOD PRESSURE: 83 MMHG | OXYGEN SATURATION: 97 % | HEART RATE: 101 BPM | TEMPERATURE: 98 F | RESPIRATION RATE: 16 BRPM

## 2023-10-10 DIAGNOSIS — R52 PAIN MANAGEMENT: ICD-10-CM

## 2023-10-10 PROCEDURE — 6360000002 HC RX W HCPCS: Performed by: NURSE ANESTHETIST, CERTIFIED REGISTERED

## 2023-10-10 PROCEDURE — 3600000002 HC SURGERY LEVEL 2 BASE: Performed by: STUDENT IN AN ORGANIZED HEALTH CARE EDUCATION/TRAINING PROGRAM

## 2023-10-10 PROCEDURE — 3700000000 HC ANESTHESIA ATTENDED CARE: Performed by: STUDENT IN AN ORGANIZED HEALTH CARE EDUCATION/TRAINING PROGRAM

## 2023-10-10 PROCEDURE — 6360000002 HC RX W HCPCS: Performed by: STUDENT IN AN ORGANIZED HEALTH CARE EDUCATION/TRAINING PROGRAM

## 2023-10-10 PROCEDURE — 2500000003 HC RX 250 WO HCPCS: Performed by: NURSE ANESTHETIST, CERTIFIED REGISTERED

## 2023-10-10 PROCEDURE — 6360000004 HC RX CONTRAST MEDICATION: Performed by: STUDENT IN AN ORGANIZED HEALTH CARE EDUCATION/TRAINING PROGRAM

## 2023-10-10 PROCEDURE — 2580000003 HC RX 258: Performed by: STUDENT IN AN ORGANIZED HEALTH CARE EDUCATION/TRAINING PROGRAM

## 2023-10-10 PROCEDURE — 2709999900 HC NON-CHARGEABLE SUPPLY: Performed by: STUDENT IN AN ORGANIZED HEALTH CARE EDUCATION/TRAINING PROGRAM

## 2023-10-10 PROCEDURE — 7100000011 HC PHASE II RECOVERY - ADDTL 15 MIN: Performed by: STUDENT IN AN ORGANIZED HEALTH CARE EDUCATION/TRAINING PROGRAM

## 2023-10-10 PROCEDURE — 7100000010 HC PHASE II RECOVERY - FIRST 15 MIN: Performed by: STUDENT IN AN ORGANIZED HEALTH CARE EDUCATION/TRAINING PROGRAM

## 2023-10-10 PROCEDURE — 2500000003 HC RX 250 WO HCPCS: Performed by: STUDENT IN AN ORGANIZED HEALTH CARE EDUCATION/TRAINING PROGRAM

## 2023-10-10 RX ORDER — TIZANIDINE 2 MG/1
TABLET ORAL
Qty: 60 TABLET | Refills: 1 | OUTPATIENT
Start: 2023-10-10

## 2023-10-10 RX ORDER — GLYCOPYRROLATE 0.2 MG/ML
INJECTION INTRAMUSCULAR; INTRAVENOUS PRN
Status: DISCONTINUED | OUTPATIENT
Start: 2023-10-10 | End: 2023-10-10 | Stop reason: SDUPTHER

## 2023-10-10 RX ORDER — METHYLPREDNISOLONE ACETATE 40 MG/ML
INJECTION, SUSPENSION INTRA-ARTICULAR; INTRALESIONAL; INTRAMUSCULAR; SOFT TISSUE PRN
Status: DISCONTINUED | OUTPATIENT
Start: 2023-10-10 | End: 2023-10-10 | Stop reason: ALTCHOICE

## 2023-10-10 RX ORDER — SODIUM CHLORIDE 0.9 % (FLUSH) 0.9 %
5-40 SYRINGE (ML) INJECTION PRN
Status: DISCONTINUED | OUTPATIENT
Start: 2023-10-10 | End: 2023-10-10 | Stop reason: HOSPADM

## 2023-10-10 RX ORDER — SODIUM CHLORIDE 9 MG/ML
INJECTION, SOLUTION INTRAVENOUS PRN
Status: DISCONTINUED | OUTPATIENT
Start: 2023-10-10 | End: 2023-10-10 | Stop reason: HOSPADM

## 2023-10-10 RX ORDER — IOPAMIDOL 612 MG/ML
INJECTION, SOLUTION INTRATHECAL PRN
Status: DISCONTINUED | OUTPATIENT
Start: 2023-10-10 | End: 2023-10-10 | Stop reason: ALTCHOICE

## 2023-10-10 RX ORDER — FENTANYL CITRATE 50 UG/ML
INJECTION, SOLUTION INTRAMUSCULAR; INTRAVENOUS PRN
Status: DISCONTINUED | OUTPATIENT
Start: 2023-10-10 | End: 2023-10-10 | Stop reason: SDUPTHER

## 2023-10-10 RX ORDER — SODIUM CHLORIDE 0.9 % (FLUSH) 0.9 %
5-40 SYRINGE (ML) INJECTION EVERY 12 HOURS SCHEDULED
Status: DISCONTINUED | OUTPATIENT
Start: 2023-10-10 | End: 2023-10-10 | Stop reason: HOSPADM

## 2023-10-10 RX ORDER — MIDAZOLAM HYDROCHLORIDE 1 MG/ML
INJECTION INTRAMUSCULAR; INTRAVENOUS PRN
Status: DISCONTINUED | OUTPATIENT
Start: 2023-10-10 | End: 2023-10-10 | Stop reason: SDUPTHER

## 2023-10-10 RX ORDER — LIDOCAINE HYDROCHLORIDE 5 MG/ML
INJECTION, SOLUTION INFILTRATION; INTRAVENOUS PRN
Status: DISCONTINUED | OUTPATIENT
Start: 2023-10-10 | End: 2023-10-10 | Stop reason: ALTCHOICE

## 2023-10-10 RX ORDER — TRIAMCINOLONE ACETONIDE 40 MG/ML
INJECTION, SUSPENSION INTRA-ARTICULAR; INTRAMUSCULAR PRN
Status: DISCONTINUED | OUTPATIENT
Start: 2023-10-10 | End: 2023-10-10 | Stop reason: ALTCHOICE

## 2023-10-10 RX ADMIN — GLYCOPYRROLATE 0.2 MG: 0.2 INJECTION, SOLUTION INTRAMUSCULAR; INTRAVENOUS at 09:13

## 2023-10-10 RX ADMIN — FENTANYL CITRATE 25 MCG: 50 INJECTION, SOLUTION INTRAMUSCULAR; INTRAVENOUS at 09:12

## 2023-10-10 RX ADMIN — MIDAZOLAM 1 MG: 1 INJECTION INTRAMUSCULAR; INTRAVENOUS at 09:09

## 2023-10-10 RX ADMIN — FENTANYL CITRATE 25 MCG: 50 INJECTION, SOLUTION INTRAMUSCULAR; INTRAVENOUS at 09:13

## 2023-10-10 RX ADMIN — MIDAZOLAM 1 MG: 1 INJECTION INTRAMUSCULAR; INTRAVENOUS at 09:13

## 2023-10-10 RX ADMIN — SODIUM CHLORIDE: 9 INJECTION, SOLUTION INTRAVENOUS at 09:07

## 2023-10-10 RX ADMIN — FENTANYL CITRATE 50 MCG: 50 INJECTION, SOLUTION INTRAMUSCULAR; INTRAVENOUS at 09:10

## 2023-10-10 ASSESSMENT — PAIN SCALES - GENERAL
PAINLEVEL_OUTOF10: 0
PAINLEVEL_OUTOF10: 7
PAINLEVEL_OUTOF10: 0
PAINLEVEL_OUTOF10: 0

## 2023-10-10 ASSESSMENT — PAIN DESCRIPTION - LOCATION: LOCATION: BACK

## 2023-10-10 ASSESSMENT — PAIN DESCRIPTION - FREQUENCY: FREQUENCY: CONTINUOUS

## 2023-10-10 ASSESSMENT — PAIN DESCRIPTION - DESCRIPTORS: DESCRIPTORS: DISCOMFORT;HEAVINESS

## 2023-10-10 ASSESSMENT — LIFESTYLE VARIABLES: SMOKING_STATUS: 1

## 2023-10-10 ASSESSMENT — PAIN DESCRIPTION - PAIN TYPE: TYPE: CHRONIC PAIN

## 2023-10-10 ASSESSMENT — PAIN DESCRIPTION - ORIENTATION: ORIENTATION: LEFT

## 2023-10-10 NOTE — H&P
612 [facility]  Pain Medicine  [address] [facility], [address] [facility]    Procedure History & Physical      Laurentn Mail     HPI:    Patient  is here for LBP, LLE Pain for L2/3 CHESTER  Labs/imaging studies reviewed   All question and concerns addressed including R/B/A associated with the procedure    Past Medical History:   Diagnosis Date    Anxiety     Depression     GERD (gastroesophageal reflux disease)     Hyperlipidemia     Lumbar radiculopathy        Past Surgical History:   Procedure Laterality Date    CARPAL TUNNEL RELEASE Bilateral     CERVICAL FUSION       SECTION      x 3    CT BIOPSY PERCUTANEOUS DEEP BONE  3/17/2023    CT BIOPSY PERCUTANEOUS DEEP BONE 3/17/2023 Kristian More MD [facility]    DENTAL SURGERY N/A 2019    DENTAL EXAM FULL MOUTH EXTRACTIONS ALEOPOLASTY performed by Marleni Allison DDS at [address] [place] [address] Left 2023    LUMBAR EPIDURAL STEROID INJECTION UNDER FLUOROSCOPIC GUIDANCE AT L4-L5 LEFT PARAMEDIAN performed by Mellisa Morris MD at [address] [place] [address] Left 2023    LUMBAR EPIDURAL STEROID INJECTION UNDER FLUOROSCOPIC GUIDANCE AT L2-L3 LEFT  PARAMEDIAN (KENALOG)- ABORTED DUE TO HYPOTENSION performed by Mellisa Morris MD at [address] [place] [address]       Prior to Admission medications    Medication Sig Start Date End Date Taking? Authorizing Provider   gabapentin (NEURONTIN) 300 MG capsule Take 2 capsules by mouth 3 times daily for 60 days.  Intended supply: 30 days 23  Mellisa Morris MD   tiZANidine (ZANAFLEX) 2 MG tablet Take 1 tablet by mouth 2 times daily as needed (spasms) 23   Anahy Salas, APRN - CNS   famotidine (PEPCID) 20 MG tablet Take 1 tablet by mouth 2 times daily 23   Colten Art, DO   DULoxetine (CYMBALTA) 30 MG extended release capsule Take 1 capsule by mouth 2 times daily 23   Colten Art, DO   atorvastatin (LIPITOR) 20 MG tablet TAKE 1

## 2023-10-10 NOTE — ANESTHESIA POSTPROCEDURE EVALUATION
Department of Anesthesiology  Postprocedure Note    Patient: Thuy Lott  MRN: [id number]  YOB: 1960  Date of evaluation: 10/10/2023      Procedure Summary     Date: 10/10/23 Room / Location: [facility] PROCEDURE ROOM 02 / [facility]    Anesthesia Start: 8878 Anesthesia Stop: 3853    Procedure: LUMBAR EPIDURAL STEROID INJECTION UNDER FLUOROSCOPIC GUIDANCE AT L2-L3 LEFT  PARAMEDIAN        (KENALOG) (Left: Back) Diagnosis:       Lumbar radiculopathy      (Lumbar radiculopathy [M54.16])    Surgeons: Sommer Manuel MD Responsible Provider: Denisa Rausch MD    Anesthesia Type: MAC ASA Status: 2          Anesthesia Type: No value filed.     Kinjal Phase I:      Kinjal Phase II:        Anesthesia Post Evaluation    Patient location during evaluation: bedside  Patient participation: complete - patient participated  Level of consciousness: awake and alert  Airway patency: patent  Nausea & Vomiting: no nausea and no vomiting  Complications: no  Cardiovascular status: blood pressure returned to baseline  Respiratory status: acceptable  Hydration status: euvolemic

## 2023-10-10 NOTE — DISCHARGE INSTRUCTIONS
Leon Vela Block/Radiofrequency  Home Going Instructions    1-Go home, rest for the remainder of the day  2-Please do not lift over 20 pounds the day of the injection  3-If you received sedation No: alcohol, driving, operating lawn mowers, plows, tractors or other dangerous equipment until next morning. Do not make important decisions or sign legal documents for 24 hours. You may experience light headedness, dizziness, nausea or sleepiness after sedation. Do not stay alone. A responsible adult must be with you for 24 hours. You could be nauseated from the medications you have received. Your IV site may be sore and bruised. 4-No dietary restrictions     5-Resume all medications the same day, blood thinners to be resumed 24 hours after injection if you were instructed to stop any. 6-Keep the surgical site clean and dry, you may shower the next morning and remove the      dressing. 7- No sitz baths, tub baths or hot tubs/swimming for 24 hours. 8- If you have any pain at the injection site(s), application of an ice pack to the area should be       helpful, 20 minutes on/20 minutes off for next 48 hours. 9- Call [facility] immediately at if you develop.   Fever greater than 100.4 F  Have bleeding or drainage from the puncture site  Have progressive Leg/arm numbness and or weakness  Loss of control of bowel and or bladder (wet/soil yourself)  Severe headache with inability to lift head  10-You may return to work the next day

## 2023-10-10 NOTE — OP NOTE
10/10/2023    Patient: Santy Dean  :  1960  Age:  59 y.o. Sex:  female     PRE-OPERATIVE DIAGNOSIS: Lumbar disc displacement, lumbar radiculopathy. POST-OPERATIVE DIAGNOSIS: Same. PROCEDURE: Fluoroscopic guided therapeutic lumbar epidural steroid injection at the L2/3 level (# 2). SURGEON:  Radha Blair MD    ANESTHESIA: Monitored Anesthesia Care    ESTIMATED BLOOD LOSS: None.  ______________________________________________________________________    BRIEF HISTORY:  Santy Dean comes in today for second lumbar epidural injection at L2/3 level. The potential complications of this procedure were discussed with her again today. She has elected to undergo the aforementioned procedure. oRman complete History & Physical examination were reviewed in depth, a copy of which is in the chart. DESCRIPTION OF PROCEDURE:    After confirming written and informed consent, a time-out was performed and Roman name and date of birth, the procedure to be performed as well as the plan for the location of the needle insertion were confirmed. The patient was brought into the procedure room and placed in the prone position on the fluoroscopy table. A pillow was placed under the patient's lower abdomen/upper pelvis to increase lumbar interlaminar space. Standard monitors were placed, and vital signs were observed throughout the procedure. The area of the lumbar spine was prepped with chloraprep and draped in a sterile manner. The L2/3 interspace was identified and marked under AP fluoroscopy. The skin and subcutaneous tissues at the above level were anesthestized with 0.5% lidocaine. With intermittent fluoroscopy, an # 18 gauge 3 1/2 tuohy epidural needle was inserted and directed toward the interlaminar space.  The needle was slowly advanced using loss of resistance technique and 5 cc glass syringe  until the tip of the epidural needle has passed through the ligamentum flavum

## 2023-10-11 RX ORDER — TIZANIDINE 2 MG/1
2 TABLET ORAL 2 TIMES DAILY PRN
Qty: 60 TABLET | Refills: 1 | Status: SHIPPED | OUTPATIENT
Start: 2023-10-11

## 2023-10-14 ENCOUNTER — APPOINTMENT (OUTPATIENT)
Dept: GENERAL RADIOLOGY | Age: 63
End: 2023-10-14
Payer: COMMERCIAL

## 2023-10-14 ENCOUNTER — APPOINTMENT (OUTPATIENT)
Dept: CT IMAGING | Age: 63
End: 2023-10-14
Payer: COMMERCIAL

## 2023-10-14 ENCOUNTER — HOSPITAL ENCOUNTER (EMERGENCY)
Age: 63
Discharge: HOME | End: 2023-10-14
Payer: COMMERCIAL

## 2023-10-14 VITALS
DIASTOLIC BLOOD PRESSURE: 77 MMHG | HEART RATE: 88 BPM | OXYGEN SATURATION: 99 % | RESPIRATION RATE: 16 BRPM | TEMPERATURE: 97.9 F | SYSTOLIC BLOOD PRESSURE: 136 MMHG

## 2023-10-14 DIAGNOSIS — S01.81XA FACIAL LACERATION, INITIAL ENCOUNTER: ICD-10-CM

## 2023-10-14 DIAGNOSIS — S63.501A SPRAIN OF RIGHT WRIST, INITIAL ENCOUNTER: ICD-10-CM

## 2023-10-14 DIAGNOSIS — S63.502A SPRAIN OF LEFT WRIST, INITIAL ENCOUNTER: ICD-10-CM

## 2023-10-14 DIAGNOSIS — S09.90XA CLOSED HEAD INJURY, INITIAL ENCOUNTER: Primary | ICD-10-CM
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
## 2023-10-14 PROCEDURE — 73110 X-RAY EXAM OF WRIST: CPT

## 2023-10-14 PROCEDURE — 72125 CT NECK SPINE W/O DYE: CPT

## 2023-10-14 PROCEDURE — 6360000002 HC RX W HCPCS: Performed by: NURSE PRACTITIONER

## 2023-10-14 PROCEDURE — 70450 CT HEAD/BRAIN W/O DYE: CPT

## 2023-10-14 PROCEDURE — 90714 TD VACC NO PRESV 7 YRS+ IM: CPT | Performed by: NURSE PRACTITIONER

## 2023-10-14 PROCEDURE — 99284 EMERGENCY DEPT VISIT MOD MDM: CPT

## 2023-10-14 PROCEDURE — 90471 IMMUNIZATION ADMIN: CPT | Performed by: NURSE PRACTITIONER

## 2023-10-14 PROCEDURE — 6370000000 HC RX 637 (ALT 250 FOR IP): Performed by: NURSE PRACTITIONER

## 2023-10-14 RX ORDER — HYDROCODONE BITARTRATE AND ACETAMINOPHEN 5; 325 MG/1; MG/1
1 TABLET ORAL ONCE
Status: COMPLETED | OUTPATIENT
Start: 2023-10-14 | End: 2023-10-14

## 2023-10-14 RX ORDER — TETANUS AND DIPHTHERIA TOXOIDS ADSORBED 2; 2 [LF]/.5ML; [LF]/.5ML
0.5 INJECTION INTRAMUSCULAR ONCE
Status: COMPLETED | OUTPATIENT
Start: 2023-10-14 | End: 2023-10-14

## 2023-10-14 RX ADMIN — HYDROCODONE BITARTRATE AND ACETAMINOPHEN 1 TABLET: 5; 325 TABLET ORAL at 15:11

## 2023-10-14 RX ADMIN — TETANUS AND DIPHTHERIA TOXOIDS ADSORBED 0.5 ML: 2; 2 INJECTION INTRAMUSCULAR at 15:10

## 2023-10-14 ASSESSMENT — PAIN DESCRIPTION - ORIENTATION: ORIENTATION: RIGHT

## 2023-10-14 ASSESSMENT — PAIN DESCRIPTION - DESCRIPTORS: DESCRIPTORS: ACHING;PRESSURE

## 2023-10-14 ASSESSMENT — PAIN DESCRIPTION - LOCATION: LOCATION: HEAD

## 2023-10-14 NOTE — DISCHARGE INSTRUCTIONS
ALTERNATE TYLENOL AND IBUPROFEN FOR PAIN. ICE TO PAINFUL AREAS FOR 20 MINUTES AT A TIME. HAVE SUTURES REMOVED IN 7-10 DAYS BY PCP, URGENT CARE OR ER. FOLLOW UP WITH YOUR PCP THIS WEEK FOR RE-EVALUATION. RETURN FOR CONFUSION, VISION CHANGES, WORSENING PAIN, SIGNS OF INFECTION AT THE LACERATION SITE.

## 2023-10-14 NOTE — ED PROVIDER NOTES
Independent [facility] Visit. [facility]   ED  Encounter Note  Admit Date/RoomTime: 10/14/2023  2:41 PM  ED Room: 32/32    NAME: Austin Nicole  : 1960  MRN: [id number]     Chief Complaint:  Fall (Hit head, denies LOC, no thinners), Wrist Pain (L side), and Laceration (To forehead)    History of Present Illness       Austin Nicole is a 59 y.o. old female who presents to the emergency department by private vehicle, for a mechanical fall which occured 1 hour(s) prior to arrival. She reportedly tripped walking up stairs while at home prior to incident with complaints of wrist pain (left worse than right) and a laceration above her right eye. The patients tetanus status is more than 5 years ago. There was no LOC, she is not on any blood thinning medications. Since onset the symptoms have been stable. She denies chest pain, sob, nausea, vomiting, abdominal pain, vision changes. She denies any other extremity pain. ROS   Pertinent positives and negatives are stated within HPI, all other systems reviewed and are negative. Past Medical History:  has a past medical history of Anxiety, Depression, GERD (gastroesophageal reflux disease), Hyperlipidemia, and Lumbar radiculopathy. Surgical History:  has a past surgical history that includes  section; Carpal tunnel release (Bilateral); cervical fusion; Dental surgery (N/A, 2019); CT BIOPSY DEEP BONE PERCUTANEOUS (3/17/2023); Pain management procedure (Left, 2023); Pain management procedure (Left, 2023); and Pain management procedure (Left, 10/10/2023). Social History:  reports that she has been smoking cigarettes. She has a 42.00 pack-year smoking history. She has been exposed to tobacco smoke. She has never used smokeless tobacco. She reports that she does not drink alcohol and does not use drugs.     Family History: family history includes Diabetes in her 6:23 PM        Electronically signed by ASHLEY Dela Cruz CNP   DD: 10/14/23  **This report was transcribed using voice recognition software. Every effort was made to ensure accuracy; however, inadvertent computerized transcription errors may be present.   END OF ED PROVIDER NOTE       ASHLEY Dela Cruz CNP  10/15/23 0801

## 2023-10-23 ENCOUNTER — OFFICE VISIT (OUTPATIENT)
Dept: FAMILY MEDICINE CLINIC | Age: 63
End: 2023-10-23
Payer: COMMERCIAL

## 2023-10-23 VITALS
BODY MASS INDEX: 30.37 KG/M2 | TEMPERATURE: 97.8 F | HEIGHT: 66 IN | HEART RATE: 84 BPM | RESPIRATION RATE: 20 BRPM | WEIGHT: 189 LBS | OXYGEN SATURATION: 97 % | DIASTOLIC BLOOD PRESSURE: 82 MMHG | SYSTOLIC BLOOD PRESSURE: 134 MMHG

## 2023-10-23 DIAGNOSIS — T14.8XXA WOUND INFECTION: Primary | ICD-10-CM

## 2023-10-23 DIAGNOSIS — L08.9 WOUND INFECTION: Primary | ICD-10-CM

## 2023-10-23 PROCEDURE — 99213 OFFICE O/P EST LOW 20 MIN: CPT | Performed by: STUDENT IN AN ORGANIZED HEALTH CARE EDUCATION/TRAINING PROGRAM

## 2023-10-23 RX ORDER — CEPHALEXIN 500 MG/1
500 CAPSULE ORAL 2 TIMES DAILY
Qty: 20 CAPSULE | Refills: 0 | Status: SHIPPED | OUTPATIENT
Start: 2023-10-23 | End: 2023-11-02

## 2023-10-23 ASSESSMENT — ENCOUNTER SYMPTOMS
VOMITING: 0
RHINORRHEA: 0
ABDOMINAL PAIN: 0
SHORTNESS OF BREATH: 0
COUGH: 0
NAUSEA: 0

## 2023-10-23 NOTE — PROGRESS NOTES
Quentin Carlin (:  1960) is a 59 y.o. female,Established patient, here for evaluation of the following chief complaint(s):  Suture / Staple Removal (States she had gotten sutures in on 10/14/2023 above right eyebrow in ER/Was told to have them out 7-10 days later /)         ASSESSMENT/PLAN:  1. Wound infection  -     cephALEXin (KEFLEX) 500 MG capsule; Take 1 capsule by mouth 2 times daily for 10 days, Disp-20 capsule, R-0Normal  -     mupirocin (BACTROBAN) 2 % ointment; Apply topically 2 times daily, Topical, 2 TIMES DAILY Starting Mon 10/23/2023, Disp-1 g, R-0, Normal  9 sutures removed with some difficulty as there was some skin beginning to grow over top of the sutures, however they were removed in their entirety. No obvious abscess to drain but giving some minimal purulence we will treat infection. Advised close follow up with PCP or back through express later this week. Return if symptoms worsen or fail to improve. return this week. Subjective   SUBJECTIVE/OBJECTIVE:  Facial laceration  Needs sutures removed  No fever, no drainage, no swellng  Hit head off steps about 9 days ago  Has sutures and sprained both wrists  Feels like it's healing well        Review of Systems   Constitutional:  Negative for chills and fever. HENT:  Negative for congestion and rhinorrhea. Respiratory:  Negative for cough and shortness of breath. Cardiovascular:  Negative for chest pain and leg swelling. Gastrointestinal:  Negative for abdominal pain, nausea and vomiting. Genitourinary:  Negative for dysuria and hematuria. Musculoskeletal:  Negative for arthralgias and myalgias. Skin:  Positive for wound. Negative for rash. Neurological:  Negative for dizziness and light-headedness. Objective   Physical Exam  Vitals reviewed. Constitutional:       General: She is not in acute distress. HENT:      Head: Normocephalic and atraumatic.         Comments: Location of laceration, 9

## 2023-11-09 ENCOUNTER — OFFICE VISIT (OUTPATIENT)
Dept: PAIN MANAGEMENT | Age: 63
End: 2023-11-09

## 2023-11-09 VITALS
DIASTOLIC BLOOD PRESSURE: 64 MMHG | SYSTOLIC BLOOD PRESSURE: 134 MMHG | OXYGEN SATURATION: 96 % | RESPIRATION RATE: 16 BRPM | WEIGHT: 189 LBS | TEMPERATURE: 97.2 F | BODY MASS INDEX: 31.49 KG/M2 | HEART RATE: 86 BPM | HEIGHT: 65 IN

## 2023-11-09 DIAGNOSIS — M54.41 CHRONIC BILATERAL LOW BACK PAIN WITH BILATERAL SCIATICA: ICD-10-CM

## 2023-11-09 DIAGNOSIS — M54.16 LUMBAR RADICULOPATHY: Primary | ICD-10-CM

## 2023-11-09 DIAGNOSIS — G89.4 CHRONIC PAIN SYNDROME: ICD-10-CM

## 2023-11-09 DIAGNOSIS — M54.42 CHRONIC BILATERAL LOW BACK PAIN WITH BILATERAL SCIATICA: ICD-10-CM

## 2023-11-09 DIAGNOSIS — M47.817 LUMBOSACRAL SPONDYLOSIS WITHOUT MYELOPATHY: ICD-10-CM

## 2023-11-09 DIAGNOSIS — G89.29 CHRONIC BILATERAL LOW BACK PAIN WITH BILATERAL SCIATICA: ICD-10-CM

## 2023-11-09 DIAGNOSIS — M48.062 SPINAL STENOSIS OF LUMBAR REGION WITH NEUROGENIC CLAUDICATION: ICD-10-CM

## 2023-11-09 RX ORDER — TIZANIDINE 2 MG/1
2 TABLET ORAL 2 TIMES DAILY PRN
Qty: 60 TABLET | Refills: 1 | Status: SHIPPED | OUTPATIENT
Start: 2023-11-09

## 2023-11-09 RX ORDER — SODIUM CHLORIDE 0.9 % (FLUSH) 0.9 %
5-40 SYRINGE (ML) INJECTION PRN
OUTPATIENT
Start: 2023-11-09

## 2023-11-09 RX ORDER — GABAPENTIN 300 MG/1
600 CAPSULE ORAL 3 TIMES DAILY
Qty: 180 CAPSULE | Refills: 1 | Status: SHIPPED | OUTPATIENT
Start: 2023-11-09 | End: 2024-01-08

## 2023-11-09 RX ORDER — SODIUM CHLORIDE 0.9 % (FLUSH) 0.9 %
5-40 SYRINGE (ML) INJECTION EVERY 12 HOURS SCHEDULED
OUTPATIENT
Start: 2023-11-09

## 2023-11-09 RX ORDER — SODIUM CHLORIDE 9 MG/ML
INJECTION, SOLUTION INTRAVENOUS PRN
OUTPATIENT
Start: 2023-11-09

## 2023-11-17 SDOH — HEALTH STABILITY: PHYSICAL HEALTH: ON AVERAGE, HOW MANY DAYS PER WEEK DO YOU ENGAGE IN MODERATE TO STRENUOUS EXERCISE (LIKE A BRISK WALK)?: 3 DAYS

## 2023-11-17 SDOH — HEALTH STABILITY: PHYSICAL HEALTH: ON AVERAGE, HOW MANY MINUTES DO YOU ENGAGE IN EXERCISE AT THIS LEVEL?: 30 MIN

## 2023-11-20 ENCOUNTER — OFFICE VISIT (OUTPATIENT)
Dept: FAMILY MEDICINE CLINIC | Age: 63
End: 2023-11-20
Payer: COMMERCIAL

## 2023-11-20 VITALS
WEIGHT: 189 LBS | HEIGHT: 66 IN | SYSTOLIC BLOOD PRESSURE: 132 MMHG | DIASTOLIC BLOOD PRESSURE: 80 MMHG | RESPIRATION RATE: 16 BRPM | OXYGEN SATURATION: 98 % | BODY MASS INDEX: 30.37 KG/M2 | TEMPERATURE: 97.2 F | HEART RATE: 102 BPM

## 2023-11-20 DIAGNOSIS — K21.9 GASTROESOPHAGEAL REFLUX DISEASE WITHOUT ESOPHAGITIS: ICD-10-CM

## 2023-11-20 DIAGNOSIS — R63.5 WEIGHT GAIN: ICD-10-CM

## 2023-11-20 DIAGNOSIS — Z12.11 COLON CANCER SCREENING: ICD-10-CM

## 2023-11-20 DIAGNOSIS — Z12.31 ENCOUNTER FOR SCREENING MAMMOGRAM FOR MALIGNANT NEOPLASM OF BREAST: Primary | ICD-10-CM

## 2023-11-20 DIAGNOSIS — G89.4 CHRONIC PAIN SYNDROME: ICD-10-CM

## 2023-11-20 PROCEDURE — 99214 OFFICE O/P EST MOD 30 MIN: CPT | Performed by: FAMILY MEDICINE

## 2023-11-20 RX ORDER — FAMOTIDINE 20 MG/1
20 TABLET, FILM COATED ORAL EVERY EVENING
Qty: 90 TABLET | Refills: 0 | Status: SHIPPED | OUTPATIENT
Start: 2023-11-20

## 2023-11-20 RX ORDER — FAMOTIDINE 20 MG/1
20 TABLET, FILM COATED ORAL DAILY
COMMUNITY
End: 2023-11-20

## 2023-11-20 RX ORDER — SUCRALFATE 1 G/1
1 TABLET ORAL 4 TIMES DAILY
Qty: 120 TABLET | Refills: 3 | Status: SHIPPED | OUTPATIENT
Start: 2023-11-20

## 2023-11-20 ASSESSMENT — ENCOUNTER SYMPTOMS
BLOOD IN STOOL: 0
SINUS PRESSURE: 0
VOMITING: 0
APNEA: 0
CONSTIPATION: 0
CHEST TIGHTNESS: 0
RHINORRHEA: 0
COUGH: 0
FACIAL SWELLING: 0
COLOR CHANGE: 0
ABDOMINAL DISTENTION: 0
PHOTOPHOBIA: 0
SINUS PAIN: 0
DIARRHEA: 0
SHORTNESS OF BREATH: 0

## 2023-11-20 NOTE — PROGRESS NOTES
Establish care:  Jesi Barron is a 59 y.o. female, who presents to the office today for establishment of care. Past Medical History:   Diagnosis Date    Anxiety     Depression     GERD (gastroesophageal reflux disease)     Hyperlipidemia     Lumbar radiculopathy         Allergies   Allergen Reactions    Lansoprazole Hives    Naproxen Rash    Prilosec [Omeprazole] Hives    Robaxin [Methocarbamol] Rash    Trintellix [Vortioxetine] Rash    Zoloft [Sertraline Hcl] Rash       Current Outpatient Medications on File Prior to Visit   Medication Sig Dispense Refill    Cholecalciferol (VITAMIN D3 PO) Take by mouth      gabapentin (NEURONTIN) 300 MG capsule Take 2 capsules by mouth 3 times daily for 60 days. Intended supply: 30 days 180 capsule 1    tiZANidine (ZANAFLEX) 2 MG tablet Take 1 tablet by mouth 2 times daily as needed (spasms) 60 tablet 1    mupirocin (BACTROBAN) 2 % ointment Apply topically 2 times daily 1 g 0    DULoxetine (CYMBALTA) 30 MG extended release capsule Take 1 capsule by mouth 2 times daily 60 capsule 3    atorvastatin (LIPITOR) 20 MG tablet TAKE 1 TABLET BY MOUTH DAILY 90 tablet 1    hydrOXYzine pamoate (VISTARIL) 50 MG capsule Take 1 capsule by mouth 3 times daily as needed for Itching       No current facility-administered medications on file prior to visit.          Family History   Problem Relation Age of Onset    Heart Disease Mother     Diabetes Mother        Past Surgical History:   Procedure Laterality Date    CARPAL TUNNEL RELEASE Bilateral     CERVICAL FUSION       SECTION      x 3    CT BIOPSY PERCUTANEOUS DEEP BONE  3/17/2023    CT BIOPSY PERCUTANEOUS DEEP BONE 3/17/2023 Kristian Martinez MD [facility] CT    DENTAL SURGERY N/A 2019    DENTAL EXAM FULL MOUTH EXTRACTIONS ALEOPOLASTY performed by Cyrus Zhang DDS at [address] [place] [address] Left 2023    LUMBAR EPIDURAL STEROID INJECTION UNDER FLUOROSCOPIC GUIDANCE AT L4-L5 LEFT PARAMEDIAN performed by

## 2023-12-02 LAB — NONINV COLON CA DNA+OCC BLD SCRN STL QL: NEGATIVE

## 2023-12-02 RX ORDER — ATORVASTATIN CALCIUM 20 MG/1
TABLET, FILM COATED ORAL
Qty: 90 TABLET | Refills: 1 | Status: SHIPPED | OUTPATIENT
Start: 2023-12-02

## 2023-12-13 ENCOUNTER — TELEPHONE (OUTPATIENT)
Dept: PAIN MANAGEMENT | Age: 63
End: 2023-12-13

## 2023-12-13 ENCOUNTER — PREP FOR PROCEDURE (OUTPATIENT)
Dept: PAIN MANAGEMENT | Age: 63
End: 2023-12-13

## 2023-12-13 DIAGNOSIS — M54.16 LUMBAR RADICULOPATHY: Primary | ICD-10-CM

## 2023-12-13 RX ORDER — SODIUM CHLORIDE 0.9 % (FLUSH) 0.9 %
5-40 SYRINGE (ML) INJECTION PRN
Status: CANCELLED | OUTPATIENT
Start: 2023-12-13

## 2023-12-13 RX ORDER — SODIUM CHLORIDE 0.9 % (FLUSH) 0.9 %
5-40 SYRINGE (ML) INJECTION EVERY 12 HOURS SCHEDULED
Status: CANCELLED | OUTPATIENT
Start: 2023-12-13

## 2023-12-13 RX ORDER — SODIUM CHLORIDE 9 MG/ML
INJECTION, SOLUTION INTRAVENOUS PRN
Status: CANCELLED | OUTPATIENT
Start: 2023-12-13

## 2023-12-13 NOTE — TELEPHONE ENCOUNTER
Call to Annamaria Wilcox that procedure was approved for 12/18/2023 and that the surgery center should call her a few days before for the pre op call and after 3:00 PM the business day before with the arrival time. Instructed Annamaria to hold ibuprofen for 24 hours, naprosyn for 4 days and any aspirin containing products or fish oil for 7 days. Instructed to call office back if any questions. Annamaria verbalized understanding.    Electronically signed by Kev Gorman RN on 12/13/2023 at 1:48 PM

## 2023-12-18 RX ORDER — GABAPENTIN 300 MG/1
600 CAPSULE ORAL 3 TIMES DAILY
Qty: 180 CAPSULE | Refills: 1 | OUTPATIENT
Start: 2023-12-18 | End: 2024-02-16

## 2023-12-19 RX ORDER — GABAPENTIN 300 MG/1
600 CAPSULE ORAL 3 TIMES DAILY
Qty: 180 CAPSULE | Refills: 1 | OUTPATIENT
Start: 2023-12-19 | End: 2024-02-17

## 2024-01-12 ENCOUNTER — OFFICE VISIT (OUTPATIENT)
Dept: PAIN MANAGEMENT | Age: 64
End: 2024-01-12

## 2024-01-12 VITALS
DIASTOLIC BLOOD PRESSURE: 86 MMHG | SYSTOLIC BLOOD PRESSURE: 121 MMHG | HEIGHT: 66 IN | HEART RATE: 101 BPM | TEMPERATURE: 97.2 F | BODY MASS INDEX: 30.53 KG/M2 | OXYGEN SATURATION: 95 % | WEIGHT: 190 LBS | RESPIRATION RATE: 16 BRPM

## 2024-01-12 DIAGNOSIS — M54.41 CHRONIC BILATERAL LOW BACK PAIN WITH BILATERAL SCIATICA: ICD-10-CM

## 2024-01-12 DIAGNOSIS — M48.062 SPINAL STENOSIS OF LUMBAR REGION WITH NEUROGENIC CLAUDICATION: ICD-10-CM

## 2024-01-12 DIAGNOSIS — M47.817 LUMBOSACRAL SPONDYLOSIS WITHOUT MYELOPATHY: ICD-10-CM

## 2024-01-12 DIAGNOSIS — G89.29 CHRONIC BILATERAL LOW BACK PAIN WITH BILATERAL SCIATICA: ICD-10-CM

## 2024-01-12 DIAGNOSIS — G89.4 CHRONIC PAIN SYNDROME: ICD-10-CM

## 2024-01-12 DIAGNOSIS — M54.16 LUMBAR RADICULOPATHY: Primary | ICD-10-CM

## 2024-01-12 DIAGNOSIS — M54.42 CHRONIC BILATERAL LOW BACK PAIN WITH BILATERAL SCIATICA: ICD-10-CM

## 2024-01-12 RX ORDER — GABAPENTIN 300 MG/1
600 CAPSULE ORAL 3 TIMES DAILY
Qty: 180 CAPSULE | Refills: 1 | Status: SHIPPED | OUTPATIENT
Start: 2024-01-12 | End: 2024-03-12

## 2024-01-12 RX ORDER — TIZANIDINE 2 MG/1
2 TABLET ORAL 2 TIMES DAILY PRN
Qty: 60 TABLET | Refills: 1 | Status: SHIPPED | OUTPATIENT
Start: 2024-01-12

## 2024-01-12 NOTE — PROGRESS NOTES
[facility] - Pain Medicine  [address] [place] [address]    Pain Medicine Follow Up Note      Annamaria Wilcox     Date of Visit:  1/12/2024    CC:  Patient presents for follow up   Chief Complaint   Patient presents with    Follow-up     Low back        HPI:    Pain is better.  Medication side effects:none.   Recent interventional procedures: L2/3 CHESTER - left paramedian - Excellent  Blood Thinners/Anticoagulation:  no  Herbal Supplements: no  Pertinent Allergies: yes - robaxin, naproxen   Diabetic: no  Bowel/Bladder Incontinence: no    Previous Plan:  HEP   Gabapentin 600mg TID - taking with relief and no side effects  Tizanidine 2mg BID PRN - taking with some relief - as needed  CHESTER - had excellent relief and pain decreased from 10 to 4 with increased range of motion and function    Interval Changes:  Really happy about the relief she experienced from the epidural  Pain is staying at about a 4 out of 10 but is starting to slowly come back  Having worsening foot tingling and numbness, right on the top, left the entire foot    Procedures: no   6/20/23 - L4/5 CHESTER left paramedian - 20% relief   8/22/23- L2/3 CHESTER - left paramedian -Aborted - Hypotension  10/10/23- L2/3 CHESTER - left paramedian - 75% relief for 1+month  12/18/23 - L2/3 CHESTER - left paramedian -75% relief for 1 month        Imaging: New: no     XRAY:    MRI:  MRI LUMBAR SPINE W WO CONTRAST 03/06/2023    Narrative  EXAMINATION:  MRI OF THE LUMBAR SPINE WITHOUT AND WITH CONTRAST  3/6/2023 2:20 pm    TECHNIQUE:  Multiplanar multisequence MRI of the lumbar spine was performed without and  with the administration of intravenous contrast.    COMPARISON:  CT the lumbar spine 02/15/2023.    HISTORY:  ORDERING SYSTEM PROVIDED HISTORY: Acute bilateral low back pain with  bilateral sciatica  TECHNOLOGIST PROVIDED HISTORY:  STAT Creatinine as needed:->No    FINDINGS:  BONES/ALIGNMENT: There is normal alignment of the spine.

## 2024-01-12 NOTE — PROGRESS NOTES
Annamaria Wilcox presents to the [facility] on 1/12/2024. Annamaria is complaining of pain low back . The pain is constant. The pain is described as aching, throbbing, shooting, and stabbing. Pain is rated on her best day at a 5, on her worst day at a 7, and on average at a 6 on the VAS scale. She took her last dose of Neurontin this morning .    Any procedures since your last visit: No  She is not on NSAIDS and  is not on anticoagulation medications   Pacemaker or defibrillator: No         Medication Contract and Consent for Opioid Use Documents Filed        No documents found                       /86   Pulse (!) 101   Temp 97.2 °F (36.2 °C) (Temporal)   Resp 16   Ht 1.676 m (5' 6\")   Wt 86.2 kg (190 lb)   SpO2 95%   BMI 30.67 kg/m²      No LMP recorded. Patient is postmenopausal.

## 2024-01-22 ENCOUNTER — TELEPHONE (OUTPATIENT)
Dept: FAMILY MEDICINE CLINIC | Age: 64
End: 2024-01-22
Payer: COMMERCIAL

## 2024-01-22 DIAGNOSIS — R73.9 HYPERGLYCEMIA: Primary | ICD-10-CM

## 2024-01-22 PROCEDURE — 83036 HEMOGLOBIN GLYCOSYLATED A1C: CPT | Performed by: FAMILY MEDICINE

## 2024-01-22 NOTE — TELEPHONE ENCOUNTER
Pt called in and said Dr Wesley Foy told her to have you put an order in for her to have her Glucose checked. Could you please place that order?

## 2024-01-29 DIAGNOSIS — R73.9 HYPERGLYCEMIA: ICD-10-CM

## 2024-01-29 LAB
ANION GAP SERPL CALCULATED.3IONS-SCNC: 11 MMOL/L (ref 7–16)
BUN BLDV-MCNC: 12 MG/DL (ref 6–23)
CALCIUM SERPL-MCNC: 9.4 MG/DL (ref 8.6–10.2)
CHLORIDE BLD-SCNC: 102 MMOL/L (ref 98–107)
CO2: 27 MMOL/L (ref 22–29)
CREAT SERPL-MCNC: 0.9 MG/DL (ref 0.5–1)
GFR SERPL CREATININE-BSD FRML MDRD: >60 ML/MIN/1.73M2
GLUCOSE BLD-MCNC: 94 MG/DL (ref 74–99)
POTASSIUM SERPL-SCNC: 4.5 MMOL/L (ref 3.5–5)
SODIUM BLD-SCNC: 140 MMOL/L (ref 132–146)

## 2024-02-26 ENCOUNTER — OFFICE VISIT (OUTPATIENT)
Dept: PAIN MANAGEMENT | Age: 64
End: 2024-02-26
Payer: COMMERCIAL

## 2024-02-26 VITALS
HEART RATE: 53 BPM | SYSTOLIC BLOOD PRESSURE: 124 MMHG | WEIGHT: 190 LBS | BODY MASS INDEX: 30.53 KG/M2 | HEIGHT: 66 IN | DIASTOLIC BLOOD PRESSURE: 65 MMHG | OXYGEN SATURATION: 97 % | TEMPERATURE: 97.2 F | RESPIRATION RATE: 16 BRPM

## 2024-02-26 DIAGNOSIS — M54.41 CHRONIC BILATERAL LOW BACK PAIN WITH BILATERAL SCIATICA: ICD-10-CM

## 2024-02-26 DIAGNOSIS — G89.29 CHRONIC BILATERAL LOW BACK PAIN WITH BILATERAL SCIATICA: ICD-10-CM

## 2024-02-26 DIAGNOSIS — G89.4 CHRONIC PAIN SYNDROME: ICD-10-CM

## 2024-02-26 DIAGNOSIS — M48.062 SPINAL STENOSIS OF LUMBAR REGION WITH NEUROGENIC CLAUDICATION: ICD-10-CM

## 2024-02-26 DIAGNOSIS — G89.29 CHRONIC INTRACTABLE HEADACHE, UNSPECIFIED HEADACHE TYPE: Primary | ICD-10-CM

## 2024-02-26 DIAGNOSIS — M47.817 LUMBOSACRAL SPONDYLOSIS WITHOUT MYELOPATHY: ICD-10-CM

## 2024-02-26 DIAGNOSIS — R51.9 CHRONIC INTRACTABLE HEADACHE, UNSPECIFIED HEADACHE TYPE: Primary | ICD-10-CM

## 2024-02-26 DIAGNOSIS — M54.16 LUMBAR RADICULOPATHY: ICD-10-CM

## 2024-02-26 DIAGNOSIS — M54.42 CHRONIC BILATERAL LOW BACK PAIN WITH BILATERAL SCIATICA: ICD-10-CM

## 2024-02-26 PROCEDURE — 99214 OFFICE O/P EST MOD 30 MIN: CPT | Performed by: STUDENT IN AN ORGANIZED HEALTH CARE EDUCATION/TRAINING PROGRAM

## 2024-02-26 RX ORDER — GABAPENTIN 300 MG/1
300 CAPSULE ORAL 3 TIMES DAILY
Qty: 90 CAPSULE | Refills: 0 | Status: SHIPPED | OUTPATIENT
Start: 2024-02-26 | End: 2024-03-27

## 2024-02-26 RX ORDER — TOPIRAMATE 25 MG/1
25 TABLET ORAL NIGHTLY
Qty: 30 TABLET | Refills: 0 | Status: SHIPPED | OUTPATIENT
Start: 2024-02-26

## 2024-02-26 NOTE — PROGRESS NOTES
Annamaria Wilcox presents to the [facility] on 2/26/2024. Annamaria is complaining of pain low back . The pain is constant. The pain is described as aching. Pain is rated on her best day at a 4, on her worst day at a 7, and on average at a 5 on the VAS scale. She took her last dose of Neurontin this morning.    Any procedures since your last visit: No    She is not on NSAIDS and  is not on anticoagulation medications  Pacemaker or defibrillator: No       Medication Contract and Consent for Opioid Use Documents Filed        No documents found                       Temp 97.2 °F (36.2 °C) (Temporal)   Resp 16   Ht 1.676 m (5' 6\")   Wt 86.2 kg (190 lb)   SpO2 97%   BMI 30.67 kg/m²      No LMP recorded. Patient is postmenopausal.  
patient/family/caregiver and ordering medications, tests, or procedures.      NOTE: The above documentation was prepared using voice recognition software.  Every attempt was made to ensure accuracy but there may be spelling, grammatical, and contextual errors.    Lore Mahajan MD    Controlled Substances Monitoring:        No data to display                [place]ARRS report reviewed 4/28/23   CC:     No referring provider defined for this encounter.   Danuta Arias MD   [address] [place] [address] [address] [place] [address]

## 2024-03-11 ENCOUNTER — PATIENT MESSAGE (OUTPATIENT)
Dept: FAMILY MEDICINE CLINIC | Age: 64
End: 2024-03-11

## 2024-03-11 NOTE — TELEPHONE ENCOUNTER
From: Annamaria Wilcox  To: Dr. Danuta Arias  Sent: 3/11/2024 10:26 AM EDT  Subject: Cough and mucus    Can you please tell me what I can take over the counter for cough and mucus with the medication I am on.

## 2024-03-18 RX ORDER — TOPIRAMATE 25 MG/1
25 TABLET ORAL NIGHTLY
Qty: 30 TABLET | Refills: 0 | OUTPATIENT
Start: 2024-03-18

## 2024-03-22 ENCOUNTER — TELEPHONE (OUTPATIENT)
Dept: FAMILY MEDICINE CLINIC | Age: 64
End: 2024-03-22

## 2024-03-22 ENCOUNTER — OFFICE VISIT (OUTPATIENT)
Dept: FAMILY MEDICINE CLINIC | Age: 64
End: 2024-03-22

## 2024-03-22 VITALS
WEIGHT: 190 LBS | DIASTOLIC BLOOD PRESSURE: 68 MMHG | SYSTOLIC BLOOD PRESSURE: 118 MMHG | OXYGEN SATURATION: 99 % | BODY MASS INDEX: 30.53 KG/M2 | HEART RATE: 104 BPM | HEIGHT: 66 IN | TEMPERATURE: 97.7 F

## 2024-03-22 DIAGNOSIS — R05.2 SUBACUTE COUGH: ICD-10-CM

## 2024-03-22 DIAGNOSIS — F17.200 SMOKER: ICD-10-CM

## 2024-03-22 DIAGNOSIS — R06.02 SOB (SHORTNESS OF BREATH): ICD-10-CM

## 2024-03-22 DIAGNOSIS — R06.2 WHEEZE: ICD-10-CM

## 2024-03-22 DIAGNOSIS — J40 BRONCHITIS: Primary | ICD-10-CM

## 2024-03-22 RX ORDER — ALBUTEROL SULFATE 90 UG/1
2 AEROSOL, METERED RESPIRATORY (INHALATION) 4 TIMES DAILY PRN
Qty: 18 G | Refills: 0 | Status: SHIPPED | OUTPATIENT
Start: 2024-03-22

## 2024-03-22 RX ORDER — IPRATROPIUM BROMIDE AND ALBUTEROL SULFATE 2.5; .5 MG/3ML; MG/3ML
1 SOLUTION RESPIRATORY (INHALATION) ONCE
Status: COMPLETED | OUTPATIENT
Start: 2024-03-22 | End: 2024-03-22

## 2024-03-22 RX ORDER — PREDNISONE 20 MG/1
20 TABLET ORAL 2 TIMES DAILY
Qty: 10 TABLET | Refills: 0 | Status: SHIPPED | OUTPATIENT
Start: 2024-03-22 | End: 2024-03-27

## 2024-03-22 RX ORDER — BENZONATATE 100 MG/1
100 CAPSULE ORAL 3 TIMES DAILY PRN
Qty: 30 CAPSULE | Refills: 0 | Status: SHIPPED | OUTPATIENT
Start: 2024-03-22 | End: 2024-04-01

## 2024-03-22 RX ORDER — AMOXICILLIN AND CLAVULANATE POTASSIUM 875; 125 MG/1; MG/1
1 TABLET, FILM COATED ORAL 2 TIMES DAILY
Qty: 20 TABLET | Refills: 0 | Status: SHIPPED | OUTPATIENT
Start: 2024-03-22 | End: 2024-04-01

## 2024-03-22 RX ADMIN — IPRATROPIUM BROMIDE AND ALBUTEROL SULFATE 1 DOSE: 2.5; .5 SOLUTION RESPIRATORY (INHALATION) at 09:39

## 2024-03-22 ASSESSMENT — ENCOUNTER SYMPTOMS
CHEST TIGHTNESS: 0
SHORTNESS OF BREATH: 1
COUGH: 1
ABDOMINAL PAIN: 0
SORE THROAT: 0
WHEEZING: 1
EYES NEGATIVE: 1
NAUSEA: 0
SINUS PRESSURE: 0
DIARRHEA: 0
VOMITING: 0

## 2024-03-22 NOTE — PROGRESS NOTES
(11/17/2023)    Exercise Vital Sign     Days of Exercise per Week: 3 days     Minutes of Exercise per Session: 30 min   Stress: Not on file   Social Connections: Not on file   Intimate Partner Violence: Not At Risk (11/17/2023)    Humiliation, Afraid, Rape, and Kick questionnaire     Fear of Current or Ex-Partner: No     Emotionally Abused: No     Physically Abused: No     Sexually Abused: No   Housing Stability: Unknown (2/6/2023)    Housing Stability Vital Sign     Unable to Pay for Housing in the Last Year: Not on file     Number of Places Lived in the Last Year: Not on file     Unstable Housing in the Last Year: No       Vitals:    03/22/24 0915   BP: 118/68   Pulse: (!) 104   Temp: 97.7 °F (36.5 °C)   TempSrc: Temporal   SpO2: 99%   Weight: 86.2 kg (190 lb)   Height: 1.676 m (5' 6\")       Physical Exam  Vitals and nursing note reviewed.   Constitutional:       General: She is not in acute distress.     Appearance: She is well-developed. She is not toxic-appearing.   HENT:      Head: Normocephalic and atraumatic.      Right Ear: Tympanic membrane, ear canal and external ear normal.      Left Ear: Tympanic membrane, ear canal and external ear normal.      Nose: Nose normal.      Mouth/Throat:      Mouth: Mucous membranes are moist.      Pharynx: Oropharynx is clear. Posterior oropharyngeal erythema present. No oropharyngeal exudate.      Comments: Thick white mucus draining posterior pharynx  Cardiovascular:      Rate and Rhythm: Regular rhythm. Tachycardia present.      Heart sounds: Normal heart sounds. No murmur heard.  Pulmonary:      Effort: Pulmonary effort is normal. No respiratory distress.      Breath sounds: Normal breath sounds. No wheezing, rhonchi or rales.   Musculoskeletal:      Cervical back: Normal range of motion and neck supple. No tenderness.   Lymphadenopathy:      Cervical: No cervical adenopathy.   Skin:     General: Skin is warm and dry.   Neurological:      Mental Status: She is alert and

## 2024-03-28 ENCOUNTER — OFFICE VISIT (OUTPATIENT)
Dept: PAIN MANAGEMENT | Age: 64
End: 2024-03-28
Payer: COMMERCIAL

## 2024-03-28 VITALS
BODY MASS INDEX: 30.53 KG/M2 | WEIGHT: 190 LBS | RESPIRATION RATE: 16 BRPM | DIASTOLIC BLOOD PRESSURE: 73 MMHG | HEIGHT: 66 IN | SYSTOLIC BLOOD PRESSURE: 134 MMHG | OXYGEN SATURATION: 96 % | TEMPERATURE: 97.2 F | HEART RATE: 97 BPM

## 2024-03-28 DIAGNOSIS — M54.41 CHRONIC BILATERAL LOW BACK PAIN WITH BILATERAL SCIATICA: ICD-10-CM

## 2024-03-28 DIAGNOSIS — R51.9 CHRONIC INTRACTABLE HEADACHE, UNSPECIFIED HEADACHE TYPE: Primary | ICD-10-CM

## 2024-03-28 DIAGNOSIS — G89.29 CHRONIC INTRACTABLE HEADACHE, UNSPECIFIED HEADACHE TYPE: Primary | ICD-10-CM

## 2024-03-28 DIAGNOSIS — M54.42 CHRONIC BILATERAL LOW BACK PAIN WITH BILATERAL SCIATICA: ICD-10-CM

## 2024-03-28 DIAGNOSIS — M48.062 SPINAL STENOSIS OF LUMBAR REGION WITH NEUROGENIC CLAUDICATION: ICD-10-CM

## 2024-03-28 DIAGNOSIS — M47.817 LUMBOSACRAL SPONDYLOSIS WITHOUT MYELOPATHY: ICD-10-CM

## 2024-03-28 DIAGNOSIS — M54.16 LUMBAR RADICULOPATHY: ICD-10-CM

## 2024-03-28 DIAGNOSIS — G89.4 CHRONIC PAIN SYNDROME: ICD-10-CM

## 2024-03-28 DIAGNOSIS — G89.29 CHRONIC BILATERAL LOW BACK PAIN WITH BILATERAL SCIATICA: ICD-10-CM

## 2024-03-28 PROCEDURE — 99214 OFFICE O/P EST MOD 30 MIN: CPT | Performed by: STUDENT IN AN ORGANIZED HEALTH CARE EDUCATION/TRAINING PROGRAM

## 2024-03-28 RX ORDER — TIZANIDINE 2 MG/1
2 TABLET ORAL 2 TIMES DAILY PRN
Qty: 60 TABLET | Refills: 1 | Status: SHIPPED | OUTPATIENT
Start: 2024-03-28

## 2024-03-28 RX ORDER — GABAPENTIN 300 MG/1
300 CAPSULE ORAL 3 TIMES DAILY
Qty: 90 CAPSULE | Refills: 0 | Status: SHIPPED | OUTPATIENT
Start: 2024-03-28 | End: 2024-04-27

## 2024-03-28 RX ORDER — TOPIRAMATE 25 MG/1
25 TABLET ORAL NIGHTLY
Qty: 30 TABLET | Refills: 0 | Status: SHIPPED | OUTPATIENT
Start: 2024-03-28

## 2024-04-02 RX ORDER — ALBUTEROL SULFATE 90 UG/1
AEROSOL, METERED RESPIRATORY (INHALATION)
Qty: 8.5 G | OUTPATIENT
Start: 2024-04-02

## 2024-04-02 RX ORDER — ALBUTEROL SULFATE 90 UG/1
2 AEROSOL, METERED RESPIRATORY (INHALATION) 4 TIMES DAILY PRN
Qty: 18 G | Refills: 0 | OUTPATIENT
Start: 2024-04-02

## 2024-04-02 RX ORDER — BENZONATATE 100 MG/1
100 CAPSULE ORAL 3 TIMES DAILY PRN
Qty: 30 CAPSULE | Refills: 0 | OUTPATIENT
Start: 2024-04-02 | End: 2024-04-12

## 2024-04-02 RX ORDER — AMOXICILLIN AND CLAVULANATE POTASSIUM 875; 125 MG/1; MG/1
1 TABLET, FILM COATED ORAL 2 TIMES DAILY
Qty: 20 TABLET | Refills: 0 | OUTPATIENT
Start: 2024-04-02 | End: 2024-04-12

## 2024-04-02 RX ORDER — BENZONATATE 100 MG/1
CAPSULE ORAL
Qty: 30 CAPSULE | Refills: 0 | OUTPATIENT
Start: 2024-04-02

## 2024-04-04 ASSESSMENT — PATIENT HEALTH QUESTIONNAIRE - PHQ9
5. POOR APPETITE OR OVEREATING: MORE THAN HALF THE DAYS
9. THOUGHTS THAT YOU WOULD BE BETTER OFF DEAD, OR OF HURTING YOURSELF: NOT AT ALL
2. FEELING DOWN, DEPRESSED OR HOPELESS: MORE THAN HALF THE DAYS
SUM OF ALL RESPONSES TO PHQ QUESTIONS 1-9: 10
10. IF YOU CHECKED OFF ANY PROBLEMS, HOW DIFFICULT HAVE THESE PROBLEMS MADE IT FOR YOU TO DO YOUR WORK, TAKE CARE OF THINGS AT HOME, OR GET ALONG WITH OTHER PEOPLE: SOMEWHAT DIFFICULT
SUM OF ALL RESPONSES TO PHQ QUESTIONS 1-9: 10
6. FEELING BAD ABOUT YOURSELF - OR THAT YOU ARE A FAILURE OR HAVE LET YOURSELF OR YOUR FAMILY DOWN: NOT AT ALL
8. MOVING OR SPEAKING SO SLOWLY THAT OTHER PEOPLE COULD HAVE NOTICED. OR THE OPPOSITE - BEING SO FIDGETY OR RESTLESS THAT YOU HAVE BEEN MOVING AROUND A LOT MORE THAN USUAL: NOT AT ALL
1. LITTLE INTEREST OR PLEASURE IN DOING THINGS: MORE THAN HALF THE DAYS
5. POOR APPETITE OR OVEREATING: MORE THAN HALF THE DAYS
3. TROUBLE FALLING OR STAYING ASLEEP: MORE THAN HALF THE DAYS
4. FEELING TIRED OR HAVING LITTLE ENERGY: MORE THAN HALF THE DAYS
8. MOVING OR SPEAKING SO SLOWLY THAT OTHER PEOPLE COULD HAVE NOTICED. OR THE OPPOSITE, BEING SO FIGETY OR RESTLESS THAT YOU HAVE BEEN MOVING AROUND A LOT MORE THAN USUAL: NOT AT ALL
SUM OF ALL RESPONSES TO PHQ QUESTIONS 1-9: 10
10. IF YOU CHECKED OFF ANY PROBLEMS, HOW DIFFICULT HAVE THESE PROBLEMS MADE IT FOR YOU TO DO YOUR WORK, TAKE CARE OF THINGS AT HOME, OR GET ALONG WITH OTHER PEOPLE: SOMEWHAT DIFFICULT
6. FEELING BAD ABOUT YOURSELF - OR THAT YOU ARE A FAILURE OR HAVE LET YOURSELF OR YOUR FAMILY DOWN: NOT AT ALL
4. FEELING TIRED OR HAVING LITTLE ENERGY: MORE THAN HALF THE DAYS
SUM OF ALL RESPONSES TO PHQ QUESTIONS 1-9: 10
7. TROUBLE CONCENTRATING ON THINGS, SUCH AS READING THE NEWSPAPER OR WATCHING TELEVISION: NOT AT ALL
SUM OF ALL RESPONSES TO PHQ9 QUESTIONS 1 & 2: 4
3. TROUBLE FALLING OR STAYING ASLEEP: MORE THAN HALF THE DAYS
1. LITTLE INTEREST OR PLEASURE IN DOING THINGS: MORE THAN HALF THE DAYS
SUM OF ALL RESPONSES TO PHQ QUESTIONS 1-9: 10
2. FEELING DOWN, DEPRESSED OR HOPELESS: MORE THAN HALF THE DAYS
9. THOUGHTS THAT YOU WOULD BE BETTER OFF DEAD, OR OF HURTING YOURSELF: NOT AT ALL
7. TROUBLE CONCENTRATING ON THINGS, SUCH AS READING THE NEWSPAPER OR WATCHING TELEVISION: NOT AT ALL

## 2024-04-08 ENCOUNTER — OFFICE VISIT (OUTPATIENT)
Dept: FAMILY MEDICINE CLINIC | Age: 64
End: 2024-04-08
Payer: COMMERCIAL

## 2024-04-08 VITALS
OXYGEN SATURATION: 97 % | BODY MASS INDEX: 31.66 KG/M2 | TEMPERATURE: 97.7 F | SYSTOLIC BLOOD PRESSURE: 128 MMHG | HEIGHT: 66 IN | HEART RATE: 100 BPM | WEIGHT: 197 LBS | DIASTOLIC BLOOD PRESSURE: 84 MMHG

## 2024-04-08 DIAGNOSIS — U09.9 LONG COVID: ICD-10-CM

## 2024-04-08 DIAGNOSIS — R05.2 SUBACUTE COUGH: ICD-10-CM

## 2024-04-08 DIAGNOSIS — R06.02 SHORTNESS OF BREATH: Primary | ICD-10-CM

## 2024-04-08 DIAGNOSIS — R06.02 SHORTNESS OF BREATH: ICD-10-CM

## 2024-04-08 DIAGNOSIS — R20.2 BILATERAL LEG PARESTHESIA: ICD-10-CM

## 2024-04-08 DIAGNOSIS — G89.4 CHRONIC PAIN SYNDROME: ICD-10-CM

## 2024-04-08 DIAGNOSIS — J98.01 BRONCHOSPASM: ICD-10-CM

## 2024-04-08 LAB
ALBUMIN SERPL-MCNC: 4.3 G/DL (ref 3.5–5.2)
ALP BLD-CCNC: 116 U/L (ref 35–104)
ALT SERPL-CCNC: 33 U/L (ref 0–32)
ANION GAP SERPL CALCULATED.3IONS-SCNC: 12 MMOL/L (ref 7–16)
AST SERPL-CCNC: 37 U/L (ref 0–31)
BASOPHILS ABSOLUTE: 0.06 K/UL (ref 0–0.2)
BASOPHILS RELATIVE PERCENT: 1 % (ref 0–2)
BILIRUB SERPL-MCNC: 0.2 MG/DL (ref 0–1.2)
BUN BLDV-MCNC: 11 MG/DL (ref 6–23)
CALCIUM SERPL-MCNC: 9.3 MG/DL (ref 8.6–10.2)
CHLORIDE BLD-SCNC: 98 MMOL/L (ref 98–107)
CO2: 26 MMOL/L (ref 22–29)
CREAT SERPL-MCNC: 0.8 MG/DL (ref 0.5–1)
EOSINOPHILS ABSOLUTE: 0.31 K/UL (ref 0.05–0.5)
EOSINOPHILS RELATIVE PERCENT: 5 % (ref 0–6)
GFR SERPL CREATININE-BSD FRML MDRD: 82 ML/MIN/1.73M2
GLUCOSE BLD-MCNC: 96 MG/DL (ref 74–99)
HCT VFR BLD CALC: 43.5 % (ref 34–48)
HEMOGLOBIN: 14.4 G/DL (ref 11.5–15.5)
IMMATURE GRANULOCYTES %: 0 % (ref 0–5)
IMMATURE GRANULOCYTES ABSOLUTE: <0.03 K/UL (ref 0–0.58)
LYMPHOCYTES ABSOLUTE: 1.86 K/UL (ref 1.5–4)
LYMPHOCYTES RELATIVE PERCENT: 28 % (ref 20–42)
MCH RBC QN AUTO: 30 PG (ref 26–35)
MCHC RBC AUTO-ENTMCNC: 33.1 G/DL (ref 32–34.5)
MCV RBC AUTO: 90.6 FL (ref 80–99.9)
MONOCYTES ABSOLUTE: 0.59 K/UL (ref 0.1–0.95)
MONOCYTES RELATIVE PERCENT: 9 % (ref 2–12)
NEUTROPHILS ABSOLUTE: 3.9 K/UL (ref 1.8–7.3)
NEUTROPHILS RELATIVE PERCENT: 58 % (ref 43–80)
PDW BLD-RTO: 14.7 % (ref 11.5–15)
PLATELET # BLD: 204 K/UL (ref 130–450)
PMV BLD AUTO: 9.5 FL (ref 7–12)
POTASSIUM SERPL-SCNC: 4.1 MMOL/L (ref 3.5–5)
RBC # BLD: 4.8 M/UL (ref 3.5–5.5)
SODIUM BLD-SCNC: 136 MMOL/L (ref 132–146)
TOTAL PROTEIN: 6.9 G/DL (ref 6.4–8.3)
WBC # BLD: 6.7 K/UL (ref 4.5–11.5)

## 2024-04-08 PROCEDURE — 99214 OFFICE O/P EST MOD 30 MIN: CPT | Performed by: FAMILY MEDICINE

## 2024-04-08 PROCEDURE — 94640 AIRWAY INHALATION TREATMENT: CPT | Performed by: FAMILY MEDICINE

## 2024-04-08 RX ORDER — HYDROXYZINE PAMOATE 50 MG/1
50 CAPSULE ORAL 3 TIMES DAILY PRN
Qty: 90 CAPSULE | Refills: 2 | Status: SHIPPED | OUTPATIENT
Start: 2024-04-08

## 2024-04-08 RX ORDER — IPRATROPIUM BROMIDE AND ALBUTEROL SULFATE 2.5; .5 MG/3ML; MG/3ML
1 SOLUTION RESPIRATORY (INHALATION) ONCE
Status: COMPLETED | OUTPATIENT
Start: 2024-04-08 | End: 2024-04-08

## 2024-04-08 RX ORDER — PREDNISONE 10 MG/1
TABLET ORAL
Qty: 45 TABLET | Refills: 0 | Status: SHIPPED | OUTPATIENT
Start: 2024-04-08

## 2024-04-08 RX ORDER — FAMOTIDINE 10 MG
10 TABLET ORAL 2 TIMES DAILY
COMMUNITY

## 2024-04-08 RX ORDER — DULOXETIN HYDROCHLORIDE 30 MG/1
30 CAPSULE, DELAYED RELEASE ORAL 2 TIMES DAILY
Qty: 180 CAPSULE | Refills: 1 | Status: SHIPPED | OUTPATIENT
Start: 2024-04-08

## 2024-04-08 RX ORDER — ALBUTEROL SULFATE 90 UG/1
2 AEROSOL, METERED RESPIRATORY (INHALATION) 4 TIMES DAILY PRN
Qty: 18 G | Refills: 0 | Status: SHIPPED | OUTPATIENT
Start: 2024-04-08

## 2024-04-08 RX ORDER — AZITHROMYCIN 250 MG/1
TABLET, FILM COATED ORAL
Qty: 6 TABLET | Refills: 0 | Status: SHIPPED | OUTPATIENT
Start: 2024-04-08 | End: 2024-04-18

## 2024-04-08 RX ORDER — AMOXICILLIN AND CLAVULANATE POTASSIUM 875; 125 MG/1; MG/1
1 TABLET, FILM COATED ORAL 2 TIMES DAILY
Qty: 14 TABLET | Refills: 0 | Status: SHIPPED | OUTPATIENT
Start: 2024-04-08 | End: 2024-04-15

## 2024-04-08 RX ORDER — ATORVASTATIN CALCIUM 20 MG/1
20 TABLET, FILM COATED ORAL DAILY
Qty: 90 TABLET | Refills: 3 | Status: SHIPPED | OUTPATIENT
Start: 2024-04-08

## 2024-04-08 RX ORDER — FLUTICASONE FUROATE AND VILANTEROL 100; 25 UG/1; UG/1
1 POWDER RESPIRATORY (INHALATION) DAILY
Qty: 1 EACH | Refills: 2 | Status: SHIPPED | OUTPATIENT
Start: 2024-04-08

## 2024-04-08 RX ADMIN — IPRATROPIUM BROMIDE AND ALBUTEROL SULFATE 1 DOSE: 2.5; .5 SOLUTION RESPIRATORY (INHALATION) at 14:40

## 2024-04-08 SDOH — ECONOMIC STABILITY: FOOD INSECURITY: WITHIN THE PAST 12 MONTHS, THE FOOD YOU BOUGHT JUST DIDN'T LAST AND YOU DIDN'T HAVE MONEY TO GET MORE.: OFTEN TRUE

## 2024-04-08 SDOH — ECONOMIC STABILITY: FOOD INSECURITY: WITHIN THE PAST 12 MONTHS, YOU WORRIED THAT YOUR FOOD WOULD RUN OUT BEFORE YOU GOT MONEY TO BUY MORE.: OFTEN TRUE

## 2024-04-08 SDOH — ECONOMIC STABILITY: INCOME INSECURITY: HOW HARD IS IT FOR YOU TO PAY FOR THE VERY BASICS LIKE FOOD, HOUSING, MEDICAL CARE, AND HEATING?: HARD

## 2024-04-08 SDOH — ECONOMIC STABILITY: TRANSPORTATION INSECURITY
IN THE PAST 12 MONTHS, HAS LACK OF TRANSPORTATION KEPT YOU FROM MEETINGS, WORK, OR FROM GETTING THINGS NEEDED FOR DAILY LIVING?: NO

## 2024-04-08 ASSESSMENT — ENCOUNTER SYMPTOMS
PHOTOPHOBIA: 0
RHINORRHEA: 0
COUGH: 0
COLOR CHANGE: 0
DIARRHEA: 0
ABDOMINAL DISTENTION: 0
VOMITING: 0
SINUS PRESSURE: 0
CHEST TIGHTNESS: 0
SHORTNESS OF BREATH: 0
CONSTIPATION: 0
APNEA: 0
BLOOD IN STOOL: 0
SINUS PAIN: 0
FACIAL SWELLING: 0

## 2024-04-08 ASSESSMENT — ANXIETY QUESTIONNAIRES
4. TROUBLE RELAXING: NEARLY EVERY DAY
3. WORRYING TOO MUCH ABOUT DIFFERENT THINGS: NEARLY EVERY DAY
GAD7 TOTAL SCORE: 16
5. BEING SO RESTLESS THAT IT IS HARD TO SIT STILL: MORE THAN HALF THE DAYS
7. FEELING AFRAID AS IF SOMETHING AWFUL MIGHT HAPPEN: SEVERAL DAYS
IF YOU CHECKED OFF ANY PROBLEMS ON THIS QUESTIONNAIRE, HOW DIFFICULT HAVE THESE PROBLEMS MADE IT FOR YOU TO DO YOUR WORK, TAKE CARE OF THINGS AT HOME, OR GET ALONG WITH OTHER PEOPLE: SOMEWHAT DIFFICULT
6. BECOMING EASILY ANNOYED OR IRRITABLE: MORE THAN HALF THE DAYS
2. NOT BEING ABLE TO STOP OR CONTROL WORRYING: NEARLY EVERY DAY
1. FEELING NERVOUS, ANXIOUS, OR ON EDGE: MORE THAN HALF THE DAYS

## 2024-04-08 NOTE — PROGRESS NOTES
nebulizer solution 1 Dose  -     albuterol sulfate HFA (VENTOLIN HFA) 108 (90 Base) MCG/ACT inhaler; Inhale 2 puffs into the lungs 4 times daily as needed for Wheezing or Shortness of Breath  -     fluticasone furoate-vilanterol (BREO ELLIPTA) 100-25 MCG/ACT inhaler; Inhale 1 puff into the lungs daily  -     azithromycin (ZITHROMAX) 250 MG tablet; 500mg on day 1 followed by 250mg on days 2 - 5  -     amoxicillin-clavulanate (AUGMENTIN) 875-125 MG per tablet; Take 1 tablet by mouth 2 times daily for 7 days  -     predniSONE (DELTASONE) 10 MG tablet; 5 tab po qd x 3d, then 4tab po qd x 3d, 3tab po qd x 3d, 2tab po qd x 3d, 1tab po qd x 3d then stop  -     XR CHEST (2 VW); Future  -     CBC with Auto Differential; Future  -     Comprehensive Metabolic Panel; Future    Bilateral leg paresthesia  -     DULoxetine (CYMBALTA) 30 MG extended release capsule; Take 1 capsule by mouth 2 times daily    Long COVID  -     DULoxetine (CYMBALTA) 30 MG extended release capsule; Take 1 capsule by mouth 2 times daily    Chronic pain syndrome  -     DULoxetine (CYMBALTA) 30 MG extended release capsule; Take 1 capsule by mouth 2 times daily    Bronchospasm  -     albuterol sulfate HFA (VENTOLIN HFA) 108 (90 Base) MCG/ACT inhaler; Inhale 2 puffs into the lungs 4 times daily as needed for Wheezing or Shortness of Breath  -     fluticasone furoate-vilanterol (BREO ELLIPTA) 100-25 MCG/ACT inhaler; Inhale 1 puff into the lungs daily  -     azithromycin (ZITHROMAX) 250 MG tablet; 500mg on day 1 followed by 250mg on days 2 - 5  -     amoxicillin-clavulanate (AUGMENTIN) 875-125 MG per tablet; Take 1 tablet by mouth 2 times daily for 7 days  -     predniSONE (DELTASONE) 10 MG tablet; 5 tab po qd x 3d, then 4tab po qd x 3d, 3tab po qd x 3d, 2tab po qd x 3d, 1tab po qd x 3d then stop  -     XR CHEST (2 VW); Future    Subacute cough  -     albuterol sulfate HFA (VENTOLIN HFA) 108 (90 Base) MCG/ACT inhaler; Inhale 2 puffs into the lungs 4 times

## 2024-04-16 ENCOUNTER — TELEPHONE (OUTPATIENT)
Dept: FAMILY MEDICINE CLINIC | Age: 64
End: 2024-04-16

## 2024-04-16 DIAGNOSIS — R74.8 ELEVATED ALKALINE PHOSPHATASE LEVEL: Primary | ICD-10-CM

## 2024-04-16 DIAGNOSIS — R74.01 TRANSAMINITIS: ICD-10-CM

## 2024-04-16 NOTE — TELEPHONE ENCOUNTER
Danuta Arias MD  4/13/2024 10:20 AM EDT Back to Top      Elevated AST/ALT  Plus alkaline phosphatase  ? Liver vs gallbladder issues ?  - please pend US of the Liver to me.

## 2024-04-18 ENCOUNTER — INITIAL CONSULT (OUTPATIENT)
Dept: GASTROENTEROLOGY | Age: 64
End: 2024-04-18
Payer: COMMERCIAL

## 2024-04-18 VITALS
DIASTOLIC BLOOD PRESSURE: 78 MMHG | RESPIRATION RATE: 16 BRPM | TEMPERATURE: 97.2 F | OXYGEN SATURATION: 95 % | HEIGHT: 66 IN | WEIGHT: 199 LBS | HEART RATE: 105 BPM | BODY MASS INDEX: 31.98 KG/M2 | SYSTOLIC BLOOD PRESSURE: 134 MMHG

## 2024-04-18 DIAGNOSIS — Z12.11 COLON CANCER SCREENING: ICD-10-CM

## 2024-04-18 DIAGNOSIS — K21.9 GASTROESOPHAGEAL REFLUX DISEASE WITHOUT ESOPHAGITIS: Primary | ICD-10-CM

## 2024-04-18 PROCEDURE — 99202 OFFICE O/P NEW SF 15 MIN: CPT | Performed by: NURSE PRACTITIONER

## 2024-04-18 NOTE — PROGRESS NOTES
04/08/2024    BILITOT 0.2 04/08/2024    ALKPHOS 116 (H) 04/08/2024    AST 37 (H) 04/08/2024    ALT 33 (H) 04/08/2024    LABGLOM 82 04/08/2024    GFRAA >60 04/27/2022             Assessment & Plan          ASSESSMENT/PLAN:    1. Gastroesophageal reflux disease without esophagitis  -     Yehuda Richards DO, General Surgery, [place]    -ongoing.  Patient is allergic to PPI's and Pepcid. Has been taking Zantac but continues with breakthrough heartburn.  Has tried Carafate without improvement.  Patient is a smoker  -dietary modification discussed along with smoking cessation  -EGD advised to rule out acid reflux, hiatal hernia, ulcer disease, H. Pylori, and Stallworth's esophagus.  Patient is agreeable      2. Colon cancer screening    -colonoscopy advised for routine colon screening.  Patient politely declines.   She would like to have upper endoscopy first.  Will consider a colonoscopy at a later date      Return for Follow up after the procedure.    An electronic signature was used to authenticate this note.    --Areli Isaacs, ASHLEY - CNP

## 2024-04-29 RX ORDER — ATORVASTATIN CALCIUM 20 MG/1
20 TABLET, FILM COATED ORAL DAILY
Qty: 30 TABLET | OUTPATIENT
Start: 2024-04-29

## 2024-05-01 ENCOUNTER — TELEPHONE (OUTPATIENT)
Dept: SURGERY | Age: 64
End: 2024-05-01

## 2024-05-01 NOTE — TELEPHONE ENCOUNTER
Scheduled patient for EGD on 5/29/24 @ 12:00 pM at [facility], patient is to arrive at 11:00 AM. Hold ASA for 7 days prior. Informed patient of date and time, patient verbalized understanding. Instruction mailed.  Electronically signed by Juana Plascencia MA on 5/1/24 at 9:44 AM EDT

## 2024-05-01 NOTE — TELEPHONE ENCOUNTER
Prior Authorization Form:      DEMOGRAPHICS:                     Patient Name:  Annamaria Wilcox  Patient :  1960            Insurance:  Payor: [employer] / Plan: DEVOTED [employer] PLANS / Product Type: *No Product type* /   Insurance ID Number:    Payer/Plan Subscr  Sex Relation Sub. Ins. ID Effective Group Num   1. DEVOTED HEALT* GAGADNEEP WILCOX* 1960 Female Self [id number]                                    [id number]         DIAGNOSIS & PROCEDURE:                       Procedure/Operation: EGD           CPT Code: 36550    Diagnosis:  GERD    ICD10 Code: K21.9    Location:  [facility]    Surgeon:  Dr Alonzo    SCHEDULING INFORMATION:                          Date: 24    Time: 12:00 PM              Anesthesia:  MAC/TIVA                                                       Status:  Outpatient        Special Comments:  N/A       Electronically signed by Juana Plascencia MA on 2024 at 9:45 AM

## 2024-05-23 NOTE — PROGRESS NOTES
[facility] PRE-ADMISSION TESTING INSTRUCTIONS    The Preadmission Testing patient is instructed accordingly using the following criteria (check applicable):    ARRIVAL INSTRUCTIONS:  [x] Parking the day of Surgery is located in the Main Entrance lot.  Upon entering the door, make an immediate right to the surgery reception desk    [x] Bring photo ID and insurance card    [x] Bring in a copy of Living will or Durable Power of  papers.    [x] Please be sure to arrange for responsible adult to provide transportation to and from the hospital    [x] Please arrange for responsible adult to be with you for the 24 hour period post procedure due to having anesthesia    [x] If you awake am of surgery not feeling well or have temperature >100 please call [phone]    GENERAL INSTRUCTIONS:    [x] Nothing by mouth after midnight, including gum, candy, mints or water    [x] You may brush your teeth, but do not swallow any water    [x] Take medications as instructed with 1-2 oz of water    [x] Stop herbal supplements and vitamins 5 days prior to procedure    [] Follow preop dosing of blood thinners per physician instructions    [] Take 1/2 dose of evening insulin, but no insulin after midnight    [] No oral diabetic medications after midnight    [] If diabetic and have low blood sugar or feel symptomatic, take 1-2oz apple juice only    [] Bring inhalers day of surgery    [] Bring C-PAP/ Bi-Pap day of surgery    [] Bring urine specimen day of surgery    [x] Shower or bath with soap, lather and rinse well, AM of Surgery, no lotion, powders or creams to surgical site    [] Follow bowel prep as instructed per surgeon    [x] No tobacco products within 24 hours of surgery     [x] No alcohol or illegal drug use within 24 hours of surgery.    [x] Jewelry, body piercing's, eyeglasses, contact lenses and dentures are not permitted into surgery (bring cases)      [x] Please do not wear any nail polish,

## 2024-05-24 ENCOUNTER — OFFICE VISIT (OUTPATIENT)
Dept: PAIN MANAGEMENT | Age: 64
End: 2024-05-24

## 2024-05-24 VITALS
RESPIRATION RATE: 16 BRPM | HEIGHT: 66 IN | SYSTOLIC BLOOD PRESSURE: 125 MMHG | OXYGEN SATURATION: 97 % | HEART RATE: 90 BPM | DIASTOLIC BLOOD PRESSURE: 69 MMHG | WEIGHT: 195 LBS | TEMPERATURE: 98.2 F | BODY MASS INDEX: 31.34 KG/M2

## 2024-05-24 DIAGNOSIS — G89.29 CHRONIC BILATERAL LOW BACK PAIN WITH BILATERAL SCIATICA: ICD-10-CM

## 2024-05-24 DIAGNOSIS — M54.41 CHRONIC BILATERAL LOW BACK PAIN WITH BILATERAL SCIATICA: ICD-10-CM

## 2024-05-24 DIAGNOSIS — M54.16 LUMBAR RADICULOPATHY: ICD-10-CM

## 2024-05-24 DIAGNOSIS — G89.29 CHRONIC INTRACTABLE HEADACHE, UNSPECIFIED HEADACHE TYPE: ICD-10-CM

## 2024-05-24 DIAGNOSIS — G89.4 CHRONIC PAIN SYNDROME: ICD-10-CM

## 2024-05-24 DIAGNOSIS — G25.81 RESTLESS LEG SYNDROME: ICD-10-CM

## 2024-05-24 DIAGNOSIS — M46.1 SACROILIITIS (HCC): Primary | ICD-10-CM

## 2024-05-24 DIAGNOSIS — R51.9 CHRONIC INTRACTABLE HEADACHE, UNSPECIFIED HEADACHE TYPE: ICD-10-CM

## 2024-05-24 DIAGNOSIS — M54.42 CHRONIC BILATERAL LOW BACK PAIN WITH BILATERAL SCIATICA: ICD-10-CM

## 2024-05-24 DIAGNOSIS — M47.817 LUMBOSACRAL SPONDYLOSIS WITHOUT MYELOPATHY: ICD-10-CM

## 2024-05-24 DIAGNOSIS — M48.062 SPINAL STENOSIS OF LUMBAR REGION WITH NEUROGENIC CLAUDICATION: ICD-10-CM

## 2024-05-24 RX ORDER — SODIUM CHLORIDE 0.9 % (FLUSH) 0.9 %
5-40 SYRINGE (ML) INJECTION EVERY 12 HOURS SCHEDULED
OUTPATIENT
Start: 2024-05-24

## 2024-05-24 RX ORDER — SODIUM CHLORIDE 0.9 % (FLUSH) 0.9 %
5-40 SYRINGE (ML) INJECTION PRN
OUTPATIENT
Start: 2024-05-24

## 2024-05-24 RX ORDER — SODIUM CHLORIDE 9 MG/ML
INJECTION, SOLUTION INTRAVENOUS PRN
OUTPATIENT
Start: 2024-05-24

## 2024-05-24 RX ORDER — TIZANIDINE 2 MG/1
2 TABLET ORAL 2 TIMES DAILY PRN
Qty: 60 TABLET | Refills: 1 | Status: SHIPPED | OUTPATIENT
Start: 2024-05-24

## 2024-05-24 RX ORDER — PRAMIPEXOLE DIHYDROCHLORIDE 0.25 MG/1
TABLET ORAL
Qty: 66 TABLET | Refills: 0 | Status: SHIPPED | OUTPATIENT
Start: 2024-05-24 | End: 2024-06-23

## 2024-05-24 NOTE — PROGRESS NOTES
Annamaria Wilcox presents to the [facility] on 5/24/2024. Annamaria is complaining of pain low back . The pain is constant. The pain is described as aching, throbbing, shooting, and stabbing. Pain is rated on her best day at a 6, on her worst day at a 7, and on average at a 6 on the VAS scale. She took her last dose of Neurontin .    Any procedures since your last visit: No,   She is not on NSAIDS and  is not on anticoagulation medications     Pacemaker or defibrillator: No     Medication Contract and Consent for Opioid Use Documents Filed        No documents found                       /69   Pulse 90   Temp 98.2 °F (36.8 °C) (Temporal)   Resp 16   Ht 1.676 m (5' 6\")   Wt 88.5 kg (195 lb)   SpO2 97%   BMI 31.47 kg/m²      No LMP recorded. Patient is postmenopausal.  
signal appears unremarkable.  Small  nodes are seen along the superior endplate of L2 and the inferior endplate of  the L3 vertebral bodies.  Enhancement is seen associated with 1 of the  Schmorl's node along the inferior endplate of the L3 vertebral body small 5  mm T1 hypointense and T2 hyperintense lesion in the L3 vertebral body (series  3, image 7) does not demonstrate any enhancement.    SPINAL CORD:  The conus terminates normally.    SOFT TISSUES: No abnormal enhancement is seen of the lumbar spine.  No  paraspinal mass identified.    L1-L2: Moderate loss of disc height with a disc bulge.  Mild facet  hypertrophy.  Mild central canal, lateral recess and neural foraminal  stenoses.    L2-L3: Prominent loss of disc height with a disc osteophyte complex.  Small  disc protrusion in the left lateral recess.  Mild facet hypertrophy.  No  significant central canal stenosis.  Moderate stenosis of the left lateral  recess.  Mild-to-moderate neural foraminal stenoses.    L3-L4: Prominent loss of disc height with a small disc osteophyte complex.  Mild facet and ligamentous hypertrophy.  No significant central canal  stenosis.  Mild lateral recess stenoses.  Moderate to severe right and  moderate left neural foraminal stenoses.    L4-L5: Disc desiccation with a small disc bulge.  Mild facet hypertrophy.  No  significant central canal stenosis.  Mild lateral recess and neural foraminal  stenoses.    L5-S1: Disc desiccation with small disc bulge.  Small left foraminal and  extraforaminal disc protrusion.  No significant central canal or lateral  recess stenosis.  Moderate left neural foraminal stenosis.    Impression  1. 5 mm T1 hypointense nonenhancing lesion in the L3 vertebral body is  nonspecific.  Absence of enhancement would be atypical for un treated  metastatic disease.  2. Schmorl nodes along L2 and L3 vertebral body endplates.  3. Mild central canal stenosis at L1-2.  4.  Multilevel neural foraminal stenoses,

## 2024-05-29 ENCOUNTER — ANESTHESIA EVENT (OUTPATIENT)
Dept: ENDOSCOPY | Age: 64
End: 2024-05-29
Payer: COMMERCIAL

## 2024-05-29 ENCOUNTER — ANESTHESIA (OUTPATIENT)
Dept: ENDOSCOPY | Age: 64
End: 2024-05-29
Payer: COMMERCIAL

## 2024-05-29 ENCOUNTER — HOSPITAL ENCOUNTER (OUTPATIENT)
Age: 64
Setting detail: OUTPATIENT SURGERY
Discharge: HOME | End: 2024-05-29
Attending: SURGERY | Admitting: SURGERY
Payer: COMMERCIAL

## 2024-05-29 VITALS
RESPIRATION RATE: 18 BRPM | DIASTOLIC BLOOD PRESSURE: 80 MMHG | HEIGHT: 66 IN | BODY MASS INDEX: 31.34 KG/M2 | HEART RATE: 84 BPM | WEIGHT: 195 LBS | SYSTOLIC BLOOD PRESSURE: 142 MMHG | OXYGEN SATURATION: 95 % | TEMPERATURE: 97 F

## 2024-05-29 DIAGNOSIS — K21.9 GASTROESOPHAGEAL REFLUX DISEASE, UNSPECIFIED WHETHER ESOPHAGITIS PRESENT: ICD-10-CM

## 2024-05-29 PROCEDURE — 2709999900 HC NON-CHARGEABLE SUPPLY: Performed by: SURGERY

## 2024-05-29 PROCEDURE — 2500000003 HC RX 250 WO HCPCS: Performed by: NURSE ANESTHETIST, CERTIFIED REGISTERED

## 2024-05-29 PROCEDURE — 7100000010 HC PHASE II RECOVERY - FIRST 15 MIN: Performed by: SURGERY

## 2024-05-29 PROCEDURE — 3700000000 HC ANESTHESIA ATTENDED CARE: Performed by: SURGERY

## 2024-05-29 PROCEDURE — 2580000003 HC RX 258: Performed by: SURGERY

## 2024-05-29 PROCEDURE — 88305 TISSUE EXAM BY PATHOLOGIST: CPT

## 2024-05-29 PROCEDURE — 99204 OFFICE O/P NEW MOD 45 MIN: CPT | Performed by: SURGERY

## 2024-05-29 PROCEDURE — 6360000002 HC RX W HCPCS: Performed by: NURSE ANESTHETIST, CERTIFIED REGISTERED

## 2024-05-29 PROCEDURE — 3609012400 HC EGD TRANSORAL BIOPSY SINGLE/MULTIPLE: Performed by: SURGERY

## 2024-05-29 PROCEDURE — 7100000011 HC PHASE II RECOVERY - ADDTL 15 MIN: Performed by: SURGERY

## 2024-05-29 RX ORDER — SODIUM CHLORIDE 0.9 % (FLUSH) 0.9 %
5-40 SYRINGE (ML) INJECTION PRN
Status: DISCONTINUED | OUTPATIENT
Start: 2024-05-29 | End: 2024-05-29 | Stop reason: HOSPADM

## 2024-05-29 RX ORDER — PROPOFOL 10 MG/ML
INJECTION, EMULSION INTRAVENOUS PRN
Status: DISCONTINUED | OUTPATIENT
Start: 2024-05-29 | End: 2024-05-29 | Stop reason: SDUPTHER

## 2024-05-29 RX ORDER — SODIUM CHLORIDE 0.9 % (FLUSH) 0.9 %
5-40 SYRINGE (ML) INJECTION EVERY 12 HOURS SCHEDULED
Status: DISCONTINUED | OUTPATIENT
Start: 2024-05-29 | End: 2024-05-29 | Stop reason: HOSPADM

## 2024-05-29 RX ORDER — SODIUM CHLORIDE 9 MG/ML
25 INJECTION, SOLUTION INTRAVENOUS PRN
Status: DISCONTINUED | OUTPATIENT
Start: 2024-05-29 | End: 2024-05-29 | Stop reason: HOSPADM

## 2024-05-29 RX ORDER — GLYCOPYRROLATE 0.2 MG/ML
INJECTION INTRAMUSCULAR; INTRAVENOUS PRN
Status: DISCONTINUED | OUTPATIENT
Start: 2024-05-29 | End: 2024-05-29 | Stop reason: SDUPTHER

## 2024-05-29 RX ORDER — LIDOCAINE HYDROCHLORIDE 20 MG/ML
INJECTION, SOLUTION EPIDURAL; INFILTRATION; INTRACAUDAL; PERINEURAL PRN
Status: DISCONTINUED | OUTPATIENT
Start: 2024-05-29 | End: 2024-05-29 | Stop reason: SDUPTHER

## 2024-05-29 RX ADMIN — GLYCOPYRROLATE 0.2 MG: 0.2 INJECTION INTRAMUSCULAR; INTRAVENOUS at 11:56

## 2024-05-29 RX ADMIN — LIDOCAINE HYDROCHLORIDE 80 MG: 20 INJECTION, SOLUTION EPIDURAL; INFILTRATION; INTRACAUDAL; PERINEURAL at 11:56

## 2024-05-29 RX ADMIN — PROPOFOL 100 MG: 10 INJECTION, EMULSION INTRAVENOUS at 11:56

## 2024-05-29 RX ADMIN — SODIUM CHLORIDE: 9 INJECTION, SOLUTION INTRAVENOUS at 11:55

## 2024-05-29 RX ADMIN — PROPOFOL 50 MG: 10 INJECTION, EMULSION INTRAVENOUS at 11:58

## 2024-05-29 ASSESSMENT — PAIN - FUNCTIONAL ASSESSMENT: PAIN_FUNCTIONAL_ASSESSMENT: 0-10

## 2024-05-29 ASSESSMENT — LIFESTYLE VARIABLES: SMOKING_STATUS: 1

## 2024-05-29 ASSESSMENT — ENCOUNTER SYMPTOMS: SHORTNESS OF BREATH: 1

## 2024-05-29 NOTE — ANESTHESIA POSTPROCEDURE EVALUATION
Department of Anesthesiology  Postprocedure Note    Patient: Annamaria Wilcox  MRN: [id number]  YOB: 1960  Date of evaluation: 5/29/2024    Procedure Summary       Date: 05/29/24 Room / Location: [address]    Anesthesia Start: 1155 Anesthesia Stop: 1209    Procedure: ESOPHAGOGASTRODUODENOSCOPY BIOPSY Diagnosis:       Gastroesophageal reflux disease, unspecified whether esophagitis present      (Gastroesophageal reflux disease, unspecified whether esophagitis present [K21.9])    Surgeons: Yehuda Alonzo DO Responsible Provider: Gareth Zarate MD    Anesthesia Type: MAC ASA Status: 3            Anesthesia Type: No value filed.    Kinjal Phase I:      Kinjal Phase II:      Anesthesia Post Evaluation    Patient location during evaluation: bedside  Patient participation: complete - patient participated  Level of consciousness: awake  Airway patency: patent  Nausea & Vomiting: no nausea and no vomiting  Cardiovascular status: blood pressure returned to baseline  Respiratory status: acceptable  Hydration status: euvolemic  Pain management: adequate        No notable events documented.

## 2024-05-29 NOTE — H&P
03/16/2023 04:29 AM    CL 98 04/08/2024 03:27 PM    CO2 26 04/08/2024 03:27 PM    BUN 11 04/08/2024 03:27 PM    CREATININE 0.8 04/08/2024 03:27 PM    GFRAA >60 04/27/2022 09:20 AM    LABGLOM 82 04/08/2024 03:27 PM    GLUCOSE 96 04/08/2024 03:27 PM    CALCIUM 9.3 04/08/2024 03:27 PM    BILITOT 0.2 04/08/2024 03:27 PM    ALKPHOS 116 04/08/2024 03:27 PM    AST 37 04/08/2024 03:27 PM    ALT 33 04/08/2024 03:27 PM     PT/INR:    Lab Results   Component Value Date/Time    PROTIME 10.3 03/16/2023 10:39 AM    INR 0.9 03/16/2023 10:39 AM     HgBA1c:    Lab Results   Component Value Date/Time    LABA1C 5.5 08/08/2017 10:25 AM     LIPASE:    Lab Results   Component Value Date/Time    LIPASE 33 03/13/2023 11:35 AM          Yehuda Alonzo DO    I have examined the patient and performed the key aspects of physical exam, and reviewed the record (including laboratory findings, results, and all pertinent radiology images, which are independently reviewed and interpreted unless otherwise explicitly stated).  The referring provider's notes were also reviewed.    Any procedures planned or discussed as above had risks, benefits, and reasonable alternatives thoroughly discussed with the patient or responsible party.    NOTE: This report, in part or full, may have been transcribed using voice recognition software. Every effort was made to ensure accuracy; however, inadvertent computerized transcription errors may be present. Please excuse any transcriptional grammatical or spelling errors that may have escaped my editorial review.

## 2024-05-29 NOTE — ANESTHESIA PRE PROCEDURE
asthma: current smoker          Patient did not smoke on day of surgery.                 Cardiovascular:Negative CV ROS    (+) hyperlipidemia      ECG reviewed                        Neuro/Psych:   (+) neuromuscular disease:, headaches:, psychiatric history:depression/anxiety              ROS comment: B/L LE paresthesias GI/Hepatic/Renal:   (+) GERD:          Endo/Other:                     Abdominal: normal exam            Vascular: negative vascular ROS.         Other Findings:             Anesthesia Plan      MAC     ASA 3       Induction: intravenous.      Anesthetic plan and risks discussed with patient.      Plan discussed with attending.                    ASHLEY Bradley - CRNA   5/29/2024

## 2024-06-06 LAB — SURGICAL PATHOLOGY REPORT: NORMAL

## 2024-06-13 RX ORDER — PRAMIPEXOLE DIHYDROCHLORIDE 0.25 MG/1
TABLET ORAL
Qty: 66 TABLET | Refills: 0 | OUTPATIENT
Start: 2024-06-13

## 2024-06-18 ENCOUNTER — TELEPHONE (OUTPATIENT)
Dept: PAIN MANAGEMENT | Age: 64
End: 2024-06-18

## 2024-06-18 DIAGNOSIS — M46.1 SACROILIITIS (HCC): Primary | ICD-10-CM

## 2024-06-18 NOTE — TELEPHONE ENCOUNTER
Call to Annamaria Wilcox and message left that procedure was scheduled for 6/25/24 and that [facility] should call her a few days before for the pre op call and between 2:00 PM and 4:00 PM  the business day before with the arrival time. Instructed Annamaria to hold ibuprofen for 24 hours, Celebrex, Mobic, and naprosyn for 4 days and any aspirin containing products, CoQ-10, or fish oil for 7 days. Instructed to call office back. Advised that if they do not return the call it may result in their procedure being delayed.    Electronically signed by ERNESTO COLLADO RN on 6/18/2024 at 3:23 PM

## 2024-06-20 ENCOUNTER — OFFICE VISIT (OUTPATIENT)
Dept: GASTROENTEROLOGY | Age: 64
End: 2024-06-20
Payer: COMMERCIAL

## 2024-06-20 VITALS
RESPIRATION RATE: 16 BRPM | SYSTOLIC BLOOD PRESSURE: 132 MMHG | HEART RATE: 88 BPM | HEIGHT: 66 IN | OXYGEN SATURATION: 95 % | BODY MASS INDEX: 30.37 KG/M2 | TEMPERATURE: 97.2 F | WEIGHT: 189 LBS | DIASTOLIC BLOOD PRESSURE: 88 MMHG

## 2024-06-20 DIAGNOSIS — K21.9 GASTROESOPHAGEAL REFLUX DISEASE WITHOUT ESOPHAGITIS: Primary | ICD-10-CM

## 2024-06-20 PROCEDURE — 99212 OFFICE O/P EST SF 10 MIN: CPT | Performed by: NURSE PRACTITIONER

## 2024-06-20 RX ORDER — TRIAMCINOLONE ACETONIDE 1 MG/G
CREAM TOPICAL
COMMUNITY
Start: 2024-06-15

## 2024-06-20 NOTE — PROGRESS NOTES
Annamaria Wilcox (:  1960) is a 64 y.o. female, here for evaluation of the following chief complaint(s):  Follow-up (Follow up after scope )      SUBJECTIVE/OBJECTIVE:  HPI:    Margy is a very pleasant 64 year old female that presents today for follow up on EGD    EGD-     Normal esophagus. Gastritis, characterized by erythema. Biopsied. Normal examined duodenum.     -- Diagnosis --   A.          Antral Biopsy:         Reactive gastropathy.   No evidence of intestinal metaplasia or dysplasia.     Patient tells me today that she develops a rash and low BP with PPI medication  Patient is also allergic to Pepcid, has an allergic reaction  Taking Carafate three times a day to control heartburn  She admits to intermittent reflux .  Will add Gaviscon as needed  Patient is a smoker               ROS:  General: Patient denies n/v/f/c or weight loss.  HEENT: Patient denies persistent postnasal drip, scleral icterus, drooling, persistent bleeding from nose/mouth.  Resp: Patient denies SOB, wheezing, productive cough.  Cards: Patient denies CP, palpitations, significant edema  GI: As above.  Derm: Patient denies jaundice/rashes.   Musc: Patient denies diffuse/irregular joint swelling or myalgias.      Objective   Wt Readings from Last 3 Encounters:   24 85.7 kg (189 lb)   24 88.5 kg (195 lb)   24 88.5 kg (195 lb)     Temp Readings from Last 3 Encounters:   24 97.2 °F (36.2 °C)   24 97 °F (36.1 °C) (Temporal)   24 98.2 °F (36.8 °C) (Temporal)     BP Readings from Last 3 Encounters:   24 132/88   24 (!) 142/80   24 125/69     Pulse Readings from Last 3 Encounters:   24 88   24 84   24 90        Physical Exam  Constitutional:       Appearance: Normal appearance.   Neurological:      Mental Status: She is alert.         Past Medical History:   Diagnosis Date    Anxiety     Asthma     Depression     GERD (gastroesophageal reflux disease)

## 2024-06-20 NOTE — PROGRESS NOTES
make up or hair products on the day of surgery    [x] You can expect a call the business day prior to procedure to notify you of your arrival time     [x] If you receive a survey after surgery we would greatly appreciate your comments    [] Parent/guardian of a minor must accompany their child and remain on the premises  the entire time they are under our care     [] Pediatric patients may bring favorite toy, blanket or comfort item with them    [] A caregiver or family member must remain with the patient during their stay if they are mentally handicapped, have dementia, disoriented or unable to use a call light or would be a safety concern if left unattended    [x] Please notify surgeon if you develop any illness between now and time of surgery (cold, cough, sore throat, fever, nausea, vomiting) or any signs of infections  including skin, wounds, and dental.    []  The Outpatient Pharmacy is available to fill your prescription here on your day of surgery, ask your preop nurse for details    [x] Other instructions: Wear comfortable clothing    EDUCATIONAL MATERIALS PROVIDED:    [] PAT Preoperative Education Packet/Booklet     [] Medication List    [] Transfusion bracelet applied with instructions    [] Shower with soap, lather and rinse well, and use CHG wipes provided the evening before surgery as instructed    [] Incentive spirometer with instructions

## 2024-06-25 ENCOUNTER — HOSPITAL ENCOUNTER (OUTPATIENT)
Dept: GENERAL RADIOLOGY | Age: 64
Setting detail: OUTPATIENT SURGERY
Discharge: HOME | End: 2024-06-27
Attending: STUDENT IN AN ORGANIZED HEALTH CARE EDUCATION/TRAINING PROGRAM
Payer: COMMERCIAL

## 2024-06-25 ENCOUNTER — ANESTHESIA EVENT (OUTPATIENT)
Dept: OPERATING ROOM | Age: 64
End: 2024-06-25
Payer: COMMERCIAL

## 2024-06-25 ENCOUNTER — HOSPITAL ENCOUNTER (OUTPATIENT)
Age: 64
Setting detail: OUTPATIENT SURGERY
Discharge: HOME | End: 2024-06-25
Attending: STUDENT IN AN ORGANIZED HEALTH CARE EDUCATION/TRAINING PROGRAM | Admitting: STUDENT IN AN ORGANIZED HEALTH CARE EDUCATION/TRAINING PROGRAM
Payer: COMMERCIAL

## 2024-06-25 ENCOUNTER — ANESTHESIA (OUTPATIENT)
Dept: OPERATING ROOM | Age: 64
End: 2024-06-25
Payer: COMMERCIAL

## 2024-06-25 VITALS
HEIGHT: 66 IN | RESPIRATION RATE: 18 BRPM | TEMPERATURE: 97.1 F | OXYGEN SATURATION: 94 % | SYSTOLIC BLOOD PRESSURE: 143 MMHG | WEIGHT: 188 LBS | BODY MASS INDEX: 30.22 KG/M2 | DIASTOLIC BLOOD PRESSURE: 71 MMHG | HEART RATE: 77 BPM

## 2024-06-25 DIAGNOSIS — R52 PAIN MANAGEMENT: ICD-10-CM

## 2024-06-25 PROCEDURE — 7100000011 HC PHASE II RECOVERY - ADDTL 15 MIN: Performed by: STUDENT IN AN ORGANIZED HEALTH CARE EDUCATION/TRAINING PROGRAM

## 2024-06-25 PROCEDURE — 3700000000 HC ANESTHESIA ATTENDED CARE: Performed by: STUDENT IN AN ORGANIZED HEALTH CARE EDUCATION/TRAINING PROGRAM

## 2024-06-25 PROCEDURE — 2709999900 HC NON-CHARGEABLE SUPPLY: Performed by: STUDENT IN AN ORGANIZED HEALTH CARE EDUCATION/TRAINING PROGRAM

## 2024-06-25 PROCEDURE — 27096 INJECT SACROILIAC JOINT: CPT | Performed by: STUDENT IN AN ORGANIZED HEALTH CARE EDUCATION/TRAINING PROGRAM

## 2024-06-25 PROCEDURE — 2580000003 HC RX 258

## 2024-06-25 PROCEDURE — 7100000010 HC PHASE II RECOVERY - FIRST 15 MIN: Performed by: STUDENT IN AN ORGANIZED HEALTH CARE EDUCATION/TRAINING PROGRAM

## 2024-06-25 PROCEDURE — 6360000002 HC RX W HCPCS

## 2024-06-25 PROCEDURE — 3600000002 HC SURGERY LEVEL 2 BASE: Performed by: STUDENT IN AN ORGANIZED HEALTH CARE EDUCATION/TRAINING PROGRAM

## 2024-06-25 PROCEDURE — 6360000002 HC RX W HCPCS: Performed by: STUDENT IN AN ORGANIZED HEALTH CARE EDUCATION/TRAINING PROGRAM

## 2024-06-25 PROCEDURE — 2500000003 HC RX 250 WO HCPCS: Performed by: STUDENT IN AN ORGANIZED HEALTH CARE EDUCATION/TRAINING PROGRAM

## 2024-06-25 PROCEDURE — 6360000004 HC RX CONTRAST MEDICATION: Performed by: STUDENT IN AN ORGANIZED HEALTH CARE EDUCATION/TRAINING PROGRAM

## 2024-06-25 RX ORDER — SODIUM CHLORIDE 0.9 % (FLUSH) 0.9 %
5-40 SYRINGE (ML) INJECTION PRN
Status: DISCONTINUED | OUTPATIENT
Start: 2024-06-25 | End: 2024-06-25 | Stop reason: HOSPADM

## 2024-06-25 RX ORDER — METHYLPREDNISOLONE ACETATE 40 MG/ML
INJECTION, SUSPENSION INTRA-ARTICULAR; INTRALESIONAL; INTRAMUSCULAR; SOFT TISSUE PRN
Status: DISCONTINUED | OUTPATIENT
Start: 2024-06-25 | End: 2024-06-25 | Stop reason: ALTCHOICE

## 2024-06-25 RX ORDER — SODIUM CHLORIDE 9 MG/ML
INJECTION, SOLUTION INTRAVENOUS PRN
Status: DISCONTINUED | OUTPATIENT
Start: 2024-06-25 | End: 2024-06-25 | Stop reason: HOSPADM

## 2024-06-25 RX ORDER — IOPAMIDOL 612 MG/ML
INJECTION, SOLUTION INTRATHECAL PRN
Status: DISCONTINUED | OUTPATIENT
Start: 2024-06-25 | End: 2024-06-25 | Stop reason: ALTCHOICE

## 2024-06-25 RX ORDER — FENTANYL CITRATE 50 UG/ML
INJECTION, SOLUTION INTRAMUSCULAR; INTRAVENOUS PRN
Status: DISCONTINUED | OUTPATIENT
Start: 2024-06-25 | End: 2024-06-25 | Stop reason: SDUPTHER

## 2024-06-25 RX ORDER — LIDOCAINE HYDROCHLORIDE 5 MG/ML
INJECTION, SOLUTION INFILTRATION; INTRAVENOUS PRN
Status: DISCONTINUED | OUTPATIENT
Start: 2024-06-25 | End: 2024-06-25 | Stop reason: ALTCHOICE

## 2024-06-25 RX ORDER — BUPIVACAINE HYDROCHLORIDE 2.5 MG/ML
INJECTION, SOLUTION EPIDURAL; INFILTRATION; INTRACAUDAL PRN
Status: DISCONTINUED | OUTPATIENT
Start: 2024-06-25 | End: 2024-06-25 | Stop reason: ALTCHOICE

## 2024-06-25 RX ORDER — MIDAZOLAM HYDROCHLORIDE 1 MG/ML
INJECTION INTRAMUSCULAR; INTRAVENOUS PRN
Status: DISCONTINUED | OUTPATIENT
Start: 2024-06-25 | End: 2024-06-25 | Stop reason: SDUPTHER

## 2024-06-25 RX ORDER — SODIUM CHLORIDE 9 MG/ML
INJECTION, SOLUTION INTRAVENOUS CONTINUOUS PRN
Status: DISCONTINUED | OUTPATIENT
Start: 2024-06-25 | End: 2024-06-25 | Stop reason: SDUPTHER

## 2024-06-25 RX ORDER — SODIUM CHLORIDE 0.9 % (FLUSH) 0.9 %
5-40 SYRINGE (ML) INJECTION EVERY 12 HOURS SCHEDULED
Status: DISCONTINUED | OUTPATIENT
Start: 2024-06-25 | End: 2024-06-25 | Stop reason: HOSPADM

## 2024-06-25 RX ADMIN — MIDAZOLAM 2 MG: 1 INJECTION INTRAMUSCULAR; INTRAVENOUS at 10:33

## 2024-06-25 RX ADMIN — FENTANYL CITRATE 100 MCG: 50 INJECTION, SOLUTION INTRAMUSCULAR; INTRAVENOUS at 10:36

## 2024-06-25 RX ADMIN — SODIUM CHLORIDE: 9 INJECTION, SOLUTION INTRAVENOUS at 08:15

## 2024-06-25 ASSESSMENT — PAIN DESCRIPTION - DESCRIPTORS: DESCRIPTORS: SHARP;SHOOTING

## 2024-06-25 ASSESSMENT — PAIN - FUNCTIONAL ASSESSMENT
PAIN_FUNCTIONAL_ASSESSMENT: NONE - DENIES PAIN
PAIN_FUNCTIONAL_ASSESSMENT: 0-10

## 2024-06-25 ASSESSMENT — LIFESTYLE VARIABLES: SMOKING_STATUS: 1

## 2024-06-25 NOTE — H&P
[facility]  Pain Medicine  [address] [place] [address]    Procedure History & Physical      Annamaria Wilcox     HPI:    Patient  is here with  Left hip pain, Left SIJ  pain for Left SIJ injection with MAC Sedation   Labs/imaging studies reviewed   All question and concerns addressed including R/B/A associated with the procedure    Past Medical History:   Diagnosis Date    Anxiety     Asthma     Depression     GERD (gastroesophageal reflux disease)     Hyperlipidemia     Lumbar radiculopathy        Past Surgical History:   Procedure Laterality Date    CARPAL TUNNEL RELEASE Bilateral     CERVICAL FUSION       SECTION      x 3    CT BIOPSY PERCUTANEOUS DEEP BONE  2023    CT BIOPSY PERCUTANEOUS DEEP BONE 3/17/2023 Kristian Wild MD [facility]    DENTAL SURGERY N/A 2019    DENTAL EXAM FULL MOUTH EXTRACTIONS ALEOPOLASTY performed by Ludwig Thorne DDS at [facility] OR    ESOPHAGOGASTRODUODENOSCOPY      PAIN MANAGEMENT PROCEDURE Left 2023    LUMBAR EPIDURAL STEROID INJECTION UNDER FLUOROSCOPIC GUIDANCE AT L4-L5 LEFT PARAMEDIAN performed by Lore Mahajan MD at [facility] OR    PAIN MANAGEMENT PROCEDURE Left 2023    LUMBAR EPIDURAL STEROID INJECTION UNDER FLUOROSCOPIC GUIDANCE AT L2-L3 LEFT  PARAMEDIAN (KENALOG)- ABORTED DUE TO HYPOTENSION performed by Lore Mahajan MD at [facility] OR    PAIN MANAGEMENT PROCEDURE Left 10/10/2023    LUMBAR EPIDURAL STEROID INJECTION UNDER FLUOROSCOPIC GUIDANCE AT L2-L3 LEFT  PARAMEDIAN        (KENALOG) performed by Lore Mahajan MD at [facility] OR    PAIN MANAGEMENT PROCEDURE N/A 2023    LUMBAR EPIDURAL STEROID INJECTION UNDER FLUOROSCOPIC GUIDANCE AT L2-L3 LEFT  PARAMEDIAN performed by Lore Mahjaan MD at [facility] OR    UPPER GASTROINTESTINAL ENDOSCOPY N/A 2024    ESOPHAGOGASTRODUODENOSCOPY BIOPSY performed by Yehuda Alonzo DO at [facility] ENDOSCOPY       Prior to Admission medications

## 2024-06-25 NOTE — ANESTHESIA PRE PROCEDURE
Vitals:    06/20/24 1553 06/25/24 0823   BP:  135/72   Pulse:  77   Resp:  17   Temp:  97.1 °F (36.2 °C)   TempSrc:  Temporal   SpO2:  96%   Weight: 85.3 kg (188 lb)    Height: 1.676 m (5' 6\")                                               BP Readings from Last 3 Encounters:   06/25/24 135/72   06/20/24 132/88   05/29/24 (!) 142/80       NPO Status: Time of last liquid consumption: 2200                        Time of last solid consumption: 1900                        Date of last liquid consumption: 06/24/24                        Date of last solid food consumption: 06/24/24    BMI:   Wt Readings from Last 3 Encounters:   06/20/24 85.3 kg (188 lb)   06/20/24 85.7 kg (189 lb)   05/29/24 88.5 kg (195 lb)     Body mass index is 30.34 kg/m².    CBC:   Lab Results   Component Value Date/Time    WBC 6.7 04/08/2024 03:27 PM    RBC 4.80 04/08/2024 03:27 PM    HGB 14.4 04/08/2024 03:27 PM    HCT 43.5 04/08/2024 03:27 PM    MCV 90.6 04/08/2024 03:27 PM    RDW 14.7 04/08/2024 03:27 PM     04/08/2024 03:27 PM       CMP:   Lab Results   Component Value Date/Time     04/08/2024 03:27 PM    K 4.1 04/08/2024 03:27 PM    K 4.1 03/16/2023 04:29 AM    CL 98 04/08/2024 03:27 PM    CO2 26 04/08/2024 03:27 PM    BUN 11 04/08/2024 03:27 PM    CREATININE 0.8 04/08/2024 03:27 PM    GFRAA >60 04/27/2022 09:20 AM    LABGLOM 82 04/08/2024 03:27 PM    GLUCOSE 96 04/08/2024 03:27 PM    CALCIUM 9.3 04/08/2024 03:27 PM    BILITOT 0.2 04/08/2024 03:27 PM    ALKPHOS 116 04/08/2024 03:27 PM    AST 37 04/08/2024 03:27 PM    ALT 33 04/08/2024 03:27 PM       POC Tests: No results for input(s): \"POCGLU\", \"POCNA\", \"POCK\", \"POCCL\", \"POCBUN\", \"POCHEMO\", \"POCHCT\" in the last 72 hours.    Coags:   Lab Results   Component Value Date/Time    PROTIME 10.3 03/16/2023 10:39 AM    INR 0.9 03/16/2023 10:39 AM       HCG (If Applicable): No results found for: \"PREGTESTUR\", \"PREGSERUM\", \"HCG\", \"HCGQUANT\"     ABGs: No results found for: \"PHART\",

## 2024-06-25 NOTE — DISCHARGE INSTRUCTIONS
[facility] Pain Management Department  Athena Agrlxw-[phone]  Dr. Darby Cerda   Post-Pain Block/Radiofrequency  Home Going Instructions    1-Go home, rest for the remainder of the day  2-Please do not lift over 20 pounds the day of the injection  3-If you received sedation No: alcohol, driving, operating lawn mowers, plows, tractors or other dangerous equipment until next morning. Do not make important decisions or sign legal documents for 24 hours. You may experience light headedness, dizziness, nausea or sleepiness after sedation. Do not stay alone. A responsible adult must be with you for 24 hours. You could be nauseated from the medications you have received. Your IV site may be sore and bruised.    4-No dietary restrictions     5-Resume all medications the same day, blood thinners to be resumed 24 hours after injection if you were instructed to stop any.    6-Keep the surgical site clean and dry, you may shower the next morning and remove the      dressing.     7- No sitz baths, tub baths or hot tubs/swimming for 24 hours.       8- If you have any pain at the injection site(s), application of an ice pack to the area should be       helpful, 20 minutes on/20 minutes off for next 48 hours.  9- Call [facility] Pain Management immediately at if you develop.  Fever greater than 100.4 F  Have bleeding or drainage from the puncture site  Have progressive Leg/arm numbness and or weakness  Loss of control of bowel and or bladder (wet/soil yourself)  Severe headache with inability to lift head  10-You may return to work the next day

## 2024-06-25 NOTE — OP NOTE
2024    Patient: Annamaria Wilcox  :  1960  Age:  64 y.o.  Sex:  female     PRE-OPERATIVE DIAGNOSIS: Left   Sacroiliitis, somatic dysfunction of the lumbosacral spine.    POST-OPERATIVE DIAGNOSIS: Same.    PROCEDURE:  Fluoroscopic guided Left   sacroiliac joint injection with steroid (#1).    SURGEON:  Lore Mahajan MD    ANESTHESIA: Monitored Anesthesia Care    ESTIMATED BLOOD LOSS: None.  ______________________________________________________________________  BRIEF HISTORY:  Annamaria Wilcox comes in today for first Left sacroiliac joint injection under fluoroscopic guidance. The potential complications as well as the procedure in detail were explained to her today. She has elected to undergo the aforementioned procedure.    Annamaria's complete History & Physical examination were reviewed in depth, a copy of which is in the chart.      DESCRIPTION OF PROCEDURE:    After confirming written and informed consent, a time-out was performed and Annamaria’s name and date of birth, the procedure to be performed as well as the plan for the location of the needle insertion were confirmed.    The patient was brought into the procedure room and placed in the prone position on the fluoroscopy table. Standard monitors were placed and vital signs were observed throughout the procedure. The low back and upper buttocks area was prepped with chloraprep and draped in a sterile manner. AP fluoroscopy was used to visualize the sacroiliac joint. The fluoroscopic beam was then obliqued until the anterior and posterior margins of the joint were aligned. The inferior margin of the joint was identified and marked. The skin and subcutaneous tissue about this identified point were anesthestized with 0.5% lidocaine. A 22 gauge 3 1/2 spinal needle was advanced toward the the identified point under fluoroscopic guidance. Once the targeted point was reached and the joint space was entered, negative aspiration was

## 2024-06-25 NOTE — ANESTHESIA POSTPROCEDURE EVALUATION
Department of Anesthesiology  Postprocedure Note    Patient: Annamaria Wilcox  MRN: [id number]  YOB: 1960  Date of evaluation: 6/25/2024    Procedure Summary       Date: 06/25/24 Room / Location: [facility] PROCEDURE ROOM 02 / [facility]    Anesthesia Start: 1033 Anesthesia Stop: 1046    Procedure: LEFT SACROILIAC JOINT INJECTION UNDER FLUOROSCOPIC GUIDANCE (Left: Sacrum) Diagnosis:       Sacroiliitis (HCC)      (Sacroiliitis (HCC) [M46.1])    Surgeons: Lore Mahajan MD Responsible Provider: Ottoniel Flanagan DO    Anesthesia Type: MAC ASA Status: 3            Anesthesia Type: No value filed.    Kinjal Phase I: Kinjal Score: 10    Kinjal Phase II:      Anesthesia Post Evaluation    Patient location during evaluation: PACU  Patient participation: complete - patient participated  Level of consciousness: awake and alert  Pain score: 0  Airway patency: patent  Nausea & Vomiting: no nausea and no vomiting  Cardiovascular status: blood pressure returned to baseline  Respiratory status: acceptable  Hydration status: euvolemic        No notable events documented.

## 2024-07-22 RX ORDER — GABAPENTIN 300 MG/1
300 CAPSULE ORAL 3 TIMES DAILY
Qty: 270 CAPSULE | OUTPATIENT
Start: 2024-07-22

## 2024-07-22 RX ORDER — PRAMIPEXOLE DIHYDROCHLORIDE 0.25 MG/1
TABLET ORAL
Qty: 66 TABLET | Refills: 0 | OUTPATIENT
Start: 2024-07-22 | End: 2024-08-20

## 2024-07-22 RX ORDER — TIZANIDINE 2 MG/1
2 TABLET ORAL 2 TIMES DAILY PRN
Qty: 60 TABLET | Refills: 1 | OUTPATIENT
Start: 2024-07-22

## 2024-07-22 RX ORDER — PRAMIPEXOLE DIHYDROCHLORIDE 0.25 MG/1
TABLET ORAL
Qty: 66 TABLET | Refills: 0 | OUTPATIENT
Start: 2024-07-22

## 2024-07-22 RX ORDER — GABAPENTIN 300 MG/1
300 CAPSULE ORAL 3 TIMES DAILY
Qty: 90 CAPSULE | Refills: 0 | OUTPATIENT
Start: 2024-07-22 | End: 2024-08-21

## 2024-07-25 ENCOUNTER — OFFICE VISIT (OUTPATIENT)
Dept: PAIN MANAGEMENT | Age: 64
End: 2024-07-25
Payer: COMMERCIAL

## 2024-07-25 VITALS
TEMPERATURE: 97.1 F | OXYGEN SATURATION: 96 % | DIASTOLIC BLOOD PRESSURE: 80 MMHG | SYSTOLIC BLOOD PRESSURE: 138 MMHG | HEART RATE: 83 BPM | RESPIRATION RATE: 16 BRPM | BODY MASS INDEX: 30.22 KG/M2 | WEIGHT: 188 LBS | HEIGHT: 66 IN

## 2024-07-25 DIAGNOSIS — G89.4 CHRONIC PAIN SYNDROME: ICD-10-CM

## 2024-07-25 DIAGNOSIS — M54.16 LUMBAR RADICULOPATHY: ICD-10-CM

## 2024-07-25 DIAGNOSIS — M48.062 SPINAL STENOSIS OF LUMBAR REGION WITH NEUROGENIC CLAUDICATION: ICD-10-CM

## 2024-07-25 DIAGNOSIS — M54.42 CHRONIC BILATERAL LOW BACK PAIN WITH BILATERAL SCIATICA: ICD-10-CM

## 2024-07-25 DIAGNOSIS — G25.81 RESTLESS LEG SYNDROME: ICD-10-CM

## 2024-07-25 DIAGNOSIS — M54.41 CHRONIC BILATERAL LOW BACK PAIN WITH BILATERAL SCIATICA: ICD-10-CM

## 2024-07-25 DIAGNOSIS — R51.9 CHRONIC INTRACTABLE HEADACHE, UNSPECIFIED HEADACHE TYPE: ICD-10-CM

## 2024-07-25 DIAGNOSIS — G89.29 CHRONIC INTRACTABLE HEADACHE, UNSPECIFIED HEADACHE TYPE: ICD-10-CM

## 2024-07-25 DIAGNOSIS — M46.1 SACROILIITIS (HCC): Primary | ICD-10-CM

## 2024-07-25 DIAGNOSIS — G89.29 CHRONIC BILATERAL LOW BACK PAIN WITH BILATERAL SCIATICA: ICD-10-CM

## 2024-07-25 PROCEDURE — 99214 OFFICE O/P EST MOD 30 MIN: CPT | Performed by: STUDENT IN AN ORGANIZED HEALTH CARE EDUCATION/TRAINING PROGRAM

## 2024-07-25 RX ORDER — PRAMIPEXOLE DIHYDROCHLORIDE 0.75 MG/1
0.75 TABLET ORAL EVERY EVENING
Qty: 30 TABLET | Refills: 1 | Status: SHIPPED | OUTPATIENT
Start: 2024-07-25 | End: 2024-09-23

## 2024-07-25 RX ORDER — TIZANIDINE 2 MG/1
2 TABLET ORAL DAILY PRN
Qty: 30 TABLET | Refills: 1 | Status: SHIPPED | OUTPATIENT
Start: 2024-07-25 | End: 2024-09-23

## 2024-07-25 RX ORDER — TOPIRAMATE 25 MG/1
25 TABLET ORAL NIGHTLY
Qty: 30 TABLET | Refills: 1 | Status: SHIPPED | OUTPATIENT
Start: 2024-07-25 | End: 2024-09-23

## 2024-07-25 RX ORDER — GABAPENTIN 300 MG/1
300 CAPSULE ORAL 3 TIMES DAILY
Qty: 90 CAPSULE | Refills: 1 | Status: SHIPPED | OUTPATIENT
Start: 2024-07-25 | End: 2024-09-23

## 2024-07-25 NOTE — PROGRESS NOTES
Annamaria Wilcox presents to the [facility] on 7/25/2024. Annamaria is complaining of pain in her left buttock. The pain is constant. The pain is described as aching and dull. Pain is rated on her best day at a 4, on her worst day at a 5, and on average at a 4 on the VAS scale. She took her last dose of Neurontin and Cymbalta today.     Any procedures since your last visit: Yes, with 80 % relief.    Pacemaker or defibrillator: No     She is not on NSAIDS and is not on anticoagulation medications.    Medication Contract and Consent for Opioid Use Documents Filed        No documents found                    /80   Pulse 83   Temp 97.1 °F (36.2 °C) (Infrared)   Resp 16   Ht 1.676 m (5' 6\")   Wt 85.3 kg (188 lb)   SpO2 96%   BMI 30.34 kg/m²      No LMP recorded. Patient is postmenopausal.  
EXAMINATION:      /80   Pulse 83   Temp 97.1 °F (36.2 °C) (Infrared)   Resp 16   Ht 1.676 m (5' 6\")   Wt 85.3 kg (188 lb)   SpO2 96%   BMI 30.34 kg/m²     General:  Pleasant in no distress and A & O x 3, Build:Normal Weight, Function: Rises from a seated position with difficulty      HEENT:normocephalic, atraumatic, Pupils regular, round, equal Sclera: icterus absent      Lungs: Breathing:normal breath pattern, unlabored     CVS: RRR, pulses intact b/l     Abdomen: non-distended and normal Non tender, no guarding.      Cervical spine: Inspection: normal   Palpation: Yes tenderness over the midline and paraspinal area, bilaterally   Tenderness to palpation over bilateral occipital ridge  Range of motion: Normal flexion, extension   Spurling's: negative bilaterally   Fulton's: negative bilaterally      Thoracic spine: Inspection: normal - band aid over thoracic spine   Palpation:Yes tenderness over the midline and paraspinal area   Range of motion: normal in flexion, extension rotation bilateral and is not painful.      Lumbar spine: Inspection: normal   Spine Range of motion: Decreased, flexion Normal,  extension Normal, Lateral bending, and rotation bilaterally reduced is somewhat painful.   Palpation:tenderness paravertebral muscles and midline tenderness.  Facet Loading: left -positive and right-positive.     Musculoskeletal:  SIJ Compression Test: positive Left  SIJ Distraction:negative bilaterally  KIRTI test: positive bilaterally   Gaenslen's test:positive left  Thigh Thrust: negative bilaterally  SLR : negative bilaterally   Trochanteric bursa tenderness: negative bilaterally  Trigger points: Bilateral cervical paraspinals, trapezius, rhomboids    Extremities:  Tremors: No  - bilaterally upper and lower   Hips: some pain with internal rotation of hips   Varicose veins: No    Pulses: present Lt radial   Cyanosis: none   Edema: No  x all 4 extremities      Neurological: Sensory:normal to light

## 2024-09-02 DIAGNOSIS — R05.2 SUBACUTE COUGH: ICD-10-CM

## 2024-09-02 DIAGNOSIS — U09.9 LONG COVID: ICD-10-CM

## 2024-09-02 DIAGNOSIS — R20.2 BILATERAL LEG PARESTHESIA: ICD-10-CM

## 2024-09-02 DIAGNOSIS — J98.01 BRONCHOSPASM: ICD-10-CM

## 2024-09-02 DIAGNOSIS — G89.4 CHRONIC PAIN SYNDROME: ICD-10-CM

## 2024-09-02 DIAGNOSIS — R06.02 SHORTNESS OF BREATH: ICD-10-CM

## 2024-09-03 RX ORDER — DULOXETIN HYDROCHLORIDE 30 MG/1
30 CAPSULE, DELAYED RELEASE ORAL 2 TIMES DAILY
Qty: 180 CAPSULE | Refills: 0 | Status: SHIPPED | OUTPATIENT
Start: 2024-09-03

## 2024-09-03 RX ORDER — GABAPENTIN 300 MG/1
300 CAPSULE ORAL 3 TIMES DAILY
Qty: 90 CAPSULE | Refills: 1 | OUTPATIENT
Start: 2024-09-03 | End: 2024-11-02

## 2024-09-03 RX ORDER — HYDROXYZINE PAMOATE 50 MG/1
50 CAPSULE ORAL 3 TIMES DAILY PRN
Qty: 90 CAPSULE | Refills: 2 | Status: SHIPPED | OUTPATIENT
Start: 2024-09-03

## 2024-09-03 RX ORDER — PRAMIPEXOLE DIHYDROCHLORIDE 0.75 MG/1
0.75 TABLET ORAL EVERY EVENING
Qty: 30 TABLET | Refills: 1 | OUTPATIENT
Start: 2024-09-03 | End: 2024-11-02

## 2024-09-03 RX ORDER — ALBUTEROL SULFATE 90 UG/1
2 AEROSOL, METERED RESPIRATORY (INHALATION) 4 TIMES DAILY PRN
Qty: 18 G | Refills: 2 | Status: SHIPPED | OUTPATIENT
Start: 2024-09-03

## 2024-09-03 NOTE — TELEPHONE ENCOUNTER
Last Appointment:  4/8/2024  Future Appointments   Date Time Provider Department Center   10/3/2024 11:15 AM Lore Mahajan MD [facility]   12/17/2024  8:30 AM Areli Isaacs APRN - CNP [facility]

## 2024-10-03 ENCOUNTER — OFFICE VISIT (OUTPATIENT)
Dept: PAIN MANAGEMENT | Age: 64
End: 2024-10-03
Payer: COMMERCIAL

## 2024-10-03 VITALS
WEIGHT: 188 LBS | HEIGHT: 66 IN | OXYGEN SATURATION: 94 % | DIASTOLIC BLOOD PRESSURE: 71 MMHG | HEART RATE: 89 BPM | TEMPERATURE: 98 F | RESPIRATION RATE: 16 BRPM | SYSTOLIC BLOOD PRESSURE: 108 MMHG | BODY MASS INDEX: 30.22 KG/M2

## 2024-10-03 DIAGNOSIS — M54.16 LUMBAR RADICULOPATHY: Primary | ICD-10-CM

## 2024-10-03 DIAGNOSIS — R51.9 CHRONIC INTRACTABLE HEADACHE, UNSPECIFIED HEADACHE TYPE: ICD-10-CM

## 2024-10-03 DIAGNOSIS — M48.062 SPINAL STENOSIS OF LUMBAR REGION WITH NEUROGENIC CLAUDICATION: ICD-10-CM

## 2024-10-03 DIAGNOSIS — G25.81 RESTLESS LEG SYNDROME: ICD-10-CM

## 2024-10-03 DIAGNOSIS — G89.29 CHRONIC INTRACTABLE HEADACHE, UNSPECIFIED HEADACHE TYPE: ICD-10-CM

## 2024-10-03 DIAGNOSIS — G89.4 CHRONIC PAIN SYNDROME: ICD-10-CM

## 2024-10-03 DIAGNOSIS — M54.41 CHRONIC BILATERAL LOW BACK PAIN WITH BILATERAL SCIATICA: ICD-10-CM

## 2024-10-03 DIAGNOSIS — M47.817 LUMBOSACRAL SPONDYLOSIS WITHOUT MYELOPATHY: ICD-10-CM

## 2024-10-03 DIAGNOSIS — G62.9 PERIPHERAL POLYNEUROPATHY: ICD-10-CM

## 2024-10-03 DIAGNOSIS — M54.42 CHRONIC BILATERAL LOW BACK PAIN WITH BILATERAL SCIATICA: ICD-10-CM

## 2024-10-03 DIAGNOSIS — G89.29 CHRONIC BILATERAL LOW BACK PAIN WITH BILATERAL SCIATICA: ICD-10-CM

## 2024-10-03 DIAGNOSIS — M46.1 SACROILIITIS (HCC): ICD-10-CM

## 2024-10-03 PROCEDURE — 99214 OFFICE O/P EST MOD 30 MIN: CPT | Performed by: STUDENT IN AN ORGANIZED HEALTH CARE EDUCATION/TRAINING PROGRAM

## 2024-10-03 RX ORDER — GABAPENTIN 400 MG/1
400 CAPSULE ORAL 3 TIMES DAILY
Qty: 90 CAPSULE | Refills: 1 | Status: SHIPPED | OUTPATIENT
Start: 2024-10-03 | End: 2024-12-02

## 2024-10-03 RX ORDER — PRAMIPEXOLE DIHYDROCHLORIDE 1 MG/1
1 TABLET ORAL EVERY EVENING
Qty: 30 TABLET | Refills: 1 | Status: SHIPPED | OUTPATIENT
Start: 2024-10-03 | End: 2024-12-02

## 2024-10-03 NOTE — PROGRESS NOTES
Annamaria Wilcox presents to the [facility] on 10/3/2024. Annamaria is complaining of pain lower back and left leg. The pain is constant. The pain is described as aching, dull, and burning. Pain is rated on her best day at a 5, on her worst day at a 6, and on average at a 5 on the VAS scale. She took her last dose of Tylenol yesterday.     Any procedures since your last visit: No    Pacemaker or defibrillator: no    She is not on NSAIDS and is not on anticoagulation medications .    Medication Contract and Consent for Opioid Use Documents Filed        No documents found                    /71   Pulse 89   Temp 98 °F (36.7 °C) (Oral)   Resp 16   Ht 1.676 m (5' 6\")   Wt 85.3 kg (188 lb)   SpO2 94%   BMI 30.34 kg/m²      No LMP recorded. Patient is postmenopausal.  
performing a medically appropriate exam, independently reviewing and interpreting imaging and studies, counseling and educating the patient/family/caregiver and ordering medications, tests, or procedures.      NOTE: The above documentation was prepared using voice recognition software.  Every attempt was made to ensure accuracy but there may be spelling, grammatical, and contextual errors.    Lore Mahajan MD    Controlled Substances Monitoring:        No data to display                [place]ARRS report reviewed 4/28/23   CC:     No referring provider defined for this encounter.   Danuta Arias MD   [address] [place] [address] [address] [place] [address]

## 2024-11-15 ENCOUNTER — OFFICE VISIT (OUTPATIENT)
Dept: PAIN MANAGEMENT | Age: 64
End: 2024-11-15
Payer: COMMERCIAL

## 2024-11-15 VITALS
OXYGEN SATURATION: 92 % | HEIGHT: 66 IN | BODY MASS INDEX: 30.22 KG/M2 | SYSTOLIC BLOOD PRESSURE: 130 MMHG | HEART RATE: 102 BPM | WEIGHT: 188 LBS | DIASTOLIC BLOOD PRESSURE: 77 MMHG | TEMPERATURE: 97.5 F | RESPIRATION RATE: 16 BRPM

## 2024-11-15 DIAGNOSIS — G89.29 CHRONIC BILATERAL LOW BACK PAIN WITH BILATERAL SCIATICA: ICD-10-CM

## 2024-11-15 DIAGNOSIS — G62.9 PERIPHERAL POLYNEUROPATHY: ICD-10-CM

## 2024-11-15 DIAGNOSIS — M54.16 LUMBAR RADICULOPATHY: Primary | ICD-10-CM

## 2024-11-15 DIAGNOSIS — M48.062 SPINAL STENOSIS OF LUMBAR REGION WITH NEUROGENIC CLAUDICATION: ICD-10-CM

## 2024-11-15 DIAGNOSIS — M54.41 CHRONIC BILATERAL LOW BACK PAIN WITH BILATERAL SCIATICA: ICD-10-CM

## 2024-11-15 DIAGNOSIS — M54.42 CHRONIC BILATERAL LOW BACK PAIN WITH BILATERAL SCIATICA: ICD-10-CM

## 2024-11-15 DIAGNOSIS — G25.81 RESTLESS LEG SYNDROME: ICD-10-CM

## 2024-11-15 DIAGNOSIS — M46.1 SACROILIITIS (HCC): ICD-10-CM

## 2024-11-15 PROCEDURE — 99214 OFFICE O/P EST MOD 30 MIN: CPT | Performed by: STUDENT IN AN ORGANIZED HEALTH CARE EDUCATION/TRAINING PROGRAM

## 2024-11-15 RX ORDER — PRAMIPEXOLE DIHYDROCHLORIDE 1 MG/1
1 TABLET ORAL EVERY EVENING
Qty: 30 TABLET | Refills: 1 | Status: SHIPPED | OUTPATIENT
Start: 2024-11-15 | End: 2025-01-14

## 2024-11-15 RX ORDER — TIZANIDINE 2 MG/1
4 TABLET ORAL NIGHTLY PRN
Qty: 60 TABLET | Refills: 0 | Status: SHIPPED | OUTPATIENT
Start: 2024-11-15 | End: 2024-12-15

## 2024-11-15 RX ORDER — TOPIRAMATE 25 MG/1
25 TABLET, FILM COATED ORAL NIGHTLY
Qty: 30 TABLET | Refills: 0 | Status: SHIPPED | OUTPATIENT
Start: 2024-11-15 | End: 2024-12-15

## 2024-11-15 RX ORDER — GABAPENTIN 400 MG/1
400 CAPSULE ORAL 3 TIMES DAILY
Qty: 90 CAPSULE | Refills: 1 | Status: SHIPPED | OUTPATIENT
Start: 2024-11-15 | End: 2025-01-14

## 2024-11-15 NOTE — PROGRESS NOTES
Annamaria Wilcox presents to the [facility] on 11/15/2024. Annamaria is complaining of pain lower back. The pain is constant. The pain is described as aching, dull, and weak. Pain is rated on her best day at a 5, on her worst day at a 9, and on average at a 7 on the VAS scale. She took her last dose of Neurontin today.     Any procedures since your last visit: No,     Pacemaker or defibrillator: No     She is  on NSAIDS and is not on anticoagulation medications     Do you want someone present when the provider examines you? No    Medication Contract and Consent for Opioid Use Documents Filed        No documents found                    /77   Pulse (!) 102   Temp 97.5 °F (36.4 °C) (Infrared)   Resp 16   Ht 1.676 m (5' 6\")   Wt 85.3 kg (188 lb)   SpO2 92%   BMI 30.34 kg/m²      No LMP recorded. Patient is postmenopausal.  
complexity as per above, which included preparing to see the patient, face-to-face patient care, completing clinical documentation, obtaining and/or reviewing separately obtained history, performing a medically appropriate exam, independently reviewing and interpreting imaging and studies, counseling and educating the patient/family/caregiver and ordering medications, tests, or procedures.      NOTE: The above documentation was prepared using voice recognition software.  Every attempt was made to ensure accuracy but there may be spelling, grammatical, and contextual errors.    Lore Mahajan MD    Controlled Substances Monitoring:        No data to display                OARRS report reviewed 4/28/23   CC:     No referring provider defined for this encounter.   Danuta Arias MD   [address] [place] [address] [address] [place] [address]

## 2024-11-16 DIAGNOSIS — R20.2 BILATERAL LEG PARESTHESIA: ICD-10-CM

## 2024-11-16 DIAGNOSIS — J98.01 BRONCHOSPASM: ICD-10-CM

## 2024-11-16 DIAGNOSIS — R06.02 SHORTNESS OF BREATH: ICD-10-CM

## 2024-11-16 DIAGNOSIS — U09.9 LONG COVID: ICD-10-CM

## 2024-11-16 DIAGNOSIS — G89.4 CHRONIC PAIN SYNDROME: ICD-10-CM

## 2024-11-16 DIAGNOSIS — R05.2 SUBACUTE COUGH: ICD-10-CM

## 2024-11-19 RX ORDER — TIZANIDINE 2 MG/1
TABLET ORAL
Qty: 60 TABLET | Refills: 0 | OUTPATIENT
Start: 2024-11-19

## 2024-11-20 RX ORDER — DULOXETIN HYDROCHLORIDE 30 MG/1
30 CAPSULE, DELAYED RELEASE ORAL 2 TIMES DAILY
Qty: 180 CAPSULE | Refills: 1 | Status: SHIPPED | OUTPATIENT
Start: 2024-11-20

## 2024-11-20 RX ORDER — ALBUTEROL SULFATE 90 UG/1
2 INHALANT RESPIRATORY (INHALATION) 4 TIMES DAILY PRN
Qty: 18 G | Refills: 2 | Status: SHIPPED | OUTPATIENT
Start: 2024-11-20

## 2024-11-20 RX ORDER — HYDROXYZINE PAMOATE 50 MG/1
50 CAPSULE ORAL 3 TIMES DAILY PRN
Qty: 90 CAPSULE | Refills: 3 | Status: SHIPPED | OUTPATIENT
Start: 2024-11-20

## 2024-11-20 NOTE — TELEPHONE ENCOUNTER
Name of Medication(s) Requested:  Requested Prescriptions     Pending Prescriptions Disp Refills    DULoxetine (CYMBALTA) 30 MG extended release capsule 180 capsule 1     Sig: Take 1 capsule by mouth 2 times daily    hydrOXYzine pamoate (VISTARIL) 50 MG capsule 90 capsule 3     Sig: Take 1 capsule by mouth 3 times daily as needed for Anxiety    albuterol sulfate HFA (VENTOLIN HFA) 108 (90 Base) MCG/ACT inhaler 18 g 2     Sig: Inhale 2 puffs into the lungs 4 times daily as needed for Wheezing or Shortness of Breath       Medication is on current medication list Yes    Dosage and directions were verified? Yes    Quantity verified: 90 day supply     Pharmacy Verified?  Yes    Last Appointment:  4/8/2024    Future appts:  Future Appointments   Date Time Provider Department Center   12/4/2024  9:30 AM DEREJEYX [facility]   12/17/2024  8:30 AM Areli Isaacs APRN - CNP [facility]   1/20/2025 10:15 AM Lore Mahajan MD [facility]        (If no appt send self scheduling link. .REFILLAPPT)  Scheduling request sent?     [] Yes  [x] No    Does patient need updated?  [] Yes  [x] No

## 2024-11-21 RX ORDER — GABAPENTIN 400 MG/1
400 CAPSULE ORAL 3 TIMES DAILY
Qty: 90 CAPSULE | Refills: 1 | OUTPATIENT
Start: 2024-11-21 | End: 2025-01-20

## 2024-11-21 RX ORDER — PRAMIPEXOLE DIHYDROCHLORIDE 1 MG/1
1 TABLET ORAL EVERY EVENING
Qty: 30 TABLET | Refills: 1 | OUTPATIENT
Start: 2024-11-21 | End: 2025-01-20

## 2024-11-21 RX ORDER — PRAMIPEXOLE DIHYDROCHLORIDE 1 MG/1
1 TABLET ORAL NIGHTLY
Qty: 30 TABLET | Refills: 1 | OUTPATIENT
Start: 2024-11-21

## 2024-12-02 RX ORDER — PRAMIPEXOLE DIHYDROCHLORIDE 0.5 MG/1
0.5 TABLET ORAL
Qty: 30 TABLET | Refills: 1 | Status: SHIPPED | OUTPATIENT
Start: 2024-12-02

## 2024-12-02 NOTE — PROGRESS NOTES
Called pt to discuss symptoms.     Pt feels having more RLS symptoms during the day.    Improvement with muscle relaxants.     Just started back on Topamax    Will increase Mirapex to 0.5 / 1 mg daily.  RBA discussed.

## 2024-12-04 ENCOUNTER — PREP FOR PROCEDURE (OUTPATIENT)
Dept: PAIN MANAGEMENT | Age: 64
End: 2024-12-04

## 2024-12-04 ENCOUNTER — TELEPHONE (OUTPATIENT)
Dept: PAIN MANAGEMENT | Age: 64
End: 2024-12-04

## 2024-12-04 RX ORDER — SODIUM CHLORIDE 0.9 % (FLUSH) 0.9 %
5-40 SYRINGE (ML) INJECTION PRN
Status: CANCELLED | OUTPATIENT
Start: 2024-12-04

## 2024-12-04 RX ORDER — SODIUM CHLORIDE 9 MG/ML
INJECTION, SOLUTION INTRAVENOUS PRN
Status: CANCELLED | OUTPATIENT
Start: 2024-12-04

## 2024-12-04 RX ORDER — SODIUM CHLORIDE 0.9 % (FLUSH) 0.9 %
5-40 SYRINGE (ML) INJECTION EVERY 12 HOURS SCHEDULED
Status: CANCELLED | OUTPATIENT
Start: 2024-12-04

## 2024-12-04 NOTE — TELEPHONE ENCOUNTER
Call to Annamaria Wilcox that procedure was scheduled for 12-10-24 and that [facility] should call her a few days before for the pre op call and after 3:00 PM the business day before with the arrival time. Instructed Annamaria to hold ibuprofen for 24 hours, Celebrex, Mobic, and naprosyn for 4 days and any aspirin containing products, CoQ 10, or fish oil for 7 days. Instructed to call office back if any questions. Annamaria verbalized understanding.    Electronically signed by Jose Mackay RN on 12/4/2024 at 1:50 PM

## 2024-12-06 NOTE — PROGRESS NOTES
[facility] PAIN MANAGEMENT  INSTRUCTIONS  ...........................................................................................................................................     [x] Parking the day of Surgery is located in the Main Entrance lot.  Upon entering the door, make immediate right into the surgery reception room    [x]  Bring photo ID and insurance card     [x] You may have a light breakfast day of procedure    [x]  Wear loose comfortable clothing    [x]  Please follow instructions for medications as given per Dr's office    [x] You can expect a call the business day prior to procedure to notify you of your arrival time     [x] Please arrange for     []  Other instructions

## 2024-12-09 RX ORDER — TOPIRAMATE 25 MG/1
25 TABLET, FILM COATED ORAL NIGHTLY
Qty: 30 TABLET | Refills: 0 | OUTPATIENT
Start: 2024-12-09 | End: 2025-01-08

## 2024-12-10 ENCOUNTER — HOSPITAL ENCOUNTER (OUTPATIENT)
Age: 64
Setting detail: OUTPATIENT SURGERY
Discharge: HOME | End: 2024-12-10
Attending: STUDENT IN AN ORGANIZED HEALTH CARE EDUCATION/TRAINING PROGRAM | Admitting: STUDENT IN AN ORGANIZED HEALTH CARE EDUCATION/TRAINING PROGRAM
Payer: COMMERCIAL

## 2024-12-10 ENCOUNTER — HOSPITAL ENCOUNTER (OUTPATIENT)
Dept: GENERAL RADIOLOGY | Age: 64
Setting detail: OUTPATIENT SURGERY
Discharge: HOME | End: 2024-12-12
Attending: STUDENT IN AN ORGANIZED HEALTH CARE EDUCATION/TRAINING PROGRAM
Payer: COMMERCIAL

## 2024-12-10 ENCOUNTER — ANESTHESIA (OUTPATIENT)
Dept: OPERATING ROOM | Age: 64
End: 2024-12-10
Payer: COMMERCIAL

## 2024-12-10 ENCOUNTER — ANESTHESIA EVENT (OUTPATIENT)
Dept: OPERATING ROOM | Age: 64
End: 2024-12-10
Payer: COMMERCIAL

## 2024-12-10 VITALS
WEIGHT: 169 LBS | OXYGEN SATURATION: 98 % | HEART RATE: 74 BPM | SYSTOLIC BLOOD PRESSURE: 109 MMHG | DIASTOLIC BLOOD PRESSURE: 58 MMHG | BODY MASS INDEX: 27.16 KG/M2 | HEIGHT: 66 IN | TEMPERATURE: 96.8 F | RESPIRATION RATE: 16 BRPM

## 2024-12-10 DIAGNOSIS — R52 PAIN MANAGEMENT: ICD-10-CM

## 2024-12-10 PROCEDURE — 62323 NJX INTERLAMINAR LMBR/SAC: CPT | Performed by: STUDENT IN AN ORGANIZED HEALTH CARE EDUCATION/TRAINING PROGRAM

## 2024-12-10 PROCEDURE — 3700000000 HC ANESTHESIA ATTENDED CARE: Performed by: STUDENT IN AN ORGANIZED HEALTH CARE EDUCATION/TRAINING PROGRAM

## 2024-12-10 PROCEDURE — 2580000003 HC RX 258: Performed by: STUDENT IN AN ORGANIZED HEALTH CARE EDUCATION/TRAINING PROGRAM

## 2024-12-10 PROCEDURE — 2709999900 HC NON-CHARGEABLE SUPPLY: Performed by: STUDENT IN AN ORGANIZED HEALTH CARE EDUCATION/TRAINING PROGRAM

## 2024-12-10 PROCEDURE — 7100000010 HC PHASE II RECOVERY - FIRST 15 MIN: Performed by: STUDENT IN AN ORGANIZED HEALTH CARE EDUCATION/TRAINING PROGRAM

## 2024-12-10 PROCEDURE — 6360000004 HC RX CONTRAST MEDICATION: Performed by: STUDENT IN AN ORGANIZED HEALTH CARE EDUCATION/TRAINING PROGRAM

## 2024-12-10 PROCEDURE — 6360000002 HC RX W HCPCS

## 2024-12-10 PROCEDURE — 3600000002 HC SURGERY LEVEL 2 BASE: Performed by: STUDENT IN AN ORGANIZED HEALTH CARE EDUCATION/TRAINING PROGRAM

## 2024-12-10 PROCEDURE — 6360000002 HC RX W HCPCS: Performed by: STUDENT IN AN ORGANIZED HEALTH CARE EDUCATION/TRAINING PROGRAM

## 2024-12-10 PROCEDURE — 7100000011 HC PHASE II RECOVERY - ADDTL 15 MIN: Performed by: STUDENT IN AN ORGANIZED HEALTH CARE EDUCATION/TRAINING PROGRAM

## 2024-12-10 RX ORDER — IOPAMIDOL 612 MG/ML
INJECTION, SOLUTION INTRATHECAL PRN
Status: DISCONTINUED | OUTPATIENT
Start: 2024-12-10 | End: 2024-12-10 | Stop reason: ALTCHOICE

## 2024-12-10 RX ORDER — FENTANYL CITRATE 50 UG/ML
INJECTION, SOLUTION INTRAMUSCULAR; INTRAVENOUS
Status: DISCONTINUED | OUTPATIENT
Start: 2024-12-10 | End: 2024-12-10 | Stop reason: SDUPTHER

## 2024-12-10 RX ORDER — MIDAZOLAM HYDROCHLORIDE 1 MG/ML
INJECTION, SOLUTION INTRAMUSCULAR; INTRAVENOUS
Status: DISCONTINUED | OUTPATIENT
Start: 2024-12-10 | End: 2024-12-10 | Stop reason: SDUPTHER

## 2024-12-10 RX ORDER — SODIUM CHLORIDE 0.9 % (FLUSH) 0.9 %
5-40 SYRINGE (ML) INJECTION EVERY 12 HOURS SCHEDULED
Status: DISCONTINUED | OUTPATIENT
Start: 2024-12-10 | End: 2024-12-10 | Stop reason: HOSPADM

## 2024-12-10 RX ORDER — SODIUM CHLORIDE 9 MG/ML
INJECTION, SOLUTION INTRAVENOUS PRN
Status: DISCONTINUED | OUTPATIENT
Start: 2024-12-10 | End: 2024-12-10 | Stop reason: HOSPADM

## 2024-12-10 RX ORDER — SODIUM CHLORIDE 0.9 % (FLUSH) 0.9 %
5-40 SYRINGE (ML) INJECTION PRN
Status: DISCONTINUED | OUTPATIENT
Start: 2024-12-10 | End: 2024-12-10 | Stop reason: HOSPADM

## 2024-12-10 RX ORDER — LIDOCAINE HYDROCHLORIDE 5 MG/ML
INJECTION, SOLUTION INFILTRATION; INTRAVENOUS PRN
Status: DISCONTINUED | OUTPATIENT
Start: 2024-12-10 | End: 2024-12-10 | Stop reason: ALTCHOICE

## 2024-12-10 RX ORDER — METHYLPREDNISOLONE ACETATE 40 MG/ML
INJECTION, SUSPENSION INTRA-ARTICULAR; INTRALESIONAL; INTRAMUSCULAR; SOFT TISSUE PRN
Status: DISCONTINUED | OUTPATIENT
Start: 2024-12-10 | End: 2024-12-10 | Stop reason: ALTCHOICE

## 2024-12-10 RX ADMIN — FENTANYL CITRATE 50 MCG: 50 INJECTION, SOLUTION INTRAMUSCULAR; INTRAVENOUS at 10:46

## 2024-12-10 RX ADMIN — MIDAZOLAM 2 MG: 1 INJECTION INTRAMUSCULAR; INTRAVENOUS at 10:42

## 2024-12-10 RX ADMIN — SODIUM CHLORIDE: 9 INJECTION, SOLUTION INTRAVENOUS at 10:43

## 2024-12-10 RX ADMIN — FENTANYL CITRATE 50 MCG: 50 INJECTION, SOLUTION INTRAMUSCULAR; INTRAVENOUS at 10:43

## 2024-12-10 ASSESSMENT — PAIN DESCRIPTION - DESCRIPTORS: DESCRIPTORS: ACHING;DULL;DISCOMFORT

## 2024-12-10 ASSESSMENT — LIFESTYLE VARIABLES: SMOKING_STATUS: 1

## 2024-12-10 ASSESSMENT — PAIN SCALES - GENERAL: PAINLEVEL_OUTOF10: 6

## 2024-12-10 ASSESSMENT — PAIN DESCRIPTION - LOCATION: LOCATION: BACK

## 2024-12-10 ASSESSMENT — PAIN - FUNCTIONAL ASSESSMENT: PAIN_FUNCTIONAL_ASSESSMENT: NONE - DENIES PAIN

## 2024-12-10 NOTE — ANESTHESIA PRE PROCEDURE
11/15/24 130/77   10/03/24 108/71   07/25/24 138/80       NPO Status: Time of last liquid consumption: 0000                        Time of last solid consumption: 0000                        Date of last liquid consumption: 12/10/24                        Date of last solid food consumption: 12/10/24    BMI:   Wt Readings from Last 3 Encounters:   12/06/24 76.7 kg (169 lb)   12/04/24 78 kg (172 lb)   11/15/24 85.3 kg (188 lb)     Body mass index is 27.28 kg/m².    CBC:   Lab Results   Component Value Date/Time    WBC 6.7 04/08/2024 03:27 PM    RBC 4.80 04/08/2024 03:27 PM    HGB 14.4 04/08/2024 03:27 PM    HCT 43.5 04/08/2024 03:27 PM    MCV 90.6 04/08/2024 03:27 PM    RDW 14.7 04/08/2024 03:27 PM     04/08/2024 03:27 PM       CMP:   Lab Results   Component Value Date/Time     04/08/2024 03:27 PM    K 4.1 04/08/2024 03:27 PM    K 4.1 03/16/2023 04:29 AM    CL 98 04/08/2024 03:27 PM    CO2 26 04/08/2024 03:27 PM    BUN 11 04/08/2024 03:27 PM    CREATININE 0.8 04/08/2024 03:27 PM    GFRAA >60 04/27/2022 09:20 AM    LABGLOM 82 04/08/2024 03:27 PM    GLUCOSE 96 04/08/2024 03:27 PM    CALCIUM 9.3 04/08/2024 03:27 PM    BILITOT 0.2 04/08/2024 03:27 PM    ALKPHOS 116 04/08/2024 03:27 PM    AST 37 04/08/2024 03:27 PM    ALT 33 04/08/2024 03:27 PM       POC Tests: No results for input(s): \"POCGLU\", \"POCNA\", \"POCK\", \"POCCL\", \"POCBUN\", \"POCHEMO\", \"POCHCT\" in the last 72 hours.    Coags:   Lab Results   Component Value Date/Time    PROTIME 10.3 03/16/2023 10:39 AM    INR 0.9 03/16/2023 10:39 AM       HCG (If Applicable): No results found for: \"PREGTESTUR\", \"PREGSERUM\", \"HCG\", \"HCGQUANT\"     ABGs: No results found for: \"PHART\", \"PO2ART\", \"IDJ1ZIZ\", \"KCT1MRT\", \"BEART\", \"B3JPBIZY\"     Type & Screen (If Applicable):  No results found for: \"LABABO\"    Drug/Infectious Status (If Applicable):  No results found for: \"HIV\", \"HEPCAB\"    COVID-19 Screening (If Applicable):   Lab Results   Component Value Date/Time

## 2024-12-10 NOTE — OP NOTE
12/10/2024    Patient: Annamaria Wilcox  :  1960  Age:  64 y.o.  Sex:  female     PRE-OPERATIVE DIAGNOSIS: Lumbar disc displacement, lumbar radiculopathy.    POST-OPERATIVE DIAGNOSIS: Same.    PROCEDURE: Fluoroscopic guided therapeutic lumbar epidural steroid injection at the L2/3 level      SURGEON:  Lore Mahajan MD    ANESTHESIA: Monitored Anesthesia Care    ESTIMATED BLOOD LOSS: None.  ______________________________________________________________________    BRIEF HISTORY:  Annamaria Wilcox comes in today for   lumbar epidural injection at L2/3 level. The potential complications of this procedure were discussed with her again today.  She has elected to undergo the aforementioned procedure.    Annamaria’s complete History & Physical examination were reviewed in depth, a copy of which is in the chart.      DESCRIPTION OF PROCEDURE:    After confirming written and informed consent, a time-out was performed and Annamaria’s name and date of birth, the procedure to be performed as well as the plan for the location of the needle insertion were confirmed.    The patient was brought into the procedure room and placed in the prone position on the fluoroscopy table. A pillow was placed under the patient's lower abdomen/upper pelvis to increase lumbar interlaminar space. Standard monitors were placed, and vital signs were observed throughout the procedure. The area of the lumbar spine was prepped with chloraprep and draped in a sterile manner. The L2/3 interspace was identified and marked under AP fluoroscopy. The skin and subcutaneous tissues at the above level were anesthestized with 0.5% lidocaine. With intermittent fluoroscopy, an # 18 gauge 3 1/2 tuohy epidural needle was inserted and directed toward the interlaminar space. The needle was slowly advanced using loss of resistance technique and 5 cc glass syringe  until the tip of the epidural needle has passed through the ligamentum flavum and

## 2024-12-10 NOTE — ANESTHESIA POSTPROCEDURE EVALUATION
Department of Anesthesiology  Postprocedure Note    Patient: Annamaria Wilcox  MRN: [id number]  YOB: 1960  Date of evaluation: 12/10/2024    Procedure Summary       Date: 12/10/24 Room / Location: [facility] PROCEDURE ROOM 02 / [facility]    Anesthesia Start: 1042 Anesthesia Stop: 1052    Procedure: LUMBAR 2-3 EPIDURAL STEROID INJECTION UNDER FLUOROSCOPIC GUIDANCE (Back) Diagnosis:       Lumbar radiculopathy      (Lumbar radiculopathy [M54.16])    Surgeons: Lore Mahajan MD Responsible Provider: Gareth Zarate MD    Anesthesia Type: MAC ASA Status: 3            Anesthesia Type: MAC    Kinjal Phase I: Kinjal Score: 10    Kinjal Phase II:      Anesthesia Post Evaluation    Patient location during evaluation: bedside  Patient participation: complete - patient cannot participate  Level of consciousness: awake and awake and alert  Pain score: 0  Airway patency: patent  Nausea & Vomiting: no nausea and no vomiting  Cardiovascular status: blood pressure returned to baseline  Respiratory status: acceptable and room air  Hydration status: euvolemic  Pain management: adequate        No notable events documented.

## 2024-12-10 NOTE — DISCHARGE INSTRUCTIONS
[facility] Pain Management Department  [facility] Xusnml-[phone]  Dr. Darby Cerda   Post-Pain Block/Radiofrequency  Home Going Instructions    1-Go home, rest for the remainder of the day  2-Please do not lift over 20 pounds the day of the injection  3-If you received sedation No: alcohol, driving, operating lawn mowers, plows, tractors or other dangerous equipment until next morning. Do not make important decisions or sign legal documents for 24 hours. You may experience light headedness, dizziness, nausea or sleepiness after sedation. Do not stay alone. A responsible adult must be with you for 24 hours. You could be nauseated from the medications you have received. Your IV site may be sore and bruised.    4-No dietary restrictions     5-Resume all medications the same day, blood thinners to be resumed 24 hours after injection if you were instructed to stop any.    6-Keep the surgical site clean and dry, you may shower the next morning and remove the      dressing.     7- No sitz baths, tub baths or hot tubs/swimming for 24 hours.       8- If you have any pain at the injection site(s), application of an ice pack to the area should be       helpful, 20 minutes on/20 minutes off for next 48 hours.  9- Call [facility] Pain Management immediately at if you develop.  Fever greater than 100.4 F  Have bleeding or drainage from the puncture site  Have progressive Leg/arm numbness and or weakness  Loss of control of bowel and or bladder (wet/soil yourself)  Severe headache with inability to lift head  10-You may return to work the next day

## 2024-12-10 NOTE — H&P
[facility]  Pain Medicine  [address] [place] [address]    Procedure History & Physical      Annamaria Wilcox     HPI:    Patient  is here for LBP, L E Pain for L2/3 [place]   Labs/imaging studies reviewed   All question and concerns addressed including R/B/A associated with the procedure    Past Medical History:   Diagnosis Date    Anxiety     Asthma     Depression     GERD (gastroesophageal reflux disease)     Hyperlipidemia     Lumbar radiculopathy        Past Surgical History:   Procedure Laterality Date    BACK INJECTION Left 2024    LEFT SACROILIAC JOINT INJECTION UNDER FLUOROSCOPIC GUIDANCE performed by Lore Mahajan MD at [facility] OR    CARPAL TUNNEL RELEASE Bilateral     CERVICAL FUSION       SECTION      x 3    CT BIOPSY PERCUTANEOUS DEEP BONE  2023    CT BIOPSY PERCUTANEOUS DEEP BONE 3/17/2023 Kristian Wild MD [facility]    DENTAL SURGERY N/A 2019    DENTAL EXAM FULL MOUTH EXTRACTIONS ALEOPOLASTY performed by Ludwig Thorne DDS at [facility] OR    ESOPHAGOGASTRODUODENOSCOPY      PAIN MANAGEMENT PROCEDURE Left 2023    LUMBAR EPIDURAL STEROID INJECTION UNDER FLUOROSCOPIC GUIDANCE AT L4-L5 LEFT PARAMEDIAN performed by Lore Mahajan MD at [facility] OR    PAIN MANAGEMENT PROCEDURE Left 2023    LUMBAR EPIDURAL STEROID INJECTION UNDER FLUOROSCOPIC GUIDANCE AT L2-L3 LEFT  PARAMEDIAN (KENALOG)- ABORTED DUE TO HYPOTENSION performed by Lore Mahajan MD at [facility] OR    PAIN MANAGEMENT PROCEDURE Left 10/10/2023    LUMBAR EPIDURAL STEROID INJECTION UNDER FLUOROSCOPIC GUIDANCE AT L2-L3 LEFT  PARAMEDIAN        (KENALOG) performed by Lore Mahajan MD at [facility] OR    PAIN MANAGEMENT PROCEDURE N/A 2023    LUMBAR EPIDURAL STEROID INJECTION UNDER FLUOROSCOPIC GUIDANCE AT L2-L3 LEFT  PARAMEDIAN performed by Lore Mahajan MD at [facility] OR    UPPER GASTROINTESTINAL ENDOSCOPY N/A 2024    ESOPHAGOGASTRODUODENOSCOPY

## 2025-01-02 RX ORDER — GABAPENTIN 400 MG/1
400 CAPSULE ORAL 3 TIMES DAILY
Qty: 90 CAPSULE | Refills: 0 | Status: SHIPPED
Start: 2025-01-02 | End: 2025-01-20 | Stop reason: SDUPTHER

## 2025-01-07 RX ORDER — TOPIRAMATE 25 MG/1
TABLET, FILM COATED ORAL
Qty: 30 TABLET | Refills: 1 | OUTPATIENT
Start: 2025-01-07

## 2025-01-20 ENCOUNTER — OFFICE VISIT (OUTPATIENT)
Dept: PAIN MANAGEMENT | Age: 65
End: 2025-01-20
Payer: COMMERCIAL

## 2025-01-20 VITALS
OXYGEN SATURATION: 92 % | HEART RATE: 86 BPM | TEMPERATURE: 97.9 F | RESPIRATION RATE: 16 BRPM | SYSTOLIC BLOOD PRESSURE: 128 MMHG | WEIGHT: 169 LBS | BODY MASS INDEX: 27.16 KG/M2 | HEIGHT: 66 IN | DIASTOLIC BLOOD PRESSURE: 81 MMHG

## 2025-01-20 DIAGNOSIS — M46.1 SACROILIITIS (HCC): ICD-10-CM

## 2025-01-20 DIAGNOSIS — M48.062 SPINAL STENOSIS OF LUMBAR REGION WITH NEUROGENIC CLAUDICATION: ICD-10-CM

## 2025-01-20 DIAGNOSIS — G62.9 PERIPHERAL POLYNEUROPATHY: ICD-10-CM

## 2025-01-20 DIAGNOSIS — G25.81 RESTLESS LEG SYNDROME: ICD-10-CM

## 2025-01-20 DIAGNOSIS — M54.41 CHRONIC BILATERAL LOW BACK PAIN WITH BILATERAL SCIATICA: ICD-10-CM

## 2025-01-20 DIAGNOSIS — M54.16 LUMBAR RADICULOPATHY: Primary | ICD-10-CM

## 2025-01-20 DIAGNOSIS — M54.42 CHRONIC BILATERAL LOW BACK PAIN WITH BILATERAL SCIATICA: ICD-10-CM

## 2025-01-20 DIAGNOSIS — G89.29 CHRONIC BILATERAL LOW BACK PAIN WITH BILATERAL SCIATICA: ICD-10-CM

## 2025-01-20 PROCEDURE — 99214 OFFICE O/P EST MOD 30 MIN: CPT | Performed by: STUDENT IN AN ORGANIZED HEALTH CARE EDUCATION/TRAINING PROGRAM

## 2025-01-20 RX ORDER — GABAPENTIN 400 MG/1
400 CAPSULE ORAL 3 TIMES DAILY
Qty: 270 CAPSULE | Refills: 0 | Status: SHIPPED | OUTPATIENT
Start: 2025-01-20 | End: 2025-04-20

## 2025-01-20 RX ORDER — PRAMIPEXOLE DIHYDROCHLORIDE 1 MG/1
1 TABLET ORAL NIGHTLY
Qty: 90 TABLET | Refills: 0 | Status: SHIPPED | OUTPATIENT
Start: 2025-01-20 | End: 2025-04-20

## 2025-01-20 RX ORDER — TIZANIDINE 2 MG/1
4 TABLET ORAL NIGHTLY PRN
Qty: 60 TABLET | Refills: 2 | Status: SHIPPED | OUTPATIENT
Start: 2025-01-20 | End: 2025-04-20

## 2025-01-20 RX ORDER — TOPIRAMATE 25 MG/1
25 TABLET, FILM COATED ORAL NIGHTLY
Qty: 90 TABLET | Refills: 0 | Status: SHIPPED | OUTPATIENT
Start: 2025-01-20 | End: 2025-04-20

## 2025-01-20 NOTE — PROGRESS NOTES
Annamaria Wilcox presents to the [facility] on 1/20/2025. Annamaria is complaining of pain lower back. The pain is constant. The pain is described as dull. Pain is rated on her best day at a 4, on her worst day at a 6, and on average at a 5 on the VAS scale. She took her last dose of Neurontin today.     Any procedures since your last visit: Yes,    Pacemaker or defibrillator: No    She is not on NSAIDS and is not on anticoagulation medications     Do you want someone present when the provider examines you? No    Medication Contract and Consent for Opioid Use Documents Filed        No documents found                    /81   Pulse 86   Temp 97.9 °F (36.6 °C) (Infrared)   Resp 16   Ht 1.676 m (5' 6\")   Wt 76.7 kg (169 lb)   SpO2 92%   BMI 27.28 kg/m²      No LMP recorded. Patient is postmenopausal.  
lb)   SpO2 92%   BMI 27.28 kg/m²     General:  Pleasant in no distress and A & O x 3, Build:Normal Weight, Function: Rises from a seated position with difficulty      HEENT:normocephalic, atraumatic, Pupils regular, round, equal Sclera: icterus absent      Lungs: Breathing:normal breath pattern, unlabored     CVS: RRR, pulses intact b/l     Abdomen: non-distended and normal Non tender, no guarding.      Cervical spine: Inspection: normal   Palpation: Yes tenderness over the midline and paraspinal area, bilaterally   Tenderness to palpation over bilateral occipital ridge  Range of motion: Normal flexion, extension   Spurling's: negative bilaterally   Fulton's: negative bilaterally      Thoracic spine: Inspection: normal - band aid over thoracic spine   Palpation:Yes tenderness over the midline and paraspinal area   Range of motion: normal in flexion, extension rotation bilateral and is not painful.      Lumbar spine: Inspection: normal   Spine Range of motion: Decreased, flexion Normal,  extension Normal, Lateral bending, and rotation bilaterally reduced is somewhat painful.   Palpation:tenderness paravertebral muscles and midline tenderness.  Facet Loading: left -positive and right-positive.     Musculoskeletal:  SIJ Compression Test: positive Left  SIJ Distraction:negative bilaterally  KIRTI test: positive bilaterally   Gaenslen's test:positive left  Thigh Thrust: negative bilaterally  SLR : negative bilaterally   Trochanteric bursa tenderness: negative bilaterally  Trigger points: Bilateral cervical paraspinals, trapezius, rhomboids    Extremities:  Tremors: No  - bilaterally upper and lower   Hips: some pain with internal rotation of hips   Varicose veins: No    Pulses: present Lt radial   Cyanosis: none   Edema: No  x all 4 extremities      Neurological: Sensory:normal to light touch  , CN 2-12 grossly intact      MOTOR Left (x/5) Right (x/5)   Shoulder Abduction (C5)  5 5   Biceps - Elbow Flexion (C6)  5 5

## 2025-01-24 SDOH — HEALTH STABILITY: PHYSICAL HEALTH: ON AVERAGE, HOW MANY MINUTES DO YOU ENGAGE IN EXERCISE AT THIS LEVEL?: 0 MIN

## 2025-01-24 SDOH — HEALTH STABILITY: PHYSICAL HEALTH: ON AVERAGE, HOW MANY DAYS PER WEEK DO YOU ENGAGE IN MODERATE TO STRENUOUS EXERCISE (LIKE A BRISK WALK)?: 0 DAYS

## 2025-01-24 ASSESSMENT — PATIENT HEALTH QUESTIONNAIRE - PHQ9
4. FEELING TIRED OR HAVING LITTLE ENERGY: SEVERAL DAYS
SUM OF ALL RESPONSES TO PHQ QUESTIONS 1-9: 8
7. TROUBLE CONCENTRATING ON THINGS, SUCH AS READING THE NEWSPAPER OR WATCHING TELEVISION: MORE THAN HALF THE DAYS
5. POOR APPETITE OR OVEREATING: NOT AT ALL
SUM OF ALL RESPONSES TO PHQ9 QUESTIONS 1 & 2: 4
8. MOVING OR SPEAKING SO SLOWLY THAT OTHER PEOPLE COULD HAVE NOTICED. OR THE OPPOSITE, BEING SO FIGETY OR RESTLESS THAT YOU HAVE BEEN MOVING AROUND A LOT MORE THAN USUAL: NOT AT ALL
9. THOUGHTS THAT YOU WOULD BE BETTER OFF DEAD, OR OF HURTING YOURSELF: NOT AT ALL
2. FEELING DOWN, DEPRESSED OR HOPELESS: MORE THAN HALF THE DAYS
6. FEELING BAD ABOUT YOURSELF - OR THAT YOU ARE A FAILURE OR HAVE LET YOURSELF OR YOUR FAMILY DOWN: NOT AT ALL
1. LITTLE INTEREST OR PLEASURE IN DOING THINGS: MORE THAN HALF THE DAYS
SUM OF ALL RESPONSES TO PHQ QUESTIONS 1-9: 8
10. IF YOU CHECKED OFF ANY PROBLEMS, HOW DIFFICULT HAVE THESE PROBLEMS MADE IT FOR YOU TO DO YOUR WORK, TAKE CARE OF THINGS AT HOME, OR GET ALONG WITH OTHER PEOPLE: NOT DIFFICULT AT ALL
3. TROUBLE FALLING OR STAYING ASLEEP: SEVERAL DAYS

## 2025-01-24 ASSESSMENT — LIFESTYLE VARIABLES
HOW MANY STANDARD DRINKS CONTAINING ALCOHOL DO YOU HAVE ON A TYPICAL DAY: 0
HOW OFTEN DO YOU HAVE A DRINK CONTAINING ALCOHOL: NEVER
HOW MANY STANDARD DRINKS CONTAINING ALCOHOL DO YOU HAVE ON A TYPICAL DAY: PATIENT DOES NOT DRINK
HOW OFTEN DO YOU HAVE SIX OR MORE DRINKS ON ONE OCCASION: 1
HOW OFTEN DO YOU HAVE A DRINK CONTAINING ALCOHOL: 1

## 2025-01-28 SDOH — ECONOMIC STABILITY: INCOME INSECURITY: IN THE LAST 12 MONTHS, WAS THERE A TIME WHEN YOU WERE NOT ABLE TO PAY THE MORTGAGE OR RENT ON TIME?: NO

## 2025-01-28 SDOH — ECONOMIC STABILITY: FOOD INSECURITY: WITHIN THE PAST 12 MONTHS, THE FOOD YOU BOUGHT JUST DIDN'T LAST AND YOU DIDN'T HAVE MONEY TO GET MORE.: SOMETIMES TRUE

## 2025-01-28 SDOH — ECONOMIC STABILITY: FOOD INSECURITY: WITHIN THE PAST 12 MONTHS, YOU WORRIED THAT YOUR FOOD WOULD RUN OUT BEFORE YOU GOT MONEY TO BUY MORE.: SOMETIMES TRUE

## 2025-01-28 SDOH — ECONOMIC STABILITY: TRANSPORTATION INSECURITY
IN THE PAST 12 MONTHS, HAS THE LACK OF TRANSPORTATION KEPT YOU FROM MEDICAL APPOINTMENTS OR FROM GETTING MEDICATIONS?: NO

## 2025-01-31 ENCOUNTER — OFFICE VISIT (OUTPATIENT)
Dept: FAMILY MEDICINE CLINIC | Age: 65
End: 2025-01-31

## 2025-01-31 VITALS
HEART RATE: 90 BPM | DIASTOLIC BLOOD PRESSURE: 80 MMHG | RESPIRATION RATE: 16 BRPM | WEIGHT: 164 LBS | HEIGHT: 66 IN | OXYGEN SATURATION: 96 % | BODY MASS INDEX: 26.36 KG/M2 | SYSTOLIC BLOOD PRESSURE: 134 MMHG | TEMPERATURE: 97.6 F

## 2025-01-31 DIAGNOSIS — Z87.891 PERSONAL HISTORY OF TOBACCO USE: ICD-10-CM

## 2025-01-31 DIAGNOSIS — F17.200 SMOKER: ICD-10-CM

## 2025-01-31 DIAGNOSIS — Z87.891 PERSONAL HISTORY OF TOBACCO USE, PRESENTING HAZARDS TO HEALTH: ICD-10-CM

## 2025-01-31 DIAGNOSIS — E78.2 MIXED HYPERLIPIDEMIA: ICD-10-CM

## 2025-01-31 DIAGNOSIS — F33.41 RECURRENT MAJOR DEPRESSIVE DISORDER, IN PARTIAL REMISSION (HCC): ICD-10-CM

## 2025-01-31 DIAGNOSIS — R06.02 SOB (SHORTNESS OF BREATH): ICD-10-CM

## 2025-01-31 DIAGNOSIS — J44.9 CHRONIC OBSTRUCTIVE PULMONARY DISEASE, UNSPECIFIED COPD TYPE (HCC): ICD-10-CM

## 2025-01-31 DIAGNOSIS — Z00.00 MEDICARE ANNUAL WELLNESS VISIT, SUBSEQUENT: Primary | ICD-10-CM

## 2025-01-31 LAB
ALBUMIN: 4.5 G/DL (ref 3.5–5.2)
ALP BLD-CCNC: 97 U/L (ref 35–104)
ALT SERPL-CCNC: 16 U/L (ref 0–32)
ANION GAP SERPL CALCULATED.3IONS-SCNC: 11 MMOL/L (ref 7–16)
AST SERPL-CCNC: 17 U/L (ref 0–31)
BASOPHILS ABSOLUTE: 0 K/UL (ref 0–0.2)
BASOPHILS RELATIVE PERCENT: 0 % (ref 0–2)
BILIRUB SERPL-MCNC: 0.3 MG/DL (ref 0–1.2)
BUN BLDV-MCNC: 8 MG/DL (ref 6–23)
CALCIUM SERPL-MCNC: 9 MG/DL (ref 8.6–10.2)
CHLORIDE BLD-SCNC: 97 MMOL/L (ref 98–107)
CHOLESTEROL, TOTAL: 170 MG/DL
CO2: 24 MMOL/L (ref 22–29)
CREAT SERPL-MCNC: 0.8 MG/DL (ref 0.5–1)
EOSINOPHILS ABSOLUTE: 0.31 K/UL (ref 0.05–0.5)
EOSINOPHILS RELATIVE PERCENT: 5 % (ref 0–6)
GFR, ESTIMATED: 83 ML/MIN/1.73M2
GLUCOSE BLD-MCNC: 95 MG/DL (ref 74–99)
HCT VFR BLD CALC: 41.3 % (ref 34–48)
HDLC SERPL-MCNC: 59 MG/DL
HEMOGLOBIN: 13.7 G/DL (ref 11.5–15.5)
IMMATURE GRANULOCYTES %: 0 % (ref 0–5)
IMMATURE GRANULOCYTES ABSOLUTE: <0.03 K/UL (ref 0–0.58)
LDL CHOLESTEROL: 91 MG/DL
LYMPHOCYTES ABSOLUTE: 1.63 K/UL (ref 1.5–4)
LYMPHOCYTES RELATIVE PERCENT: 28 % (ref 20–42)
MCH RBC QN AUTO: 29.7 PG (ref 26–35)
MCHC RBC AUTO-ENTMCNC: 33.2 G/DL (ref 32–34.5)
MCV RBC AUTO: 89.4 FL (ref 80–99.9)
MONOCYTES ABSOLUTE: 0.47 K/UL (ref 0.1–0.95)
MONOCYTES RELATIVE PERCENT: 8 % (ref 2–12)
NEUTROPHILS ABSOLUTE: 3.34 K/UL (ref 1.8–7.3)
NEUTROPHILS RELATIVE PERCENT: 58 % (ref 43–80)
PDW BLD-RTO: 15.5 % (ref 11.5–15)
PLATELET # BLD: 201 K/UL (ref 130–450)
PMV BLD AUTO: 9.3 FL (ref 7–12)
POTASSIUM SERPL-SCNC: 4.2 MMOL/L (ref 3.5–5)
RBC # BLD: 4.62 M/UL (ref 3.5–5.5)
SODIUM BLD-SCNC: 132 MMOL/L (ref 132–146)
TOTAL PROTEIN: 6.8 G/DL (ref 6.4–8.3)
TRIGL SERPL-MCNC: 98 MG/DL
VLDLC SERPL CALC-MCNC: 20 MG/DL
WBC # BLD: 5.8 K/UL (ref 4.5–11.5)

## 2025-01-31 RX ORDER — NICOTINE 21 MG/24HR
1 PATCH, TRANSDERMAL 24 HOURS TRANSDERMAL EVERY 24 HOURS
Qty: 30 PATCH | Refills: 3 | Status: SHIPPED | OUTPATIENT
Start: 2025-01-31 | End: 2026-01-31

## 2025-01-31 RX ORDER — TIOTROPIUM BROMIDE 18 UG/1
18 CAPSULE ORAL; RESPIRATORY (INHALATION) DAILY
Qty: 90 CAPSULE | Refills: 1 | Status: SHIPPED | OUTPATIENT
Start: 2025-01-31

## 2025-01-31 RX ORDER — BUPROPION HYDROCHLORIDE 150 MG/1
150 TABLET ORAL EVERY MORNING
Qty: 30 TABLET | Refills: 3 | Status: SHIPPED | OUTPATIENT
Start: 2025-01-31

## 2025-01-31 NOTE — PROGRESS NOTES
Medicare Annual Wellness Visit    Annamaria Wilcox is here for Medicare AWV    Assessment & Plan  1. Chronic Obstructive Pulmonary Disease (COPD): Uncontrolled.  - Initiate Spiriva therapy.  - Order low-density CT scan of the chest to rule out nodules.  - Conduct pulmonary function test (PFT) to confirm COPD diagnosis.  - Perform chest x-ray today to exclude pneumonia.  - Refer to pulmonologist for further evaluation.  - Obtain basic blood work including complete blood count, kidney function tests, liver function tests, and cholesterol levels.    2. Smoking Cessation.  - Start Wellbutrin: one tablet in the morning for the first three days, then one tablet in the morning and one in the afternoon.  - Prescribe NicoDerm patch 21 mg: apply daily, replace after shower if needed.    3. Depression.  - Prescribe Wellbutrin: one tablet in the morning for the first three days, then one tablet in the morning and one in the afternoon.    Follow-up  - Chest x-ray today.  - Basic blood work today.    PROCEDURE  The patient received an injection for chronic pain in December 2024.    Medicare annual wellness visit, subsequent  SOB (shortness of breath)  -     Full PFT Study With Bronchodilator; Future  -     XR CHEST (2 VW); Future  -     tiotropium (SPIRIVA HANDIHALER) 18 MCG inhalation capsule; Inhale 1 capsule into the lungs daily, Disp-90 capsule, R-1Normal  -     AFL (Epic) Hermelinda Bruno MD, Pulmonary, [place]  Chronic obstructive pulmonary disease, unspecified COPD type (HCC)  -     Full PFT Study With Bronchodilator; Future  -     XR CHEST (2 VW); Future  -     tiotropium (SPIRIVA HANDIHALER) 18 MCG inhalation capsule; Inhale 1 capsule into the lungs daily, Disp-90 capsule, R-1Normal  -     AFL (Epic) Hermelinda Bruno MD, Pulmonary, [place]  -     ESTABLISHED, MOD MDM, 30-39 MIN [46152]  Smoker  Recurrent major depressive disorder, in partial remission (HCC)  -     ESTABLISHED, MOD MDM, 30-39 MIN

## 2025-02-02 ENCOUNTER — PATIENT MESSAGE (OUTPATIENT)
Dept: FAMILY MEDICINE CLINIC | Age: 65
End: 2025-02-02

## 2025-02-17 ENCOUNTER — HOSPITAL ENCOUNTER (OUTPATIENT)
Dept: PULMONOLOGY | Age: 65
Discharge: HOME | End: 2025-02-17
Attending: FAMILY MEDICINE
Payer: COMMERCIAL

## 2025-02-17 DIAGNOSIS — J44.9 CHRONIC OBSTRUCTIVE PULMONARY DISEASE, UNSPECIFIED COPD TYPE (HCC): ICD-10-CM

## 2025-02-17 DIAGNOSIS — R06.02 SOB (SHORTNESS OF BREATH): ICD-10-CM

## 2025-02-17 PROCEDURE — 94726 PLETHYSMOGRAPHY LUNG VOLUMES: CPT

## 2025-02-17 PROCEDURE — 94729 DIFFUSING CAPACITY: CPT

## 2025-02-17 PROCEDURE — 94060 EVALUATION OF WHEEZING: CPT

## 2025-02-18 PROBLEM — R06.02 SOB (SHORTNESS OF BREATH): Status: ACTIVE | Noted: 2025-02-18

## 2025-02-18 NOTE — PROCEDURES
[address] [address]                           PULMONARY FUNCTION      PATIENT NAME: DOMINIQUE BOTELLO      : 1960  MED REC NO: [id number]                        ROOM:   ACCOUNT NO: [id number]                       ADMIT DATE: 2025  PROVIDER: Reagan Roberto MD      DATE OF PROCEDURE: 2025    SURGEON:  Reagan Roberto MD    REFERRING PHYSICIAN:  NATHANIEL MACKEY    INTERPRETING PHYSICIAN:  Reagan Roberto MD    CLINICAL INDICATION:  COPD, nonspecified, dyspnea on hills and stairs.    PROCEDURE:  Pulmonary function test.    PURPOSE:  Diagnostic.    MEDICATIONS:  Albuterol inhaler as needed, Spiriva, Dulera, Breo Ellipta.    LUNG MECHANICS:  There appears to be no obstructive or restrictive component of lung disease according to graphic and numeric representation of patient lung volumes.  Pattern of flow volume loop supports this.    FEV1/FVC 0.72, represented best postbronchodilator.    FEV1 is 2.21 L, 86% of predicted postbronchodilator equating to a 160 mL difference or 13% change.  Z-score -0.88.    FVC 3.05 L, 93% of predicted postbronchodilator equating to a 310 mL difference or 11% change.  Z-score -0.41.    MVV 70% of predicted.    Inspiratory phase of flow volume loop shows adequate upper airway laryngeal and pharyngeal function.  Expiratory phase of flow volume loop shows adequate chest wall strength.    LUNG VOLUMES:  ERV is moderately reduced at 41%.  There is hyperinflation noted at the RV which is 157%.  All other parameters are within normal limits and repeatable to about 2% to 4%.    LUNG DIFFUSION:  DLCO corrected for alveolar volume is normal at 90%.  This reflects a normal parenchymal based gas exchange process.    CLINICAL IMPRESSION:  Clinically most compatible with a very mild obstructive component of lung disease reflective of active large and small airway asthma as reflected by the

## 2025-02-21 ENCOUNTER — HOSPITAL ENCOUNTER (OUTPATIENT)
Dept: CT IMAGING | Age: 65
Discharge: HOME | End: 2025-02-23
Attending: FAMILY MEDICINE
Payer: COMMERCIAL

## 2025-02-21 DIAGNOSIS — Z87.891 PERSONAL HISTORY OF TOBACCO USE: ICD-10-CM

## 2025-02-21 PROCEDURE — 71271 CT THORAX LUNG CANCER SCR C-: CPT

## 2025-02-24 RX ORDER — BUPROPION HYDROCHLORIDE 150 MG/1
150 TABLET ORAL 2 TIMES DAILY
Qty: 180 TABLET | Refills: 1 | Status: SHIPPED | OUTPATIENT
Start: 2025-02-24

## 2025-02-24 NOTE — TELEPHONE ENCOUNTER
Patient comment: I was started at one in the morning then went to one in the morning and one at night. I ran out nine days ago and they won't fill because the table says once a day not twice.  Please correct and refill . It was really helping.     I updated order, please verify    Name of Medication(s) Requested:  Requested Prescriptions     Pending Prescriptions Disp Refills    buPROPion (WELLBUTRIN XL) 150 MG extended release tablet 180 tablet 1     Sig: Take 1 tablet by mouth in the morning and at bedtime       Medication is on current medication list Yes    Dosage and directions were verified? Yes    Quantity verified: 90 day supply     Pharmacy Verified?  Yes    Last Appointment:  1/31/2025    Future appts:  Future Appointments   Date Time Provider Department Center   3/12/2025  9:40 AM Jose Greco MD [facility]   3/21/2025  9:30 AM Lore Mahajan MD [facility]   5/30/2025  8:00 AM Danuta Arias MD [facility] DEP        (If no appt send self scheduling link. .REFILLAPPT)  Scheduling request sent?     [] Yes  [x] No    Does patient need updated?  [] Yes  [x] No

## 2025-03-06 DIAGNOSIS — R06.02 SHORTNESS OF BREATH: ICD-10-CM

## 2025-03-06 DIAGNOSIS — R05.2 SUBACUTE COUGH: ICD-10-CM

## 2025-03-06 DIAGNOSIS — J98.01 BRONCHOSPASM: ICD-10-CM

## 2025-03-06 RX ORDER — BUPROPION HYDROCHLORIDE 150 MG/1
150 TABLET ORAL 2 TIMES DAILY
Qty: 180 TABLET | Refills: 1 | Status: CANCELLED | OUTPATIENT
Start: 2025-03-06

## 2025-03-07 RX ORDER — ALBUTEROL SULFATE 90 UG/1
2 INHALANT RESPIRATORY (INHALATION) 4 TIMES DAILY PRN
Qty: 18 G | Refills: 2 | Status: SHIPPED | OUTPATIENT
Start: 2025-03-07

## 2025-03-07 NOTE — TELEPHONE ENCOUNTER
Name of Medication(s) Requested:  Requested Prescriptions     Pending Prescriptions Disp Refills    albuterol sulfate HFA (VENTOLIN HFA) 108 (90 Base) MCG/ACT inhaler 18 g 2     Sig: Inhale 2 puffs into the lungs 4 times daily as needed for Wheezing or Shortness of Breath       Medication is on current medication list Yes    Dosage and directions were verified? Yes    Quantity verified: 90 day supply     Pharmacy Verified?  Yes    Last Appointment:  1/31/2025    Future appts:  Future Appointments   Date Time Provider Department Center   3/12/2025  9:40 AM Jose Greco MD [facility]   3/21/2025  9:30 AM Lore Mahajan MD [facility]   5/30/2025  8:00 [facility]        (If no appt send self scheduling link. .REFILLAPPT)  Scheduling request sent?     [] Yes  [x] No    Does patient need updated?  [] Yes  [x] No

## 2025-03-12 PROBLEM — J44.9 SUSPECTED CHRONIC OBSTRUCTIVE PULMONARY DISEASE BASED ON INITIAL EVALUATION (HCC): Status: ACTIVE | Noted: 2025-03-12

## 2025-03-12 PROBLEM — R06.83 SNORING: Status: ACTIVE | Noted: 2025-03-12

## 2025-03-12 PROBLEM — J45.909 REACTIVE AIRWAY DISEASE: Status: ACTIVE | Noted: 2025-03-12

## 2025-03-12 PROBLEM — R06.09 DYSPNEA ON EXERTION: Status: ACTIVE | Noted: 2025-02-18

## 2025-03-12 PROBLEM — G47.33 OSA (OBSTRUCTIVE SLEEP APNEA): Status: ACTIVE | Noted: 2025-03-12

## 2025-03-12 PROBLEM — G47.19 EXCESSIVE DAYTIME SLEEPINESS: Status: ACTIVE | Noted: 2025-03-12

## 2025-03-13 RX ORDER — HYDROXYZINE PAMOATE 50 MG/1
CAPSULE ORAL
Qty: 90 CAPSULE | Refills: 2 | Status: SHIPPED | OUTPATIENT
Start: 2025-03-13

## 2025-03-13 NOTE — TELEPHONE ENCOUNTER
Name of Medication(s) Requested:  Requested Prescriptions     Pending Prescriptions Disp Refills    hydrOXYzine pamoate (VISTARIL) 50 MG capsule [Pharmacy Med Name: HYDROXYZINE PAMOATE 50MG CAPSULE] 90 capsule 2     Sig: TAKE ONE (1) CAPSULE BY MOUTH THREE TIMES A DAY AS NEEDED FOR ANXIETY       Medication is on current medication list Yes    Dosage and directions were verified? Yes    Quantity verified: 90 day supply     Pharmacy Verified?  Yes    Last Appointment:  1/31/2025    Future appts:  Future Appointments   Date Time Provider Department Center   3/21/2025  9:30 AM Lore Mahajan MD [facility]   5/30/2025  8:00 AM Danuta Arias MD [facility] E[facility] DEP        (If no appt send self scheduling link. .REFILLAPPT)  Scheduling request sent?     [] Yes  [] No    Does patient need updated?  [] Yes  [x] No

## 2025-03-21 ENCOUNTER — OFFICE VISIT (OUTPATIENT)
Dept: PAIN MANAGEMENT | Age: 65
End: 2025-03-21
Payer: COMMERCIAL

## 2025-03-21 VITALS
RESPIRATION RATE: 16 BRPM | OXYGEN SATURATION: 91 % | HEIGHT: 66 IN | TEMPERATURE: 97.8 F | SYSTOLIC BLOOD PRESSURE: 112 MMHG | DIASTOLIC BLOOD PRESSURE: 69 MMHG | WEIGHT: 174 LBS | BODY MASS INDEX: 27.97 KG/M2 | HEART RATE: 92 BPM

## 2025-03-21 DIAGNOSIS — M54.41 CHRONIC BILATERAL LOW BACK PAIN WITH BILATERAL SCIATICA: ICD-10-CM

## 2025-03-21 DIAGNOSIS — G89.4 CHRONIC PAIN SYNDROME: ICD-10-CM

## 2025-03-21 DIAGNOSIS — M46.1 SACROILIITIS: ICD-10-CM

## 2025-03-21 DIAGNOSIS — M54.42 CHRONIC BILATERAL LOW BACK PAIN WITH BILATERAL SCIATICA: ICD-10-CM

## 2025-03-21 DIAGNOSIS — M47.817 LUMBOSACRAL SPONDYLOSIS WITHOUT MYELOPATHY: ICD-10-CM

## 2025-03-21 DIAGNOSIS — G25.81 RESTLESS LEG SYNDROME: ICD-10-CM

## 2025-03-21 DIAGNOSIS — G89.29 CHRONIC BILATERAL LOW BACK PAIN WITH BILATERAL SCIATICA: ICD-10-CM

## 2025-03-21 DIAGNOSIS — M54.16 LUMBAR RADICULOPATHY: Primary | ICD-10-CM

## 2025-03-21 DIAGNOSIS — G62.9 PERIPHERAL POLYNEUROPATHY: ICD-10-CM

## 2025-03-21 DIAGNOSIS — M48.062 SPINAL STENOSIS OF LUMBAR REGION WITH NEUROGENIC CLAUDICATION: ICD-10-CM

## 2025-03-21 PROCEDURE — 99214 OFFICE O/P EST MOD 30 MIN: CPT | Performed by: STUDENT IN AN ORGANIZED HEALTH CARE EDUCATION/TRAINING PROGRAM

## 2025-03-21 RX ORDER — PRAMIPEXOLE DIHYDROCHLORIDE 0.75 MG/1
0.75 TABLET ORAL NIGHTLY
Qty: 30 TABLET | Refills: 0 | Status: SHIPPED | OUTPATIENT
Start: 2025-03-21 | End: 2025-04-20

## 2025-03-21 RX ORDER — TIZANIDINE 2 MG/1
2 TABLET ORAL NIGHTLY PRN
Qty: 30 TABLET | Refills: 0 | Status: SHIPPED | OUTPATIENT
Start: 2025-03-21 | End: 2025-04-20

## 2025-03-21 RX ORDER — GABAPENTIN 300 MG/1
300 CAPSULE ORAL 3 TIMES DAILY
Qty: 90 CAPSULE | Refills: 0 | Status: SHIPPED | OUTPATIENT
Start: 2025-03-21 | End: 2025-04-20

## 2025-03-21 NOTE — PROGRESS NOTES
Annamaria Wilcox presents to the [facility] on 3/21/2025. Annamaria is complaining of pain left leg. The pain is constant. The pain is described as aching and dull. Pain is rated on her best day at a 3, on her worst day at a 5, and on average at a 4 on the VAS scale. Neurontin today    Any procedures since your last visit: No,     Pacemaker or defibrillator: No    She is not on NSAIDS and is not on anticoagulation medications    Do you want someone present when the provider examines you? No    Medication Contract and Consent for Opioid Use Documents Filed        No documents found                    /69   Pulse 92   Temp 97.8 °F (36.6 °C) (Infrared)   Resp 16   Ht 1.676 m (5' 6\")   Wt 78.9 kg (174 lb)   SpO2 91%   BMI 28.08 kg/m²      No LMP recorded. Patient is postmenopausal.  
robaxin, norco, percocet, lorazepam, cymbalta, PT.      Imaging (reviewed with patient) MRI Lumbar showed severe DDD mutlilevel, with mild-mod central canal stenosis at L2/3, and multilevel NF stenosis worst at L3/4 with multilevel spondylosis and T1 hypointense lesion at L3. EMG was overall normal, with borderline peripheral neuropathy.       Differential diagnosis includes (but is not limited to) lumbar spondylosis, lumbar radiculopathy, spinal stenosis with neurogenic claudication, myofascial pain, benign intravertebral lesion, peripheral neuropathy.     She is has had various interventions with great relief in the past, most recently, she is s/p 12/10/24 - L2/3 CHESTER -80% relief for 3 + month.      Our treatment plan will continue with HEP for low back and hips. Given her fatigue, we will decrease her  gabapentin to 300mg TID,  Mirapex to 0.75 mg QHS and Tizanidine 2  mg QHS PRN for spasms. We will continue Topamax 25mg daily.           The patient was understanding and in agreement with this plan.       Plan:   Therapy: HEP  Imaging: no new     Medications:   Gabapentin to 300mg TID  Topamax 25 mg daily  Zanaflex 2 mg PRN  Mirapex  0.75 mg QHS  Interventions:   Consider repeat CHESTER   Consults:  follow-up neurosurgery  Follow up:  1 months   Urine screen today: no        Counseling :  Patient encouraged to stay active and to watch/lose weight   Encouraged to continue Regular home exercise program as tolerated - stretching / strengthening.   Smoking cessation counseling : Yes  Treatment plan discussed with the patient including medication and procedure side effects, as well as benefits and alternatives and the patient expressed understanding.     E&M Medical Decision Making / Time Spent:  Classification: 50922 - Moderate Medical Decision Making and/or 30-39 min  MDM Complexity of Problems: Moderate - 1 or more chronic illnesses with exacerbation, progression, or side effects of treatment, 2 or more stable chronic

## 2025-03-25 ENCOUNTER — HOSPITAL ENCOUNTER (OUTPATIENT)
Dept: SLEEP CENTER | Age: 65
Discharge: HOME | End: 2025-03-25
Attending: STUDENT IN AN ORGANIZED HEALTH CARE EDUCATION/TRAINING PROGRAM
Payer: COMMERCIAL

## 2025-03-25 DIAGNOSIS — G47.19 EXCESSIVE DAYTIME SLEEPINESS: ICD-10-CM

## 2025-03-25 DIAGNOSIS — R06.83 SNORING: ICD-10-CM

## 2025-03-25 DIAGNOSIS — G47.33 OSA (OBSTRUCTIVE SLEEP APNEA): ICD-10-CM

## 2025-03-25 PROCEDURE — 95800 SLP STDY UNATTENDED: CPT

## 2025-03-27 NOTE — PROGRESS NOTES
[place] [address]                           SLEEP CENTER REPORT      PATIENT NAME: DOMINIQUE BOTELLO      : 1960  MED REC NO: [id number]                        ROOM:   ACCOUNT NO: [id number]                       ADMIT DATE: 2025  PROVIDER: Jeramy Cates MD      SLEEP STUDY    DATE OF STUDY:  2025    REFERRING PHYSICIAN:  Jose Greco      INDICATION FOR STUDY:  Excessive daytime sleepiness, snoring, restless sleep, trouble with memory/concentration, nocturnal diaphoresis.    CURRENT MEDICATIONS:  None listed.    INTERPRETATION:  This sleep study was performed as a home sleep apnea test.  A WatchPAT monitoring device was utilized for the study.  Recording time was 8 hours and sleep time was 7 hours and 8 minutes.  REM stage sleep comprised 21% of the total sleep time.  Review of the raw data indicates sufficient information to adequately interpret the study.    RESPIRATION SUMMARY:  The respiratory event index was less than 3.  The patient slept in the supine position 64% of the total sleep time.    OXYGEN SATURATION:  Average oxygen saturation was 91%.  Lowest saturation was 87%.  The patient spent less than 1% of the time with saturation less than 88%.    HEART RATE SUMMARY:  Average heart rate was 80 beats per minute.  Atrial fibrillation was not detected.  The premature beats occurred at a rate of less than 0.1 per minute.    MISCELLANEOUS:  [place] Sleepiness Scale score is 15/24.  Snoring was very mild.  The patient snored for 5% of the total valid sleep time.  BMI was 28 pounds per square inch and neck circumference 15 inches.    IMPRESSION:    1. No evidence of sleep apnea.  2. Very mild snoring.  3. Good oxygen saturation.    SUGGESTIONS:    1. Dr. Jose Greco to discuss results of study with the patient.  2. No indication to treat for sleep apnea.          JERAMY CATES MD  [employer]

## 2025-05-08 RX ORDER — TOPIRAMATE 25 MG/1
TABLET, FILM COATED ORAL
Qty: 30 TABLET | Refills: 1 | OUTPATIENT
Start: 2025-05-08

## 2025-05-09 RX ORDER — TOPIRAMATE 25 MG/1
25 TABLET, FILM COATED ORAL NIGHTLY
Qty: 90 TABLET | Refills: 0 | Status: SHIPPED | OUTPATIENT
Start: 2025-05-09 | End: 2025-08-07

## 2025-05-09 NOTE — TELEPHONE ENCOUNTER
Name of Medication(s) Requested:  Requested Prescriptions     Pending Prescriptions Disp Refills    topiramate (TOPAMAX) 25 MG tablet 90 tablet 0     Sig: Take 1 tablet by mouth nightly       Medication is on current medication list Yes    Dosage and directions were verified? Yes    Quantity verified: 90 day supply     Pharmacy Verified?  Yes    Last Appointment:  3/21/2025    Future appts:  Future Appointments   Date Time Provider Department Center   5/30/2025  8:00 AM Danuta Arias MD [facility]   6/13/2025  1:00 PM [facility]        (If no appt send self scheduling link. .REFILLAPPT)  Scheduling request sent?     [] Yes  [] No    Does patient need updated?  [] Yes  [] No

## 2025-05-27 DIAGNOSIS — U09.9 LONG COVID: ICD-10-CM

## 2025-05-27 DIAGNOSIS — G89.4 CHRONIC PAIN SYNDROME: ICD-10-CM

## 2025-05-27 DIAGNOSIS — R20.2 BILATERAL LEG PARESTHESIA: ICD-10-CM

## 2025-05-27 NOTE — TELEPHONE ENCOUNTER
Name of Medication(s) Requested:  Requested Prescriptions     Pending Prescriptions Disp Refills    DULoxetine (CYMBALTA) 30 MG extended release capsule [Pharmacy Med Name: DULOXETINE HCL 30MG CAPSULE DR PART] 180 capsule 1     Sig: TAKE ONE CAPSULE BY MOUTH TWICE A DAY       Medication is on current medication list Yes    Dosage and directions were verified? Yes    Quantity verified: 90 day supply     Pharmacy Verified?  Yes    Last Appointment:  1/31/2025    Future appts:  Future Appointments   Date Time Provider Department Center   6/13/2025  1:00 PM Jose Greco MD [facility]   7/3/2025  8:30 AM Danuta Arias MD [facility] ECC DEP        (If no appt send self scheduling link. .REFILLAPPT)  Scheduling request sent?     [] Yes  [x] No    Does patient need updated?  [] Yes  [x] No

## 2025-05-28 RX ORDER — DULOXETIN HYDROCHLORIDE 30 MG/1
30 CAPSULE, DELAYED RELEASE ORAL 2 TIMES DAILY
Qty: 180 CAPSULE | Refills: 1 | Status: SHIPPED | OUTPATIENT
Start: 2025-05-28

## 2025-06-24 NOTE — TELEPHONE ENCOUNTER
Name of Medication(s) Requested:  Requested Prescriptions     Pending Prescriptions Disp Refills    atorvastatin (LIPITOR) 20 MG tablet [Pharmacy Med Name: ATORVASTATIN CALCIUM 20MG TABLET] 90 tablet 2     Sig: TAKE ONE TABLET BY MOUTH EVERY DAY       Medication is on current medication list Yes    Dosage and directions were verified? Yes    Quantity verified: 90 day supply     Pharmacy Verified?  Yes    Last Appointment:  1/31/2025    Future appts:  Future Appointments   Date Time Provider Department Center   7/3/2025  8:30 AM Danuta Arias MD [facility] DE[facility]        (If no appt send self scheduling link. .REFILLAPPT)  Scheduling request sent?     [] Yes  [x] No    Does patient need updated?  [] Yes  [x] No

## 2025-06-25 ENCOUNTER — TELEPHONE (OUTPATIENT)
Dept: FAMILY MEDICINE CLINIC | Age: 65
End: 2025-06-25

## 2025-06-25 RX ORDER — ATORVASTATIN CALCIUM 20 MG/1
20 TABLET, FILM COATED ORAL DAILY
Qty: 90 TABLET | Refills: 3 | Status: SHIPPED | OUTPATIENT
Start: 2025-06-25

## 2025-06-25 NOTE — TELEPHONE ENCOUNTER
Annamaria calling about her cancelled appointment with Dr Mahajan, stating that she is willing to see whomever else you can refer her to.

## 2025-06-26 NOTE — TELEPHONE ENCOUNTER
Tati De Leon, referral specialist w/ Dr CYR's office, she will call patient to schedule an appointment with Gayla Garnica. A new referral is not needed.

## 2025-07-03 ENCOUNTER — OFFICE VISIT (OUTPATIENT)
Dept: FAMILY MEDICINE CLINIC | Age: 65
End: 2025-07-03
Payer: COMMERCIAL

## 2025-07-03 VITALS
OXYGEN SATURATION: 94 % | SYSTOLIC BLOOD PRESSURE: 110 MMHG | BODY MASS INDEX: 30.53 KG/M2 | DIASTOLIC BLOOD PRESSURE: 60 MMHG | RESPIRATION RATE: 18 BRPM | TEMPERATURE: 97.1 F | HEIGHT: 66 IN | HEART RATE: 77 BPM | WEIGHT: 190 LBS

## 2025-07-03 DIAGNOSIS — R87.612 LOW GRADE SQUAMOUS INTRAEPITHELIAL LESION ON CYTOLOGIC SMEAR OF CERVIX (LGSIL): Primary | ICD-10-CM

## 2025-07-03 DIAGNOSIS — R53.83 OTHER FATIGUE: ICD-10-CM

## 2025-07-03 DIAGNOSIS — B97.7 HPV IN FEMALE: ICD-10-CM

## 2025-07-03 PROCEDURE — 99214 OFFICE O/P EST MOD 30 MIN: CPT | Performed by: FAMILY MEDICINE

## 2025-07-03 RX ORDER — MOMETASONE FUROATE AND FORMOTEROL FUMARATE DIHYDRATE 100; 5 UG/1; UG/1
AEROSOL RESPIRATORY (INHALATION)
COMMUNITY
Start: 2025-05-27

## 2025-07-03 RX ORDER — BUPROPION HYDROCHLORIDE 150 MG/1
150 TABLET ORAL 2 TIMES DAILY
Qty: 180 TABLET | Refills: 1 | Status: SHIPPED | OUTPATIENT
Start: 2025-07-03

## 2025-07-03 RX ORDER — NICOTINE 21 MG/24HR
1 PATCH, TRANSDERMAL 24 HOURS TRANSDERMAL EVERY 24 HOURS
Qty: 30 PATCH | Refills: 3 | Status: SHIPPED | OUTPATIENT
Start: 2025-07-03 | End: 2026-07-03

## 2025-07-03 RX ORDER — HYDROXYZINE PAMOATE 50 MG/1
CAPSULE ORAL
Qty: 90 CAPSULE | Refills: 2 | Status: SHIPPED | OUTPATIENT
Start: 2025-07-03

## 2025-07-03 ASSESSMENT — PATIENT HEALTH QUESTIONNAIRE - PHQ9
7. TROUBLE CONCENTRATING ON THINGS, SUCH AS READING THE NEWSPAPER OR WATCHING TELEVISION: NEARLY EVERY DAY
2. FEELING DOWN, DEPRESSED OR HOPELESS: NEARLY EVERY DAY
SUM OF ALL RESPONSES TO PHQ QUESTIONS 1-9: 20
SUM OF ALL RESPONSES TO PHQ QUESTIONS 1-9: 19
5. POOR APPETITE OR OVEREATING: NEARLY EVERY DAY
6. FEELING BAD ABOUT YOURSELF - OR THAT YOU ARE A FAILURE OR HAVE LET YOURSELF OR YOUR FAMILY DOWN: SEVERAL DAYS
8. MOVING OR SPEAKING SO SLOWLY THAT OTHER PEOPLE COULD HAVE NOTICED. OR THE OPPOSITE, BEING SO FIGETY OR RESTLESS THAT YOU HAVE BEEN MOVING AROUND A LOT MORE THAN USUAL: MORE THAN HALF THE DAYS
4. FEELING TIRED OR HAVING LITTLE ENERGY: NEARLY EVERY DAY
SUM OF ALL RESPONSES TO PHQ QUESTIONS 1-9: 20
1. LITTLE INTEREST OR PLEASURE IN DOING THINGS: NEARLY EVERY DAY
3. TROUBLE FALLING OR STAYING ASLEEP: SEVERAL DAYS
9. THOUGHTS THAT YOU WOULD BE BETTER OFF DEAD, OR OF HURTING YOURSELF: SEVERAL DAYS
SUM OF ALL RESPONSES TO PHQ QUESTIONS 1-9: 20
10. IF YOU CHECKED OFF ANY PROBLEMS, HOW DIFFICULT HAVE THESE PROBLEMS MADE IT FOR YOU TO DO YOUR WORK, TAKE CARE OF THINGS AT HOME, OR GET ALONG WITH OTHER PEOPLE: SOMEWHAT DIFFICULT

## 2025-07-03 ASSESSMENT — COLUMBIA-SUICIDE SEVERITY RATING SCALE - C-SSRS
6. HAVE YOU EVER DONE ANYTHING, STARTED TO DO ANYTHING, OR PREPARED TO DO ANYTHING TO END YOUR LIFE?: NO
2. HAVE YOU ACTUALLY HAD ANY THOUGHTS OF KILLING YOURSELF?: NO
1. WITHIN THE PAST MONTH, HAVE YOU WISHED YOU WERE DEAD OR WISHED YOU COULD GO TO SLEEP AND NOT WAKE UP?: NO

## 2025-07-03 NOTE — PROGRESS NOTES
Annamaria Wilcox is a 65 y.o. female   CC:   Chief Complaint   Patient presents with    4 mo    Asthma     Has completed pulmonary rehab. Has not smoked since 5/13/25.     Anxiety    Depression       History of Present Illness  The patient presents for a 6-month follow-up.    Smoking Cessation  She has successfully achieved smoking cessation since 05/13/2025, utilizing a combination of nicotine replacement therapy and bupropion. She reports no adverse effects from these treatments as long as she adheres to both. Her current regimen includes one bupropion tablet in the morning and another at bedtime.  - Onset: Since 05/13/2025.  - Alleviating/Aggravating Factors: Nicotine replacement therapy and bupropion.  - Timing: One bupropion tablet in the morning and another at bedtime.  - Severity: No adverse effects reported.    Pulmonary Rehabilitation  She has completed pulmonary rehabilitation and underwent polysomnography, which yielded favorable results.    Dorsalgia Management  She is under the care of Dr. Lotus Thomas for management of dorsalgia, but he is departing from his practice. She has scheduled an appointment with a new physician. Her current medications include gabapentin, topiramate, and tizanidine.  - Alleviating/Aggravating Factors: Gabapentin, topiramate, and tizanidine.    Profound Fatigue  She reports experiencing profound fatigue, often falling asleep irrespective of her location. She denies hematochezia. She has not undergone a recent colonoscopy but recalls having one in the past.  - Character: Profound fatigue, often falling asleep irrespective of her location.    Abnormal Pap Smear Follow-up  Her last Papanicolaou test was conducted in 2021 by Dr. Art, who also performed a mammography. Despite an abnormal Pap smear result, she did not follow up with a gynecologist as recommended.    SOCIAL HISTORY  The patient quit smoking on 05/13/2025.    Patient's past medical, surgical, social

## 2025-07-07 ENCOUNTER — OFFICE VISIT (OUTPATIENT)
Age: 65
End: 2025-07-07
Payer: COMMERCIAL

## 2025-07-07 VITALS
OXYGEN SATURATION: 92 % | TEMPERATURE: 97.2 F | RESPIRATION RATE: 16 BRPM | HEIGHT: 66 IN | WEIGHT: 195.1 LBS | DIASTOLIC BLOOD PRESSURE: 73 MMHG | BODY MASS INDEX: 31.36 KG/M2 | HEART RATE: 70 BPM | SYSTOLIC BLOOD PRESSURE: 122 MMHG

## 2025-07-07 DIAGNOSIS — Z01.419 WOMEN'S ANNUAL ROUTINE GYNECOLOGICAL EXAMINATION: Primary | ICD-10-CM

## 2025-07-07 PROCEDURE — 99386 PREV VISIT NEW AGE 40-64: CPT | Performed by: OBSTETRICS & GYNECOLOGY

## 2025-07-07 NOTE — PROGRESS NOTES
Pt presents for Colposcopy.  Consent obtained and signed by pt, provider and this MA.   Medications and allergies reviewed with pt.   Procedural timeout preformed prior to start of procedure.  
L2-L3 LEFT  PARAMEDIAN performed by Lore Mahajan MD at [facility] OR    PAIN MANAGEMENT PROCEDURE N/A 12/10/2024    LUMBAR 2-3 EPIDURAL STEROID INJECTION UNDER FLUOROSCOPIC GUIDANCE performed by Lore Mahajan MD at [facility] OR    UPPER GASTROINTESTINAL ENDOSCOPY N/A 05/29/2024    ESOPHAGOGASTRODUODENOSCOPY BIOPSY performed by Yehuda Alonzo DO at [facility] ENDOSCOPY        Allergies: Famotidine, Lansoprazole, Naproxen, Omeprazole, Robaxin [methocarbamol], and Trintellix [vortioxetine]     Medications:   Current Outpatient Medications   Medication Sig Dispense Refill    DULERA 100-5 MCG/ACT inhaler       buPROPion (WELLBUTRIN XL) 150 MG extended release tablet Take 1 tablet by mouth in the morning and at bedtime 180 tablet 1    hydrOXYzine pamoate (VISTARIL) 50 MG capsule TAKE ONE (1) CAPSULE BY MOUTH THREE TIMES A DAY AS NEEDED FOR ANXIETY 90 capsule 2    nicotine (NICODERM CQ) 21 MG/24HR Place 1 patch onto the skin every 24 hours 30 patch 3    atorvastatin (LIPITOR) 20 MG tablet TAKE ONE TABLET BY MOUTH EVERY DAY 90 tablet 3    DULoxetine (CYMBALTA) 30 MG extended release capsule TAKE ONE CAPSULE BY MOUTH TWICE A  capsule 1    topiramate (TOPAMAX) 25 MG tablet Take 1 tablet by mouth nightly 90 tablet 0    gabapentin (NEURONTIN) 300 MG capsule Take 1 capsule by mouth in the morning, at noon, and at bedtime for 30 days. 90 capsule 0    fluticasone-umeclidin-vilant (TRELEGY ELLIPTA) 100-62.5-25 MCG/ACT AEPB inhaler Inhale 1 puff into the lungs daily 60 each 3    albuterol sulfate HFA (PROVENTIL;VENTOLIN;PROAIR) 108 (90 Base) MCG/ACT inhaler Inhale 2 puffs into the lungs every 6 hours as needed for Wheezing 18 g 5    triamcinolone (KENALOG) 0.1 % cream apply to affected area twice a day if needed for OUTBREAKS (NEVER APPLY TO FACE)      Cholecalciferol (VITAMIN D3 PO) Take by mouth      mupirocin (BACTROBAN) 2 % ointment Apply topically 2 times daily 1 g 0     No current facility-administered medications for

## 2025-07-10 ASSESSMENT — ENCOUNTER SYMPTOMS
ABDOMINAL DISTENTION: 0
CHEST TIGHTNESS: 0
BLOOD IN STOOL: 0
RHINORRHEA: 0
SINUS PAIN: 0
SHORTNESS OF BREATH: 0
DIARRHEA: 0
PHOTOPHOBIA: 0
VOMITING: 0
COUGH: 0
APNEA: 0
CONSTIPATION: 0
COLOR CHANGE: 0
FACIAL SWELLING: 0
SINUS PRESSURE: 0

## 2025-07-23 ENCOUNTER — RESULTS FOLLOW-UP (OUTPATIENT)
Age: 65
End: 2025-07-23

## 2025-07-23 LAB — GYNECOLOGY CYTOLOGY REPORT: NORMAL

## 2025-07-23 NOTE — TELEPHONE ENCOUNTER
Called patient to schedule colpo with Dr. Goodwin. No answer, left message for her to call the office.

## 2025-07-25 RX ORDER — PRAMIPEXOLE DIHYDROCHLORIDE 0.5 MG/1
0.5 TABLET ORAL
Qty: 30 TABLET | Refills: 1 | Status: SHIPPED | OUTPATIENT
Start: 2025-07-25

## 2025-07-25 NOTE — TELEPHONE ENCOUNTER
Patient calling to see if you will fill this medication? Dr CYR was filling it and she has a scheduled appointment with Danika on 8/14. She is requesting just enough till then.  Last Appointment:  7/3/2025  Future Appointments   Date Time Provider Department Center   8/4/2025  8:15 AM Nitesh Goodwin,  BDM [facility]   8/14/2025 11:00 AM Blanche Garnica PA [facility]   10/3/2025 10:30 AM Danuta Arias MD [facility] DEP   1/8/2026  8:30 AM Danuta Arias MD [facility] DEP   7/8/2026  8:15 [facility]

## (undated) DEVICE — YANKAUER,OPEN TIP,W/O VENT,STERILE: Brand: MEDLINE INDUSTRIES, INC.

## (undated) DEVICE — 6 ML SYRINGE LUER-LOCK TIP: Brand: MONOJECT

## (undated) DEVICE — SYRINGE MED 5ML STD CLR PLAS LUERLOCK TIP N CTRL DISP

## (undated) DEVICE — NEEDLE HYPO 25GA L1.5IN BLU POLYPR HUB S STL REG BVL STR

## (undated) DEVICE — GLOVE ORANGE PI 7 1/2   MSG9075

## (undated) DEVICE — FORCEPS BX OVL CUP FEN DISPOSABLE CAP L 160CM RAD JAW 4

## (undated) DEVICE — SOLUTION IV IRRIG WATER 1000ML POUR BRL 2F7114

## (undated) DEVICE — SYRINGE, LUER LOCK, 5ML: Brand: MEDLINE

## (undated) DEVICE — GAUZE,SPONGE,4"X4",8PLY,STRL,LF,10/TRAY: Brand: MEDLINE

## (undated) DEVICE — BANDAGE ADH W1XL3IN NAT FAB WVN FLX DURABLE N ADH PD SEAL

## (undated) DEVICE — NON-DEHP CATHETER EXTENSION SET, MALE LUER LOCK ADAPTER

## (undated) DEVICE — 3M™ RED DOT™ MONITORING ELECTRODE WITH FOAM TAPE AND STICKY GEL 2560, 50/BAG, 20/CASE, 72/PLT: Brand: RED DOT™

## (undated) DEVICE — NEEDLE HYPO 18GA L1.5IN PNK POLYPR HUB S STL REG BVL STR

## (undated) DEVICE — TUBING SUCT 12FR MAL ALUM SHFT FN CAP VENT UNIV CONN W/ OBT

## (undated) DEVICE — 12 ML SYRINGE,LUER-LOCK TIP: Brand: MONOJECT

## (undated) DEVICE — SURGICAL PROCEDURE PACK EENT CUST

## (undated) DEVICE — GAUZE,SPONGE,4"X4",12PLY,STERILE,LF,2'S: Brand: MEDLINE

## (undated) DEVICE — SPONGE GZ W4XL4IN RAYON POLY CVR W/NONWOVEN FAB STRL 2/PK

## (undated) DEVICE — COVER,LIGHT HANDLE,FLX,2/PK: Brand: MEDLINE INDUSTRIES, INC.

## (undated) DEVICE — COUNTER NDL 30 COUNT DBL MAG

## (undated) DEVICE — Device: Brand: PORTEX

## (undated) DEVICE — GRADUATE TRIANG MEASURE 1000ML BLK PRNT

## (undated) DEVICE — TOWEL,OR,DSP,ST,BLUE,STD,6/PK,12PK/CS: Brand: MEDLINE

## (undated) DEVICE — BLOCK BITE 60FR RUBBER ADLT DENTAL

## (undated) DEVICE — BASIC SINGLE BASIN 1-LF: Brand: MEDLINE INDUSTRIES, INC.

## (undated) DEVICE — MARKER,SKIN,WI/RULER AND LABELS: Brand: MEDLINE

## (undated) DEVICE — 4-PORT MANIFOLD: Brand: NEPTUNE 2

## (undated) DEVICE — NEEDLE DENT LNG 25 GA INJ RED PAINLESS STL DISP

## (undated) DEVICE — NEEDLE HYPO 18GA L1.5IN PNK POLYPR HUB S STL THN WALL FILL

## (undated) DEVICE — SET DENTAL PROPHY

## (undated) DEVICE — GOWN,SIRUS,FABRNF,2XL,18/CS: Brand: MEDLINE

## (undated) DEVICE — SYRINGE,EAR/ULCER, 2 OZ, STERILE: Brand: MEDLINE